# Patient Record
Sex: FEMALE | Race: WHITE | NOT HISPANIC OR LATINO | Employment: UNEMPLOYED | URBAN - METROPOLITAN AREA
[De-identification: names, ages, dates, MRNs, and addresses within clinical notes are randomized per-mention and may not be internally consistent; named-entity substitution may affect disease eponyms.]

---

## 2017-01-19 ENCOUNTER — ALLSCRIPTS OFFICE VISIT (OUTPATIENT)
Dept: OTHER | Facility: OTHER | Age: 67
End: 2017-01-19

## 2017-04-20 ENCOUNTER — ALLSCRIPTS OFFICE VISIT (OUTPATIENT)
Dept: OTHER | Facility: OTHER | Age: 67
End: 2017-04-20

## 2017-04-20 DIAGNOSIS — E55.9 VITAMIN D DEFICIENCY: ICD-10-CM

## 2017-04-20 DIAGNOSIS — I10 ESSENTIAL (PRIMARY) HYPERTENSION: ICD-10-CM

## 2017-04-20 DIAGNOSIS — Z78.0 ASYMPTOMATIC MENOPAUSAL STATE: ICD-10-CM

## 2017-04-20 DIAGNOSIS — R73.9 HYPERGLYCEMIA: ICD-10-CM

## 2017-04-20 DIAGNOSIS — E78.5 HYPERLIPIDEMIA: ICD-10-CM

## 2017-04-20 LAB
BILIRUB UR QL STRIP: NEGATIVE
CLARITY UR: NORMAL
COLOR UR: YELLOW
GLUCOSE (HISTORICAL): NEGATIVE
HGB UR QL STRIP.AUTO: NEGATIVE
KETONES UR STRIP-MCNC: NEGATIVE MG/DL
LEUKOCYTE ESTERASE UR QL STRIP: NEGATIVE
NITRITE UR QL STRIP: NEGATIVE
PH UR STRIP.AUTO: 6.5 [PH]
PROT UR STRIP-MCNC: NEGATIVE MG/DL
SP GR UR STRIP.AUTO: 1.01
UROBILINOGEN UR QL STRIP.AUTO: 0.2

## 2017-04-20 PROCEDURE — G0145 SCR C/V CYTO,THINLAYER,RESCR: HCPCS | Performed by: FAMILY MEDICINE

## 2017-04-20 PROCEDURE — 87624 HPV HI-RISK TYP POOLED RSLT: CPT | Performed by: FAMILY MEDICINE

## 2017-04-21 ENCOUNTER — LAB REQUISITION (OUTPATIENT)
Dept: LAB | Facility: HOSPITAL | Age: 67
End: 2017-04-21
Payer: COMMERCIAL

## 2017-04-21 DIAGNOSIS — Z01.419 ENCOUNTER FOR GYNECOLOGICAL EXAMINATION WITHOUT ABNORMAL FINDING: ICD-10-CM

## 2017-04-25 LAB — HPV RRNA GENITAL QL NAA+PROBE: NORMAL

## 2017-04-26 LAB
LAB AP GYN PRIMARY INTERPRETATION: NORMAL
Lab: NORMAL

## 2017-04-28 ENCOUNTER — ALLSCRIPTS OFFICE VISIT (OUTPATIENT)
Dept: OTHER | Facility: OTHER | Age: 67
End: 2017-04-28

## 2017-05-10 ENCOUNTER — ALLSCRIPTS OFFICE VISIT (OUTPATIENT)
Dept: OTHER | Facility: OTHER | Age: 67
End: 2017-05-10

## 2017-05-15 ENCOUNTER — GENERIC CONVERSION - ENCOUNTER (OUTPATIENT)
Dept: OTHER | Facility: OTHER | Age: 67
End: 2017-05-15

## 2017-05-25 ENCOUNTER — ALLSCRIPTS OFFICE VISIT (OUTPATIENT)
Dept: OTHER | Facility: OTHER | Age: 67
End: 2017-05-25

## 2017-06-09 ENCOUNTER — ALLSCRIPTS OFFICE VISIT (OUTPATIENT)
Dept: OTHER | Facility: OTHER | Age: 67
End: 2017-06-09

## 2017-10-19 ENCOUNTER — ALLSCRIPTS OFFICE VISIT (OUTPATIENT)
Dept: OTHER | Facility: OTHER | Age: 67
End: 2017-10-19

## 2017-10-19 DIAGNOSIS — Z12.39 ENCOUNTER FOR OTHER SCREENING FOR MALIGNANT NEOPLASM OF BREAST: ICD-10-CM

## 2017-10-19 DIAGNOSIS — R73.9 HYPERGLYCEMIA: ICD-10-CM

## 2017-10-19 DIAGNOSIS — I10 ESSENTIAL (PRIMARY) HYPERTENSION: ICD-10-CM

## 2017-10-30 ENCOUNTER — GENERIC CONVERSION - ENCOUNTER (OUTPATIENT)
Dept: OTHER | Facility: OTHER | Age: 67
End: 2017-10-30

## 2017-12-06 ENCOUNTER — ALLSCRIPTS OFFICE VISIT (OUTPATIENT)
Dept: OTHER | Facility: OTHER | Age: 67
End: 2017-12-06

## 2017-12-06 DIAGNOSIS — M79.661 PAIN OF RIGHT LOWER LEG: ICD-10-CM

## 2017-12-06 DIAGNOSIS — R60.0 LOCALIZED EDEMA: ICD-10-CM

## 2017-12-06 DIAGNOSIS — M54.50 LOW BACK PAIN: ICD-10-CM

## 2017-12-06 DIAGNOSIS — R74.8 ABNORMAL LEVELS OF OTHER SERUM ENZYMES: ICD-10-CM

## 2017-12-22 NOTE — PROGRESS NOTES
Assessment   1  Right calf pain (729 5) (M79 661)   2  Lower leg edema (782 3) (R60 0)   3  Elevated liver enzymes (790 5) (R74 8)   4  Benign essential hypertension (401 1) (I10)   5  Low back pain radiating to left leg (724 2) (M54 5)   6  Pain of left calf (729 5) (M79 662)    Plan    Elevated liver enzymes    · * US ABDOMEN COMPLETE; Status:Hold For - Scheduling; Requested for:96Gcn3829;    · US LIVER; Status:Canceled - Scheduling; Lower leg edema    · HydroCHLOROthiazide 12 5 MG Oral Tablet; Take 1 tablet daily  Lower leg edema, Pain of left calf    · VAS LOWER LIMB VENOUS DUPLEX STUDY, COMPLETE BILATERAL; Status:Hold For    - Scheduling; Requested for:05Qqq8552;   PMH: Screening for colon cancer    · COLONOSCOPY; Status:Temporary Deferral - Pt refuses;    12/6/2018      * MRI LUMBAR SPINE WO CONTRAST; Status:Need Information - Financial Authorization; Requested for:[id number];      Perform:[facility]; Order Comments:Lumbar degenerative disc disease radiating into left buttock ; Due:82Jua6208; Ordered; For:Low back pain radiating to left leg; Ordered By:Lesly Ramirez;        Discussion/Summary      Patient is a 78year old female with multiple issues today  Most urgent is her left calf pain and swelling  I am going to send her for a stat venous duplex to rule out DVT  Patient is also with elevated liver enzymes on previous blood work and I am ordering a liver ultrasound  also is with back pain radiating into her left buttock  Patient does have a history of degenerative disc disease but I do not see a recent MRI in her chart and I would like to refer her to pain management  I will try to get the MRI proved to insurance  Chief Complaint   PT complains of her legs and ankle's being swollen for the last two days      History of Present Illness   HPI: Patient has tightness in left calf and right ankle is with swelling  she has increased back pain into left buttock        Review of Systems Constitutional: No fever, no chills, feels well, no tiredness, no recent weight gain or loss  Cardiovascular: no complaints of slow or fast heart rate, no chest pain, no palpitations, no leg claudication or lower extremity edema  Respiratory: no complaints of shortness of breath, no wheezing, no dyspnea on exertion, no orthopnea or PND  Gastrointestinal: no complaints of abdominal pain, no constipation, no nausea or diarrhea, no vomiting, no bloody stools  Musculoskeletal: limb pain, but-- as noted in HPI  Integumentary: no complaints of skin rash or lesion, no itching or dry skin, no skin wounds  Neurological: no complaints of headache, no confusion, no numbness or tingling, no dizziness or fainting  Active Problems   1  Abnormal CAT scan (793 99) (R93 8)   2  Asymptomatic age-related postmenopausal state (V49 81) (Z78 0)   3  Atherosclerotic plaque (440 9) (I70 90)   4  Benign essential hypertension (401 1) (I10)   5  Benign paroxysmal positional vertigo, unspecified laterality (386 11) (H81 10)   6  Changing skin lesion (709 9) (L98 9)   7  Elevated blood sugar (790 29) (R73 9)   8  Hyperlipidemia (272 4) (E78 5)   9  Multiple gallstones (574 20) (K80 20)   10  Vitamin D deficiency (268 9) (E55 9)    Past Medical History   1  History of Acute bacterial conjunctivitis of left eye (372 03) (H10 32)   2  History of Bilateral acute serous otitis media, recurrence not specified (381 01) (H65 03)   3  History of Cellulitis (682 9) (L03 90)   4  History of Chest pressure (786 59) (R07 89)   5  History of Compression fracture of lumbar vertebra (805 4) (S32 000A)   6  History of Dysuria (788 1) (R30 0)   7  History of Dysuria (788 1) (R30 0)   8  History of Encounter for screening colonoscopy (V76 51) (Z12 11)   9  History of Encounter for screening mammogram for malignant neoplasm of breast     (V76 12) (Z12 31)   10   History of Encounter for screening mammogram for malignant neoplasm of breast      (V76 12) (Z12 31)   11  History of acute bacterial sinusitis (V12 69) (Z87 09)   12  History of acute bronchitis (V12 69) (Z87 09)   13  History of allergic rhinitis (V12 69) (Z87 09)   14  History of chest pain (V13 89) (Z87 898)   15  History of cholelithiasis (V12 79) (Z87 19)   16  History of dermatitis (V13 3) (Z87 2)   17  History of dizziness (V13 89) (Z87 898)   18  History of low back pain (V13 59) (Z87 39)   19  History of otitis media (V12 49) (Z86 69)   20  History of sinusitis (V12 69) (Z87 09)   21  History of Lump of skin (782 2) (R22 9)   22  History of Screening for colon cancer (V76 51) (Z12 11)   23  History of Screening for depression (V79 0) (Z13 89)   24  History of Visit for routine gyn exam (V72 31) (Z01 419)   25  History of Visit For:   32  History of Well woman exam (V72 31) (Z01 419)  Active Problems And Past Medical History Reviewed: The active problems and past medical history were reviewed and updated today  Family History   Mother    1  Family history of Hypertension  Father    2  Family history of Liver cancer   3  Family history of Stomach cancer    Social History    · Never A Smoker  The social history was reviewed and updated today  The social history was reviewed and is unchanged  Current Meds    1  B-6 100 MG Oral Tablet; TAKE 1 TABLET DAILY AS DIRECTED; Therapy: 66QVU1463 to Recorded   2  Budesonide 32 MCG/ACT Nasal Suspension; USE 2 SPRAYS IN EACH NOSTRIL ONCE     DAILY; Therapy: 76VKS8358 to (Last NH:71DCS9763)  Requested for: 39EMO2241 Ordered   3  Jublia 10 % External Solution; Apply to toenail; Therapy: 65KXN6517 to (Last Rx:68Qpi0948)  Requested for: 99Xxu3854 Ordered   4  Metoprolol Tartrate 100 MG Oral Tablet; Take 1 tablet by mouth  daily; Therapy: 80ETU5529 to (Danielle Ford)  Requested for: 19YYU0670; Last     Rx:75Djx9041 Ordered   5  Multivitamins Oral Capsule; TAKE 1 CAPSULE DAILY;      Therapy: 07LEE6069 to Recorded   6  PriLOSEC 20 MG CPDR; TAKE 1 CAPSULE Daily Recorded   7  Ursodiol 300 MG Oral Capsule; TAKE 1 CAPSULE BY MOUTH  TWICE DAILY; Therapy: 43FZN1920 to (Twilla Hard)  Requested for: 38ZYE4507; Last     Rx:00Pok2470 Ordered   8  Valsartan 160 MG Oral Tablet; take 1 tablet by mouth once daily; Therapy: 22Voq7000 to (Evaluate:20Nuu0943)  Requested for: 48YMZ0287; Last     Rx:01Gwg7274 Ordered   9  Vitamin D3 3000 UNIT Oral Tablet; Therapy: 00QOU9115 to Recorded   10  Vitamin E 200 UNIT Oral Tablet; Take 1 tablet daily; Therapy: 65WNZ4601 to Recorded   11  ZyrTEC Allergy 10 MG Oral Tablet; take 1 tablet daily as needed Recorded     The medication list was reviewed and updated today  Allergies   1  Bactrim DS TABS   2  Bentyl TABS   3  Erythromycin TABS   4  Zithromax TABS    Vitals    Recorded: [id number] 02:05PM   Temperature 97 F, Tympanic   Heart Rate 71   Pulse Quality Normal   Respiration Quality Normal   Respiration 17   Systolic 514, LUE, Sitting   Diastolic 80, LUE, Sitting   Height 5 ft 0 5 in   Weight 255 lb 2 oz   BMI Calculated 49 01   BSA Calculated 2 08   O2 Saturation 99   Pain Scale 0     Physical Exam        Pulmonary      Respiratory effort: No increased work of breathing or signs of respiratory distress  Auscultation of lungs: Clear to auscultation  Cardiovascular      Auscultation of heart: Normal rate and rhythm, normal S1 and S2, without murmurs  Examination of extremities for edema and/or varicosities: Normal        Lymphatic      Palpation of lymph nodes in neck: No lymphadenopathy  Musculoskeletal      Gait and station: Abnormal   Gait evaluation demonstrated limping on the right-- and-- limping on the left  -- Left calf with firmness  Right ankel with swelling           Signatures    Electronically signed by : Jadon German DO; Dec 21 2017  4:44PM EST                       (Author)

## 2018-01-12 VITALS
SYSTOLIC BLOOD PRESSURE: 146 MMHG | DIASTOLIC BLOOD PRESSURE: 90 MMHG | TEMPERATURE: 98.4 F | HEART RATE: 82 BPM | OXYGEN SATURATION: 99 % | HEIGHT: 62 IN | BODY MASS INDEX: 44.35 KG/M2 | WEIGHT: 241 LBS

## 2018-01-13 VITALS
TEMPERATURE: 97.3 F | HEIGHT: 62 IN | OXYGEN SATURATION: 96 % | BODY MASS INDEX: 46.05 KG/M2 | WEIGHT: 250.25 LBS | HEART RATE: 77 BPM | SYSTOLIC BLOOD PRESSURE: 158 MMHG | DIASTOLIC BLOOD PRESSURE: 92 MMHG

## 2018-01-14 VITALS
OXYGEN SATURATION: 97 % | TEMPERATURE: 97.1 F | WEIGHT: 243.44 LBS | HEIGHT: 62 IN | SYSTOLIC BLOOD PRESSURE: 152 MMHG | BODY MASS INDEX: 44.8 KG/M2 | DIASTOLIC BLOOD PRESSURE: 98 MMHG | HEART RATE: 78 BPM

## 2018-01-14 VITALS
SYSTOLIC BLOOD PRESSURE: 140 MMHG | OXYGEN SATURATION: 97 % | HEART RATE: 75 BPM | BODY MASS INDEX: 45.46 KG/M2 | RESPIRATION RATE: 16 BRPM | DIASTOLIC BLOOD PRESSURE: 82 MMHG | WEIGHT: 248.56 LBS | TEMPERATURE: 98.2 F

## 2018-01-14 VITALS
HEART RATE: 73 BPM | WEIGHT: 249.5 LBS | TEMPERATURE: 98.4 F | DIASTOLIC BLOOD PRESSURE: 92 MMHG | OXYGEN SATURATION: 98 % | HEIGHT: 62 IN | SYSTOLIC BLOOD PRESSURE: 142 MMHG | BODY MASS INDEX: 45.91 KG/M2

## 2018-01-14 VITALS
HEART RATE: 77 BPM | WEIGHT: 250 LBS | OXYGEN SATURATION: 97 % | BODY MASS INDEX: 46.01 KG/M2 | DIASTOLIC BLOOD PRESSURE: 90 MMHG | SYSTOLIC BLOOD PRESSURE: 150 MMHG | HEIGHT: 62 IN | TEMPERATURE: 99 F

## 2018-01-16 NOTE — PROGRESS NOTES
Assessment    1  Well adult exam (V70 0) (Z00 00)   2  Need for influenza vaccination (V04 81) (Z23)    Plan  Asymptomatic age-related postmenopausal state    · * DXA BONE DENSITY SPINE HIP AND PELVIS; Status:Active; Requested  CDR:36NUI7811;   Benign essential hypertension, Elevated blood sugar    · (1) CBC/PLT/DIFF; Status:Active; Requested SHW:99XBH0273;    · (1) COMPREHENSIVE METABOLIC PANEL; Status:Active; Requested [id number];    · (1) HEMOGLOBIN A1C; Status:Active; Requested for:2017;    · (1) LIPID PANEL FASTING W DIRECT LDL REFLEX; Status:Active; Requested  for:2017;    · (1) TSH WITH FT4 REFLEX; Status:Active; Requested for:2017;   Benign essential hypertension, Elevated blood sugar, Hyperlipidemia, Vitamin D  deficiency    · Valsartan 160 MG Oral Tablet; take 1 tablet by mouth once daily  Need for influenza vaccination    · Fluzone High-Dose 0 5 ML Intramuscular Suspension Prefilled Syringe    Discussion/Summary  health maintenance visit Currently, she eats an adequate diet and has an inadequate exercise regimen  cervical cancer screening is current Breast cancer screening: mammogram has been ordered  Colorectal cancer screening: colorectal cancer screening is current  Screening lab work includes hemoglobin, glucose, lipid profile and thyroid function testing  The immunizations are up to date  Hepatitis C Screening: the patient was counseled on Hepatitis C screening  Patient is a 71-year-old female seen for a well visit  She is due for mammogram and DEXA  I am giving her an RX for Fasting blood work  She will have high dose flu shot today  Possible side effects of new medications were reviewed with the patient/guardian today  The treatment plan was reviewed with the patient/guardian   The patient/guardian understands and agrees with the treatment plan      Chief Complaint  Patient presents for a physical exam  She stated that she usually does not get a flu vaccine, but she would like to discuss the high dose flu shot today  History of Present Illness  HM, Adult Female: The patient is being seen for a health maintenance evaluation  General Health: She has regular dental visits  She denies vision problems  Immunizations status: not up to date   Patient has refused up until this time  Screening:   HPI: Patient is here for wellness visit  /84 at home today  Review of Systems    Constitutional: No fever, no chills, feels well, no tiredness, no recent weight gain or weight loss  Cardiovascular: No complaints of slow heart rate, no fast heart rate, no chest pain, no palpitations, no leg claudication, no lower extremity edema  Respiratory: No complaints of shortness of breath, no wheezing, no cough, no SOB on exertion, no orthopnea, no PND  Gastrointestinal: No complaints of abdominal pain, no constipation, no nausea or vomiting, no diarrhea, no bloody stools  Genitourinary: No complaints of dysuria, no incontinence, no pelvic pain, no dysmenorrhea, no vaginal discharge or bleeding  Musculoskeletal: No complaints of arthralgias, no myalgias, no joint swelling or stiffness, no limb pain or swelling  Integumentary: stasis changes  Active Problems    1  Abnormal CAT scan (793 99) (R93 8)   2  Asymptomatic age-related postmenopausal state (V49 81) (Z78 0)   3  Atherosclerotic plaque (440 9) (I70 90)   4  Benign essential hypertension (401 1) (I10)   5  Benign paroxysmal positional vertigo, unspecified laterality (386 11) (H81 10)   6  Changing skin lesion (709 9) (L98 9)   7  Chronic sinusitis, unspecified location (473 9) (J32 9)   8  Cough (786 2) (R05)   9  Elevated blood sugar (790 29) (R73 9)   10  Encounter for other screening for malignant neoplasm of breast (V76 19) (Z12 39)   11  Hyperlipidemia (272 4) (E78 5)   12  Multiple gallstones (574 20) (K80 20)   13  Tinea of nail (110 1) (B35 1)   14   Vitamin D deficiency (268 9) (E55 9)    Past Medical History · History of Acute bacterial conjunctivitis of left eye (372 03) (H10 32)   · History of Bilateral acute serous otitis media, recurrence not specified (381 01) (H65 03)   · History of Cellulitis (682 9) (L03 90)   · History of Chest pressure (786 59) (R07 89)   · History of Compression fracture of lumbar vertebra (805 4) (S32 000A)   · History of Dysuria (788 1) (R30 0)   · History of Dysuria (788 1) (R30 0)   · History of Encounter for screening colonoscopy (V76 51) (Z12 11)   · History of Encounter for screening mammogram for malignant neoplasm of breast  (V76 12) (Z12 31)   · History of Encounter for screening mammogram for malignant neoplasm of breast  (V76 12) (Z12 31)   · History of acute bacterial sinusitis (V12 69) (Z87 09)   · History of acute bronchitis (V12 69) (Z87 09)   · History of allergic rhinitis (V12 69) (Z87 09)   · History of chest pain (V13 89) (V54 240)   · History of cholelithiasis (V12 79) (Z87 19)   · History of dermatitis (V13 3) (Z87 2)   · History of dizziness (V13 89) (Z87 898)   · History of low back pain (V13 59) (Z87 39)   · History of otitis media (V12 49) (Z86 69)   · History of sinusitis (V12 69) (Z87 09)   · History of Lump of skin (782 2) (R22 9)   · History of Screening for colon cancer (V76 51) (Z12 11)   · History of Screening for depression (V79 0) (Z13 89)   · History of Visit for routine gyn exam (V72 31) (Z01 419)   · History of Visit For:   · History of Well woman exam (V72 31) (Z01 419)    Family History  Mother    · Family history of Hypertension  Father    · Family history of Liver cancer   · Family history of Stomach cancer    Social History    · Never A Smoker    Current Meds   1  B-6 100 MG Oral Tablet; TAKE 1 TABLET DAILY AS DIRECTED; Therapy: 32MXI1394 to Recorded   2  Budesonide 32 MCG/ACT Nasal Suspension; USE 2 SPRAYS IN EACH NOSTRIL ONCE   DAILY; Therapy: 55LDR3616 to (Last OH:11XGZ2394)  Requested for: 75RII4750 Ordered   3   Jublia 10 % External Solution; Apply to toenail; Therapy: 89KKP8419 to (Last Rx:21Apr2016)  Requested for: 81Ywc6126 Ordered   4  Metoprolol Tartrate 100 MG Oral Tablet; Take 1 tablet by mouth  daily; Therapy: 92OVH5303 to (Bella Vazqueztorius)  Requested for: 43JGJ4435; Last   Rx:87Cjd9905 Ordered   5  Multivitamins Oral Capsule; TAKE 1 CAPSULE DAILY; Therapy: 97MSR3132 to Recorded   6  PriLOSEC 20 MG CPDR; TAKE 1 CAPSULE Daily Recorded   7  Ursodiol 300 MG Oral Capsule; TAKE 1 CAPSULE BY MOUTH  TWICE DAILY; Therapy: 10NUD7514 to (Bella Vazqueztorius)  Requested for: 91EZL8728; Last   Rx:83Kae8202 Ordered   8  Valsartan 160 MG Oral Tablet; take 1 tablet by mouth once daily; Therapy: 54Yap3193 to (Evaluate:06Nov2017)  Requested for: 61VDX8737; Last   Rx:02Mou6670 Ordered   9  Vitamin D3 3000 UNIT Oral Tablet; Therapy: 69IFZ0399 to Recorded   10  Vitamin E 200 UNIT Oral Tablet; Take 1 tablet daily; Therapy: 18MGH5055 to Recorded   11  ZyrTEC Allergy 10 MG Oral Tablet; take 1 tablet daily as needed Recorded    Allergies    1  Bactrim DS TABS   2  Bentyl TABS   3  Erythromycin TABS   4  Zithromax TABS    Vitals   Recorded: 61HKI3846 12:19PM   Temperature 97 5 F, Tympanic   Heart Rate 90   Pulse Quality Normal   Systolic 682, LUE, Sitting   Diastolic 90, LUE, Sitting   BP CUFF SIZE Large   Height 5 ft 2 in   Weight 252 lb 4 oz   BMI Calculated 46 14   BSA Calculated 2 11   O2 Saturation 96, RA     Physical Exam    Constitutional   General appearance: No acute distress, well appearing and well nourished  Head and Face   Head and face: Normal     Eyes   Conjunctiva and lids: No swelling, erythema or discharge  Pupils and irises: Equal, round, reactive to light  Ears, Nose, Mouth, and Throat   External inspection of ears and nose: Normal     Otoscopic examination: Tympanic membranes translucent with normal light reflex  Canals patent without erythema  Oropharynx: Normal with no erythema, edema, exudate or lesions  Neck   Neck: Supple, symmetric, trachea midline, no masses  Thyroid: Normal, no thyromegaly  Pulmonary   Respiratory effort: No increased work of breathing or signs of respiratory distress  Auscultation of lungs: Clear to auscultation  Cardiovascular   Auscultation of heart: Normal rate and rhythm, normal S1 and S2, no murmurs  Carotid pulses: 2+ bilaterally  Pedal pulses: 2+ bilaterally  Abdomen   Abdomen: Abnormal   The abdomen was obese  The abdomen was soft and nontender  Lymphatic   Palpation of lymph nodes in neck: No lymphadenopathy  Musculoskeletal   Gait and station: Normal     Neurologic   Reflexes: 2+ and symmetric  Sensation: No sensory loss  Psychiatric   Orientation to person, place, and time: Normal     Mood and affect: Normal        Health Management  History of Encounter for screening mammogram for malignant neoplasm of breast   Digital Bilateral Screening Mammogram With CAD; every 1 year; Last 62QVM4428; Next  Due: 60MDO6166; Overdue  * MAMMO SCREENING BILATERAL W CAD; every 1 year; Last 74QNM8756; Next Due:  12GVL2328;  Active    Signatures   Electronically signed by : Jaylene Traylor DO; Nov 7 2017  5:44AM EST                       (Author)

## 2018-01-22 VITALS
DIASTOLIC BLOOD PRESSURE: 90 MMHG | HEIGHT: 62 IN | OXYGEN SATURATION: 96 % | WEIGHT: 252.25 LBS | HEART RATE: 90 BPM | TEMPERATURE: 97.5 F | BODY MASS INDEX: 46.42 KG/M2 | SYSTOLIC BLOOD PRESSURE: 132 MMHG

## 2018-01-23 VITALS
DIASTOLIC BLOOD PRESSURE: 80 MMHG | SYSTOLIC BLOOD PRESSURE: 140 MMHG | HEART RATE: 71 BPM | BODY MASS INDEX: 48.17 KG/M2 | WEIGHT: 255.13 LBS | HEIGHT: 61 IN | OXYGEN SATURATION: 99 % | RESPIRATION RATE: 17 BRPM | TEMPERATURE: 97 F

## 2018-01-23 NOTE — MISCELLANEOUS
Message  I called patient with reading on MRI of her lumbar spine  Which shows multi level degenerative disc disease  And a remote compression fracture  The compression fracture was not present in 2009 but it is not new and we did see this on a CT patient had a couple years ago  I recommended either physical therapy or pain management, none of which patient is keen on doing  She asked if her degenerative disc disease is much worse than 2009 and I told her that I could not tell that from the report  I will try to find that paper report from 2009  She will let me know what she decides about physical therapy or pain management  I also reminded her to go for her ultrasound of the liver  Plan  Elevated liver enzymes    · * US ABDOMEN COMPLETE; Status:Hold For - Scheduling; Requested for:02Taj3694;    · US LIVER; Status:Canceled - Scheduling; Lower leg edema    · HydroCHLOROthiazide 12 5 MG Oral Tablet;  Take 1 tablet daily  Lower leg edema, Pain of left calf    · VAS LOWER LIMB VENOUS DUPLEX STUDY, COMPLETE BILATERAL; Status:Hold For  - Scheduling; Requested for:35Fez0338;   PMH: Screening for colon cancer    · COLONOSCOPY; Status:Temporary Deferral - Pt refuses;    12/6/2018    Signatures   Electronically signed by : Yon Iglesias DO; Dec 21 2017  5:00PM EST                       (Author)

## 2018-02-08 ENCOUNTER — OFFICE VISIT (OUTPATIENT)
Dept: FAMILY MEDICINE CLINIC | Facility: CLINIC | Age: 68
End: 2018-02-08
Payer: COMMERCIAL

## 2018-02-08 VITALS
WEIGHT: 254.7 LBS | RESPIRATION RATE: 18 BRPM | OXYGEN SATURATION: 99 % | SYSTOLIC BLOOD PRESSURE: 130 MMHG | DIASTOLIC BLOOD PRESSURE: 90 MMHG | TEMPERATURE: 97.8 F | HEART RATE: 75 BPM | BODY MASS INDEX: 48.92 KG/M2

## 2018-02-08 DIAGNOSIS — I10 BENIGN ESSENTIAL HYPERTENSION: ICD-10-CM

## 2018-02-08 DIAGNOSIS — K80.20 MULTIPLE GALLSTONES: ICD-10-CM

## 2018-02-08 DIAGNOSIS — J01.40 ACUTE NON-RECURRENT PANSINUSITIS: Primary | ICD-10-CM

## 2018-02-08 DIAGNOSIS — R73.9 ELEVATED BLOOD SUGAR: ICD-10-CM

## 2018-02-08 PROCEDURE — 99213 OFFICE O/P EST LOW 20 MIN: CPT | Performed by: FAMILY MEDICINE

## 2018-02-08 RX ORDER — CEFDINIR 300 MG/1
300 CAPSULE ORAL EVERY 12 HOURS SCHEDULED
Qty: 20 CAPSULE | Refills: 0 | Status: SHIPPED | OUTPATIENT
Start: 2018-02-08 | End: 2018-02-18

## 2018-02-08 RX ORDER — OMEPRAZOLE 20 MG/1
1 CAPSULE, DELAYED RELEASE ORAL DAILY
COMMUNITY
End: 2018-10-18 | Stop reason: SDUPTHER

## 2018-02-08 RX ORDER — HYDROCHLOROTHIAZIDE 12.5 MG/1
1 TABLET ORAL DAILY
COMMUNITY
Start: 2017-12-06 | End: 2018-10-18 | Stop reason: SDUPTHER

## 2018-02-08 RX ORDER — PYRIDOXINE HCL (VITAMIN B6) 100 MG
1 TABLET ORAL DAILY
COMMUNITY
Start: 2014-10-28

## 2018-02-08 RX ORDER — METOPROLOL TARTRATE 100 MG/1
1 TABLET ORAL DAILY
COMMUNITY
Start: 2012-12-05 | End: 2018-02-08 | Stop reason: SDUPTHER

## 2018-02-08 RX ORDER — METOPROLOL TARTRATE 100 MG/1
100 TABLET ORAL DAILY
Qty: 90 TABLET | Refills: 1 | Status: SHIPPED | OUTPATIENT
Start: 2018-02-08 | End: 2018-04-26 | Stop reason: SDUPTHER

## 2018-02-08 RX ORDER — URSODIOL 300 MG/1
1 CAPSULE ORAL 2 TIMES DAILY
COMMUNITY
Start: 2011-01-03 | End: 2018-02-08 | Stop reason: SDUPTHER

## 2018-02-08 RX ORDER — CETIRIZINE HYDROCHLORIDE 10 MG/1
1 TABLET ORAL DAILY PRN
COMMUNITY

## 2018-02-08 RX ORDER — URSODIOL 300 MG/1
300 CAPSULE ORAL 2 TIMES DAILY
Qty: 180 CAPSULE | Refills: 1 | Status: SHIPPED | OUTPATIENT
Start: 2018-02-08 | End: 2018-04-26 | Stop reason: SDUPTHER

## 2018-02-08 RX ORDER — ASCORBIC ACID
1 CRYSTALS ORAL DAILY
COMMUNITY
Start: 2014-10-28

## 2018-02-08 RX ORDER — VALSARTAN 160 MG/1
1 TABLET ORAL DAILY
COMMUNITY
Start: 2017-04-20 | End: 2018-03-16 | Stop reason: SDUPTHER

## 2018-02-08 NOTE — PROGRESS NOTES
Assessment/Plan:  Patient with acute sinusitis - facial pressure,pressure in ears and dizziness  After much discussion regarding antibiotics and review of the od medical record, we do not think she has an allergy to Cefuroxime, but I am prescribing Cefdinir  She did not want Meclizine for the dizziness secondary to side effects  No problem-specific Assessment & Plan notes found for this encounter  Diagnoses and all orders for this visit:    Acute non-recurrent pansinusitis  -     cefdinir (OMNICEF) 300 mg capsule; Take 1 capsule (300 mg total) by mouth every 12 (twelve) hours for 10 days    Benign essential hypertension  -     metoprolol tartrate (LOPRESSOR) 100 mg tablet; Take 1 tablet (100 mg total) by mouth daily for 90 days  -     Comprehensive metabolic panel; Future  -     Lipid Panel with Direct LDL reflex; Future  -     TSH, 3rd generation with T4 reflex; Future  -     CBC and differential; Future    Multiple gallstones  -     ursodiol (ACTIGALL) 300 mg capsule; Take 1 capsule (300 mg total) by mouth 2 (two) times a day    Elevated blood sugar  -     Lipid Panel with Direct LDL reflex; Future  -     TSH, 3rd generation with T4 reflex; Future  -     CBC and differential; Future  -     Hemoglobin A1c; Future    Other orders  -     Pyridoxine HCl (VITAMIN B-6) 100 MG TABS; Take 1 tablet by mouth daily  -     hydrochlorothiazide (HYDRODIURIL) 12 5 mg tablet; Take 1 tablet by mouth daily  -     Discontinue: metoprolol tartrate (LOPRESSOR) 100 mg tablet; Take 1 tablet by mouth daily  -     omeprazole (PRILOSEC) 20 mg delayed release capsule; Take 1 capsule by mouth daily  -     valsartan (DIOVAN) 160 mg tablet; Take 1 tablet by mouth daily  -     Discontinue: ursodiol (ACTIGALL) 300 mg capsule; Take 1 capsule by mouth 2 (two) times a day  -     Cholecalciferol (HM VITAMIN D3) 4000 units CAPS;  Take by mouth  -     Vitamin E 200 units TABS; Take 1 tablet by mouth daily  -     cetirizine (ZYRTEC ALLERGY) 10 mg tablet; Take 1 tablet by mouth daily as needed  -     Efinaconazole (JUBLIA) 10 % SOLN; Apply topically  -     budesonide (RINOCORT AQUA) 32 MCG/ACT nasal spray; 2 sprays into each nostril daily          Vitals:    02/08/18 1506   BP: 130/90   Pulse: 75   Resp: 18   Temp: 97 8 °F (36 6 °C)   SpO2: 99%       Subjective:      Patient ID: Sanford Whitley is a 78 y o  female  Patient is with sinus pressure for a few weeks - frontal area and back of neck  Worst part is the dizziness- pressure in her ears  She has to be careful when she stands up from sitting  The following portions of the patient's history were reviewed and updated as appropriate: allergies, current medications, past family history, past medical history, past social history, past surgical history and problem list     Review of Systems   Constitutional: Negative for chills and fever  HENT: Positive for congestion and sore throat  Respiratory: Positive for cough  Cardiovascular: Negative for chest pain and palpitations  Gastrointestinal: Negative  Genitourinary: Negative  Neurological: Positive for dizziness and headaches  Negative for syncope  Psychiatric/Behavioral: Negative  Objective:     Physical Exam   Constitutional: She appears well-developed and well-nourished  HENT:   Right Ear: Tympanic membrane is injected and scarred  Left Ear: Tympanic membrane is scarred  Nose: Mucosal edema and rhinorrhea present  Right sinus exhibits frontal sinus tenderness  Left sinus exhibits frontal sinus tenderness  Mouth/Throat: Oropharynx is clear and moist and mucous membranes are normal    Nursing note and vitals reviewed

## 2018-03-16 DIAGNOSIS — I10 ESSENTIAL HYPERTENSION: Primary | ICD-10-CM

## 2018-03-16 RX ORDER — VALSARTAN 160 MG/1
160 TABLET ORAL DAILY
Qty: 90 TABLET | Refills: 1 | Status: SHIPPED | OUTPATIENT
Start: 2018-03-16 | End: 2018-04-26 | Stop reason: SDUPTHER

## 2018-04-07 ENCOUNTER — OFFICE VISIT (OUTPATIENT)
Dept: FAMILY MEDICINE CLINIC | Facility: CLINIC | Age: 68
End: 2018-04-07
Payer: COMMERCIAL

## 2018-04-07 VITALS
WEIGHT: 258 LBS | SYSTOLIC BLOOD PRESSURE: 138 MMHG | HEART RATE: 74 BPM | TEMPERATURE: 99 F | HEIGHT: 61 IN | RESPIRATION RATE: 16 BRPM | BODY MASS INDEX: 48.71 KG/M2 | OXYGEN SATURATION: 99 % | DIASTOLIC BLOOD PRESSURE: 82 MMHG

## 2018-04-07 DIAGNOSIS — R42 DIZZINESS: Primary | ICD-10-CM

## 2018-04-07 PROCEDURE — 99213 OFFICE O/P EST LOW 20 MIN: CPT | Performed by: FAMILY MEDICINE

## 2018-04-07 NOTE — PROGRESS NOTES
Assessment/Plan:   1  Dizziness  Reviewed patient's symptoms with her  Her exam was benign today  It does not appear to be any sign of otitis media  She was advised to continue with supportive care  Maintain hydration  Take over-the-counter Mucinex for symptom relief  She may also start using her fluticasone nasal spray  If any symptoms should persist or worsen, consider short course of oral steroids  She was offered meclizine today however it does not appear that this will be beneficial for her  There are no diagnoses linked to this encounter  Subjective:    Chief Complaint   Patient presents with    Ear Fullness     Patient presents for dizziness and pressure in both ears x a couple days  Patient ID: Melody Arana is a 78 y o  female  Patient is a 52-year-old female presents today with CC of dizziness  She states that she periodically develops the symptoms  Her symptoms only last for short period of time  She mainly notices the symptoms upon position change when she stands up as well as when she turns her head  She denies any loss of consciousness  She states that she previously had similar symptoms and did have a urine infection  She currently has not been taking anything for her current symptoms  Review of Systems   Constitutional: Negative for activity change, chills, fatigue and fever  HENT: Negative for congestion, ear pain, sinus pressure and sore throat  Eyes: Negative for redness, itching and visual disturbance  Respiratory: Negative for cough and shortness of breath  Cardiovascular: Negative for chest pain and palpitations  Gastrointestinal: Negative for abdominal pain, diarrhea and nausea  Endocrine: Negative for cold intolerance and heat intolerance  Genitourinary: Negative for dysuria, flank pain and frequency  Musculoskeletal: Negative for arthralgias, back pain, gait problem and myalgias  Skin: Negative for color change  Allergic/Immunologic: Negative for environmental allergies  Neurological: Negative for dizziness, numbness and headaches  Psychiatric/Behavioral: Negative for behavioral problems and sleep disturbance  The following portions of the patient's history were reviewed and updated as appropriate : past family history, past medical history, past social history and past surgical history  Objective:    Vitals:    04/07/18 1233   BP: 138/82   BP Location: Left arm   Patient Position: Sitting   Cuff Size: Standard   Pulse: 74   Resp: 16   Temp: 99 °F (37 2 °C)   TempSrc: Tympanic   SpO2: 99%   Weight: 129 kg (285 lb 1 6 oz)   Height: 5' 1 02" (1 55 m)        Physical Exam   Constitutional: She is oriented to person, place, and time  She appears well-developed and well-nourished  HENT:   Head: Normocephalic and atraumatic  Nose: Nose normal    Mouth/Throat: No oropharyngeal exudate  Eyes: Pupils are equal, round, and reactive to light  Right eye exhibits no discharge  Left eye exhibits no discharge  Neck: Normal range of motion  Neck supple  No tracheal deviation present  Cardiovascular: Normal rate, regular rhythm and intact distal pulses  Exam reveals no gallop and no friction rub  No murmur heard  Pulses:       Carotid pulses are 2+ on the right side, and 2+ on the left side  Pulmonary/Chest: Effort normal and breath sounds normal  No respiratory distress  She has no wheezes  She has no rales  Abdominal: Soft  Bowel sounds are normal  She exhibits no distension  There is no tenderness  There is no rebound and no guarding  Musculoskeletal: Normal range of motion  She exhibits no edema  Lymphadenopathy:        Head (right side): No submental and no submandibular adenopathy present  Head (left side): No submental and no submandibular adenopathy present  She has no cervical adenopathy          Right cervical: No superficial cervical, no deep cervical and no posterior cervical adenopathy present  Left cervical: No superficial cervical, no deep cervical and no posterior cervical adenopathy present  Neurological: She is alert and oriented to person, place, and time  No cranial nerve deficit or sensory deficit  Skin: Skin is warm, dry and intact  Psychiatric: Her speech is normal and behavior is normal  Judgment normal  Her mood appears not anxious  Cognition and memory are normal  She does not exhibit a depressed mood  Vitals reviewed

## 2018-04-11 ENCOUNTER — OFFICE VISIT (OUTPATIENT)
Dept: FAMILY MEDICINE CLINIC | Facility: CLINIC | Age: 68
End: 2018-04-11
Payer: COMMERCIAL

## 2018-04-11 ENCOUNTER — TELEPHONE (OUTPATIENT)
Dept: FAMILY MEDICINE CLINIC | Facility: CLINIC | Age: 68
End: 2018-04-11

## 2018-04-11 VITALS
TEMPERATURE: 97.6 F | WEIGHT: 258.2 LBS | BODY MASS INDEX: 47.52 KG/M2 | HEART RATE: 73 BPM | HEIGHT: 62 IN | DIASTOLIC BLOOD PRESSURE: 80 MMHG | SYSTOLIC BLOOD PRESSURE: 132 MMHG | OXYGEN SATURATION: 99 % | RESPIRATION RATE: 17 BRPM

## 2018-04-11 DIAGNOSIS — Z12.11 SCREENING FOR COLON CANCER: Primary | ICD-10-CM

## 2018-04-11 DIAGNOSIS — R42 VERTIGO: ICD-10-CM

## 2018-04-11 DIAGNOSIS — I10 BENIGN ESSENTIAL HYPERTENSION: ICD-10-CM

## 2018-04-11 PROCEDURE — 99214 OFFICE O/P EST MOD 30 MIN: CPT | Performed by: FAMILY MEDICINE

## 2018-04-11 RX ORDER — GUAIFENESIN 600 MG
600 TABLET, EXTENDED RELEASE 12 HR ORAL EVERY 12 HOURS SCHEDULED
COMMUNITY

## 2018-04-11 NOTE — PROGRESS NOTES
Assessment/Plan:  Patient with dizziness, seen by ENT and no ear pathology noted  She was given an RX for an MRI  I agreed this was appropriate and also ordered an echo and Holter  She wants to have those at [facility]  She has an Rx for fasting blood work to have done before her next appointment  Patient is very anxious about her condition  Diagnoses and all orders for this visit:    Screening for colon cancer  -     Ambulatory referral to Gastroenterology; Future    Vertigo  -     Echo complete with contrast if indicated; Future  -     Holter monitor - 24 hour; Future    Benign essential hypertension    Other orders  -     guaiFENesin (MUCINEX) 600 mg 12 hr tablet; Take 600 mg by mouth every 12 (twelve) hours          Subjective:      Patient ID: Frandy Collins is a 78 y o  female  Patient is here for follow up of ear pressure  No crackling  Some sinus congestion  She was here on Saturday and saw Dr Gurpreet Blevins and was told her exam was normal  Then she went to see Dr Yocasta Christianson and saw the PA - they also saw no problems on her exam and was told that she had no pulse  The following portions of the patient's history were reviewed and updated as appropriate: allergies, current medications, past family history, past medical history, past social history, past surgical history and problem list     Review of Systems   Constitutional: Negative  HENT: Positive for sinus pressure  Respiratory: Negative  Cardiovascular: Positive for leg swelling  Gastrointestinal: Negative  Neurological: Positive for dizziness  Psychiatric/Behavioral: The patient is nervous/anxious  Objective:      /80 (BP Location: Right arm, Patient Position: Sitting, Cuff Size: Adult)   Pulse 73   Temp 97 6 °F (36 4 °C) (Tympanic)   Resp 17   Ht 5' 2" (1 575 m)   Wt 117 kg (258 lb 3 2 oz)   SpO2 99%   BMI 47 23 kg/m²          Physical Exam   Constitutional: She is oriented to person, place, and time  She appears well-developed and well-nourished  Obese  HENT:   Right Ear: Tympanic membrane normal    Left Ear: Tympanic membrane normal    Mouth/Throat: Posterior oropharyngeal erythema present  Cardiovascular: Normal rate, regular rhythm and normal heart sounds  Pulmonary/Chest: Effort normal and breath sounds normal    Musculoskeletal: She exhibits edema (Slight )  Neurological: She is alert and oriented to person, place, and time  Skin: Skin is warm and dry  Psychiatric: She has a normal mood and affect  Nursing note and vitals reviewed

## 2018-04-19 ENCOUNTER — TELEPHONE (OUTPATIENT)
Dept: FAMILY MEDICINE CLINIC | Facility: CLINIC | Age: 68
End: 2018-04-19

## 2018-04-19 NOTE — TELEPHONE ENCOUNTER
Pt came into the office and stated that she got the results of her MRI results back and she is very worried she does not know what it all means  She did have a copy sent to you, but I stated to the pt to please bring one in for your chart tomorrow  Pt the stated to me the results of her MRI was SM Vessel Disease, lack of oxygen  Pt asked if you would please call her when you have a moment  Thank you!

## 2018-04-20 NOTE — TELEPHONE ENCOUNTER
I spoke to patient last evening  Her MRI result was in the Care Everywhere section  We had not received a FAX - we didn  It showed  Subtle changes in the white matter, mild small vessel disease  I told her that this is most likely related to her hypertension and is important to get this under better control  She should take a baby aspirin daily  The MRI was just resulted yesterday afternoon at 2:56 p m  I had not called her about her blood work since she had an upcoming appointment and we would be discussing the results  She was very upset, as she had called the ear nose and throat doctor's office who had prescribed the MRI and she was told about the small vessel disease but did not understand the implications of this  I told her that I was not sure how to print the results of these tests out from the Care everywhere section but I would have to ask someone in the office to do that today  She should still have her echocardiogram and Holter monitor

## 2018-04-26 ENCOUNTER — ANNUAL EXAM (OUTPATIENT)
Dept: FAMILY MEDICINE CLINIC | Facility: CLINIC | Age: 68
End: 2018-04-26
Payer: COMMERCIAL

## 2018-04-26 VITALS
OXYGEN SATURATION: 99 % | TEMPERATURE: 97.6 F | HEIGHT: 62 IN | HEART RATE: 75 BPM | RESPIRATION RATE: 20 BRPM | SYSTOLIC BLOOD PRESSURE: 160 MMHG | DIASTOLIC BLOOD PRESSURE: 80 MMHG | BODY MASS INDEX: 47.22 KG/M2 | WEIGHT: 256.6 LBS

## 2018-04-26 DIAGNOSIS — K80.20 MULTIPLE GALLSTONES: ICD-10-CM

## 2018-04-26 DIAGNOSIS — I10 BENIGN ESSENTIAL HYPERTENSION: ICD-10-CM

## 2018-04-26 DIAGNOSIS — M51.36 DISC DEGENERATION, LUMBAR: ICD-10-CM

## 2018-04-26 DIAGNOSIS — Z01.419 WELL WOMAN EXAM: Primary | ICD-10-CM

## 2018-04-26 DIAGNOSIS — M47.816 LUMBAR SPONDYLOSIS: ICD-10-CM

## 2018-04-26 DIAGNOSIS — I10 ESSENTIAL HYPERTENSION: ICD-10-CM

## 2018-04-26 PROBLEM — R74.8 ELEVATED LIVER ENZYMES: Status: ACTIVE | Noted: 2017-12-06

## 2018-04-26 LAB
SL AMB  POCT GLUCOSE, UA: NEGATIVE
SL AMB LEUKOCYTE ESTERASE,UA: NEGATIVE
SL AMB POCT BILIRUBIN,UA: NEGATIVE
SL AMB POCT BLOOD,UA: NORMAL
SL AMB POCT CLARITY,UA: CLEAR
SL AMB POCT COLOR,UA: YELLOW
SL AMB POCT KETONES,UA: NEGATIVE
SL AMB POCT NITRITE,UA: NEGATIVE
SL AMB POCT PH,UA: 6
SL AMB POCT SPECIFIC GRAVITY,UA: 1.02
SL AMB POCT URINE PROTEIN: NEGATIVE
SL AMB POCT UROBILINOGEN: 0.2

## 2018-04-26 PROCEDURE — G0101 CA SCREEN;PELVIC/BREAST EXAM: HCPCS | Performed by: FAMILY MEDICINE

## 2018-04-26 PROCEDURE — 81003 URINALYSIS AUTO W/O SCOPE: CPT | Performed by: FAMILY MEDICINE

## 2018-04-26 PROCEDURE — G0145 SCR C/V CYTO,THINLAYER,RESCR: HCPCS | Performed by: FAMILY MEDICINE

## 2018-04-26 PROCEDURE — 99214 OFFICE O/P EST MOD 30 MIN: CPT | Performed by: FAMILY MEDICINE

## 2018-04-26 RX ORDER — METOPROLOL TARTRATE 100 MG/1
100 TABLET ORAL DAILY
Qty: 90 TABLET | Refills: 0 | Status: SHIPPED | OUTPATIENT
Start: 2018-04-26 | End: 2018-10-18 | Stop reason: SDUPTHER

## 2018-04-26 RX ORDER — VALSARTAN 160 MG/1
160 TABLET ORAL DAILY
Qty: 90 TABLET | Refills: 0 | Status: SHIPPED | OUTPATIENT
Start: 2018-04-26 | End: 2018-10-23 | Stop reason: SDUPTHER

## 2018-04-26 RX ORDER — URSODIOL 300 MG/1
300 CAPSULE ORAL 2 TIMES DAILY
Qty: 180 CAPSULE | Refills: 0 | Status: SHIPPED | OUTPATIENT
Start: 2018-04-26 | End: 2018-10-18 | Stop reason: SDUPTHER

## 2018-04-26 NOTE — PROGRESS NOTES
Assessment/Plan:  Patient is seen for a well womanexam  She did have a PAP smear  We also discussed her dizziness and the testing she is having done       Diagnoses and all orders for this visit:    Well woman exam  -     Liquid-based pap, screening  -     POCT urine dip auto non-scope    Benign essential hypertension  -     metoprolol tartrate (LOPRESSOR) 100 mg tablet; Take 1 tablet (100 mg total) by mouth daily for 90 days    Essential hypertension  -     valsartan (DIOVAN) 160 mg tablet; Take 1 tablet (160 mg total) by mouth daily    Disc degeneration, lumbar  -     Walker    Multiple gallstones  -     ursodiol (ACTIGALL) 300 mg capsule; Take 1 capsule (300 mg total) by mouth 2 (two) times a day    Lumbar spondylosis  -     Walker          Subjective:   Chief Complaint   Patient presents with    Gynecologic Exam        Patient ID: Polo Reyes is a 78 y o  female  Patient is here for well woman exam   She did have a PAP smear last year and it was normal, but she would like one today  I did discuss that it may not be covered  She has a [employer] insurance this year, she tells me  She did sign the ABN  The following portions of the patient's history were reviewed and updated as appropriate: allergies, current medications, past family history, past medical history, past social history, past surgical history and problem list     Review of Systems   Constitutional: Negative  Respiratory: Negative  Cardiovascular: Negative  Gastrointestinal:        Hemorrhoid  Genitourinary:        Vaginal dryness   Neurological: Positive for dizziness  Objective:      /80 (BP Location: Left arm, Patient Position: Sitting, Cuff Size: Large)   Pulse 75   Temp 97 6 °F (36 4 °C) (Tympanic)   Resp 20   Ht 5' 2" (1 575 m)   Wt 116 kg (256 lb 9 6 oz)   SpO2 99%   Breastfeeding? No   BMI 46 93 kg/m²          Physical Exam   Constitutional: She is oriented to person, place, and time   No distress  Morbidly obese  Neck: No thyromegaly present  Cardiovascular: Normal rate, regular rhythm and normal heart sounds  Pulmonary/Chest: Effort normal and breath sounds normal    Abdominal: Soft  Bowel sounds are normal    Genitourinary:   Genitourinary Comments: Vaginal exam was difficult secondary to patient size  Even with larger speculum, I am not sure that I truly visualized the cervix  I did not do a rectal exam because she say her hemorrhoid is bother ing her   A PAP smear was done  Lymphadenopathy:     She has no cervical adenopathy  Neurological: She is alert and oriented to person, place, and time  Skin: Skin is warm and dry  Psychiatric: She has a normal mood and affect  Nursing note and vitals reviewed

## 2018-04-30 ENCOUNTER — TELEPHONE (OUTPATIENT)
Dept: FAMILY MEDICINE CLINIC | Facility: CLINIC | Age: 68
End: 2018-04-30

## 2018-04-30 LAB
LAB AP GYN PRIMARY INTERPRETATION: NORMAL
Lab: NORMAL

## 2018-04-30 NOTE — TELEPHONE ENCOUNTER
Call patient - I looked in the Care Everywhere and saw her Holter and echo results because I know she was concerned  They were ok  I will call her at the end of my day to discuss further   (Please send message back to me as a reminder )

## 2018-05-09 ENCOUNTER — TELEPHONE (OUTPATIENT)
Dept: FAMILY MEDICINE CLINIC | Facility: CLINIC | Age: 68
End: 2018-05-09

## 2018-05-09 NOTE — TELEPHONE ENCOUNTER
[facility] pt id their for her echo she was requesting that it be faxed to her at [phone]   Pt's echo script was faxed on 5/9/18 at 2:33 pm to [phone]

## 2018-05-18 ENCOUNTER — OFFICE VISIT (OUTPATIENT)
Dept: FAMILY MEDICINE CLINIC | Facility: CLINIC | Age: 68
End: 2018-05-18
Payer: COMMERCIAL

## 2018-05-18 VITALS
HEART RATE: 92 BPM | DIASTOLIC BLOOD PRESSURE: 100 MMHG | RESPIRATION RATE: 18 BRPM | OXYGEN SATURATION: 94 % | TEMPERATURE: 97.6 F | HEIGHT: 61 IN | BODY MASS INDEX: 48.82 KG/M2 | WEIGHT: 258.6 LBS | SYSTOLIC BLOOD PRESSURE: 160 MMHG

## 2018-05-18 DIAGNOSIS — R42 DIZZINESS: ICD-10-CM

## 2018-05-18 DIAGNOSIS — I10 BENIGN ESSENTIAL HYPERTENSION: Primary | ICD-10-CM

## 2018-05-18 PROCEDURE — 99214 OFFICE O/P EST MOD 30 MIN: CPT | Performed by: FAMILY MEDICINE

## 2018-05-18 RX ORDER — HYDROCHLOROTHIAZIDE 12.5 MG/1
12.5 TABLET ORAL DAILY
Qty: 30 TABLET | Refills: 1 | Status: SHIPPED | OUTPATIENT
Start: 2018-05-18 | End: 2018-07-17 | Stop reason: SDUPTHER

## 2018-05-18 NOTE — PROGRESS NOTES
Assessment/Plan:    Patient is 69-year-old female seen for hypertension and to review her test results  Her echo did show a good ejection fraction over 60 percent and some wall thickening  No significant valve abnormality  Holter did not show any significant happy to be she has been seen by ENT for her dizziness and had an MRI of her brain  Her blood pressure is still not well controlled and I am adding a low dose of HCTZ and she will continue the metoprolol and valsartan  I will see her back in 6 weeks or so  Diagnoses and all orders for this visit:    Benign essential hypertension  -     hydrochlorothiazide (HYDRODIURIL) 12 5 mg tablet; Take 1 tablet (12 5 mg total) by mouth daily    Dizziness          Subjective:      Patient ID: Juany Mireles is a 78 y o  female  Patient is here to follow up on dizziness  She had echo and Holter  She had also seen ear nose and throat  Her Holter monitor did not show any significant  Ectopy  Her 1st echo  Was redone with Doppler to get better visualization  The following portions of the patient's history were reviewed and updated as appropriate: allergies, current medications, past family history, past medical history, past social history, past surgical history and problem list     Review of Systems   Constitutional: Negative  HENT: Negative  Respiratory: Negative  Cardiovascular: Positive for leg swelling  Gastrointestinal: Negative  Neurological: Positive for dizziness  Psychiatric/Behavioral: Negative  Objective:      /100 (BP Location: Left arm, Patient Position: Sitting, Cuff Size: Adult)   Pulse 92   Temp 97 6 °F (36 4 °C) (Tympanic)   Resp 18   Ht 5' 1 2" (1 554 m)   Wt 117 kg (258 lb 9 6 oz)   SpO2 94%   BMI 48 54 kg/m²          Physical Exam   Constitutional: She is oriented to person, place, and time  She appears well-developed  No distress  HENT:   Right TM dull compared to left     Eyes: EOM are normal  Pupils are equal, round, and reactive to light  Neck: No thyromegaly present  Cardiovascular: Normal rate, regular rhythm and normal heart sounds  /94   Pulmonary/Chest: Effort normal and breath sounds normal    Musculoskeletal: She exhibits edema  Lymphadenopathy:     She has no cervical adenopathy  Neurological: She is alert and oriented to person, place, and time  Skin: Skin is warm and dry  Psychiatric: She has a normal mood and affect  Nursing note and vitals reviewed

## 2018-07-17 ENCOUNTER — OFFICE VISIT (OUTPATIENT)
Dept: FAMILY MEDICINE CLINIC | Facility: CLINIC | Age: 68
End: 2018-07-17
Payer: COMMERCIAL

## 2018-07-17 VITALS
OXYGEN SATURATION: 98 % | WEIGHT: 253.7 LBS | RESPIRATION RATE: 18 BRPM | SYSTOLIC BLOOD PRESSURE: 130 MMHG | DIASTOLIC BLOOD PRESSURE: 80 MMHG | BODY MASS INDEX: 47.9 KG/M2 | HEART RATE: 80 BPM | TEMPERATURE: 98 F | HEIGHT: 61 IN

## 2018-07-17 DIAGNOSIS — E03.9 HYPOTHYROIDISM, UNSPECIFIED TYPE: ICD-10-CM

## 2018-07-17 DIAGNOSIS — I10 BENIGN ESSENTIAL HYPERTENSION: ICD-10-CM

## 2018-07-17 DIAGNOSIS — I10 ESSENTIAL HYPERTENSION: Primary | ICD-10-CM

## 2018-07-17 LAB
ALBUMIN SERPL BCP-MCNC: 3.7 G/DL (ref 3.5–5)
ALP SERPL-CCNC: 99 U/L (ref 46–116)
ALT SERPL W P-5'-P-CCNC: 101 U/L (ref 12–78)
ANION GAP SERPL CALCULATED.3IONS-SCNC: 6 MMOL/L (ref 4–13)
AST SERPL W P-5'-P-CCNC: 73 U/L (ref 5–45)
BILIRUB SERPL-MCNC: 0.41 MG/DL (ref 0.2–1)
BUN SERPL-MCNC: 13 MG/DL (ref 5–25)
CALCIUM SERPL-MCNC: 8.9 MG/DL (ref 8.3–10.1)
CHLORIDE SERPL-SCNC: 101 MMOL/L (ref 100–108)
CO2 SERPL-SCNC: 29 MMOL/L (ref 21–32)
CREAT SERPL-MCNC: 0.66 MG/DL (ref 0.6–1.3)
GFR SERPL CREATININE-BSD FRML MDRD: 92 ML/MIN/1.73SQ M
GLUCOSE SERPL-MCNC: 169 MG/DL (ref 65–140)
POTASSIUM SERPL-SCNC: 4 MMOL/L (ref 3.5–5.3)
PROT SERPL-MCNC: 7.9 G/DL (ref 6.4–8.2)
SODIUM SERPL-SCNC: 136 MMOL/L (ref 136–145)

## 2018-07-17 PROCEDURE — 36415 COLL VENOUS BLD VENIPUNCTURE: CPT | Performed by: FAMILY MEDICINE

## 2018-07-17 PROCEDURE — 99214 OFFICE O/P EST MOD 30 MIN: CPT | Performed by: FAMILY MEDICINE

## 2018-07-17 PROCEDURE — 80053 COMPREHEN METABOLIC PANEL: CPT | Performed by: FAMILY MEDICINE

## 2018-07-17 RX ORDER — HYDROCHLOROTHIAZIDE 12.5 MG/1
TABLET ORAL
Qty: 90 TABLET | Refills: 1 | Status: SHIPPED | OUTPATIENT
Start: 2018-07-17 | End: 2018-10-08 | Stop reason: SDUPTHER

## 2018-07-17 NOTE — PROGRESS NOTES
Assessment/Plan:  Patient's blood pressure is improved  I will check a BMP because of taking HCTZ  She will continue with present medication and I will see her back in the fall for a wellness visit  Regarding her ear discomfort, I asked her to continue Zyrtec and steroid nasal spray  Diagnoses and all orders for this visit:    Essential hypertension  -     Comprehensive metabolic panel    Benign essential hypertension    Hypothyroidism, unspecified type          Subjective:   Chief Complaint   Patient presents with    Follow-up     medication        Patient ID: Ny Turner is a 80 y o  female  Patient is here to follow up on dizziness and hypertension  We had adjusted her blood pressure medication at the last visit  She has sen ENT had an MRI and an echocardiogram in regards to dizziness  She is feeeling some ear discomfort  The following portions of the patient's history were reviewed and updated as appropriate: allergies, current medications, past family history, past medical history, past social history, past surgical history and problem list     Review of Systems   Constitutional: Negative for chills and fever  HENT: Positive for congestion and ear pain  Respiratory: Negative  Cardiovascular: Negative  Neurological: Positive for dizziness  Objective:      /80 (BP Location: Left arm, Patient Position: Sitting, Cuff Size: Large)   Pulse 80   Temp 98 °F (36 7 °C) (Tympanic)   Resp 18   Ht 5' 1 2" (1 554 m)   Wt 115 kg (253 lb 11 2 oz)   SpO2 98%   Breastfeeding? No   BMI 47 62 kg/m²          Physical Exam   Constitutional: She is oriented to person, place, and time  She appears well-developed  No distress  HENT:   TM's dull  Neck: Carotid bruit is not present  No thyromegaly present  Cardiovascular: Normal rate, regular rhythm and normal heart sounds  Pulmonary/Chest: Effort normal and breath sounds normal    Musculoskeletal: She exhibits no edema  Lymphadenopathy:     She has no cervical adenopathy  Neurological: She is alert and oriented to person, place, and time  Skin: Skin is warm and dry  Psychiatric: She has a normal mood and affect  Nursing note and vitals reviewed

## 2018-10-08 DIAGNOSIS — I10 BENIGN ESSENTIAL HYPERTENSION: ICD-10-CM

## 2018-10-08 RX ORDER — HYDROCHLOROTHIAZIDE 12.5 MG/1
12.5 TABLET ORAL DAILY
Qty: 90 TABLET | Refills: 1 | Status: SHIPPED | OUTPATIENT
Start: 2018-10-08 | End: 2018-10-18 | Stop reason: SDUPTHER

## 2018-10-18 ENCOUNTER — OFFICE VISIT (OUTPATIENT)
Dept: FAMILY MEDICINE CLINIC | Facility: CLINIC | Age: 68
End: 2018-10-18
Payer: COMMERCIAL

## 2018-10-18 VITALS
TEMPERATURE: 97.2 F | HEIGHT: 60 IN | SYSTOLIC BLOOD PRESSURE: 122 MMHG | WEIGHT: 250.5 LBS | RESPIRATION RATE: 16 BRPM | OXYGEN SATURATION: 96 % | BODY MASS INDEX: 49.18 KG/M2 | DIASTOLIC BLOOD PRESSURE: 84 MMHG | HEART RATE: 77 BPM

## 2018-10-18 DIAGNOSIS — R82.90 FOUL SMELLING URINE: ICD-10-CM

## 2018-10-18 DIAGNOSIS — I10 BENIGN ESSENTIAL HYPERTENSION: ICD-10-CM

## 2018-10-18 DIAGNOSIS — E03.9 HYPOTHYROIDISM, UNSPECIFIED TYPE: ICD-10-CM

## 2018-10-18 DIAGNOSIS — K21.00 REFLUX ESOPHAGITIS: ICD-10-CM

## 2018-10-18 DIAGNOSIS — R73.9 ELEVATED BLOOD SUGAR: ICD-10-CM

## 2018-10-18 DIAGNOSIS — K80.20 MULTIPLE GALLSTONES: ICD-10-CM

## 2018-10-18 DIAGNOSIS — Z13.220 ENCOUNTER FOR LIPID SCREENING FOR CARDIOVASCULAR DISEASE: ICD-10-CM

## 2018-10-18 DIAGNOSIS — Z11.59 ENCOUNTER FOR HEPATITIS C SCREENING TEST FOR LOW RISK PATIENT: ICD-10-CM

## 2018-10-18 DIAGNOSIS — Z12.31 ENCOUNTER FOR SCREENING MAMMOGRAM FOR BREAST CANCER: ICD-10-CM

## 2018-10-18 DIAGNOSIS — Z23 NEED FOR INFLUENZA VACCINATION: ICD-10-CM

## 2018-10-18 DIAGNOSIS — Z13.6 ENCOUNTER FOR LIPID SCREENING FOR CARDIOVASCULAR DISEASE: ICD-10-CM

## 2018-10-18 DIAGNOSIS — E55.9 VITAMIN D DEFICIENCY: ICD-10-CM

## 2018-10-18 DIAGNOSIS — Z00.00 MEDICARE ANNUAL WELLNESS VISIT, SUBSEQUENT: Primary | ICD-10-CM

## 2018-10-18 DIAGNOSIS — I10 ESSENTIAL HYPERTENSION: ICD-10-CM

## 2018-10-18 LAB
SL AMB  POCT GLUCOSE, UA: NEGATIVE
SL AMB LEUKOCYTE ESTERASE,UA: NEGATIVE
SL AMB POCT BILIRUBIN,UA: NEGATIVE
SL AMB POCT BLOOD,UA: ABNORMAL
SL AMB POCT CLARITY,UA: CLEAR
SL AMB POCT COLOR,UA: YELLOW
SL AMB POCT KETONES,UA: NEGATIVE
SL AMB POCT NITRITE,UA: NEGATIVE
SL AMB POCT PH,UA: 5
SL AMB POCT SPECIFIC GRAVITY,UA: 1.02
SL AMB POCT URINE PROTEIN: ABNORMAL
SL AMB POCT UROBILINOGEN: 0.2

## 2018-10-18 PROCEDURE — 81003 URINALYSIS AUTO W/O SCOPE: CPT | Performed by: FAMILY MEDICINE

## 2018-10-18 PROCEDURE — 1170F FXNL STATUS ASSESSED: CPT | Performed by: FAMILY MEDICINE

## 2018-10-18 PROCEDURE — 1036F TOBACCO NON-USER: CPT | Performed by: FAMILY MEDICINE

## 2018-10-18 PROCEDURE — 3008F BODY MASS INDEX DOCD: CPT | Performed by: FAMILY MEDICINE

## 2018-10-18 PROCEDURE — 1125F AMNT PAIN NOTED PAIN PRSNT: CPT | Performed by: FAMILY MEDICINE

## 2018-10-18 PROCEDURE — 1101F PT FALLS ASSESS-DOCD LE1/YR: CPT | Performed by: FAMILY MEDICINE

## 2018-10-18 PROCEDURE — 1160F RVW MEDS BY RX/DR IN RCRD: CPT | Performed by: FAMILY MEDICINE

## 2018-10-18 PROCEDURE — 90662 IIV NO PRSV INCREASED AG IM: CPT | Performed by: FAMILY MEDICINE

## 2018-10-18 PROCEDURE — 87086 URINE CULTURE/COLONY COUNT: CPT | Performed by: FAMILY MEDICINE

## 2018-10-18 PROCEDURE — 99214 OFFICE O/P EST MOD 30 MIN: CPT | Performed by: FAMILY MEDICINE

## 2018-10-18 PROCEDURE — G0008 ADMIN INFLUENZA VIRUS VAC: HCPCS | Performed by: FAMILY MEDICINE

## 2018-10-18 PROCEDURE — G0439 PPPS, SUBSEQ VISIT: HCPCS | Performed by: FAMILY MEDICINE

## 2018-10-18 RX ORDER — METOPROLOL TARTRATE 100 MG/1
100 TABLET ORAL DAILY
Qty: 90 TABLET | Refills: 0 | Status: SHIPPED | OUTPATIENT
Start: 2018-10-18 | End: 2018-10-18 | Stop reason: SDUPTHER

## 2018-10-18 RX ORDER — OMEPRAZOLE 20 MG/1
20 CAPSULE, DELAYED RELEASE ORAL DAILY
Qty: 90 CAPSULE | Refills: 1 | Status: SHIPPED | OUTPATIENT
Start: 2018-10-18 | End: 2018-10-18 | Stop reason: SDUPTHER

## 2018-10-18 RX ORDER — URSODIOL 300 MG/1
300 CAPSULE ORAL 2 TIMES DAILY
Qty: 180 CAPSULE | Refills: 1 | Status: SHIPPED | OUTPATIENT
Start: 2018-10-18 | End: 2018-10-18 | Stop reason: SDUPTHER

## 2018-10-18 RX ORDER — HYDROCHLOROTHIAZIDE 12.5 MG/1
12.5 TABLET ORAL DAILY
Qty: 90 TABLET | Refills: 1 | Status: SHIPPED | OUTPATIENT
Start: 2018-10-18 | End: 2018-10-18 | Stop reason: SDUPTHER

## 2018-10-18 NOTE — PROGRESS NOTES
Assessment/Plan:    Patient's blood pressure is controlled on medication  I am sending out a urine culture because of her urinary complaints  No problem-specific Assessment & Plan notes found for this encounter  Diagnoses and all orders for this visit:    Medicare annual wellness visit, subsequent    Benign essential hypertension  -     metoprolol tartrate (LOPRESSOR) 100 mg tablet; Take 1 tablet (100 mg total) by mouth daily for 90 days  -     hydrochlorothiazide (HYDRODIURIL) 12 5 mg tablet; Take 1 tablet (12 5 mg total) by mouth daily    Essential hypertension    Multiple gallstones  -     ursodiol (ACTIGALL) 300 mg capsule; Take 1 capsule (300 mg total) by mouth 2 (two) times a day    Encounter for screening mammogram for breast cancer  -     Mammo screening bilateral w cad; Future    Foul smelling urine    Other orders  -     omeprazole (PRILOSEC) 20 mg delayed release capsule; Take 1 capsule (20 mg total) by mouth daily          Subjective:   Chief Complaint   Patient presents with    Follow-up    Medicare Wellness Visit     Patient presents for AWV        Patient ID: Keyanna Guadalupe is a 80 y o  female  Patient is her for follow up of hypertension  She also notes that urine is strong  The following portions of the patient's history were reviewed and updated as appropriate: allergies, current medications, past family history, past medical history, past social history, past surgical history and problem list     Review of Systems   Constitutional: Negative for chills and fever  HENT: Negative for congestion and sore throat  Respiratory: Negative for chest tightness  Cardiovascular: Positive for leg swelling  Negative for chest pain and palpitations  Gastrointestinal: Negative for abdominal pain, constipation, diarrhea and nausea  Genitourinary: Positive for frequency  Negative for difficulty urinating  Foul smelling urine  Skin: Negative      Neurological: Negative for dizziness and headaches  Psychiatric/Behavioral: Negative  Objective:      /84 (BP Location: Left arm, Patient Position: Sitting, Cuff Size: Standard)   Pulse 77   Temp (!) 97 2 °F (36 2 °C) (Tympanic)   Resp 16   Ht 5' 0 43" (1 535 m)   Wt 114 kg (250 lb 8 oz)   SpO2 96%   BMI 48 22 kg/m²          Physical Exam   Constitutional: She is oriented to person, place, and time  She appears well-developed  No distress  HENT:   Right Ear: Tympanic membrane normal    Left Ear: Tympanic membrane normal    Mouth/Throat: No oropharyngeal exudate or posterior oropharyngeal erythema  Neck: Carotid bruit is not present  No thyromegaly present  Cardiovascular: Normal rate, regular rhythm and normal heart sounds  Pulmonary/Chest: Effort normal and breath sounds normal    Abdominal:   Obese   Musculoskeletal: She exhibits no edema  Lymphadenopathy:     She has no cervical adenopathy  Neurological: She is alert and oriented to person, place, and time  Skin: Skin is warm and dry  Psychiatric: She has a normal mood and affect  Nursing note and vitals reviewed

## 2018-10-18 NOTE — PROGRESS NOTES
Assessment and Plan:  Patient is here for Medicare subsequent visit  Will get flu shot today and get Prevnar at next visit    Problem List Items Addressed This Visit     Benign essential hypertension    Relevant Medications    metoprolol tartrate (LOPRESSOR) 100 mg tablet    hydrochlorothiazide (HYDRODIURIL) 12 5 mg tablet    Other Relevant Orders    CBC and differential    Comprehensive metabolic panel    TSH, 3rd generation with Free T4 reflex    Lipid Panel with Direct LDL reflex    Elevated blood sugar    Relevant Orders    HEMOGLOBIN A1C W/ EAG ESTIMATION    Multiple gallstones    Relevant Medications    ursodiol (ACTIGALL) 300 mg capsule    Vitamin D deficiency    Relevant Orders    Vitamin D 25 hydroxy    Hypothyroidism    Relevant Medications    metoprolol tartrate (LOPRESSOR) 100 mg tablet    Other Relevant Orders    TSH, 3rd generation with Free T4 reflex      Other Visit Diagnoses     Medicare annual wellness visit, subsequent    -  Primary    Essential hypertension        Relevant Medications    metoprolol tartrate (LOPRESSOR) 100 mg tablet    hydrochlorothiazide (HYDRODIURIL) 12 5 mg tablet    Encounter for screening mammogram for breast cancer        Relevant Orders    Mammo screening bilateral w cad    Foul smelling urine        Relevant Orders    POCT urine dip auto non-scope (Completed)    Encounter for hepatitis C screening test for low risk patient        Relevant Orders    Hepatitis C antibody    Need for influenza vaccination        Relevant Orders    influenza vaccine, 1421-7642, high-dose, PF 0 5 mL, for patients 65 yr+ (FLUZONE HIGH-DOSE) (Completed)    Reflux esophagitis        Relevant Medications    omeprazole (PRILOSEC) 20 mg delayed release capsule    Encounter for lipid screening for cardiovascular disease        Relevant Orders    High sensitivity CRP        Health Maintenance Due   Topic Date Due    Medicare Annual Wellness Visit (AWV)  1950    DTaP,Tdap,and Td Vaccines (1 - Tdap) 06/27/1972    Pneumococcal PPSV23/PCV13 65+ Years / Low and Medium Risk (1 of 2 - PCV13) 06/27/2016    MAMMOGRAM  10/30/2018         HPI:  Patient Active Problem List   Diagnosis    Benign essential hypertension    Acute non-recurrent pansinusitis    Abnormal CAT scan    Atherosclerotic plaque    Benign paroxysmal positional vertigo    Elevated blood sugar    Elevated liver enzymes    Hyperlipidemia    Multiple gallstones    Vitamin D deficiency    Hypothyroidism     Past Medical History:   Diagnosis Date    Chest pain     last assessed 9/16/13    Chest pressure     last assessed 12/15/16    Chronic sinusitis     last assessed 5/4/17    Compression fracture of lumbar vertebra ([address] )     last assessed 6/13/15    Lump of skin     last assessed 1/17/14     No past surgical history on file    Family History   Problem Relation Age of Onset    Hyperlipidemia Mother     Hypertension Mother     Cancer Father     Liver cancer Father     Stomach cancer Father     No Known Problems Son     No Known Problems Daughter      History   Smoking Status    Never Smoker   Smokeless Tobacco    Never Used     History   Alcohol Use No      History   Drug Use No         Current Outpatient Prescriptions   Medication Sig Dispense Refill    budesonide (RINOCORT AQUA) 32 MCG/ACT nasal spray 2 sprays into each nostril daily      cetirizine (ZYRTEC ALLERGY) 10 mg tablet Take 1 tablet by mouth daily as needed      Cholecalciferol (HM VITAMIN D3) 4000 units CAPS Take by mouth      Efinaconazole (JUBLIA) 10 % SOLN Apply topically      guaiFENesin (MUCINEX) 600 mg 12 hr tablet Take 600 mg by mouth every 12 (twelve) hours      hydrochlorothiazide (HYDRODIURIL) 12 5 mg tablet Take 1 tablet (12 5 mg total) by mouth daily 90 tablet 1    omeprazole (PRILOSEC) 20 mg delayed release capsule Take 1 capsule (20 mg total) by mouth daily 90 capsule 1    Pyridoxine HCl (VITAMIN B-6) 100 MG TABS Take 1 tablet by mouth daily  ursodiol (ACTIGALL) 300 mg capsule Take 1 capsule (300 mg total) by mouth 2 (two) times a day 180 capsule 1    valsartan (DIOVAN) 160 mg tablet Take 1 tablet (160 mg total) by mouth daily 90 tablet 0    Vitamin E 200 units TABS Take 1 tablet by mouth daily      metoprolol tartrate (LOPRESSOR) 100 mg tablet Take 1 tablet (100 mg total) by mouth daily for 90 days 90 tablet 0     No current facility-administered medications for this visit  Allergies   Allergen Reactions    Azithromycin GI Intolerance    Dicyclomine GI Intolerance and Hives    Erythromycin GI Intolerance    Sulfa Antibiotics Hives    Sulfamethoxazole-Trimethoprim Rash     Immunization History   Administered Date(s) Administered    Influenza Split High Dose Preservative Free IM 10/19/2017    Influenza, high dose seasonal 0 5 mL 10/18/2018       Patient Care Team:  Imani Bosch DO as PCP - General    Medicare Screening Tests and Risk Assessments:  Steve Bello is here for her Subsequent Wellness visit  Last Medicare Wellness visit information reviewed, patient interviewed and updates made to the record today  Health Risk Assessment:  Patient rates overall health as good  Patient feels that their physical health rating is Same  Eyesight was rated as Same  Hearing was rated as Same  Patient feels that their emotional and mental health rating is Same  Pain experienced by patient in the last 7 days has been Some  Patient's pain rating has been 6/10  Patient states that she has experienced no weight loss or gain in last 6 months  Emotional/Mental Health:  Patient has been feeling nervous/anxious  PHQ-9 Depression Screening:    Frequency of the following problems over the past two weeks:      1  Little interest or pleasure in doing things: 0 - not at all      2  Feeling down, depressed, or hopeless: 0 - not at all  PHQ-2 Score: 0          Broken Bones/Falls: Fall Risk Assessment:    In the past year, patient has experienced:  No history of falling in past year          Bladder/Bowel:  Patient has leaked urine accidently in the last six months  Patient reports no loss of bowel control  Immunizations:  Patient has had a flu vaccination within the last year  Patient has not received a pneumonia shot  Patient has not received a shingles shot  Patient has not received tetanus/diphtheria shot  Home Safety:  Patient does not have trouble with stairs inside or outside of their home  Patient currently reports that there are safety hazards present in home , working smoke alarms, working carbon monoxide detectors  Preventative Screenings:   Breast cancer screening performed, colon cancer screen completed, cholesterol screen completed, glaucoma eye exam completed,     Nutrition:  Current diet: Regular and Limited junk food with servings of the following:    Medications:  Patient is currently taking over-the-counter supplements  Patient is able to manage medications  Lifestyle Choices:  Patient reports no tobacco use  Patient has not smoked or used tobacco in the past   Patient reports no alcohol use  Patient does not drive a vehicle  Patient wears seat belt  Current level of exercise of physical activity described by patient as: LIMITED WALKING  Activities of Daily Living:  Can get out of bed by his or her self, able to dress self, able to make own meals, able to do own shopping, able to bathe self, can do own laundry/housekeeping, can manage own money, pay bills and track expenses    Previous Hospitalizations:  No hospitalization or ED visit in past 12 months        Advanced Directives:  Patient has decided on a power of   Patient has spoken to designated power of   Patient has not completed advanced directive          Preventative Screening/Counseling:      Cardiovascular:      General: Risks and Benefits Discussed     Due for Labs/Analytes/Optional EKG: Lipid Panel          Diabetes:      General: Risks and Benefits Discussed      Due for labs: Blood Glucose          Colorectal Cancer:      General: Risks and Benefits Discussed and Screening Not Indicated          Breast Cancer:      General: Risks and Benefits Discussed and Screening Current          Cervical Cancer:      General: Screening Not Indicated          AAA:      General: Screening Not Indicated          HIV:      General: Screening Not Indicated          Advanced Directives:   Patient has living will for healthcare, has durable POA for healthcare, patient has an advanced directive  5 wishes given  End of life assessment reviewed with patient  Immunizations:      Influenza: Influenza Due Today      Pneumococcal: Pneumococcal Due Today      Shingrix: Risks & Benefits Discussed      Hepatitis B (Low risk patients): Series Not Indicated      Zostavax: Vaccine Status Unknown      TD: Vaccine Status Unknown      TDAP: Vaccine Status Unknown  Additional Comments: Duane Givens refuses pneumonia vaccine at same time as flu vaccine  She will get at next visit  Physical Exam:  Review of Systems   Gastrointestinal: Negative for bowel incontinence  Psychiatric/Behavioral: The patient is not nervous/anxious  Vitals:    10/18/18 1347   BP: 122/84   BP Location: Left arm   Patient Position: Sitting   Cuff Size: Standard   Pulse: 77   Resp: 16   Temp: (!) 97 2 °F (36 2 °C)   TempSrc: Tympanic   SpO2: 96%   Weight: 114 kg (250 lb 8 oz)   Height: 5' 0 43" (1 535 m)   Body mass index is 48 22 kg/m²      Physical Exam

## 2018-10-20 LAB — BACTERIA UR CULT: NORMAL

## 2018-10-23 DIAGNOSIS — I10 ESSENTIAL HYPERTENSION: ICD-10-CM

## 2018-10-23 RX ORDER — HYDROCHLOROTHIAZIDE 12.5 MG/1
12.5 TABLET ORAL DAILY
Qty: 90 TABLET | Refills: 1 | Status: SHIPPED | OUTPATIENT
Start: 2018-10-23 | End: 2019-06-24 | Stop reason: SDUPTHER

## 2018-10-23 RX ORDER — URSODIOL 300 MG/1
300 CAPSULE ORAL 2 TIMES DAILY
Qty: 180 CAPSULE | Refills: 1 | Status: SHIPPED | OUTPATIENT
Start: 2018-10-23 | End: 2019-04-16 | Stop reason: SDUPTHER

## 2018-10-23 RX ORDER — VALSARTAN 160 MG/1
160 TABLET ORAL DAILY
Qty: 90 TABLET | Refills: 1 | Status: SHIPPED | OUTPATIENT
Start: 2018-10-23 | End: 2019-04-16 | Stop reason: SDUPTHER

## 2018-10-23 RX ORDER — METOPROLOL TARTRATE 100 MG/1
100 TABLET ORAL DAILY
Qty: 90 TABLET | Refills: 1 | Status: SHIPPED | OUTPATIENT
Start: 2018-10-23 | End: 2019-04-16 | Stop reason: SDUPTHER

## 2018-10-23 RX ORDER — OMEPRAZOLE 20 MG/1
20 CAPSULE, DELAYED RELEASE ORAL DAILY
Qty: 90 CAPSULE | Refills: 1 | Status: SHIPPED | OUTPATIENT
Start: 2018-10-23 | End: 2019-04-16 | Stop reason: SDUPTHER

## 2018-10-30 LAB
HBA1C MFR BLD HPLC: 6.1 %
HCV AB SER-ACNC: NEGATIVE

## 2018-12-03 DIAGNOSIS — I10 ESSENTIAL HYPERTENSION: ICD-10-CM

## 2018-12-03 NOTE — TELEPHONE ENCOUNTER
I had a request from [facility] for patient's Valsartan, but I had sent an RX to [facility] her mail in pharmacy in October   Does she rebecca the RX sent to [facility]

## 2018-12-04 RX ORDER — VALSARTAN 160 MG/1
160 TABLET ORAL DAILY
Qty: 90 TABLET | Refills: 1 | OUTPATIENT
Start: 2018-12-04

## 2019-04-16 DIAGNOSIS — I10 BENIGN ESSENTIAL HYPERTENSION: ICD-10-CM

## 2019-04-16 DIAGNOSIS — K80.20 MULTIPLE GALLSTONES: ICD-10-CM

## 2019-04-16 DIAGNOSIS — K21.00 REFLUX ESOPHAGITIS: ICD-10-CM

## 2019-04-16 DIAGNOSIS — I10 ESSENTIAL HYPERTENSION: ICD-10-CM

## 2019-04-17 RX ORDER — URSODIOL 300 MG/1
300 CAPSULE ORAL 2 TIMES DAILY
Qty: 180 CAPSULE | Refills: 1 | Status: SHIPPED | OUTPATIENT
Start: 2019-04-17 | End: 2019-10-23 | Stop reason: SDUPTHER

## 2019-04-17 RX ORDER — VALSARTAN 160 MG/1
160 TABLET ORAL DAILY
Qty: 90 TABLET | Refills: 1 | Status: SHIPPED | OUTPATIENT
Start: 2019-04-17 | End: 2019-10-23 | Stop reason: SDUPTHER

## 2019-04-17 RX ORDER — METOPROLOL TARTRATE 100 MG/1
100 TABLET ORAL DAILY
Qty: 90 TABLET | Refills: 1 | Status: SHIPPED | OUTPATIENT
Start: 2019-04-17 | End: 2019-10-23 | Stop reason: SDUPTHER

## 2019-04-17 RX ORDER — OMEPRAZOLE 20 MG/1
20 CAPSULE, DELAYED RELEASE ORAL DAILY
Qty: 90 CAPSULE | Refills: 1 | Status: SHIPPED | OUTPATIENT
Start: 2019-04-17 | End: 2019-10-23 | Stop reason: SDUPTHER

## 2019-05-07 ENCOUNTER — ANNUAL EXAM (OUTPATIENT)
Dept: FAMILY MEDICINE CLINIC | Facility: CLINIC | Age: 69
End: 2019-05-07
Payer: COMMERCIAL

## 2019-05-07 VITALS
BODY MASS INDEX: 47.37 KG/M2 | SYSTOLIC BLOOD PRESSURE: 128 MMHG | HEART RATE: 78 BPM | HEIGHT: 61 IN | DIASTOLIC BLOOD PRESSURE: 86 MMHG | TEMPERATURE: 97.5 F | OXYGEN SATURATION: 97 % | WEIGHT: 250.9 LBS | RESPIRATION RATE: 17 BRPM

## 2019-05-07 DIAGNOSIS — H35.62 RETINAL HEMORRHAGE, LEFT EYE: ICD-10-CM

## 2019-05-07 DIAGNOSIS — R31.21 ASYMPTOMATIC MICROSCOPIC HEMATURIA: Primary | ICD-10-CM

## 2019-05-07 DIAGNOSIS — E03.9 HYPOTHYROIDISM, UNSPECIFIED TYPE: ICD-10-CM

## 2019-05-07 DIAGNOSIS — R73.9 ELEVATED BLOOD SUGAR: ICD-10-CM

## 2019-05-07 DIAGNOSIS — Z23 NEED FOR VACCINATION AGAINST STREPTOCOCCUS PNEUMONIAE: ICD-10-CM

## 2019-05-07 DIAGNOSIS — R74.8 ELEVATED LIVER ENZYMES: ICD-10-CM

## 2019-05-07 DIAGNOSIS — I10 BENIGN ESSENTIAL HYPERTENSION: ICD-10-CM

## 2019-05-07 DIAGNOSIS — Z12.11 COLON CANCER SCREENING: ICD-10-CM

## 2019-05-07 DIAGNOSIS — Z01.419 WELL WOMAN EXAM WITH ROUTINE GYNECOLOGICAL EXAM: ICD-10-CM

## 2019-05-07 DIAGNOSIS — E78.2 MIXED HYPERLIPIDEMIA: ICD-10-CM

## 2019-05-07 LAB
SL AMB  POCT GLUCOSE, UA: ABNORMAL
SL AMB LEUKOCYTE ESTERASE,UA: ABNORMAL
SL AMB POCT BILIRUBIN,UA: ABNORMAL
SL AMB POCT BLOOD,UA: ABNORMAL
SL AMB POCT CLARITY,UA: CLEAR
SL AMB POCT COLOR,UA: YELLOW
SL AMB POCT KETONES,UA: ABNORMAL
SL AMB POCT NITRITE,UA: ABNORMAL
SL AMB POCT PH,UA: 5
SL AMB POCT SPECIFIC GRAVITY,UA: 1.01
SL AMB POCT URINE PROTEIN: ABNORMAL
SL AMB POCT UROBILINOGEN: 0.2

## 2019-05-07 PROCEDURE — G0145 SCR C/V CYTO,THINLAYER,RESCR: HCPCS | Performed by: FAMILY MEDICINE

## 2019-05-07 PROCEDURE — 88112 CYTOPATH CELL ENHANCE TECH: CPT | Performed by: PATHOLOGY

## 2019-05-07 PROCEDURE — G0101 CA SCREEN;PELVIC/BREAST EXAM: HCPCS | Performed by: FAMILY MEDICINE

## 2019-05-07 PROCEDURE — 90670 PCV13 VACCINE IM: CPT | Performed by: FAMILY MEDICINE

## 2019-05-07 PROCEDURE — G0009 ADMIN PNEUMOCOCCAL VACCINE: HCPCS | Performed by: FAMILY MEDICINE

## 2019-05-07 PROCEDURE — 87624 HPV HI-RISK TYP POOLED RSLT: CPT | Performed by: FAMILY MEDICINE

## 2019-05-07 PROCEDURE — 81003 URINALYSIS AUTO W/O SCOPE: CPT | Performed by: FAMILY MEDICINE

## 2019-05-07 PROCEDURE — 87086 URINE CULTURE/COLONY COUNT: CPT | Performed by: FAMILY MEDICINE

## 2019-05-08 LAB
BACTERIA UR CULT: ABNORMAL
HPV HR 12 DNA CVX QL NAA+PROBE: NEGATIVE
HPV16 DNA CVX QL NAA+PROBE: NEGATIVE
HPV18 DNA CVX QL NAA+PROBE: NEGATIVE

## 2019-05-09 LAB
LAB AP GYN PRIMARY INTERPRETATION: NORMAL
LAB AP LMP: NORMAL
Lab: NORMAL

## 2019-05-13 ENCOUNTER — TELEPHONE (OUTPATIENT)
Dept: FAMILY MEDICINE CLINIC | Facility: CLINIC | Age: 69
End: 2019-05-13

## 2019-05-18 DIAGNOSIS — B96.89 BACTERIAL VAGINOSIS: Primary | ICD-10-CM

## 2019-05-18 DIAGNOSIS — N76.0 BACTERIAL VAGINOSIS: Primary | ICD-10-CM

## 2019-05-18 RX ORDER — CLINDAMYCIN PHOSPHATE 20 MG/G
1 CREAM VAGINAL
Qty: 40 G | Refills: 0 | Status: SHIPPED | OUTPATIENT
Start: 2019-05-18 | End: 2019-05-25

## 2019-05-20 DIAGNOSIS — Z12.31 ENCOUNTER FOR SCREENING MAMMOGRAM FOR BREAST CANCER: ICD-10-CM

## 2019-05-22 ENCOUNTER — TELEPHONE (OUTPATIENT)
Dept: FAMILY MEDICINE CLINIC | Facility: CLINIC | Age: 69
End: 2019-05-22

## 2019-05-30 LAB — HBA1C MFR BLD HPLC: 6.2 %

## 2019-06-01 ENCOUNTER — APPOINTMENT (OUTPATIENT)
Dept: RADIOLOGY | Facility: CLINIC | Age: 69
End: 2019-06-01
Payer: COMMERCIAL

## 2019-06-01 ENCOUNTER — OFFICE VISIT (OUTPATIENT)
Dept: URGENT CARE | Facility: CLINIC | Age: 69
End: 2019-06-01
Payer: COMMERCIAL

## 2019-06-01 VITALS
HEIGHT: 63 IN | RESPIRATION RATE: 18 BRPM | BODY MASS INDEX: 44.3 KG/M2 | TEMPERATURE: 97.3 F | OXYGEN SATURATION: 97 % | WEIGHT: 250 LBS | DIASTOLIC BLOOD PRESSURE: 87 MMHG | SYSTOLIC BLOOD PRESSURE: 186 MMHG | HEART RATE: 79 BPM

## 2019-06-01 DIAGNOSIS — R10.9 FLANK PAIN: ICD-10-CM

## 2019-06-01 DIAGNOSIS — R10.9 FLANK PAIN: Primary | ICD-10-CM

## 2019-06-01 LAB
SL AMB  POCT GLUCOSE, UA: ABNORMAL
SL AMB LEUKOCYTE ESTERASE,UA: ABNORMAL
SL AMB POCT BILIRUBIN,UA: ABNORMAL
SL AMB POCT BLOOD,UA: ABNORMAL
SL AMB POCT CLARITY,UA: ABNORMAL
SL AMB POCT COLOR,UA: YELLOW
SL AMB POCT KETONES,UA: ABNORMAL
SL AMB POCT NITRITE,UA: ABNORMAL
SL AMB POCT PH,UA: 7.5
SL AMB POCT SPECIFIC GRAVITY,UA: 1.01
SL AMB POCT URINE PROTEIN: 300
SL AMB POCT UROBILINOGEN: 0.2

## 2019-06-01 PROCEDURE — S9088 SERVICES PROVIDED IN URGENT: HCPCS | Performed by: NURSE PRACTITIONER

## 2019-06-01 PROCEDURE — 74018 RADEX ABDOMEN 1 VIEW: CPT

## 2019-06-01 PROCEDURE — 99214 OFFICE O/P EST MOD 30 MIN: CPT | Performed by: NURSE PRACTITIONER

## 2019-06-01 PROCEDURE — 81002 URINALYSIS NONAUTO W/O SCOPE: CPT | Performed by: NURSE PRACTITIONER

## 2019-06-03 ENCOUNTER — HOSPITAL ENCOUNTER (OUTPATIENT)
Dept: RADIOLOGY | Facility: HOSPITAL | Age: 69
Discharge: HOME | End: 2019-06-03
Payer: COMMERCIAL

## 2019-06-03 ENCOUNTER — OFFICE VISIT (OUTPATIENT)
Dept: FAMILY MEDICINE CLINIC | Facility: CLINIC | Age: 69
End: 2019-06-03
Payer: COMMERCIAL

## 2019-06-03 ENCOUNTER — HOSPITAL ENCOUNTER (OUTPATIENT)
Dept: CT IMAGING | Facility: HOSPITAL | Age: 69
Discharge: HOME | End: 2019-06-03
Payer: COMMERCIAL

## 2019-06-03 VITALS
WEIGHT: 248.2 LBS | OXYGEN SATURATION: 97 % | DIASTOLIC BLOOD PRESSURE: 86 MMHG | TEMPERATURE: 97.1 F | BODY MASS INDEX: 43.98 KG/M2 | RESPIRATION RATE: 17 BRPM | SYSTOLIC BLOOD PRESSURE: 132 MMHG | HEART RATE: 72 BPM | HEIGHT: 63 IN

## 2019-06-03 DIAGNOSIS — R93.5 ABNORMAL CT OF THE ABDOMEN: Primary | ICD-10-CM

## 2019-06-03 DIAGNOSIS — I10 BENIGN ESSENTIAL HYPERTENSION: ICD-10-CM

## 2019-06-03 DIAGNOSIS — R93.5 ABNORMAL CT OF THE ABDOMEN: ICD-10-CM

## 2019-06-03 DIAGNOSIS — R31.0 GROSS HEMATURIA: ICD-10-CM

## 2019-06-03 DIAGNOSIS — R10.31 RIGHT LOWER QUADRANT ABDOMINAL PAIN: Primary | ICD-10-CM

## 2019-06-03 DIAGNOSIS — R10.31 RIGHT LOWER QUADRANT ABDOMINAL PAIN: ICD-10-CM

## 2019-06-03 PROCEDURE — 3075F SYST BP GE 130 - 139MM HG: CPT | Performed by: FAMILY MEDICINE

## 2019-06-03 PROCEDURE — 4040F PNEUMOC VAC/ADMIN/RCVD: CPT | Performed by: FAMILY MEDICINE

## 2019-06-03 PROCEDURE — 3079F DIAST BP 80-89 MM HG: CPT | Performed by: FAMILY MEDICINE

## 2019-06-03 PROCEDURE — 1036F TOBACCO NON-USER: CPT | Performed by: FAMILY MEDICINE

## 2019-06-03 PROCEDURE — 74177 CT ABD & PELVIS W/CONTRAST: CPT

## 2019-06-03 PROCEDURE — 3008F BODY MASS INDEX DOCD: CPT | Performed by: FAMILY MEDICINE

## 2019-06-03 PROCEDURE — 71046 X-RAY EXAM CHEST 2 VIEWS: CPT

## 2019-06-03 PROCEDURE — 1160F RVW MEDS BY RX/DR IN RCRD: CPT | Performed by: FAMILY MEDICINE

## 2019-06-03 PROCEDURE — 99214 OFFICE O/P EST MOD 30 MIN: CPT | Performed by: FAMILY MEDICINE

## 2019-06-03 RX ADMIN — IOHEXOL 50 ML: 240 INJECTION, SOLUTION INTRATHECAL; INTRAVASCULAR; INTRAVENOUS; ORAL at 10:00

## 2019-06-03 RX ADMIN — IOHEXOL 100 ML: 350 INJECTION, SOLUTION INTRAVENOUS at 12:54

## 2019-06-04 ENCOUNTER — TELEPHONE (OUTPATIENT)
Dept: FAMILY MEDICINE CLINIC | Facility: CLINIC | Age: 69
End: 2019-06-04

## 2019-06-04 DIAGNOSIS — R59.9 LYMPH NODES ENLARGED: ICD-10-CM

## 2019-06-04 DIAGNOSIS — R16.1 SPLEEN ENLARGED: Primary | ICD-10-CM

## 2019-06-13 ENCOUNTER — TELEPHONE (OUTPATIENT)
Dept: FAMILY MEDICINE CLINIC | Facility: CLINIC | Age: 69
End: 2019-06-13

## 2019-06-17 DIAGNOSIS — J98.8 RESPIRATORY INFECTION: Primary | ICD-10-CM

## 2019-06-17 RX ORDER — AMOXICILLIN 500 MG/1
500 CAPSULE ORAL EVERY 8 HOURS SCHEDULED
Qty: 30 CAPSULE | Refills: 0 | Status: SHIPPED | OUTPATIENT
Start: 2019-06-17 | End: 2019-06-27

## 2019-06-24 DIAGNOSIS — I10 BENIGN ESSENTIAL HYPERTENSION: ICD-10-CM

## 2019-06-24 RX ORDER — HYDROCHLOROTHIAZIDE 12.5 MG/1
12.5 TABLET ORAL DAILY
Qty: 90 TABLET | Refills: 1 | Status: SHIPPED | OUTPATIENT
Start: 2019-06-24 | End: 2019-10-23 | Stop reason: SDUPTHER

## 2019-10-23 ENCOUNTER — OFFICE VISIT (OUTPATIENT)
Dept: FAMILY MEDICINE CLINIC | Facility: CLINIC | Age: 69
End: 2019-10-23
Payer: COMMERCIAL

## 2019-10-23 VITALS
DIASTOLIC BLOOD PRESSURE: 84 MMHG | RESPIRATION RATE: 18 BRPM | TEMPERATURE: 98.6 F | SYSTOLIC BLOOD PRESSURE: 126 MMHG | HEART RATE: 78 BPM | OXYGEN SATURATION: 98 %

## 2019-10-23 DIAGNOSIS — E55.9 VITAMIN D DEFICIENCY: ICD-10-CM

## 2019-10-23 DIAGNOSIS — I10 ESSENTIAL HYPERTENSION: ICD-10-CM

## 2019-10-23 DIAGNOSIS — K80.20 MULTIPLE GALLSTONES: ICD-10-CM

## 2019-10-23 DIAGNOSIS — E03.9 HYPOTHYROIDISM, UNSPECIFIED TYPE: ICD-10-CM

## 2019-10-23 DIAGNOSIS — Z11.59 NEED FOR HEPATITIS C SCREENING TEST: ICD-10-CM

## 2019-10-23 DIAGNOSIS — K21.00 REFLUX ESOPHAGITIS: ICD-10-CM

## 2019-10-23 DIAGNOSIS — I10 BENIGN ESSENTIAL HYPERTENSION: Primary | ICD-10-CM

## 2019-10-23 DIAGNOSIS — J98.11 MILD BIBASILAR ATELECTASIS: ICD-10-CM

## 2019-10-23 DIAGNOSIS — Z00.00 MEDICARE ANNUAL WELLNESS VISIT, SUBSEQUENT: ICD-10-CM

## 2019-10-23 PROCEDURE — 99214 OFFICE O/P EST MOD 30 MIN: CPT | Performed by: FAMILY MEDICINE

## 2019-10-23 PROCEDURE — 1101F PT FALLS ASSESS-DOCD LE1/YR: CPT | Performed by: FAMILY MEDICINE

## 2019-10-23 PROCEDURE — G0439 PPPS, SUBSEQ VISIT: HCPCS | Performed by: FAMILY MEDICINE

## 2019-10-23 PROCEDURE — 3074F SYST BP LT 130 MM HG: CPT | Performed by: FAMILY MEDICINE

## 2019-10-23 PROCEDURE — 1125F AMNT PAIN NOTED PAIN PRSNT: CPT | Performed by: FAMILY MEDICINE

## 2019-10-23 PROCEDURE — 1170F FXNL STATUS ASSESSED: CPT | Performed by: FAMILY MEDICINE

## 2019-10-23 PROCEDURE — 3079F DIAST BP 80-89 MM HG: CPT | Performed by: FAMILY MEDICINE

## 2019-10-23 RX ORDER — VALSARTAN 160 MG/1
160 TABLET ORAL DAILY
Qty: 90 TABLET | Refills: 1 | Status: SHIPPED | OUTPATIENT
Start: 2019-10-23 | End: 2020-04-07 | Stop reason: SDUPTHER

## 2019-10-23 RX ORDER — OMEPRAZOLE 20 MG/1
20 CAPSULE, DELAYED RELEASE ORAL DAILY
Qty: 90 CAPSULE | Refills: 1 | Status: SHIPPED | OUTPATIENT
Start: 2019-10-23 | End: 2020-04-07 | Stop reason: SDUPTHER

## 2019-10-23 RX ORDER — HYDROCHLOROTHIAZIDE 12.5 MG/1
12.5 TABLET ORAL DAILY
Qty: 90 TABLET | Refills: 1 | Status: SHIPPED | OUTPATIENT
Start: 2019-10-23 | End: 2020-04-07 | Stop reason: SDUPTHER

## 2019-10-23 RX ORDER — METOPROLOL TARTRATE 100 MG/1
100 TABLET ORAL DAILY
Qty: 90 TABLET | Refills: 1 | Status: SHIPPED | OUTPATIENT
Start: 2019-10-23 | End: 2020-04-07 | Stop reason: SDUPTHER

## 2019-10-23 RX ORDER — URSODIOL 300 MG/1
300 CAPSULE ORAL 2 TIMES DAILY
Qty: 180 CAPSULE | Refills: 1 | Status: SHIPPED | OUTPATIENT
Start: 2019-10-23 | End: 2020-04-07 | Stop reason: SDUPTHER

## 2019-10-23 NOTE — PROGRESS NOTES
Assessment/Plan:    Patient is a 81-year-old female seen for follow-up of hypertension and hyperlipidemia  Her blood pressure is stable on metoprolol, HCTZ and valsartan  Patient has recently been seen by Urology because of blood found on urine in the office  Her workup has been negative  They believe there is no cause for concern regarding the microscopic hematuria  She has also seen a gastroenterologist at [facility] regarding her elevated liver enzymes  That workup is still incomplete  I did review the blood work that she has had done the past few months and she has not had a lipid panel  And so I did order that  She has seen Hematology regarding enlarged spleen but they feel that it is normal for her size  Patient is morbidly obese  Patient asked for an EKG  She states that her insurance allows her to have an EKG yearly  I explained to her that I thought this was only for the Welcome to Medicare visit  But I not refuse doing it  [facility]        Diagnoses and all orders for this visit:    Need for hepatitis C screening test    Benign essential hypertension  -     metoprolol tartrate (LOPRESSOR) 100 mg tablet; Take 1 tablet (100 mg total) by mouth daily  -     hydrochlorothiazide (HYDRODIURIL) 12 5 mg tablet; Take 1 tablet (12 5 mg total) by mouth daily  -     Comprehensive metabolic panel; Future  -     TSH, 3rd generation with Free T4 reflex; Future    Multiple gallstones  -     ursodiol (ACTIGALL) 300 mg capsule; Take 1 capsule (300 mg total) by mouth 2 (two) times a day    Reflux esophagitis  -     omeprazole (PRILOSEC) 20 mg delayed release capsule; Take 1 capsule (20 mg total) by mouth daily    Essential hypertension  -     valsartan (DIOVAN) 160 mg tablet; Take 1 tablet (160 mg total) by mouth daily  -     POCT ECG    Mild bibasilar atelectasis  -     XR chest pa & lateral; Future    Vitamin D deficiency  -     Vitamin D 25 hydroxy;  Future    Hypothyroidism, unspecified type  -     Lipid Panel with Direct LDL reflex; Future  -     TSH, 3rd generation with Free T4 reflex; Future    Other orders  -     Cancel: Hepatitis C antibody; Future          Subjective:   Chief Complaint   Patient presents with    Medicare Wellness Visit     subsequent         Patient ID: Lexi Isaac is a 77 y o  female  Patient is here for follow  Up of chronic medical conditins  She is compliant with her medications  She is feeling well  Since I last saw her, she has seen multiple specialists - Gastroenterology, Urology and Hematology  This was all related to microscopic amounts of blood found on a dip urinalysis in the office  So far her workup has been negative for any hematologic or urologic conditions  Her GI workup is not complete as of yet  The following portions of the patient's history were reviewed and updated as appropriate: allergies, current medications, past family history, past medical history, past social history, past surgical history and problem list     Review of Systems   Constitutional: Negative for chills and fever  HENT: Negative for congestion and sore throat  Respiratory: Negative for chest tightness  Cardiovascular: Negative for chest pain and palpitations  Gastrointestinal: Negative for abdominal pain, constipation, diarrhea and nausea  Genitourinary: Negative for difficulty urinating  Skin: Negative  Neurological: Negative for dizziness and headaches  Psychiatric/Behavioral: Negative  Objective:      /84 (BP Location: Right arm, Patient Position: Sitting, Cuff Size: Large)   Pulse 78   Temp 98 6 °F (37 °C) (Tympanic)   Resp 18   SpO2 98%          Physical Exam   Constitutional: She is oriented to person, place, and time  No distress  Morbidly obese   Neck: Carotid bruit is not present  No thyromegaly present  Cardiovascular: Normal rate, regular rhythm and normal heart sounds     Pulmonary/Chest: Effort normal and breath sounds normal    Musculoskeletal: She exhibits no edema  Lymphadenopathy:     She has no cervical adenopathy  Neurological: She is alert and oriented to person, place, and time  Skin: Skin is warm and dry  Psychiatric: She has a normal mood and affect  Nursing note and vitals reviewed

## 2019-10-23 NOTE — PROGRESS NOTES
Assessment and Plan:    patient is seen for Medicare subsequent visit  Problem List Items Addressed This Visit        Digestive    Multiple gallstones    Relevant Medications    ursodiol (ACTIGALL) 300 mg capsule       Endocrine    Hypothyroidism    Relevant Medications    metoprolol tartrate (LOPRESSOR) 100 mg tablet    Other Relevant Orders    Lipid Panel with Direct LDL reflex    TSH, 3rd generation with Free T4 reflex       Cardiovascular and Mediastinum    Benign essential hypertension    Relevant Medications    metoprolol tartrate (LOPRESSOR) 100 mg tablet    hydrochlorothiazide (HYDRODIURIL) 12 5 mg tablet    valsartan (DIOVAN) 160 mg tablet    Other Relevant Orders    Comprehensive metabolic panel    TSH, 3rd generation with Free T4 reflex       Other    Vitamin D deficiency    Relevant Orders    Vitamin D 25 hydroxy      Other Visit Diagnoses     Need for hepatitis C screening test    -  Primary    Reflux esophagitis        Relevant Medications    omeprazole (PRILOSEC) 20 mg delayed release capsule    Essential hypertension        Relevant Medications    metoprolol tartrate (LOPRESSOR) 100 mg tablet    hydrochlorothiazide (HYDRODIURIL) 12 5 mg tablet    valsartan (DIOVAN) 160 mg tablet    Other Relevant Orders    POCT ECG    Mild bibasilar atelectasis        Relevant Orders    XR chest pa & lateral    Medicare annual wellness visit, subsequent               Preventive health issues were discussed with patient, and age appropriate screening tests were ordered as noted in patient's After Visit Summary  Personalized health advice and appropriate referrals for health education or preventive services given if needed, as noted in patient's After Visit Summary       History of Present Illness:     Patient presents for Medicare Annual Wellness visit    Patient Care Team:  Wellington Paz DO as PCP - General     Problem List:     Patient Active Problem List   Diagnosis    Benign essential hypertension    Acute non-recurrent pansinusitis    Abnormal CAT scan    Atherosclerotic plaque    Benign paroxysmal positional vertigo    Elevated blood sugar    Elevated liver enzymes    Hyperlipidemia    Multiple gallstones    Vitamin D deficiency    Hypothyroidism      Past Medical and Surgical History:     Past Medical History:   Diagnosis Date    Chest pain     last assessed 9/16/13    Chest pressure     last assessed 12/15/16    Chronic sinusitis     last assessed 5/4/17    Compression fracture of lumbar vertebra ([address] )     last assessed 6/13/15    Lump of skin     last assessed 1/17/14     History reviewed  No pertinent surgical history     Family History:     Family History   Problem Relation Age of Onset    Hyperlipidemia Mother     Hypertension Mother    Chu Patricio Cancer Father     Liver cancer Father     Stomach cancer Father     No Known Problems Son     No Known Problems Daughter       Social History:     Social History     Socioeconomic History    Marital status: /Civil Union     Spouse name: None    Number of children: None    Years of education: None    Highest education level: None   Occupational History    None   Social Needs    Financial resource strain: None    Food insecurity:     Worry: None     Inability: None    Transportation needs:     Medical: None     Non-medical: None   Tobacco Use    Smoking status: Never Smoker    Smokeless tobacco: Never Used   Substance and Sexual Activity    Alcohol use: No    Drug use: No    Sexual activity: Yes     Partners: Male   Lifestyle    Physical activity:     Days per week: None     Minutes per session: None    Stress: None   Relationships    Social connections:     Talks on phone: None     Gets together: None     Attends Sikh service: None     Active member of club or organization: None     Attends meetings of clubs or organizations: None     Relationship status: None    Intimate partner violence:     Fear of current or ex partner: None Emotionally abused: None     Physically abused: None     Forced sexual activity: None   Other Topics Concern    None   Social History Narrative    Always uses seatbelts    Feels safe at home    No caffeine use       Medications and Allergies:     Current Outpatient Medications   Medication Sig Dispense Refill    budesonide (RINOCORT AQUA) 32 MCG/ACT nasal spray 2 sprays into each nostril daily      cetirizine (ZYRTEC ALLERGY) 10 mg tablet Take 1 tablet by mouth daily as needed      Cholecalciferol (HM VITAMIN D3) 4000 units CAPS Take by mouth      Efinaconazole (JUBLIA) 10 % SOLN Apply topically      guaiFENesin (MUCINEX) 600 mg 12 hr tablet Take 600 mg by mouth every 12 (twelve) hours      hydrochlorothiazide (HYDRODIURIL) 12 5 mg tablet Take 1 tablet (12 5 mg total) by mouth daily 90 tablet 1    omeprazole (PRILOSEC) 20 mg delayed release capsule Take 1 capsule (20 mg total) by mouth daily 90 capsule 1    Pyridoxine HCl (VITAMIN B-6) 100 MG TABS Take 1 tablet by mouth daily      ursodiol (ACTIGALL) 300 mg capsule Take 1 capsule (300 mg total) by mouth 2 (two) times a day 180 capsule 1    valsartan (DIOVAN) 160 mg tablet Take 1 tablet (160 mg total) by mouth daily 90 tablet 1    Vitamin E 200 units TABS Take 1 tablet by mouth daily      metoprolol tartrate (LOPRESSOR) 100 mg tablet Take 1 tablet (100 mg total) by mouth daily 90 tablet 1     No current facility-administered medications for this visit        Allergies   Allergen Reactions    Azithromycin GI Intolerance    Dicyclomine GI Intolerance and Hives    Erythromycin GI Intolerance    Sulfa Antibiotics Hives    Sulfamethoxazole-Trimethoprim Rash      Immunizations:     Immunization History   Administered Date(s) Administered    Influenza Split High Dose Preservative Free IM 10/19/2017, 09/23/2019    Influenza, high dose seasonal 0 5 mL 10/18/2018    Pneumococcal Conjugate 13-Valent 05/07/2019      Health Maintenance:         Topic Date Due    Hepatitis C Screening  1950    MAMMOGRAM  05/17/2020    CRC Screening: Colonoscopy  05/18/2028         Topic Date Due    HEPATITIS B VACCINES (1 of 3 - Risk 3-dose series) 06/27/1970    DTaP,Tdap,and Td Vaccines (1 - Tdap) 06/27/1972      Medicare Health Risk Assessment:     /84 (BP Location: Right arm, Patient Position: Sitting, Cuff Size: Large)   Pulse 78   Temp 98 6 °F (37 °C) (Tympanic)   Resp 18   SpO2 98%      Nettie Stan is here for her Subsequent Wellness visit  Health Risk Assessment:   Patient rates overall health as good  Patient feels that their physical health rating is same  Eyesight was rated as same  Hearing was rated as same  Patient feels that their emotional and mental health rating is same  Pain experienced in the last 7 days has been none  Patient states that she has experienced no weight loss or gain in last 6 months  Depression Screening:   PHQ-2 Score: 0      Fall Risk Screening: In the past year, patient has experienced: no history of falling in past year      Urinary Incontinence Screening:   Patient has not leaked urine accidently in the last six months  Home Safety:  Patient does not have trouble with stairs inside or outside of their home  Patient has working smoke alarms and has working carbon monoxide detector  Home safety hazards include: none  Nutrition:   Current diet is Regular  Medications:   Patient is currently taking over-the-counter supplements  OTC medications include: see medication list  Patient is able to manage medications  Activities of Daily Living (ADLs)/Instrumental Activities of Daily Living (IADLs):   Walk and transfer into and out of bed and chair?: Yes  Dress and groom yourself?: Yes    Bathe or shower yourself?: Yes    Feed yourself?  Yes  Do your laundry/housekeeping?: Yes  Manage your money, pay your bills and track your expenses?: Yes  Make your own meals?: Yes    Do your own shopping?: Yes    Previous Hospitalizations:   Any hospitalizations or ED visits within the last 12 months?: No      Advance Care Planning:   Living will: No    Durable POA for healthcare:  Yes    Advanced directive: Yes    Advanced directive counseling given: Yes    End of Life Decisions reviewed with patient: Yes      Cognitive Screening:   Provider or family/friend/caregiver concerned regarding cognition?: No    PREVENTIVE SCREENINGS      Cardiovascular Screening:    General: Screening Not Indicated and History Lipid Disorder      Diabetes Screening:     General: Screening Current      Colorectal Cancer Screening:     General: Screening Current      Breast Cancer Screening:     General: Screening Current      Cervical Cancer Screening:    General: Screening Not Indicated      Abdominal Aortic Aneurysm (AAA) Screening:        General: Screening Not Indicated      Lung Cancer Screening:     General: Screening Not Indicated      Hepatitis C Screening:    General: Screening Current      Shaheen Zamora DO

## 2019-10-25 PROCEDURE — 93000 ELECTROCARDIOGRAM COMPLETE: CPT | Performed by: FAMILY MEDICINE

## 2019-11-06 ENCOUNTER — RX ONLY (RX ONLY)
Age: 69
End: 2019-11-06

## 2019-11-06 ENCOUNTER — DOCTOR'S OFFICE (OUTPATIENT)
Dept: URBAN - METROPOLITAN AREA CLINIC 136 | Facility: CLINIC | Age: 69
Setting detail: OPHTHALMOLOGY
End: 2019-11-06
Payer: COMMERCIAL

## 2019-11-06 DIAGNOSIS — H11.31: ICD-10-CM

## 2019-11-06 PROCEDURE — 99202 OFFICE O/P NEW SF 15 MIN: CPT | Performed by: OPTOMETRIST

## 2019-11-06 ASSESSMENT — REFRACTION_MANIFEST
OU_VA: 20/
OS_VA2: 20/
OD_VA3: 20/
OS_VA3: 20/
OU_VA: 20/
OD_VA2: 20/
OD_VA2: 20/
OS_VA3: 20/
OS_VA2: 20/
OS_VA1: 20/
OD_VA1: 20/
OD_VA3: 20/
OS_VA1: 20/
OD_VA1: 20/

## 2019-11-06 ASSESSMENT — REFRACTION_CURRENTRX
OS_OVR_VA: 20/
OD_OVR_VA: 20/
OD_OVR_VA: 20/
OS_OVR_VA: 20/
OD_OVR_VA: 20/
OS_OVR_VA: 20/

## 2019-11-06 ASSESSMENT — VISUAL ACUITY
OS_BCVA: 20/30-2
OD_BCVA: 20/30-1

## 2019-11-22 ENCOUNTER — TELEPHONE (OUTPATIENT)
Dept: FAMILY MEDICINE CLINIC | Facility: CLINIC | Age: 69
End: 2019-11-22

## 2019-11-22 NOTE — TELEPHONE ENCOUNTER
I sent an email to billing requesting a one time courtesy adjustment for $16 65 for [facility] 10/25/19 EKG balance due  There was a miscommunication about this service between the patient and provider

## 2020-04-07 DIAGNOSIS — K21.00 REFLUX ESOPHAGITIS: ICD-10-CM

## 2020-04-07 DIAGNOSIS — K80.20 MULTIPLE GALLSTONES: ICD-10-CM

## 2020-04-07 DIAGNOSIS — I10 ESSENTIAL HYPERTENSION: ICD-10-CM

## 2020-04-07 DIAGNOSIS — I10 BENIGN ESSENTIAL HYPERTENSION: ICD-10-CM

## 2020-04-07 RX ORDER — OMEPRAZOLE 20 MG/1
20 CAPSULE, DELAYED RELEASE ORAL DAILY
Qty: 90 CAPSULE | Refills: 0 | Status: SHIPPED | OUTPATIENT
Start: 2020-04-07 | End: 2020-06-29 | Stop reason: SDUPTHER

## 2020-04-07 RX ORDER — METOPROLOL TARTRATE 100 MG/1
100 TABLET ORAL DAILY
Qty: 90 TABLET | Refills: 0 | Status: SHIPPED | OUTPATIENT
Start: 2020-04-07 | End: 2020-06-29 | Stop reason: SDUPTHER

## 2020-04-07 RX ORDER — VALSARTAN 160 MG/1
160 TABLET ORAL DAILY
Qty: 90 TABLET | Refills: 0 | Status: SHIPPED | OUTPATIENT
Start: 2020-04-07 | End: 2020-06-29 | Stop reason: SDUPTHER

## 2020-04-07 RX ORDER — URSODIOL 300 MG/1
300 CAPSULE ORAL 2 TIMES DAILY
Qty: 180 CAPSULE | Refills: 0 | Status: SHIPPED | OUTPATIENT
Start: 2020-04-07 | End: 2020-06-29 | Stop reason: SDUPTHER

## 2020-04-07 RX ORDER — HYDROCHLOROTHIAZIDE 12.5 MG/1
12.5 TABLET ORAL DAILY
Qty: 90 TABLET | Refills: 0 | Status: SHIPPED | OUTPATIENT
Start: 2020-04-07 | End: 2020-06-29 | Stop reason: SDUPTHER

## 2020-06-29 DIAGNOSIS — I10 ESSENTIAL HYPERTENSION: ICD-10-CM

## 2020-06-29 DIAGNOSIS — K80.20 MULTIPLE GALLSTONES: ICD-10-CM

## 2020-06-29 DIAGNOSIS — I10 BENIGN ESSENTIAL HYPERTENSION: ICD-10-CM

## 2020-06-29 DIAGNOSIS — K21.00 REFLUX ESOPHAGITIS: ICD-10-CM

## 2020-06-30 RX ORDER — URSODIOL 300 MG/1
300 CAPSULE ORAL 2 TIMES DAILY
Qty: 180 CAPSULE | Refills: 0 | Status: SHIPPED | OUTPATIENT
Start: 2020-06-30 | End: 2020-09-14 | Stop reason: SDUPTHER

## 2020-06-30 RX ORDER — METOPROLOL TARTRATE 100 MG/1
100 TABLET ORAL DAILY
Qty: 90 TABLET | Refills: 0 | Status: SHIPPED | OUTPATIENT
Start: 2020-06-30 | End: 2020-09-14 | Stop reason: SDUPTHER

## 2020-06-30 RX ORDER — METOPROLOL TARTRATE 100 MG/1
100 TABLET ORAL DAILY
Qty: 90 TABLET | Refills: 0 | OUTPATIENT
Start: 2020-06-30 | End: 2020-09-28

## 2020-06-30 RX ORDER — OMEPRAZOLE 20 MG/1
20 CAPSULE, DELAYED RELEASE ORAL DAILY
Qty: 90 CAPSULE | Refills: 0 | Status: SHIPPED | OUTPATIENT
Start: 2020-06-30 | End: 2020-09-14 | Stop reason: SDUPTHER

## 2020-06-30 RX ORDER — HYDROCHLOROTHIAZIDE 12.5 MG/1
12.5 TABLET ORAL DAILY
Qty: 90 TABLET | Refills: 0 | Status: SHIPPED | OUTPATIENT
Start: 2020-06-30 | End: 2020-09-14 | Stop reason: SDUPTHER

## 2020-06-30 RX ORDER — VALSARTAN 160 MG/1
160 TABLET ORAL DAILY
Qty: 90 TABLET | Refills: 0 | Status: SHIPPED | OUTPATIENT
Start: 2020-06-30 | End: 2020-09-14 | Stop reason: SDUPTHER

## 2020-09-14 DIAGNOSIS — K21.00 REFLUX ESOPHAGITIS: ICD-10-CM

## 2020-09-14 DIAGNOSIS — K80.20 MULTIPLE GALLSTONES: ICD-10-CM

## 2020-09-14 DIAGNOSIS — I10 ESSENTIAL HYPERTENSION: ICD-10-CM

## 2020-09-14 DIAGNOSIS — I10 BENIGN ESSENTIAL HYPERTENSION: ICD-10-CM

## 2020-09-15 RX ORDER — HYDROCHLOROTHIAZIDE 12.5 MG/1
12.5 TABLET ORAL DAILY
Qty: 90 TABLET | Refills: 0 | Status: SHIPPED | OUTPATIENT
Start: 2020-09-15 | End: 2020-12-15 | Stop reason: SDUPTHER

## 2020-09-15 RX ORDER — URSODIOL 300 MG/1
300 CAPSULE ORAL 2 TIMES DAILY
Qty: 180 CAPSULE | Refills: 0 | Status: SHIPPED | OUTPATIENT
Start: 2020-09-15 | End: 2020-12-15 | Stop reason: SDUPTHER

## 2020-09-15 RX ORDER — METOPROLOL TARTRATE 100 MG/1
100 TABLET ORAL DAILY
Qty: 90 TABLET | Refills: 0 | Status: SHIPPED | OUTPATIENT
Start: 2020-09-15 | End: 2020-12-15 | Stop reason: SDUPTHER

## 2020-09-15 RX ORDER — VALSARTAN 160 MG/1
160 TABLET ORAL DAILY
Qty: 90 TABLET | Refills: 0 | Status: SHIPPED | OUTPATIENT
Start: 2020-09-15 | End: 2020-12-15 | Stop reason: SDUPTHER

## 2020-09-15 RX ORDER — OMEPRAZOLE 20 MG/1
20 CAPSULE, DELAYED RELEASE ORAL DAILY
Qty: 90 CAPSULE | Refills: 0 | Status: SHIPPED | OUTPATIENT
Start: 2020-09-15 | End: 2020-12-15 | Stop reason: SDUPTHER

## 2020-09-15 NOTE — TELEPHONE ENCOUNTER
Call patient - I received a request to refill her medication  I ddi authorize the refill, but she will need blood work and and appointment before anymore refills can be authorized  She would be due for her Medicare Wellness in October - please schedule appropriately

## 2020-09-28 NOTE — TELEPHONE ENCOUNTER
Tried calling home phone number, no answer, no machine  LMOM on 's cell phone asking him to have her call back to schedule AWV for October and let us know which lab she uses for orders  Ok per CC

## 2020-12-15 DIAGNOSIS — K21.00 REFLUX ESOPHAGITIS: ICD-10-CM

## 2020-12-15 DIAGNOSIS — I10 BENIGN ESSENTIAL HYPERTENSION: ICD-10-CM

## 2020-12-15 DIAGNOSIS — K80.20 MULTIPLE GALLSTONES: ICD-10-CM

## 2020-12-15 DIAGNOSIS — I10 ESSENTIAL HYPERTENSION: ICD-10-CM

## 2020-12-16 RX ORDER — URSODIOL 300 MG/1
300 CAPSULE ORAL 2 TIMES DAILY
Qty: 180 CAPSULE | Refills: 0 | Status: SHIPPED | OUTPATIENT
Start: 2020-12-16 | End: 2021-03-16 | Stop reason: SDUPTHER

## 2020-12-16 RX ORDER — METOPROLOL TARTRATE 100 MG/1
100 TABLET ORAL DAILY
Qty: 90 TABLET | Refills: 0 | OUTPATIENT
Start: 2020-12-16 | End: 2021-03-16

## 2020-12-16 RX ORDER — HYDROCHLOROTHIAZIDE 12.5 MG/1
12.5 TABLET ORAL DAILY
Qty: 90 TABLET | Refills: 0 | Status: SHIPPED | OUTPATIENT
Start: 2020-12-16 | End: 2021-03-16 | Stop reason: SDUPTHER

## 2020-12-16 RX ORDER — OMEPRAZOLE 20 MG/1
20 CAPSULE, DELAYED RELEASE ORAL DAILY
Qty: 90 CAPSULE | Refills: 0 | Status: SHIPPED | OUTPATIENT
Start: 2020-12-16 | End: 2021-03-16 | Stop reason: SDUPTHER

## 2020-12-16 RX ORDER — METOPROLOL TARTRATE 100 MG/1
100 TABLET ORAL DAILY
Qty: 90 TABLET | Refills: 0 | Status: SHIPPED | OUTPATIENT
Start: 2020-12-16 | End: 2021-03-16 | Stop reason: SDUPTHER

## 2020-12-16 RX ORDER — VALSARTAN 160 MG/1
160 TABLET ORAL DAILY
Qty: 90 TABLET | Refills: 0 | Status: SHIPPED | OUTPATIENT
Start: 2020-12-16 | End: 2021-03-16 | Stop reason: SDUPTHER

## 2021-03-10 DIAGNOSIS — Z23 ENCOUNTER FOR IMMUNIZATION: ICD-10-CM

## 2021-03-16 DIAGNOSIS — I10 ESSENTIAL HYPERTENSION: ICD-10-CM

## 2021-03-16 DIAGNOSIS — K21.00 REFLUX ESOPHAGITIS: ICD-10-CM

## 2021-03-16 DIAGNOSIS — I10 BENIGN ESSENTIAL HYPERTENSION: ICD-10-CM

## 2021-03-16 DIAGNOSIS — K80.20 MULTIPLE GALLSTONES: ICD-10-CM

## 2021-03-16 RX ORDER — METOPROLOL TARTRATE 100 MG/1
100 TABLET ORAL DAILY
Qty: 90 TABLET | Refills: 0 | Status: SHIPPED | OUTPATIENT
Start: 2021-03-16 | End: 2021-06-11 | Stop reason: SDUPTHER

## 2021-03-16 RX ORDER — URSODIOL 300 MG/1
300 CAPSULE ORAL 2 TIMES DAILY
Qty: 180 CAPSULE | Refills: 0 | Status: SHIPPED | OUTPATIENT
Start: 2021-03-16 | End: 2021-06-11 | Stop reason: SDUPTHER

## 2021-03-16 RX ORDER — VALSARTAN 160 MG/1
160 TABLET ORAL DAILY
Qty: 90 TABLET | Refills: 0 | Status: SHIPPED | OUTPATIENT
Start: 2021-03-16 | End: 2021-06-11 | Stop reason: SDUPTHER

## 2021-03-16 RX ORDER — HYDROCHLOROTHIAZIDE 12.5 MG/1
12.5 TABLET ORAL DAILY
Qty: 90 TABLET | Refills: 0 | Status: CANCELLED | OUTPATIENT
Start: 2021-03-16

## 2021-03-16 RX ORDER — OMEPRAZOLE 20 MG/1
20 CAPSULE, DELAYED RELEASE ORAL DAILY
Qty: 90 CAPSULE | Refills: 0 | Status: SHIPPED | OUTPATIENT
Start: 2021-03-16 | End: 2021-06-11 | Stop reason: SDUPTHER

## 2021-03-16 RX ORDER — HYDROCHLOROTHIAZIDE 12.5 MG/1
12.5 TABLET ORAL DAILY
Qty: 90 TABLET | Refills: 0 | Status: SHIPPED | OUTPATIENT
Start: 2021-03-16 | End: 2021-06-11 | Stop reason: SDUPTHER

## 2021-04-28 ENCOUNTER — TELEPHONE (OUTPATIENT)
Dept: FAMILY MEDICINE CLINIC | Facility: CLINIC | Age: 71
End: 2021-04-28

## 2021-04-28 NOTE — TELEPHONE ENCOUNTER
Patient stated she called yesterday and left message regarding her appointment with Fallon Corral on 4/29/2021   Patient is hesitant to come into the office and would like to know if a virtual visit can be done instead   Patient is schedule for a wellness check  Please advise

## 2021-04-28 NOTE — TELEPHONE ENCOUNTER
Call patient  Certainly if she is not comfortable coming in the office, we can do the visit virtually, but I do not feel that is optimal, since I will not be able to check her Blood pressure, listen to her heart etc Everyone coming into the building is masked and we clean the exam tables, etc  The virtual visit should be video, not a phone call  Also, I see that she has not gotten a COVID vaccine through [facility]  Did she get her vaccine through [facility] or one of the pharmacies  If she has not gotten one, we need to help her schedule one

## 2021-04-28 NOTE — TELEPHONE ENCOUNTER
Spoke to [place] who stated she is unable to do a Video Visit  She can only do a phone visit  Per [place] " she has a BP machine/ [employer]  Etc  Spoke to [facility] and appointment switch to a phone visit  Patient is aware that it will not be Wellness Check  Patient did get her covid vaccine will bring records the next time she comes into the office     First -2/26/2021  Booster- 3/19/2021

## 2021-04-29 ENCOUNTER — TELEMEDICINE (OUTPATIENT)
Dept: FAMILY MEDICINE CLINIC | Facility: CLINIC | Age: 71
End: 2021-04-29
Payer: COMMERCIAL

## 2021-04-29 VITALS
BODY MASS INDEX: 45.35 KG/M2 | OXYGEN SATURATION: 97 % | WEIGHT: 256 LBS | DIASTOLIC BLOOD PRESSURE: 84 MMHG | SYSTOLIC BLOOD PRESSURE: 126 MMHG

## 2021-04-29 DIAGNOSIS — E55.9 VITAMIN D DEFICIENCY: ICD-10-CM

## 2021-04-29 DIAGNOSIS — R73.9 ELEVATED BLOOD SUGAR: ICD-10-CM

## 2021-04-29 DIAGNOSIS — I10 BENIGN ESSENTIAL HYPERTENSION: Primary | ICD-10-CM

## 2021-04-29 DIAGNOSIS — E78.2 MIXED HYPERLIPIDEMIA: ICD-10-CM

## 2021-04-29 DIAGNOSIS — R74.8 ELEVATED LIVER ENZYMES: ICD-10-CM

## 2021-04-29 DIAGNOSIS — E03.9 HYPOTHYROIDISM, UNSPECIFIED TYPE: ICD-10-CM

## 2021-04-29 PROCEDURE — 1160F RVW MEDS BY RX/DR IN RCRD: CPT | Performed by: FAMILY MEDICINE

## 2021-04-29 PROCEDURE — 99214 OFFICE O/P EST MOD 30 MIN: CPT | Performed by: FAMILY MEDICINE

## 2021-04-29 PROCEDURE — 1036F TOBACCO NON-USER: CPT | Performed by: FAMILY MEDICINE

## 2021-04-29 NOTE — PROGRESS NOTES
Virtual Brief Visit    Assessment/Plan:    Patient is 46-year-old female seen for follow-up chronic medical conditions  Problem List Items Addressed This Visit        Endocrine    Hypothyroidism       Cardiovascular and Mediastinum    Benign essential hypertension - Primary       Other    Elevated blood sugar    Elevated liver enzymes        At this time she will continue on the valsartan and metoprolol for her blood pressure  She also takes HCTZ  I will put orders in for fasting blood work  Hopefully in 6 months she feels comfortable coming into the office  Reason for visit is   Chief Complaint   Patient presents with    Virtual Brief Visit        Encounter provider Nikita Gee DO    Provider located at [address] [facility] [id number] [phone]    Recent Visits  Date Type Provider Dept   04/29/21 DULCE MARIA Head DO [facility]   04/28/21 Telephone March Alpers, MA [facility]   Showing recent visits within past 7 days and meeting all other requirements     Future Appointments  No visits were found meeting these conditions  Showing future appointments within next 150 days and meeting all other requirements        After connecting through [employer] and patient was informed that this is a secure, HIPAA-compliant platform  She agrees to proceed  , the patient was identified by name and date of birth  Jerri Ruggiero was informed that this is a telemedicine visit and that the visit is being conducted through telephone  My office door was closed  No one else was in the room  She acknowledged consent and understanding of privacy and security of the platform  The patient has agreed to participate and understands she can discontinue the visit at any time  Patient is aware this is a billable service  Subjective    Jerri Ruggiero is a 72 y o  female   Seen for follow-up of chronic medical conditions    She is still fearful of COVID and did not want to come into the office     Got her second COVID vaccine  Past Medical History:   Diagnosis Date    Chest pain     last assessed 9/16/13    Chest pressure     last assessed 12/15/16    Chronic sinusitis     last assessed 5/4/17    Compression fracture of lumbar vertebra [facility])     last assessed 6/13/15    Lump of skin     last assessed 1/17/14       No past surgical history on file  Current Outpatient Medications   Medication Sig Dispense Refill    hydrochlorothiazide (HYDRODIURIL) 12 5 mg tablet Take 1 tablet (12 5 mg total) by mouth daily 90 tablet 0    metoprolol tartrate (LOPRESSOR) 100 mg tablet Take 1 tablet (100 mg total) by mouth daily 90 tablet 0    valsartan (DIOVAN) 160 mg tablet Take 1 tablet (160 mg total) by mouth daily 90 tablet 0    budesonide (RINOCORT AQUA) 32 MCG/ACT nasal spray 2 sprays into each nostril daily      cetirizine (ZYRTEC ALLERGY) 10 mg tablet Take 1 tablet by mouth daily as needed      Cholecalciferol (HM VITAMIN D3) 4000 units CAPS Take by mouth      Efinaconazole (JUBLIA) 10 % SOLN Apply topically      guaiFENesin (MUCINEX) 600 mg 12 hr tablet Take 600 mg by mouth every 12 (twelve) hours      omeprazole (PriLOSEC) 20 mg delayed release capsule Take 1 capsule (20 mg total) by mouth daily 90 capsule 0    Pyridoxine HCl (VITAMIN B-6) 100 MG TABS Take 1 tablet by mouth daily      ursodiol (ACTIGALL) 300 mg capsule Take 1 capsule (300 mg total) by mouth 2 (two) times a day 180 capsule 0    Vitamin E 200 units TABS Take 1 tablet by mouth daily       No current facility-administered medications for this visit  Allergies   Allergen Reactions    Azithromycin GI Intolerance    Dicyclomine GI Intolerance and Hives    Erythromycin GI Intolerance    Sulfa Antibiotics Hives    Sulfamethoxazole-Trimethoprim Rash       Review of Systems   Constitutional: Negative for chills and fever     HENT: Negative for congestion and sore throat  Respiratory: Negative for chest tightness  Cardiovascular: Negative for chest pain and palpitations  Gastrointestinal: Negative for abdominal pain, constipation, diarrhea and nausea  Genitourinary: Negative for difficulty urinating  Skin: Negative  Neurological: Negative for dizziness and headaches  Psychiatric/Behavioral: Negative  Vitals:    04/29/21 1246   BP: 126/84   SpO2: 97%   Weight: 116 kg (256 lb)         I spent 20 minutes directly with the patient during this visit    [facility] [employer] [address] acknowledges that she has consented to an online visit or consultation  She understands that the online visit is based solely on information provided by her, and that, in the absence of a face-to-face physical evaluation by the physician, the diagnosis she receives is both limited and provisional in terms of accuracy and completeness  This is not intended to replace a full medical face-to-face evaluation by the physician  Zeinab Robbins understands and accepts these terms

## 2021-05-01 ENCOUNTER — TELEPHONE (OUTPATIENT)
Dept: FAMILY MEDICINE CLINIC | Facility: CLINIC | Age: 71
End: 2021-05-01

## 2021-05-01 NOTE — TELEPHONE ENCOUNTER
----- Message from Naima Sanchez DO sent at 4/30/2021 12:24 AM EDT -----  Regarding: lab work    I put lab work orders in patient's chart  Please mail to her  [facility]

## 2021-06-11 DIAGNOSIS — K21.00 REFLUX ESOPHAGITIS: ICD-10-CM

## 2021-06-11 DIAGNOSIS — K80.20 MULTIPLE GALLSTONES: ICD-10-CM

## 2021-06-11 DIAGNOSIS — I10 BENIGN ESSENTIAL HYPERTENSION: ICD-10-CM

## 2021-06-11 DIAGNOSIS — I10 ESSENTIAL HYPERTENSION: ICD-10-CM

## 2021-06-11 RX ORDER — URSODIOL 300 MG/1
300 CAPSULE ORAL 2 TIMES DAILY
Qty: 180 CAPSULE | Refills: 0 | Status: SHIPPED | OUTPATIENT
Start: 2021-06-11 | End: 2021-09-14 | Stop reason: SDUPTHER

## 2021-06-11 RX ORDER — METOPROLOL TARTRATE 100 MG/1
100 TABLET ORAL DAILY
Qty: 90 TABLET | Refills: 0 | Status: SHIPPED | OUTPATIENT
Start: 2021-06-11 | End: 2021-09-14 | Stop reason: SDUPTHER

## 2021-06-11 RX ORDER — HYDROCHLOROTHIAZIDE 12.5 MG/1
12.5 TABLET ORAL DAILY
Qty: 90 TABLET | Refills: 0 | Status: SHIPPED | OUTPATIENT
Start: 2021-06-11 | End: 2021-09-14 | Stop reason: SDUPTHER

## 2021-06-11 RX ORDER — OMEPRAZOLE 20 MG/1
20 CAPSULE, DELAYED RELEASE ORAL DAILY
Qty: 90 CAPSULE | Refills: 0 | Status: SHIPPED | OUTPATIENT
Start: 2021-06-11 | End: 2021-09-14 | Stop reason: SDUPTHER

## 2021-06-11 RX ORDER — VALSARTAN 160 MG/1
160 TABLET ORAL DAILY
Qty: 90 TABLET | Refills: 0 | Status: SHIPPED | OUTPATIENT
Start: 2021-06-11 | End: 2021-09-14 | Stop reason: SDUPTHER
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
## 2021-07-28 ENCOUNTER — APPOINTMENT (OUTPATIENT)
Dept: RADIOLOGY | Facility: CLINIC | Age: 71
End: 2021-07-28
Payer: COMMERCIAL

## 2021-07-28 ENCOUNTER — OFFICE VISIT (OUTPATIENT)
Dept: URGENT CARE | Facility: CLINIC | Age: 71
End: 2021-07-28
Payer: COMMERCIAL

## 2021-07-28 VITALS
DIASTOLIC BLOOD PRESSURE: 82 MMHG | RESPIRATION RATE: 22 BRPM | OXYGEN SATURATION: 98 % | HEART RATE: 100 BPM | TEMPERATURE: 97.2 F | SYSTOLIC BLOOD PRESSURE: 138 MMHG

## 2021-07-28 DIAGNOSIS — M25.461 EFFUSION, RIGHT KNEE: ICD-10-CM
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
## 2021-07-28 DIAGNOSIS — S89.91XA RIGHT KNEE INJURY, INITIAL ENCOUNTER: ICD-10-CM

## 2021-07-28 DIAGNOSIS — T07.XXXA MULTIPLE CONTUSIONS: ICD-10-CM
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
## 2021-07-28 PROCEDURE — 73564 X-RAY EXAM KNEE 4 OR MORE: CPT

## 2021-07-28 PROCEDURE — 99214 OFFICE O/P EST MOD 30 MIN: CPT | Performed by: PHYSICIAN ASSISTANT

## 2021-07-28 PROCEDURE — S9088 SERVICES PROVIDED IN URGENT: HCPCS | Performed by: PHYSICIAN ASSISTANT
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
## 2021-07-30 ENCOUNTER — VBI (OUTPATIENT)
Dept: ADMINISTRATIVE | Facility: OTHER | Age: 71
End: 2021-07-30

## 2021-07-30 ENCOUNTER — OFFICE VISIT (OUTPATIENT)
Dept: OBGYN CLINIC | Facility: MEDICAL CENTER | Age: 71
End: 2021-07-30
Payer: COMMERCIAL

## 2021-07-30 ENCOUNTER — APPOINTMENT (OUTPATIENT)
Dept: RADIOLOGY | Facility: MEDICAL CENTER | Age: 71
End: 2021-07-30
Payer: COMMERCIAL

## 2021-07-30 VITALS
DIASTOLIC BLOOD PRESSURE: 79 MMHG | HEART RATE: 73 BPM | SYSTOLIC BLOOD PRESSURE: 129 MMHG | BODY MASS INDEX: 45.36 KG/M2 | WEIGHT: 256 LBS | HEIGHT: 63 IN

## 2021-07-30 DIAGNOSIS — M25.461 EFFUSION, RIGHT KNEE: ICD-10-CM

## 2021-07-30 DIAGNOSIS — Z00.6 ENCOUNTER FOR EXAMINATION FOR NORMAL COMPARISON AND CONTROL IN CLINICAL RESEARCH PROGRAM: ICD-10-CM
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
## 2021-07-30 DIAGNOSIS — S89.91XA RIGHT KNEE INJURY, INITIAL ENCOUNTER: ICD-10-CM

## 2021-07-30 DIAGNOSIS — M17.11 PRIMARY OSTEOARTHRITIS OF RIGHT KNEE: ICD-10-CM

## 2021-07-30 DIAGNOSIS — S89.91XA RIGHT KNEE INJURY, INITIAL ENCOUNTER: Primary | ICD-10-CM

## 2021-07-30 PROCEDURE — 1160F RVW MEDS BY RX/DR IN RCRD: CPT | Performed by: ORTHOPAEDIC SURGERY

## 2021-07-30 PROCEDURE — 73564 X-RAY EXAM KNEE 4 OR MORE: CPT

## 2021-07-30 PROCEDURE — 99203 OFFICE O/P NEW LOW 30 MIN: CPT | Performed by: ORTHOPAEDIC SURGERY

## 2021-07-30 PROCEDURE — 73560 X-RAY EXAM OF KNEE 1 OR 2: CPT

## 2021-07-30 PROCEDURE — 3008F BODY MASS INDEX DOCD: CPT | Performed by: ORTHOPAEDIC SURGERY
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
## 2021-08-03 ENCOUNTER — TELEPHONE (OUTPATIENT)
Dept: OBGYN CLINIC | Facility: HOSPITAL | Age: 71
End: 2021-08-03

## 2021-08-03 NOTE — TELEPHONE ENCOUNTER
Agree with recommendations  No sign of infection on picture  Lots of swelling can cause blisters  She should keep these areas clean  She may use a little neosporin or triple antibiotic ointment and a bandage  The most important thing is that the patient continues to ice and elevate (toes about the nose) to help get her swelling down  May take NSAID and tylenol  We will follow up after MRI or sooner if she has concerns   Thanks

## 2021-08-03 NOTE — TELEPHONE ENCOUNTER
Patient states knee is filling up with fluid again and is dripping  She would like a call back to discuss

## 2021-08-03 NOTE — TELEPHONE ENCOUNTER
Patient is calling wanting to let Dr Olga Harrison that her blisters popped twice on her knee, it is oozing serosanguinous fluid  She is keeping it applying neosporin and keeping it covered with a telfa bandage  She is worried about infection  Advised this can happen when there is a lot of swelling as the edema comes thru the skin  She will send picture of the blister for Dr 's review  In the meantime RICE method, tylenol and ibuprofen  Call with any worsening of symptoms  MRI scheduled for 8/13  She also states she looked at AVS and she did not deny instability etc   She said she definitely had instability as she was offered a wheelchair  Surgery scheduler - please authorize MRI for 8/13 at [facility]    Brennen Burt  Please see picture sent by patient -

## 2021-08-04 NOTE — TELEPHONE ENCOUNTER
Patient is calling back asking to speak to Gracie Bowles  I let the patient know that she is in the OR & that I would ask her to call back as soon as she could       [phone]

## 2021-08-04 NOTE — TELEPHONE ENCOUNTER
I spoke with patient and information from Odalys Martínez provided and she verbalized understanding  She will call back with any further concerns

## 2021-08-05 ENCOUNTER — TELEPHONE (OUTPATIENT)
Dept: OBGYN CLINIC | Facility: HOSPITAL | Age: 71
End: 2021-08-05

## 2021-08-05 NOTE — TELEPHONE ENCOUNTER
Needing prior auth for rt knee Sameul Ganser, [address]  services mri  [address] [facility] [address]  [phone]

## 2021-08-06 NOTE — TELEPHONE ENCOUNTER
Anoop thru [facility]:     Approval # [id number]  Tracking # [id number]  Valid: 8/6 - 2/2/2022    Faxed to Luther at [phone]

## 2021-08-06 NOTE — TELEPHONE ENCOUNTER
Per Jamilah Del Cid:     ALEX: [id number]  TIN: [id number]  [facility]   Scheduled for 8/13

## 2021-08-19 ENCOUNTER — OFFICE VISIT (OUTPATIENT)
Dept: OBGYN CLINIC | Facility: MEDICAL CENTER | Age: 71
End: 2021-08-19
Payer: COMMERCIAL

## 2021-08-19 VITALS
BODY MASS INDEX: 45.04 KG/M2 | HEIGHT: 63 IN | HEART RATE: 80 BPM | DIASTOLIC BLOOD PRESSURE: 74 MMHG | SYSTOLIC BLOOD PRESSURE: 137 MMHG | WEIGHT: 254.2 LBS

## 2021-08-19 DIAGNOSIS — M79.89 LEG SWELLING: ICD-10-CM

## 2021-08-19 DIAGNOSIS — T14.8XXA HEMATOMA: Primary | ICD-10-CM

## 2021-08-19 DIAGNOSIS — M22.41 CHONDROMALACIA OF RIGHT PATELLA: ICD-10-CM

## 2021-08-19 PROCEDURE — 1160F RVW MEDS BY RX/DR IN RCRD: CPT | Performed by: ORTHOPAEDIC SURGERY

## 2021-08-19 PROCEDURE — 1036F TOBACCO NON-USER: CPT | Performed by: ORTHOPAEDIC SURGERY

## 2021-08-19 PROCEDURE — 3008F BODY MASS INDEX DOCD: CPT | Performed by: ORTHOPAEDIC SURGERY

## 2021-08-19 PROCEDURE — 99213 OFFICE O/P EST LOW 20 MIN: CPT | Performed by: ORTHOPAEDIC SURGERY

## 2021-08-19 RX ORDER — NAPROXEN 500 MG/1
500 TABLET ORAL 2 TIMES DAILY PRN
Qty: 60 TABLET | Refills: 1 | Status: SHIPPED | OUTPATIENT
Start: 2021-08-19 | End: 2021-10-07 | Stop reason: SDUPTHER
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
## 2021-09-02 ENCOUNTER — TELEPHONE (OUTPATIENT)
Dept: FAMILY MEDICINE CLINIC | Facility: CLINIC | Age: 71
End: 2021-09-02

## 2021-09-02 NOTE — TELEPHONE ENCOUNTER
Lmom pt over due for AWV  Last 10/23/19  If pt calls back please assist in scheduling  If pt declines visit, please document

## 2021-09-14 DIAGNOSIS — K21.00 REFLUX ESOPHAGITIS: ICD-10-CM

## 2021-09-14 DIAGNOSIS — I10 ESSENTIAL HYPERTENSION: ICD-10-CM

## 2021-09-14 DIAGNOSIS — K80.20 MULTIPLE GALLSTONES: ICD-10-CM

## 2021-09-14 DIAGNOSIS — I10 BENIGN ESSENTIAL HYPERTENSION: ICD-10-CM

## 2021-09-14 RX ORDER — VALSARTAN 160 MG/1
160 TABLET ORAL DAILY
Qty: 90 TABLET | Refills: 0 | Status: SHIPPED | OUTPATIENT
Start: 2021-09-14 | End: 2021-12-13 | Stop reason: SDUPTHER

## 2021-09-14 RX ORDER — METOPROLOL TARTRATE 100 MG/1
100 TABLET ORAL DAILY
Qty: 90 TABLET | Refills: 0 | Status: SHIPPED | OUTPATIENT
Start: 2021-09-14 | End: 2021-12-13 | Stop reason: SDUPTHER

## 2021-09-14 RX ORDER — OMEPRAZOLE 20 MG/1
20 CAPSULE, DELAYED RELEASE ORAL DAILY
Qty: 90 CAPSULE | Refills: 0 | Status: SHIPPED | OUTPATIENT
Start: 2021-09-14 | End: 2021-12-13 | Stop reason: SDUPTHER

## 2021-09-14 RX ORDER — URSODIOL 300 MG/1
300 CAPSULE ORAL 2 TIMES DAILY
Qty: 180 CAPSULE | Refills: 0 | Status: SHIPPED | OUTPATIENT
Start: 2021-09-14 | End: 2021-12-13 | Stop reason: SDUPTHER

## 2021-09-14 RX ORDER — HYDROCHLOROTHIAZIDE 12.5 MG/1
12.5 TABLET ORAL DAILY
Qty: 90 TABLET | Refills: 0 | Status: SHIPPED | OUTPATIENT
Start: 2021-09-14 | End: 2022-05-04

## 2021-10-07 ENCOUNTER — OFFICE VISIT (OUTPATIENT)
Dept: OBGYN CLINIC | Facility: MEDICAL CENTER | Age: 71
End: 2021-10-07
Payer: COMMERCIAL

## 2021-10-07 VITALS
BODY MASS INDEX: 46.78 KG/M2 | HEIGHT: 63 IN | HEART RATE: 79 BPM | WEIGHT: 264 LBS | DIASTOLIC BLOOD PRESSURE: 82 MMHG | SYSTOLIC BLOOD PRESSURE: 130 MMHG

## 2021-10-07 DIAGNOSIS — T14.8XXA HEMATOMA: Primary | ICD-10-CM

## 2021-10-07 DIAGNOSIS — M79.89 LEG SWELLING: ICD-10-CM

## 2021-10-07 DIAGNOSIS — M22.41 CHONDROMALACIA OF RIGHT PATELLA: ICD-10-CM

## 2021-10-07 PROCEDURE — 99213 OFFICE O/P EST LOW 20 MIN: CPT | Performed by: ORTHOPAEDIC SURGERY

## 2021-10-07 RX ORDER — NAPROXEN 500 MG/1
500 TABLET ORAL 2 TIMES DAILY PRN
Qty: 60 TABLET | Refills: 1 | Status: SHIPPED | OUTPATIENT
Start: 2021-10-07 | End: 2022-05-04

## 2021-10-15 ENCOUNTER — RA CDI HCC (OUTPATIENT)
Dept: OTHER | Facility: HOSPITAL | Age: 71
End: 2021-10-15

## 2021-10-20 ENCOUNTER — OFFICE VISIT (OUTPATIENT)
Dept: FAMILY MEDICINE CLINIC | Facility: CLINIC | Age: 71
End: 2021-10-20
Payer: COMMERCIAL

## 2021-10-20 ENCOUNTER — TELEPHONE (OUTPATIENT)
Dept: FAMILY MEDICINE CLINIC | Facility: CLINIC | Age: 71
End: 2021-10-20

## 2021-10-20 VITALS
SYSTOLIC BLOOD PRESSURE: 124 MMHG | HEART RATE: 89 BPM | TEMPERATURE: 98.7 F | OXYGEN SATURATION: 99 % | HEIGHT: 63 IN | BODY MASS INDEX: 47.13 KG/M2 | WEIGHT: 266 LBS | DIASTOLIC BLOOD PRESSURE: 82 MMHG

## 2021-10-20 DIAGNOSIS — S80.01XA HEMATOMA OF RIGHT KNEE REGION: ICD-10-CM

## 2021-10-20 DIAGNOSIS — R10.84 GENERALIZED ABDOMINAL PAIN: Primary | ICD-10-CM

## 2021-10-20 DIAGNOSIS — R14.0 ABDOMINAL DISTENTION: ICD-10-CM

## 2021-10-20 DIAGNOSIS — R60.0 LOWER EXTREMITY EDEMA: ICD-10-CM

## 2021-10-20 DIAGNOSIS — L03.311 CELLULITIS OF ABDOMINAL WALL: ICD-10-CM

## 2021-10-20 DIAGNOSIS — D64.9 ANEMIA, UNSPECIFIED TYPE: ICD-10-CM

## 2021-10-20 DIAGNOSIS — I10 BENIGN ESSENTIAL HYPERTENSION: ICD-10-CM

## 2021-10-20 DIAGNOSIS — Z00.00 MEDICARE ANNUAL WELLNESS VISIT, SUBSEQUENT: ICD-10-CM

## 2021-10-20 DIAGNOSIS — R10.84 GENERALIZED ABDOMINAL PAIN: ICD-10-CM

## 2021-10-20 DIAGNOSIS — R73.9 ELEVATED BLOOD SUGAR: ICD-10-CM

## 2021-10-20 DIAGNOSIS — R18.8 OTHER ASCITES: Primary | ICD-10-CM

## 2021-10-20 PROCEDURE — 1160F RVW MEDS BY RX/DR IN RCRD: CPT | Performed by: FAMILY MEDICINE

## 2021-10-20 PROCEDURE — 3288F FALL RISK ASSESSMENT DOCD: CPT | Performed by: FAMILY MEDICINE

## 2021-10-20 PROCEDURE — 1170F FXNL STATUS ASSESSED: CPT | Performed by: FAMILY MEDICINE

## 2021-10-20 PROCEDURE — 3074F SYST BP LT 130 MM HG: CPT | Performed by: FAMILY MEDICINE

## 2021-10-20 PROCEDURE — 3008F BODY MASS INDEX DOCD: CPT | Performed by: FAMILY MEDICINE

## 2021-10-20 PROCEDURE — 1036F TOBACCO NON-USER: CPT | Performed by: FAMILY MEDICINE

## 2021-10-20 PROCEDURE — 3079F DIAST BP 80-89 MM HG: CPT | Performed by: FAMILY MEDICINE

## 2021-10-20 PROCEDURE — 3725F SCREEN DEPRESSION PERFORMED: CPT | Performed by: FAMILY MEDICINE

## 2021-10-20 PROCEDURE — 1125F AMNT PAIN NOTED PAIN PRSNT: CPT | Performed by: FAMILY MEDICINE

## 2021-10-20 PROCEDURE — 99214 OFFICE O/P EST MOD 30 MIN: CPT | Performed by: FAMILY MEDICINE

## 2021-10-20 PROCEDURE — G0439 PPPS, SUBSEQ VISIT: HCPCS | Performed by: FAMILY MEDICINE

## 2021-10-20 RX ORDER — CEPHALEXIN 500 MG/1
500 CAPSULE ORAL EVERY 12 HOURS SCHEDULED
Qty: 20 CAPSULE | Refills: 0 | Status: SHIPPED | OUTPATIENT
Start: 2021-10-20 | End: 2021-10-30

## 2021-10-20 RX ORDER — HYOSCYAMINE SULFATE 0.125 MG
0.12 TABLET ORAL EVERY 4 HOURS PRN
Qty: 30 TABLET | Refills: 0 | Status: SHIPPED | OUTPATIENT
Start: 2021-10-20 | End: 2021-10-28 | Stop reason: SDUPTHER

## 2021-10-21 ENCOUNTER — TELEPHONE (OUTPATIENT)
Dept: FAMILY MEDICINE CLINIC | Facility: CLINIC | Age: 71
End: 2021-10-21

## 2021-10-28 ENCOUNTER — TELEPHONE (OUTPATIENT)
Dept: FAMILY MEDICINE CLINIC | Facility: CLINIC | Age: 71
End: 2021-10-28

## 2021-10-28 DIAGNOSIS — R10.84 GENERALIZED ABDOMINAL PAIN: ICD-10-CM

## 2021-10-28 RX ORDER — HYOSCYAMINE SULFATE 0.125 MG
0.12 TABLET ORAL EVERY 4 HOURS PRN
Qty: 30 TABLET | Refills: 0 | Status: SHIPPED | OUTPATIENT
Start: 2021-10-28

## 2021-10-29 ENCOUNTER — TELEPHONE (OUTPATIENT)
Dept: FAMILY MEDICINE CLINIC | Facility: CLINIC | Age: 71
End: 2021-10-29

## 2021-11-16 ENCOUNTER — TELEPHONE (OUTPATIENT)
Dept: FAMILY MEDICINE CLINIC | Facility: CLINIC | Age: 71
End: 2021-11-16

## 2021-12-13 DIAGNOSIS — I10 ESSENTIAL HYPERTENSION: ICD-10-CM

## 2021-12-13 DIAGNOSIS — I10 BENIGN ESSENTIAL HYPERTENSION: ICD-10-CM

## 2021-12-13 DIAGNOSIS — K21.00 REFLUX ESOPHAGITIS: ICD-10-CM

## 2021-12-13 DIAGNOSIS — K80.20 MULTIPLE GALLSTONES: ICD-10-CM

## 2021-12-14 RX ORDER — OMEPRAZOLE 20 MG/1
20 CAPSULE, DELAYED RELEASE ORAL DAILY
Qty: 90 CAPSULE | Refills: 0 | Status: SHIPPED | OUTPATIENT
Start: 2021-12-14 | End: 2022-03-18 | Stop reason: SDUPTHER

## 2021-12-14 RX ORDER — VALSARTAN 160 MG/1
160 TABLET ORAL DAILY
Qty: 90 TABLET | Refills: 0 | Status: SHIPPED | OUTPATIENT
Start: 2021-12-14 | End: 2022-03-18 | Stop reason: SDUPTHER

## 2021-12-14 RX ORDER — URSODIOL 300 MG/1
300 CAPSULE ORAL 2 TIMES DAILY
Qty: 180 CAPSULE | Refills: 0 | Status: SHIPPED | OUTPATIENT
Start: 2021-12-14 | End: 2022-03-18 | Stop reason: SDUPTHER

## 2021-12-14 RX ORDER — METOPROLOL TARTRATE 100 MG/1
100 TABLET ORAL DAILY
Qty: 90 TABLET | Refills: 0 | Status: SHIPPED | OUTPATIENT
Start: 2021-12-14 | End: 2022-03-18 | Stop reason: SDUPTHER

## 2022-02-02 ENCOUNTER — VBI (OUTPATIENT)
Dept: ADMINISTRATIVE | Facility: OTHER | Age: 72
End: 2022-02-02

## 2022-04-27 ENCOUNTER — RA CDI HCC (OUTPATIENT)
Dept: OTHER | Facility: HOSPITAL | Age: 72
End: 2022-04-27

## 2022-04-27 NOTE — PROGRESS NOTES
[facility]  coding opportunities     E66 01, N18 31     Chart Reviewed number of suggestions sent to Provider: 2     Patients Insurance     Medicare Insurance: [employer]

## 2022-05-02 RX ORDER — FUROSEMIDE 20 MG/1
40 TABLET ORAL DAILY
COMMUNITY
Start: 2022-02-28

## 2022-05-02 RX ORDER — SPIRONOLACTONE 50 MG/1
100 TABLET, FILM COATED ORAL DAILY
COMMUNITY
Start: 2022-02-28

## 2022-05-04 ENCOUNTER — TELEPHONE (OUTPATIENT)
Dept: ADMINISTRATIVE | Facility: OTHER | Age: 72
End: 2022-05-04

## 2022-05-04 ENCOUNTER — OFFICE VISIT (OUTPATIENT)
Dept: FAMILY MEDICINE CLINIC | Facility: CLINIC | Age: 72
End: 2022-05-04
Payer: COMMERCIAL

## 2022-05-04 VITALS
SYSTOLIC BLOOD PRESSURE: 124 MMHG | TEMPERATURE: 97.9 F | DIASTOLIC BLOOD PRESSURE: 64 MMHG | BODY MASS INDEX: 36.93 KG/M2 | OXYGEN SATURATION: 99 % | HEART RATE: 54 BPM | WEIGHT: 208.4 LBS | RESPIRATION RATE: 18 BRPM | HEIGHT: 63 IN

## 2022-05-04 DIAGNOSIS — Z23 NEED FOR HEPATITIS A AND B VACCINATION: ICD-10-CM

## 2022-05-04 DIAGNOSIS — K75.81 LIVER CIRRHOSIS SECONDARY TO NASH (HCC): ICD-10-CM

## 2022-05-04 DIAGNOSIS — I10 BENIGN ESSENTIAL HYPERTENSION: Primary | ICD-10-CM

## 2022-05-04 DIAGNOSIS — E55.9 VITAMIN D DEFICIENCY: ICD-10-CM

## 2022-05-04 DIAGNOSIS — R73.9 ELEVATED BLOOD SUGAR: ICD-10-CM

## 2022-05-04 DIAGNOSIS — R30.0 DYSURIA: ICD-10-CM

## 2022-05-04 DIAGNOSIS — K74.60 LIVER CIRRHOSIS SECONDARY TO NASH (HCC): ICD-10-CM

## 2022-05-04 PROCEDURE — 90471 IMMUNIZATION ADMIN: CPT | Performed by: FAMILY MEDICINE

## 2022-05-04 PROCEDURE — 90746 HEPB VACCINE 3 DOSE ADULT IM: CPT | Performed by: FAMILY MEDICINE

## 2022-05-04 PROCEDURE — 3074F SYST BP LT 130 MM HG: CPT | Performed by: FAMILY MEDICINE

## 2022-05-04 PROCEDURE — 3008F BODY MASS INDEX DOCD: CPT | Performed by: FAMILY MEDICINE

## 2022-05-04 PROCEDURE — 3078F DIAST BP <80 MM HG: CPT | Performed by: FAMILY MEDICINE

## 2022-05-04 PROCEDURE — G0010 ADMIN HEPATITIS B VACCINE: HCPCS | Performed by: FAMILY MEDICINE

## 2022-05-04 PROCEDURE — 1160F RVW MEDS BY RX/DR IN RCRD: CPT | Performed by: FAMILY MEDICINE

## 2022-05-04 PROCEDURE — 90632 HEPA VACCINE ADULT IM: CPT | Performed by: FAMILY MEDICINE

## 2022-05-04 PROCEDURE — 99214 OFFICE O/P EST MOD 30 MIN: CPT | Performed by: FAMILY MEDICINE

## 2022-05-04 PROCEDURE — 3725F SCREEN DEPRESSION PERFORMED: CPT | Performed by: FAMILY MEDICINE

## 2022-05-04 PROCEDURE — 1036F TOBACCO NON-USER: CPT | Performed by: FAMILY MEDICINE

## 2022-05-04 NOTE — PATIENT INSTRUCTIONS
You will get your 2nd hepatitis-B vaccine in one month  And then when I see you in 6 months, we will give you your 2nd hepatitis a and 3rd hepatitis-B vaccine

## 2022-05-04 NOTE — TELEPHONE ENCOUNTER
Upon review of the In Basket request we were able to locate, review, and update the patient chart as requested for Hepatitis C   Any additional questions or concerns should be emailed to the Practice Liaisons via [email or web address] email, please do not reply via In Basket      Thank you  Pantera Costa

## 2022-05-04 NOTE — PROGRESS NOTES
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 ([facility] [address] )  -     Lipid Panel with Direct LDL reflex; Future  -     HEPATITIS A VACCINE ADULT IM  -     HEPATITIS B VACCINE ADULT IM    Elevated blood sugar  -     HEMOGLOBIN A1C W/ EAG ESTIMATION; Future    Vitamin D deficiency  -     Vitamin D 25 hydroxy; Future    Dysuria  -     UA (URINE) with reflex to Scope; Future    BMI 36 0-36 9,adult    Need for hepatitis A and B vaccination  -     HEPATITIS A VACCINE ADULT IM  -     HEPATITIS B VACCINE ADULT IM    Other orders  -     furosemide (LASIX) 20 mg tablet; Take 40 mg by mouth daily    -     spironolactone (ALDACTONE) 50 mg tablet; Take 100 mg by mouth daily            Subjective:   Chief Complaint   Patient presents with    Follow-up     asking to defer mammo, dexa, and CRC        Patient ID: Soham Kirby is a 80 y o  female  Patient is here for follow up of hypertension  I have not seen her since she was diagnosed with non alcoholic cirrhosis  The following portions of the patient's history were reviewed and updated as appropriate: allergies, current medications, past family history, past medical history, past social history, past surgical history and problem list     Review of Systems   Constitutional: Negative for chills and fever  HENT: Negative for congestion and sore throat  Respiratory: Negative for chest tightness  Cardiovascular: Negative for chest pain and palpitations  Gastrointestinal: Positive for abdominal distention  Negative for abdominal pain, constipation, diarrhea and nausea  Genitourinary: Negative for difficulty urinating  Skin: Positive for color change  Neurological: Negative for dizziness and headaches  Psychiatric/Behavioral: Positive for agitation  Objective:      /64 (BP Location: Left arm, Patient Position: Sitting)   Pulse (!) 54   Temp 97 9 °F (36 6 °C) (Tympanic)   Resp 18   Ht 5' 3" (1 6 m)   Wt 94 5 kg (208 lb 6 4 oz)   SpO2 99%   BMI 36 92 kg/m²          Physical Exam  Vitals and nursing note reviewed  Constitutional:       General: She is not in acute distress  Appearance: She is obese  HENT:      Head: Normocephalic  Neck:      Thyroid: No thyromegaly  Cardiovascular:      Rate and Rhythm: Regular rhythm  Bradycardia present  Heart sounds: Normal heart sounds  Comments: /70  Pulmonary:      Effort: Pulmonary effort is normal       Breath sounds: Normal breath sounds  Musculoskeletal:      Right lower leg: No edema  Left lower leg: No edema  Lymphadenopathy:      Cervical: No cervical adenopathy  Skin:     General: Skin is warm and dry  Neurological:      Mental Status: She is alert and oriented to person, place, and time     Psychiatric:         Mood and Affect: Mood normal

## 2022-05-04 NOTE — TELEPHONE ENCOUNTER
----- Message from Ken De La Fuente sent at 5/4/2022  8:36 AM EDT -----  Regarding: Care Gap Request  05/04/22 8:36 AM    Hello, our patient Carmelo Madera has had Hepatitis C completed/performed  Please assist in updating the patient chart by pulling the Care Everywhere (CE) document  The date of service is 10/2018       Thank you,  Javier Zavala MA  [facility]

## 2022-05-13 LAB — HBA1C MFR BLD HPLC: 5 %

## 2022-05-30 ENCOUNTER — TELEPHONE (OUTPATIENT)
Dept: FAMILY MEDICINE CLINIC | Facility: CLINIC | Age: 72
End: 2022-05-30

## 2022-05-30 NOTE — TELEPHONE ENCOUNTER
Call patient  She probably saw her blood results  Her A1c was 5 0 which is good even though her blood sugar was 115  Her cholesterol was 160  Her vitamin-D level was good at 58  Jodi Figueroa

## 2022-06-16 ENCOUNTER — CLINICAL SUPPORT (OUTPATIENT)
Dept: FAMILY MEDICINE CLINIC | Facility: CLINIC | Age: 72
End: 2022-06-16
Payer: COMMERCIAL

## 2022-06-16 DIAGNOSIS — Z23 NEED FOR HEPATITIS B VACCINATION: Primary | ICD-10-CM

## 2022-06-16 PROCEDURE — 90746 HEPB VACCINE 3 DOSE ADULT IM: CPT | Performed by: FAMILY MEDICINE

## 2022-06-16 PROCEDURE — G0010 ADMIN HEPATITIS B VACCINE: HCPCS | Performed by: FAMILY MEDICINE

## 2022-06-20 DIAGNOSIS — I10 ESSENTIAL HYPERTENSION: ICD-10-CM

## 2022-06-20 DIAGNOSIS — I10 BENIGN ESSENTIAL HYPERTENSION: ICD-10-CM

## 2022-06-20 DIAGNOSIS — K80.20 MULTIPLE GALLSTONES: ICD-10-CM

## 2022-06-20 DIAGNOSIS — K21.00 REFLUX ESOPHAGITIS: ICD-10-CM

## 2022-06-20 RX ORDER — OMEPRAZOLE 20 MG/1
20 CAPSULE, DELAYED RELEASE ORAL DAILY
Qty: 90 CAPSULE | Refills: 0 | Status: SHIPPED | OUTPATIENT
Start: 2022-06-20

## 2022-06-20 RX ORDER — VALSARTAN 160 MG/1
160 TABLET ORAL DAILY
Qty: 90 TABLET | Refills: 0 | Status: SHIPPED | OUTPATIENT
Start: 2022-06-20

## 2022-06-20 RX ORDER — METOPROLOL TARTRATE 100 MG/1
100 TABLET ORAL DAILY
Qty: 90 TABLET | Refills: 0 | Status: SHIPPED | OUTPATIENT
Start: 2022-06-20 | End: 2022-09-18

## 2022-06-20 RX ORDER — URSODIOL 300 MG/1
300 CAPSULE ORAL 2 TIMES DAILY
Qty: 180 CAPSULE | Refills: 0 | Status: SHIPPED | OUTPATIENT
Start: 2022-06-20

## 2022-09-22 DIAGNOSIS — I10 ESSENTIAL HYPERTENSION: ICD-10-CM

## 2022-09-22 DIAGNOSIS — K80.20 MULTIPLE GALLSTONES: ICD-10-CM

## 2022-09-22 DIAGNOSIS — K21.00 REFLUX ESOPHAGITIS: ICD-10-CM

## 2022-09-22 DIAGNOSIS — I10 BENIGN ESSENTIAL HYPERTENSION: ICD-10-CM

## 2022-09-24 RX ORDER — OMEPRAZOLE 20 MG/1
20 CAPSULE, DELAYED RELEASE ORAL DAILY
Qty: 90 CAPSULE | Refills: 0 | Status: SHIPPED | OUTPATIENT
Start: 2022-09-24

## 2022-09-24 RX ORDER — URSODIOL 300 MG/1
300 CAPSULE ORAL 2 TIMES DAILY
Qty: 180 CAPSULE | Refills: 0 | Status: SHIPPED | OUTPATIENT
Start: 2022-09-24

## 2022-09-24 RX ORDER — VALSARTAN 160 MG/1
160 TABLET ORAL DAILY
Qty: 90 TABLET | Refills: 0 | Status: SHIPPED | OUTPATIENT
Start: 2022-09-24

## 2022-09-24 RX ORDER — METOPROLOL TARTRATE 100 MG/1
100 TABLET ORAL DAILY
Qty: 90 TABLET | Refills: 0 | Status: SHIPPED | OUTPATIENT
Start: 2022-09-24 | End: 2022-12-23

## 2022-11-04 ENCOUNTER — RA CDI HCC (OUTPATIENT)
Dept: OTHER | Facility: HOSPITAL | Age: 72
End: 2022-11-04

## 2022-11-04 NOTE — PROGRESS NOTES
[facility]  coding opportunities       Chart reviewed, no opportunity found:   [address]        Patients Insurance     Medicare Insurance: [employer]

## 2022-11-07 ENCOUNTER — OFFICE VISIT (OUTPATIENT)
Dept: FAMILY MEDICINE CLINIC | Facility: CLINIC | Age: 72
End: 2022-11-07

## 2022-11-07 VITALS
WEIGHT: 218.6 LBS | TEMPERATURE: 98.2 F | RESPIRATION RATE: 18 BRPM | HEART RATE: 66 BPM | DIASTOLIC BLOOD PRESSURE: 68 MMHG | SYSTOLIC BLOOD PRESSURE: 120 MMHG | OXYGEN SATURATION: 99 % | HEIGHT: 61 IN | BODY MASS INDEX: 41.27 KG/M2

## 2022-11-07 DIAGNOSIS — R73.9 ELEVATED BLOOD SUGAR: ICD-10-CM

## 2022-11-07 DIAGNOSIS — Z23 NEED FOR HEPATITIS A IMMUNIZATION: ICD-10-CM

## 2022-11-07 DIAGNOSIS — Z00.00 ENCOUNTER FOR SUBSEQUENT ANNUAL WELLNESS VISIT (AWV) IN MEDICARE PATIENT: ICD-10-CM

## 2022-11-07 DIAGNOSIS — K74.60 LIVER CIRRHOSIS SECONDARY TO NASH (HCC): ICD-10-CM

## 2022-11-07 DIAGNOSIS — Z23 NEED FOR HEPATITIS B VACCINATION: ICD-10-CM

## 2022-11-07 DIAGNOSIS — E03.9 HYPOTHYROIDISM, UNSPECIFIED TYPE: Primary | ICD-10-CM

## 2022-11-07 DIAGNOSIS — K75.81 LIVER CIRRHOSIS SECONDARY TO NASH (HCC): ICD-10-CM

## 2022-11-07 DIAGNOSIS — I10 BENIGN ESSENTIAL HYPERTENSION: ICD-10-CM

## 2022-11-07 DIAGNOSIS — R31.9 HEMATURIA, UNSPECIFIED TYPE: ICD-10-CM

## 2022-11-07 PROBLEM — T39.395A NSAIDS ADVERSE REACTION: Status: ACTIVE | Noted: 2022-11-07
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
## 2022-11-07 NOTE — PROGRESS NOTES
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 ([address] )    4  Encounter for subsequent annual wellness visit (AWV) in Medicare patient    5  Need for hepatitis A immunization  -     HEPATITIS A VACCINE ADULT IM    6  Need for hepatitis B vaccination  -     HEPATITIS B VACCINE ADULT IM    7  Hematuria, unspecified type  -     UA (URINE) with reflex to Scope; Future; Expected date: 2022  -     Urine culture; Future; Expected date: 2022    8  Elevated blood sugar  -     HEMOGLOBIN A1C W/ EAG ESTIMATION; Future; Expected date: 2022         Subjective      Chief Complaint   Patient presents with   • Medicare Wellness Visit     Subsequent   • Follow-up     6 months- review labs done 22       Patient is here for follow up of chronic medical conditions  She saw a drop of blood with her urine    Review of Systems   Constitutional: Negative for chills and fever  HENT: Negative for congestion and sore throat  Respiratory: Negative for chest tightness  Cardiovascular: Negative for chest pain and palpitations  Gastrointestinal: Negative for abdominal pain, constipation, diarrhea and nausea  Genitourinary: Negative for difficulty urinating  Skin: Negative  Lumps on arms   Neurological: Negative for dizziness and headaches  Psychiatric/Behavioral: Negative          Current Outpatient Medications on File Prior to Visit   Medication Sig   • aspirin (ECOTRIN LOW STRENGTH) 81 mg EC tablet Take 81 mg by mouth daily   • budesonide (RINOCORT AQUA) 32 MCG/ACT nasal spray 2 sprays into each nostril daily   • cetirizine (ZyrTEC) 10 mg tablet Take 1 tablet by mouth daily as needed   • Cholecalciferol 100 MCG (4000 UT) CAPS Take by mouth   • cyanocobalamin (VITAMIN B-12) 100 MCG tablet Take 100 mcg by mouth daily   • Efinaconazole 10 % SOLN Apply topically   • furosemide (LASIX) 20 mg tablet Take 40 mg by mouth daily     • guaiFENesin (MUCINEX) 600 mg 12 hr tablet Take 600 mg by mouth every 12 (twelve) hours   • hyoscyamine (Levsin) 0 125 MG tablet Take 1 tablet (0 125 mg total) by mouth every 4 (four) hours as needed for cramping   • metoprolol tartrate (LOPRESSOR) 100 mg tablet Take 1 tablet (100 mg total) by mouth daily   • mometasone (NASONEX) 50 mcg/act nasal spray 2 sprays each nostril once daily   • omeprazole (PriLOSEC) 20 mg delayed release capsule Take 1 capsule (20 mg total) by mouth daily   • Pyridoxine HCl (VITAMIN B-6) 100 MG TABS Take 1 tablet by mouth daily   • spironolactone (ALDACTONE) 50 mg tablet Take 100 mg by mouth daily     • ursodiol (ACTIGALL) 300 mg capsule Take 1 capsule (300 mg total) by mouth 2 (two) times a day   • valsartan (DIOVAN) 160 mg tablet Take 1 tablet (160 mg total) by mouth daily   • Vitamin E 200 units TABS Take 1 tablet by mouth daily       Objective     /68 (BP Location: Left arm, Patient Position: Sitting, Cuff Size: Large)   Pulse 66   Temp 98 2 °F (36 8 °C) (Tympanic)   Resp 18   Ht 5' 1" (1 549 m)   Wt 99 2 kg (218 lb 9 6 oz)   SpO2 99%   BMI 41 30 kg/m²     Physical Exam  Vitals and nursing note reviewed  Constitutional:       General: She is not in acute distress  Appearance: She is obese  Neck:      Thyroid: No thyromegaly  Cardiovascular:      Rate and Rhythm: Normal rate and regular rhythm  Heart sounds: Normal heart sounds  Pulmonary:      Effort: Pulmonary effort is normal       Breath sounds: Normal breath sounds  Lymphadenopathy:      Cervical: No cervical adenopathy  Skin:     General: Skin is warm and dry  Findings: Lesion (nodules along left forearm) present     Neurological:      Mental Status: She is alert and oriented to person, place, and time         Imani Bosch, DO

## 2022-11-08 LAB — HBA1C MFR BLD HPLC: 5.5 %

## 2022-11-11 ENCOUNTER — TELEPHONE (OUTPATIENT)
Dept: FAMILY MEDICINE CLINIC | Facility: CLINIC | Age: 72
End: 2022-11-11

## 2022-11-12 ENCOUNTER — TELEPHONE (OUTPATIENT)
Dept: FAMILY MEDICINE CLINIC | Facility: CLINIC | Age: 72
End: 2022-11-12

## 2022-11-12 DIAGNOSIS — N30.00 ACUTE CYSTITIS WITHOUT HEMATURIA: Primary | ICD-10-CM

## 2022-11-12 DIAGNOSIS — N30.01 ACUTE CYSTITIS WITH HEMATURIA: ICD-10-CM

## 2022-11-12 RX ORDER — CIPROFLOXACIN 250 MG/1
250 TABLET, FILM COATED ORAL EVERY 12 HOURS SCHEDULED
Qty: 10 TABLET | Refills: 0 | Status: SHIPPED | OUTPATIENT
Start: 2022-11-12 | End: 2022-11-17

## 2022-12-05 ENCOUNTER — VBI (OUTPATIENT)
Dept: ADMINISTRATIVE | Facility: OTHER | Age: 72
End: 2022-12-05

## 2022-12-12 ENCOUNTER — TELEPHONE (OUTPATIENT)
Dept: FAMILY MEDICINE CLINIC | Facility: CLINIC | Age: 72
End: 2022-12-12

## 2022-12-12 DIAGNOSIS — R30.0 DYSURIA: Primary | ICD-10-CM

## 2022-12-12 NOTE — TELEPHONE ENCOUNTER
Pt ORIANAM she would like a script regarding a urine infection that she had in November  She wondering if she can have a script sent to [facility] in [place] to check her urine

## 2023-05-24 ENCOUNTER — RA CDI HCC (OUTPATIENT)
Dept: OTHER | Facility: HOSPITAL | Age: 73
End: 2023-05-24

## 2023-05-24 NOTE — PROGRESS NOTES
[facility]  coding opportunities       Chart reviewed, no opportunity found: CHART REVIEWED, Quinten Lowry [id number]     Patients Insurance     Medicare Insurance: [employer]

## 2023-05-25 ENCOUNTER — OFFICE VISIT (OUTPATIENT)
Dept: FAMILY MEDICINE CLINIC | Facility: CLINIC | Age: 73
End: 2023-05-25

## 2023-05-25 VITALS
SYSTOLIC BLOOD PRESSURE: 120 MMHG | BODY MASS INDEX: 42.86 KG/M2 | DIASTOLIC BLOOD PRESSURE: 72 MMHG | WEIGHT: 227 LBS | TEMPERATURE: 97.6 F | OXYGEN SATURATION: 99 % | HEIGHT: 61 IN | HEART RATE: 64 BPM

## 2023-05-25 DIAGNOSIS — K21.00 REFLUX ESOPHAGITIS: ICD-10-CM

## 2023-05-25 DIAGNOSIS — R60.9 EDEMA, UNSPECIFIED TYPE: ICD-10-CM

## 2023-05-25 DIAGNOSIS — E66.01 OBESITY, MORBID (HCC): ICD-10-CM

## 2023-05-25 DIAGNOSIS — K75.81 LIVER CIRRHOSIS SECONDARY TO NASH (HCC): ICD-10-CM

## 2023-05-25 DIAGNOSIS — R32 URINARY INCONTINENCE, UNSPECIFIED TYPE: ICD-10-CM

## 2023-05-25 DIAGNOSIS — I10 ESSENTIAL HYPERTENSION: ICD-10-CM

## 2023-05-25 DIAGNOSIS — R30.0 DYSURIA: ICD-10-CM

## 2023-05-25 DIAGNOSIS — R73.9 ELEVATED BLOOD SUGAR: ICD-10-CM

## 2023-05-25 DIAGNOSIS — E03.9 HYPOTHYROIDISM, UNSPECIFIED TYPE: Primary | ICD-10-CM

## 2023-05-25 DIAGNOSIS — E78.5 DYSLIPIDEMIA: ICD-10-CM

## 2023-05-25 DIAGNOSIS — Z23 NEED FOR VACCINATION AGAINST STREPTOCOCCUS PNEUMONIAE: ICD-10-CM

## 2023-05-25 DIAGNOSIS — K80.20 MULTIPLE GALLSTONES: ICD-10-CM

## 2023-05-25 DIAGNOSIS — I10 BENIGN ESSENTIAL HYPERTENSION: ICD-10-CM

## 2023-05-25 DIAGNOSIS — K74.60 LIVER CIRRHOSIS SECONDARY TO NASH (HCC): ICD-10-CM

## 2023-05-25 RX ORDER — URSODIOL 300 MG/1
300 CAPSULE ORAL 2 TIMES DAILY
Qty: 180 CAPSULE | Refills: 1 | Status: SHIPPED | OUTPATIENT
Start: 2023-05-25

## 2023-05-25 RX ORDER — VALSARTAN 160 MG/1
160 TABLET ORAL DAILY
Qty: 90 TABLET | Refills: 1 | Status: SHIPPED | OUTPATIENT
Start: 2023-05-25

## 2023-05-25 RX ORDER — OMEPRAZOLE 20 MG/1
20 CAPSULE, DELAYED RELEASE ORAL DAILY
Qty: 90 CAPSULE | Refills: 1 | Status: SHIPPED | OUTPATIENT
Start: 2023-05-25

## 2023-05-25 RX ORDER — METOPROLOL TARTRATE 100 MG/1
100 TABLET ORAL DAILY
Qty: 90 TABLET | Refills: 1 | Status: SHIPPED | OUTPATIENT
Start: 2023-05-25 | End: 2023-08-23

## 2023-05-25 NOTE — PROGRESS NOTES
Name: Kavita Hernandez      : 1950      MRN: [id number]  Encounter Provider: Zonia Rayo DO  Encounter Date: 2023   Encounter department: [address] [place] [address]   Patient is a 71year old female seen for follow up of chronic medical conditions  She is seen by [facility] for non alcoholic cirrhosis and goe to [facility] for paracentesis by IR  She has not had enough fluid the past few times to have the procedure done  1  Hypothyroidism, unspecified type  -     TSH, 3rd generation with Free T4 reflex; Future; Expected date: 2023    2  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 ([facility] )    3  Benign essential hypertension  -     metoprolol tartrate (LOPRESSOR) 100 mg tablet; Take 1 tablet (100 mg total) by mouth daily  -     POCT ECG    4  Essential hypertension  -     valsartan (DIOVAN) 160 mg tablet; Take 1 tablet (160 mg total) by mouth daily    5  Multiple gallstones  -     ursodiol (ACTIGALL) 300 mg capsule; Take 1 capsule (300 mg total) by mouth 2 (two) times a day    6  Reflux esophagitis  -     omeprazole (PriLOSEC) 20 mg delayed release capsule; Take 1 capsule (20 mg total) by mouth daily    7  Edema, unspecified type  -     POCT ECG    8  Elevated blood sugar  -     HEMOGLOBIN A1C W/ EAG ESTIMATION; Future; Expected date: 2023    9  Dyslipidemia  -     Lipid Panel with Direct LDL reflex; Future; Expected date: 2023    10  Dysuria  -     UA (URINE) with reflex to Scope; Future; Expected date: 2023  -     Urine culture; Future; Expected date: 2023    11  Need for vaccination against Streptococcus pneumoniae  -     Pneumococcal Conjugate Vaccine 20-valent (Pcv20)    12  Obesity, morbid ([facility] )    13  Urinary incontinence, unspecified type    Her blood pressure is well controlled and an EKG done at the visit was normal     I gave her orders for blood work which she can have done with her blood work for GI    AWV in six months         Subjective      Chief Complaint "  Patient presents with   • Follow-up     6 month follow up  Patient is seen for follow up of chronic medical conditions  She complains of frequent urination and needing to wear adut diapers and still havig incontinence  She watches hr weight and worries if it fluctuaes    Review of Systems   Constitutional: Negative for chills and fever  HENT: Negative for congestion and sore throat  Respiratory: Negative for chest tightness  Cardiovascular: Positive for leg swelling  Negative for chest pain and palpitations  Gastrointestinal: Positive for abdominal distention and abdominal pain  Negative for constipation, diarrhea and nausea  Genitourinary: Positive for frequency  Negative for difficulty urinating  As in HPI   Skin: Negative  Neurological: Negative for dizziness and headaches  Psychiatric/Behavioral: Negative          Current Outpatient Medications on File Prior to Visit   Medication Sig   • aspirin (ECOTRIN LOW STRENGTH) 81 mg EC tablet Take 81 mg by mouth daily   • cyanocobalamin (VITAMIN B-12) 100 MCG tablet Take 100 mcg by mouth daily   • furosemide (LASIX) 20 mg tablet Take 40 mg by mouth daily     • guaiFENesin (MUCINEX) 600 mg 12 hr tablet Take 600 mg by mouth every 12 (twelve) hours   • hyoscyamine (Levsin) 0 125 MG tablet Take 1 tablet (0 125 mg total) by mouth every 4 (four) hours as needed for cramping   • Pyridoxine HCl (VITAMIN B-6) 100 MG TABS Take 1 tablet by mouth daily   • spironolactone (ALDACTONE) 50 mg tablet Take 100 mg by mouth daily     • Vitamin E 200 units TABS Take 1 tablet by mouth daily   • cetirizine (ZyrTEC) 10 mg tablet Take 1 tablet by mouth daily as needed (Patient not taking: Reported on 5/25/2023)       Objective     /72 (BP Location: Left arm, Patient Position: Sitting, Cuff Size: Large)   Pulse 64   Temp 97 6 °F (36 4 °C)   Ht 5' 1\" (1 549 m)   Wt 103 kg (227 lb)   SpO2 99%   BMI 42 89 kg/m²     Physical Exam  Vitals and nursing note " reviewed  Constitutional:       General: She is not in acute distress  Appearance: She is obese  HENT:      Head: Normocephalic  Neck:      Thyroid: No thyromegaly  Cardiovascular:      Rate and Rhythm: Normal rate and regular rhythm  Heart sounds: Normal heart sounds  Pulmonary:      Effort: Pulmonary effort is normal       Breath sounds: Normal breath sounds  Musculoskeletal:      Right lower leg: Edema present  Left lower leg: Edema present  Lymphadenopathy:      Cervical: No cervical adenopathy  Skin:     General: Skin is warm and dry  Neurological:      Mental Status: She is alert and oriented to person, place, and time     Psychiatric:         Mood and Affect: Mood normal        Wilian Mendez DO

## 2023-05-25 NOTE — PATIENT INSTRUCTIONS
Urinary Incontinence   AMBULATORY CARE:   Urinary incontinence (UI)  is when you lose control of your bladder  UI develops because your bladder cannot store or empty urine properly  The 3 most common types of UI are stress incontinence, urge incontinence, or both  Common symptoms include the following:   • You feel like your bladder does not empty completely when you urinate  • You urinate often and need to urinate immediately  • You leak urine when you sleep, or you wake up with the urge to urinate  • You leak urine when you cough, sneeze, exercise, or laugh  Call your doctor if:   • You have severe pain  • You are confused or cannot think clearly  • You have a fever  • You see blood in your urine  • You have pain when you urinate  • You have new or worse pain, even after treatment  • Your mouth feels dry or you have vision changes  • Your urine is cloudy or smells bad  • You have questions or concerns about your condition or care  Medicines:   • Medicines  may be given to help strengthen your bladder control  • Take your medicine as directed  Contact your healthcare provider if you think your medicine is not helping or if you have side effects  Tell your provider if you are allergic to any medicine  Keep a list of the medicines, vitamins, and herbs you take  Include the amounts, and when and why you take them  Bring the list or the pill bottles to follow-up visits  Carry your medicine list with you in case of an emergency  Do pelvic muscle exercises often:  Your pelvic muscles help you stop urinating  Squeeze these muscles tight for 5 seconds, then relax for 5 seconds  Gradually work up to squeezing for 10 seconds  Do 3 sets of 15 repetitions a day, or as directed  This will help strengthen your pelvic muscles and improve bladder control  Train your bladder:  Go to the bathroom at set times, such as every 2 hours, even if you do not feel the urge to go   You can also try to hold your urine when you feel the urge to go  For example, hold your urine for 5 minutes when you feel the urge to go  As that becomes easier, hold your urine for 10 minutes  Self-care:   • Keep a UI record  Write down how often you leak urine and how much you leak  Make a note of what you were doing when you leaked urine  • Drink liquids as directed  Ask your healthcare provider how much liquid to drink each day and which liquids are best for you  You may need to limit the amount of liquid you drink to help control your urine leakage  Do not drink any liquid right before you go to bed  Limit or do not have drinks that contain caffeine or alcohol  • Prevent constipation  Eat a variety of high-fiber foods  Good examples are high-fiber cereals, beans, vegetables, and whole-grain breads  Prune juice may help make your bowel movement softer  Walking is the best way to trigger your intestines to have a bowel movement  • Exercise regularly and maintain a healthy weight  Ask your healthcare provider how much you should weigh and about the best exercise plan for you  Weight loss and exercise will decrease pressure on your bladder and help you control your leakage  Ask him or her to help you create a weight loss plan if you are overweight  • Use a catheter as directed  to help empty your bladder  A catheter is a tiny, plastic tube that is put into your bladder to drain your urine  Your healthcare provider may tell you to use a catheter to prevent your bladder from getting too full and leaking urine  • Go to behavior therapy as directed  Behavior therapy may be used to help you learn to control your urge to urinate  Follow up with your doctor as directed:  Write down your questions so you remember to ask them during your visits  © Copyright [employer] 2022 Information is for End User's use only and may not be sold, redistributed or otherwise used for commercial purposes    The above information is an  only  It is not intended as medical advice for individual conditions or treatments  Talk to your doctor, nurse or pharmacist before following any medical regimen to see if it is safe and effective for you

## 2023-06-16 LAB — HBA1C MFR BLD HPLC: 6.1 %

## 2023-06-20 DIAGNOSIS — N30.01 ACUTE CYSTITIS WITH HEMATURIA: ICD-10-CM

## 2023-06-20 RX ORDER — CIPROFLOXACIN 250 MG/1
250 TABLET, FILM COATED ORAL EVERY 12 HOURS SCHEDULED
Qty: 10 TABLET | Refills: 0 | Status: SHIPPED | OUTPATIENT
Start: 2023-06-20 | End: 2023-06-25

## 2023-06-30 ENCOUNTER — TELEPHONE (OUTPATIENT)
Dept: FAMILY MEDICINE CLINIC | Facility: CLINIC | Age: 73
End: 2023-06-30

## 2023-06-30 DIAGNOSIS — R30.0 DYSURIA: Primary | ICD-10-CM

## 2023-06-30 NOTE — TELEPHONE ENCOUNTER
Pt had UTI but had finished meds on Sunday but still feels like she has the UTI. Any recommendations.

## 2023-07-03 NOTE — TELEPHONE ENCOUNTER
I think there was miscommunication here. Pt not going to [facility] lab    Orders faxed to lab in [place] at this time.

## 2023-08-24 ENCOUNTER — VBI (OUTPATIENT)
Dept: ADMINISTRATIVE | Facility: OTHER | Age: 73
End: 2023-08-24

## 2023-09-18 DIAGNOSIS — K80.20 MULTIPLE GALLSTONES: ICD-10-CM

## 2023-09-18 RX ORDER — URSODIOL 300 MG/1
300 CAPSULE ORAL 2 TIMES DAILY
Qty: 180 CAPSULE | Refills: 0 | Status: SHIPPED | OUTPATIENT
Start: 2023-09-18

## 2023-09-18 NOTE — TELEPHONE ENCOUNTER
Patient called. She stated [facility] will not refill her Ursodiol, they are saying there are no refills. Cassie Lizama said the bottle says 1 refill.

## 2023-10-19 ENCOUNTER — VBI (OUTPATIENT)
Dept: ADMINISTRATIVE | Facility: OTHER | Age: 73
End: 2023-10-19

## 2023-11-07 ENCOUNTER — VBI (OUTPATIENT)
Dept: ADMINISTRATIVE | Facility: OTHER | Age: 73
End: 2023-11-07

## 2023-12-04 ENCOUNTER — RA CDI HCC (OUTPATIENT)
Dept: OTHER | Facility: HOSPITAL | Age: 73
End: 2023-12-04

## 2023-12-04 NOTE — PROGRESS NOTES
[address] coding opportunities       Chart reviewed, no opportunity found: [address] Review     Patients Insurance     Medicare Insurance: [employer]

## 2023-12-12 ENCOUNTER — OFFICE VISIT (OUTPATIENT)
Dept: FAMILY MEDICINE CLINIC | Facility: CLINIC | Age: 73
End: 2023-12-12
Payer: COMMERCIAL

## 2023-12-12 ENCOUNTER — RA CDI HCC (OUTPATIENT)
Dept: OTHER | Facility: HOSPITAL | Age: 73
End: 2023-12-12

## 2023-12-12 VITALS
HEIGHT: 61 IN | BODY MASS INDEX: 43.31 KG/M2 | WEIGHT: 229.4 LBS | HEART RATE: 69 BPM | SYSTOLIC BLOOD PRESSURE: 112 MMHG | TEMPERATURE: 97.7 F | OXYGEN SATURATION: 99 % | DIASTOLIC BLOOD PRESSURE: 68 MMHG

## 2023-12-12 DIAGNOSIS — Z00.00 MEDICARE ANNUAL WELLNESS VISIT, SUBSEQUENT: ICD-10-CM

## 2023-12-12 DIAGNOSIS — K75.81 LIVER CIRRHOSIS SECONDARY TO NASH (HCC): ICD-10-CM

## 2023-12-12 DIAGNOSIS — R30.0 DYSURIA: ICD-10-CM

## 2023-12-12 DIAGNOSIS — K74.60 LIVER CIRRHOSIS SECONDARY TO NASH (HCC): ICD-10-CM

## 2023-12-12 DIAGNOSIS — R73.9 ELEVATED BLOOD SUGAR: ICD-10-CM

## 2023-12-12 DIAGNOSIS — E61.1 IRON DEFICIENCY: ICD-10-CM

## 2023-12-12 DIAGNOSIS — K80.20 MULTIPLE GALLSTONES: ICD-10-CM

## 2023-12-12 DIAGNOSIS — K64.9 HEMORRHOIDS, UNSPECIFIED HEMORRHOID TYPE: ICD-10-CM

## 2023-12-12 DIAGNOSIS — I10 BENIGN ESSENTIAL HYPERTENSION: Primary | ICD-10-CM

## 2023-12-12 DIAGNOSIS — K21.00 REFLUX ESOPHAGITIS: ICD-10-CM

## 2023-12-12 DIAGNOSIS — I10 ESSENTIAL HYPERTENSION: ICD-10-CM

## 2023-12-12 PROCEDURE — G0439 PPPS, SUBSEQ VISIT: HCPCS | Performed by: FAMILY MEDICINE

## 2023-12-12 PROCEDURE — 99214 OFFICE O/P EST MOD 30 MIN: CPT | Performed by: FAMILY MEDICINE

## 2023-12-12 RX ORDER — VALSARTAN 160 MG/1
160 TABLET ORAL DAILY
Qty: 90 TABLET | Refills: 1 | Status: SHIPPED | OUTPATIENT
Start: 2023-12-12

## 2023-12-12 RX ORDER — HYDROCORTISONE 25 MG/G
CREAM TOPICAL 2 TIMES DAILY
Qty: 28 G | Refills: 1 | Status: SHIPPED | OUTPATIENT
Start: 2023-12-12 | End: 2023-12-13 | Stop reason: SDUPTHER

## 2023-12-12 RX ORDER — METOPROLOL TARTRATE 100 MG/1
100 TABLET ORAL DAILY
Qty: 90 TABLET | Refills: 1 | Status: SHIPPED | OUTPATIENT
Start: 2023-12-12 | End: 2024-06-09

## 2023-12-12 RX ORDER — URSODIOL 300 MG/1
300 CAPSULE ORAL 2 TIMES DAILY
Qty: 180 CAPSULE | Refills: 1 | Status: SHIPPED | OUTPATIENT
Start: 2023-12-12

## 2023-12-12 RX ORDER — OMEPRAZOLE 20 MG/1
20 CAPSULE, DELAYED RELEASE ORAL DAILY
Qty: 90 CAPSULE | Refills: 1 | Status: SHIPPED | OUTPATIENT
Start: 2023-12-12

## 2023-12-12 NOTE — PROGRESS NOTES
Assessment and Plan:     Problem List Items Addressed This Visit     Benign essential hypertension - Primary    Relevant Medications    metoprolol tartrate (LOPRESSOR) 100 mg tablet    valsartan (DIOVAN) 160 mg tablet    Elevated blood sugar    Relevant Orders    HEMOGLOBIN A1C W/ EAG ESTIMATION    Multiple gallstones    Relevant Medications    ursodiol (ACTIGALL) 300 mg capsule    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)    Relevant Orders    Comprehensive metabolic panel    CBC and differential   Other Visit Diagnoses     Reflux esophagitis        Relevant Medications    omeprazole (PriLOSEC) 20 mg delayed release capsule    Essential hypertension        Relevant Medications    metoprolol tartrate (LOPRESSOR) 100 mg tablet    valsartan (DIOVAN) 160 mg tablet    Medicare annual wellness visit, subsequent        Hemorrhoids, unspecified hemorrhoid type        Dysuria        Relevant Orders    UA (URINE) with reflex to Scope    Urine culture    Iron deficiency        Relevant Orders    Iron      Patient follows with [facility] for HOUSE.  Blood pressure stable.  Patient was given Anusol suppositories for hemorrhoids.      BMI Counseling: Body mass index is 43.34 kg/m². The BMI is above normal. Nutrition recommendations include encouraging healthy choices of fruits and vegetables, moderation in carbohydrate intake, increasing intake of lean protein and reducing intake of saturated and trans fat. Exercise recommendations include moderate physical activity 150 minutes/week. No pharmacotherapy was ordered. Rationale for BMI follow-up plan is due to patient being overweight or obese.     Depression Screening and Follow-up Plan: Patient was screened for depression during today's encounter. They screened negative with a PHQ-2 score of 0.    Urinary Incontinence Plan of Care: counseling topics discussed: use restroom every 2 hours and limiting fluid intake 3-4 hours before bed.       Preventive health issues were discussed with  patient, and age appropriate screening tests were ordered as noted in patient's After Visit Summary.  Personalized health advice and appropriate referrals for health education or preventive services given if needed, as noted in patient's After Visit Summary.     History of Present Illness:     Patient presents for a Medicare Wellness Visit    Patient is seen for follow up of chronic medical conditions as well as her Medicare visit.  She has frequent BM's - has problems with hemorrhoids.       Patient Care Team:  Janny Ramirez DO as PCP - General     Review of Systems:     Review of Systems   Constitutional:  Negative for chills and fever.   HENT:  Negative for congestion and sore throat.    Respiratory:  Negative for chest tightness.    Cardiovascular:  Negative for chest pain and palpitations.   Gastrointestinal:  Negative for abdominal pain, constipation, diarrhea and nausea.        As in HPI   Genitourinary:  Negative for difficulty urinating.   Skin: Negative.    Neurological:  Negative for dizziness and headaches.   Psychiatric/Behavioral: Negative.          Problem List:     Patient Active Problem List   Diagnosis   • Benign essential hypertension   • Acute non-recurrent pansinusitis   • Abnormal CAT scan   • Atherosclerotic plaque   • Benign paroxysmal positional vertigo   • Elevated blood sugar   • Elevated liver enzymes   • Hyperlipidemia   • Multiple gallstones   • Vitamin D deficiency   • Hypothyroidism   • 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)   • BMI 40.0-44.9, adult (HCC)   • NSAIDs adverse reaction      Past Medical and Surgical History:     Past Medical History:   Diagnosis Date   • Chest pain     last assessed 9/16/13   • Chest pressure     last assessed 12/15/16   • Chronic sinusitis     last assessed 5/4/17   • Compression fracture of lumbar vertebra (HCC)     last assessed 6/13/15   • Lump of skin     last assessed 1/17/14     History reviewed. No pertinent surgical history.   Family History:      Family History   Problem Relation Age of Onset   • Hyperlipidemia Mother    • Hypertension Mother    • Cancer Father    • Liver cancer Father    • Stomach cancer Father    • No Known Problems Son    • No Known Problems Daughter       Social History:     Social History     Socioeconomic History   • Marital status: /Civil Union     Spouse name: None   • Number of children: None   • Years of education: None   • Highest education level: None   Occupational History   • None   Tobacco Use   • Smoking status: Never   • Smokeless tobacco: Never   Vaping Use   • Vaping status: Never Used   Substance and Sexual Activity   • Alcohol use: No   • Drug use: No   • Sexual activity: Yes     Partners: Male   Other Topics Concern   • None   Social History Narrative    Always uses seatbelts    Feels safe at home    No caffeine use     Social Determinants of Health     Financial Resource Strain: Low Risk  (12/12/2023)    Overall Financial Resource Strain (CARDIA)    • Difficulty of Paying Living Expenses: Not hard at all   Food Insecurity: Not on file   Transportation Needs: No Transportation Needs (12/12/2023)    PRAPARE - Transportation    • Lack of Transportation (Medical): No    • Lack of Transportation (Non-Medical): No   Physical Activity: Not on file   Stress: Not on file   Social Connections: Not on file   Intimate Partner Violence: Not on file   Housing Stability: Not on file      Medications and Allergies:     Current Outpatient Medications   Medication Sig Dispense Refill   • aspirin (ECOTRIN LOW STRENGTH) 81 mg EC tablet Take 81 mg by mouth daily     • cyanocobalamin (VITAMIN B-12) 100 MCG tablet Take 100 mcg by mouth daily     • furosemide (LASIX) 20 mg tablet Take 40 mg by mouth daily       • guaiFENesin (MUCINEX) 600 mg 12 hr tablet Take 600 mg by mouth every 12 (twelve) hours     • metoprolol tartrate (LOPRESSOR) 100 mg tablet Take 1 tablet (100 mg total) by mouth daily 90 tablet 1   • omeprazole (PriLOSEC)  20 mg delayed release capsule Take 1 capsule (20 mg total) by mouth daily 90 capsule 1   • Pyridoxine HCl (VITAMIN B-6) 100 MG TABS Take 1 tablet by mouth daily     • spironolactone (ALDACTONE) 50 mg tablet Take 100 mg by mouth daily       • ursodiol (ACTIGALL) 300 mg capsule Take 1 capsule (300 mg total) by mouth 2 (two) times a day 180 capsule 1   • valsartan (DIOVAN) 160 mg tablet Take 1 tablet (160 mg total) by mouth daily 90 tablet 1   • Vitamin E 200 units TABS Take 1 tablet by mouth daily     • cetirizine (ZyrTEC) 10 mg tablet Take 1 tablet by mouth daily as needed (Patient not taking: Reported on 5/25/2023)     • hydrocortisone (ANUSOL-HC) 2.5 % rectal cream Apply topically 2 (two) times a day 28 g 1   • hyoscyamine (Levsin) 0.125 MG tablet Take 1 tablet (0.125 mg total) by mouth every 4 (four) hours as needed for cramping (Patient not taking: Reported on 12/12/2023) 30 tablet 0     No current facility-administered medications for this visit.     Allergies   Allergen Reactions   • Azithromycin GI Intolerance   • Dicyclomine GI Intolerance and Hives   • Erythromycin GI Intolerance   • Sulfa Antibiotics Hives   • Sulfamethoxazole-Trimethoprim Rash      Immunizations:     Immunization History   Administered Date(s) Administered   • COVID-19 PFIZER VACCINE 0.3 ML IM 02/26/2021, 03/19/2021   • Hep A, adult 05/04/2022, 11/07/2022   • Hep B, adult 05/04/2022, 06/16/2022, 11/07/2022   • INFLUENZA 11/16/2021, 10/01/2023   • Influenza Split High Dose Preservative Free IM 10/19/2017, 09/23/2019   • Influenza, high dose seasonal 0.7 mL 10/18/2018   • Pneumococcal Conjugate 13-Valent 05/07/2019   • Pneumococcal Conjugate Vaccine 20-valent (Pcv20), Polysace 05/25/2023      Health Maintenance:         Topic Date Due   • Colorectal Cancer Screening  Never done   • Breast Cancer Screening: Mammogram  05/17/2020   • Cervical Cancer Screening  05/07/2021   • Hepatitis C Screening  Completed         Topic Date Due   • COVID-19  Vaccine (3 - 2023-24 season) 09/01/2023      Medicare Screening Tests and Risk Assessments:     Martha is here for her Subsequent Wellness visit. Last Medicare Wellness visit information reviewed, patient interviewed and updates made to the record today.      Health Risk Assessment:   Patient rates overall health as very good. Patient feels that their physical health rating is same. Patient is satisfied with their life. Eyesight was rated as same. Hearing was rated as same. Patient feels that their emotional and mental health rating is same. Patients states they are sometimes angry. Patient states they are sometimes unusually tired/fatigued. Pain experienced in the last 7 days has been none. Patient states that she has experienced no weight loss or gain in last 6 months.     Depression Screening:   PHQ-2 Score: 0      Fall Risk Screening:   In the past year, patient has experienced: no history of falling in past year      Urinary Incontinence Screening:   Patient has not leaked urine accidently in the last six months.     Home Safety:  Patient does not have trouble with stairs inside or outside of their home. Patient has working smoke alarms and has working carbon monoxide detector. Home safety hazards include: none.     Nutrition:   Current diet is Regular.     Medications:   Patient is currently taking over-the-counter supplements. OTC medications include: see medication list. Patient is able to manage medications.     Activities of Daily Living (ADLs)/Instrumental Activities of Daily Living (IADLs):   Walk and transfer into and out of bed and chair?: Yes  Dress and groom yourself?: Yes    Bathe or shower yourself?: Yes    Feed yourself? Yes  Do your laundry/housekeeping?: Yes  Manage your money, pay your bills and track your expenses?: Yes  Make your own meals?: Yes    Do your own shopping?: Yes    Previous Hospitalizations:   Any hospitalizations or ED visits within the last 12 months?: No      Advance Care  "Planning:   Living will: No    Durable POA for healthcare: No    ACP document given: Yes    End of Life Decisions reviewed with patient: Yes      Cognitive Screening:   Provider or family/friend/caregiver concerned regarding cognition?: No    PREVENTIVE SCREENINGS      Cardiovascular Screening:    General: Screening Not Indicated and History Lipid Disorder      Diabetes Screening:     General: Screening Current      Cervical Cancer Screening:    General: Screening Not Indicated      Lung Cancer Screening:     General: Screening Not Indicated      Hepatitis C Screening:    General: Screening Current    Screening, Brief Intervention, and Referral to Treatment (SBIRT)    Screening  Typical number of drinks in a day: 0  Typical number of drinks in a week: 0  Interpretation: Low risk drinking behavior.    Single Item Drug Screening:  How often have you used an illegal drug (including marijuana) or a prescription medication for non-medical reasons in the past year? never    Single Item Drug Screen Score: 0  Interpretation: Negative screen for possible drug use disorder    Brief Intervention  Alcohol & drug use screenings were reviewed. No concerns regarding substance use disorder identified.     No results found.     Physical Exam:     /68 (BP Location: Left arm, Patient Position: Sitting, Cuff Size: Large)   Pulse 69   Temp 97.7 °F (36.5 °C)   Ht 5' 1\" (1.549 m)   Wt 104 kg (229 lb 6.4 oz)   SpO2 99%   BMI 43.34 kg/m²     Physical Exam  Vitals and nursing note reviewed.   Constitutional:       General: She is not in acute distress.     Appearance: She is well-developed.   HENT:      Head: Normocephalic.      Right Ear: Tympanic membrane normal.      Left Ear: Tympanic membrane normal.      Mouth/Throat:      Pharynx: No posterior oropharyngeal erythema.   Eyes:      General: No scleral icterus.  Neck:      Thyroid: No thyromegaly.      Vascular: No carotid bruit.   Cardiovascular:      Rate and Rhythm: Normal " rate and regular rhythm.      Heart sounds: Normal heart sounds. No murmur heard.  Pulmonary:      Effort: Pulmonary effort is normal.      Breath sounds: Normal breath sounds.   Abdominal:      General: Bowel sounds are normal.      Palpations: Abdomen is soft.   Musculoskeletal:      Right lower leg: No edema.      Left lower leg: No edema.   Lymphadenopathy:      Cervical: No cervical adenopathy.   Skin:     General: Skin is warm and dry.   Neurological:      Mental Status: She is alert and oriented to person, place, and time.   Psychiatric:         Mood and Affect: Mood normal.          Janny Ramirez DO

## 2023-12-12 NOTE — PATIENT INSTRUCTIONS
Medicare Preventive Visit Patient Instructions  Thank you for completing your Welcome to Medicare Visit or Medicare Annual Wellness Visit today. Your next wellness visit will be due in one year (12/12/2024).  The screening/preventive services that you may require over the next 5-10 years are detailed below. Some tests may not apply to you based off risk factors and/or age. Screening tests ordered at today's visit but not completed yet may show as past due. Also, please note that scanned in results may not display below.  Preventive Screenings:  Service Recommendations Previous Testing/Comments   Colorectal Cancer Screening  * Colonoscopy    * Fecal Occult Blood Test (FOBT)/Fecal Immunochemical Test (FIT)  * Fecal DNA/Cologuard Test  * Flexible Sigmoidoscopy Age: 45-75 years old   Colonoscopy: every 10 years (may be performed more frequently if at higher risk)  OR  FOBT/FIT: every 1 year  OR  Cologuard: every 3 years  OR  Sigmoidoscopy: every 5 years  Screening may be recommended earlier than age 45 if at higher risk for colorectal cancer. Also, an individualized decision between you and your healthcare provider will decide whether screening between the ages of 76-85 would be appropriate. Colonoscopy: Not on file  FOBT/FIT: Not on file  Cologuard: Not on file  Sigmoidoscopy: Not on file          Breast Cancer Screening Age: 40+ years old  Frequency: every 1-2 years  Not required if history of left and right mastectomy Mammogram: 05/17/2019        Cervical Cancer Screening Between the ages of 21-29, pap smear recommended once every 3 years.   Between the ages of 30-65, can perform pap smear with HPV co-testing every 5 years.   Recommendations may differ for women with a history of total hysterectomy, cervical cancer, or abnormal pap smears in past. Pap Smear: 05/07/2019    Screening Not Indicated   Hepatitis C Screening Once for adults born between 1945 and 1965  More frequently in patients at high risk for Hepatitis  C Hep C Antibody: 10/30/2018    Screening Current   Diabetes Screening 1-2 times per year if you're at risk for diabetes or have pre-diabetes Fasting glucose: No results in last 5 years (No results in last 5 years)  A1C: 6.1 % (6/16/2023)  Screening Current   Cholesterol Screening Once every 5 years if you don't have a lipid disorder. May order more often based on risk factors. Lipid panel: Not on file    Screening Not Indicated  History Lipid Disorder     Other Preventive Screenings Covered by Medicare:  Abdominal Aortic Aneurysm (AAA) Screening: covered once if your at risk. You're considered to be at risk if you have a family history of AAA.  Lung Cancer Screening: covers low dose CT scan once per year if you meet all of the following conditions: (1) Age 55-77; (2) No signs or symptoms of lung cancer; (3) Current smoker or have quit smoking within the last 15 years; (4) You have a tobacco smoking history of at least 20 pack years (packs per day multiplied by number of years you smoked); (5) You get a written order from a healthcare provider.  Glaucoma Screening: covered annually if you're considered high risk: (1) You have diabetes OR (2) Family history of glaucoma OR (3)  aged 50 and older OR (4)  American aged 65 and older  Osteoporosis Screening: covered every 2 years if you meet one of the following conditions: (1) You're estrogen deficient and at risk for osteoporosis based off medical history and other findings; (2) Have a vertebral abnormality; (3) On glucocorticoid therapy for more than 3 months; (4) Have primary hyperparathyroidism; (5) On osteoporosis medications and need to assess response to drug therapy.   Last bone density test (DXA Scan): Not on file.  HIV Screening: covered annually if you're between the age of 15-65. Also covered annually if you are younger than 15 and older than 65 with risk factors for HIV infection. For pregnant patients, it is covered up to 3 times per  pregnancy.    Immunizations:  Immunization Recommendations   Influenza Vaccine Annual influenza vaccination during flu season is recommended for all persons aged >= 6 months who do not have contraindications   Pneumococcal Vaccine   * Pneumococcal conjugate vaccine = PCV13 (Prevnar 13), PCV15 (Vaxneuvance), PCV20 (Prevnar 20)  * Pneumococcal polysaccharide vaccine = PPSV23 (Pneumovax) Adults 19-65 yo with certain risk factors or if 65+ yo  If never received any pneumonia vaccine: recommend Prevnar 20 (PCV20)  Give PCV20 if previously received 1 dose of PCV13 or PPSV23   Hepatitis B Vaccine 3 dose series if at intermediate or high risk (ex: diabetes, end stage renal disease, liver disease)   Respiratory syncytial virus (RSV) Vaccine - COVERED BY MEDICARE PART D  * RSVPreF3 (Arexvy) CDC recommends that adults 60 years of age and older may receive a single dose of RSV vaccine using shared clinical decision-making (SCDM)   Tetanus (Td) Vaccine - COST NOT COVERED BY MEDICARE PART B Following completion of primary series, a booster dose should be given every 10 years to maintain immunity against tetanus. Td may also be given as tetanus wound prophylaxis.   Tdap Vaccine - COST NOT COVERED BY MEDICARE PART B Recommended at least once for all adults. For pregnant patients, recommended with each pregnancy.   Shingles Vaccine (Shingrix) - COST NOT COVERED BY MEDICARE PART B  2 shot series recommended in those 19 years and older who have or will have weakened immune systems or those 50 years and older     Health Maintenance Due:      Topic Date Due   • Colorectal Cancer Screening  Never done   • Breast Cancer Screening: Mammogram  05/17/2020   • Cervical Cancer Screening  05/07/2021   • Hepatitis C Screening  Completed     Immunizations Due:      Topic Date Due   • COVID-19 Vaccine (3 - Pfizer series) 05/14/2021     Advance Directives   What are advance directives?  Advance directives are legal documents that state your wishes  and plans for medical care. These plans are made ahead of time in case you lose your ability to make decisions for yourself. Advance directives can apply to any medical decision, such as the treatments you want, and if you want to donate organs.   What are the types of advance directives?  There are many types of advance directives, and each state has rules about how to use them. You may choose a combination of any of the following:  Living will:  This is a written record of the treatment you want. You can also choose which treatments you do not want, which to limit, and which to stop at a certain time. This includes surgery, medicine, IV fluid, and tube feedings.   Durable power of  for healthcare (DPAHC):  This is a written record that states who you want to make healthcare choices for you when you are unable to make them for yourself. This person, called a proxy, is usually a family member or a friend. You may choose more than 1 proxy.  Do not resuscitate (DNR) order:  A DNR order is used in case your heart stops beating or you stop breathing. It is a request not to have certain forms of treatment, such as CPR. A DNR order may be included in other types of advance directives.  Medical directive:  This covers the care that you want if you are in a coma, near death, or unable to make decisions for yourself. You can list the treatments you want for each condition. Treatment may include pain medicine, surgery, blood transfusions, dialysis, IV or tube feedings, and a ventilator (breathing machine).  Values history:  This document has questions about your views, beliefs, and how you feel and think about life. This information can help others choose the care that you would choose.  Why are advance directives important?  An advance directive helps you control your care. Although spoken wishes may be used, it is better to have your wishes written down. Spoken wishes can be misunderstood, or not followed.  Treatments may be given even if you do not want them. An advance directive may make it easier for your family to make difficult choices about your care.   Urinary Incontinence   Urinary incontinence (UI)  is when you lose control of your bladder. UI develops because your bladder cannot store or empty urine properly. The 3 most common types of UI are stress incontinence, urge incontinence, or both.  Medicines:   May be given to help strengthen your bladder control. Report any side effects of medication to your healthcare provider.  Do pelvic muscle exercises often:  Your pelvic muscles help you stop urinating. Squeeze these muscles tight for 5 seconds, then relax for 5 seconds. Gradually work up to squeezing for 10 seconds. Do 3 sets of 15 repetitions a day, or as directed. This will help strengthen your pelvic muscles and improve bladder control.  Train your bladder:  Go to the bathroom at set times, such as every 2 hours, even if you do not feel the urge to go. You can also try to hold your urine when you feel the urge to go. For example, hold your urine for 5 minutes when you feel the urge to go. As that becomes easier, hold your urine for 10 minutes.   Self-care:   Keep a UI record.  Write down how often you leak urine and how much you leak. Make a note of what you were doing when you leaked urine.  Drink liquids as directed. You may need to limit the amount of liquid you drink to help control your urine leakage. Do not drink any liquid right before you go to bed. Limit or do not have drinks that contain caffeine or alcohol.   Prevent constipation.  Eat a variety of high-fiber foods. Good examples are high-fiber cereals, beans, vegetables, and whole-grain breads. Walking is the best way to trigger your intestines to have a bowel movement.  Exercise regularly and maintain a healthy weight.  Weight loss and exercise will decrease pressure on your bladder and help you control your leakage.   Use a catheter as  directed  to help empty your bladder. A catheter is a tiny, plastic tube that is put into your bladder to drain your urine.   Go to behavior therapy as directed.  Behavior therapy may be used to help you learn to control your urge to urinate.    Weight Management   Why it is important to manage your weight:  Being overweight increases your risk of health conditions such as heart disease, high blood pressure, type 2 diabetes, and certain types of cancer. It can also increase your risk for osteoarthritis, sleep apnea, and other respiratory problems. Aim for a slow, steady weight loss. Even a small amount of weight loss can lower your risk of health problems.  How to lose weight safely:  A safe and healthy way to lose weight is to eat fewer calories and get regular exercise. You can lose up about 1 pound a week by decreasing the number of calories you eat by 500 calories each day.   Healthy meal plan for weight management:  A healthy meal plan includes a variety of foods, contains fewer calories, and helps you stay healthy. A healthy meal plan includes the following:  Eat whole-grain foods more often.  A healthy meal plan should contain fiber. Fiber is the part of grains, fruits, and vegetables that is not broken down by your body. Whole-grain foods are healthy and provide extra fiber in your diet. Some examples of whole-grain foods are whole-wheat breads and pastas, oatmeal, brown rice, and bulgur.  Eat a variety of vegetables every day.  Include dark, leafy greens such as spinach, kale, sb greens, and mustard greens. Eat yellow and orange vegetables such as carrots, sweet potatoes, and winter squash.   Eat a variety of fruits every day.  Choose fresh or canned fruit (canned in its own juice or light syrup) instead of juice. Fruit juice has very little or no fiber.  Eat low-fat dairy foods.  Drink fat-free (skim) milk or 1% milk. Eat fat-free yogurt and low-fat cottage cheese. Try low-fat cheeses such as  mozzarella and other reduced-fat cheeses.  Choose meat and other protein foods that are low in fat.  Choose beans or other legumes such as split peas or lentils. Choose fish, skinless poultry (chicken or turkey), or lean cuts of red meat (beef or pork). Before you cook meat or poultry, cut off any visible fat.   Use less fat and oil.  Try baking foods instead of frying them. Add less fat, such as margarine, sour cream, regular salad dressing and mayonnaise to foods. Eat fewer high-fat foods. Some examples of high-fat foods include french fries, doughnuts, ice cream, and cakes.  Eat fewer sweets.  Limit foods and drinks that are high in sugar. This includes candy, cookies, regular soda, and sweetened drinks.  Exercise:  Exercise at least 30 minutes per day on most days of the week. Some examples of exercise include walking, biking, dancing, and swimming. You can also fit in more physical activity by taking the stairs instead of the elevator or parking farther away from stores. Ask your healthcare provider about the best exercise plan for you.      © Copyright [employer] 2018 Information is for End User's use only and may not be sold, redistributed or otherwise used for commercial purposes. All illustrations and images included in CareNotes® are the copyrighted property of A.D.A.M., Inc. or [employer]

## 2023-12-12 NOTE — PROGRESS NOTES
[address] [facility] coding opportunities       Chart reviewed, no opportunity found: CHART REVIEWED, [address]     Patients Insurance     Medicare Insurance: [employer]

## 2023-12-13 ENCOUNTER — TELEPHONE (OUTPATIENT)
Dept: FAMILY MEDICINE CLINIC | Facility: CLINIC | Age: 73
End: 2023-12-13

## 2023-12-13 DIAGNOSIS — K64.9 HEMORRHOIDS, UNSPECIFIED HEMORRHOID TYPE: ICD-10-CM

## 2023-12-13 RX ORDER — HYDROCORTISONE 25 MG/G
CREAM TOPICAL 2 TIMES DAILY
Qty: 28 G | Refills: 1 | Status: SHIPPED | OUTPATIENT
Start: 2023-12-13

## 2023-12-13 NOTE — TELEPHONE ENCOUNTER
Ashley Capone [facility] June 27, 1950 My number is [phone]. I was there yesterday to see Doctor Ruthie Tian. I'm calling about a mix up in my medications to be filled. Thank you. Addressed with pt.  Resent cream to [facility], was sent to [facility]

## 2023-12-21 ENCOUNTER — VBI (OUTPATIENT)
Dept: ADMINISTRATIVE | Facility: OTHER | Age: 73
End: 2023-12-21

## 2024-01-05 ENCOUNTER — VBI (OUTPATIENT)
Dept: ADMINISTRATIVE | Facility: OTHER | Age: 74
End: 2024-01-05

## 2024-02-21 PROBLEM — J01.40 ACUTE NON-RECURRENT PANSINUSITIS: Status: RESOLVED | Noted: 2018-02-08 | Resolved: 2024-02-21

## 2024-03-06 ENCOUNTER — OFFICE VISIT (OUTPATIENT)
Dept: URGENT CARE | Facility: MEDICAL CENTER | Age: 74
End: 2024-03-06
Payer: COMMERCIAL

## 2024-03-06 ENCOUNTER — APPOINTMENT (OUTPATIENT)
Dept: RADIOLOGY | Facility: MEDICAL CENTER | Age: 74
End: 2024-03-06
Payer: COMMERCIAL

## 2024-03-06 VITALS
SYSTOLIC BLOOD PRESSURE: 118 MMHG | TEMPERATURE: 96 F | RESPIRATION RATE: 18 BRPM | DIASTOLIC BLOOD PRESSURE: 84 MMHG | HEART RATE: 80 BPM | OXYGEN SATURATION: 99 %

## 2024-03-06 DIAGNOSIS — M25.561 ACUTE PAIN OF RIGHT KNEE: Primary | ICD-10-CM

## 2024-03-06 DIAGNOSIS — M25.561 ACUTE PAIN OF RIGHT KNEE: ICD-10-CM

## 2024-03-06 PROCEDURE — 73562 X-RAY EXAM OF KNEE 3: CPT

## 2024-03-06 PROCEDURE — S9088 SERVICES PROVIDED IN URGENT: HCPCS

## 2024-03-06 PROCEDURE — 99213 OFFICE O/P EST LOW 20 MIN: CPT

## 2024-03-06 NOTE — PATIENT INSTRUCTIONS
Preliminary reading of right knee X-ray: no fracture.  Radiologist will have final reading- if that is different I will call you.    Ice to affected area first 48 hours, heat afterwards. 15 minutes every 3 hours as needed throughout the day  Topical pain medication such as icy/hot, Biofreeze, Salon pas, etc.  Acetaminophen - 325-1000 mg orally every 4-6 hours when required, maximum 4000 mg/day    Follow up with orthopedics: Call [phone] to make an appointment    If tests have been performed at [facility], our office will contact you with results if changes need to be made to the care plan discussed with you at the visit.  You can review your full results on [facility] MyChart.
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
## 2024-04-08 LAB
ALBUMIN SERPL-MCNC: 4.1 G/DL (ref 3.5–5.7)
ALP SERPL-CCNC: 58 U/L (ref 35–120)
ALT SERPL-CCNC: 42 U/L
ANION GAP SERPL CALCULATED.3IONS-SCNC: 9 MMOL/L (ref 3–11)
AST SERPL-CCNC: 40 U/L
BASOPHILS # BLD AUTO: 0 THOU/CMM (ref 0–0.1)
BASOPHILS NFR BLD AUTO: 1 %
BILIRUB SERPL-MCNC: 0.6 MG/DL (ref 0.2–1)
BUN SERPL-MCNC: 28 MG/DL (ref 7–25)
CALCIUM SERPL-MCNC: 9.2 MG/DL (ref 8.5–10.1)
CHLORIDE SERPL-SCNC: 99 MMOL/L (ref 100–109)
CO2 SERPL-SCNC: 30 MMOL/L (ref 21–31)
CREAT SERPL-MCNC: 1.03 MG/DL (ref 0.4–1.1)
CYTOLOGY CMNT CVX/VAG CYTO-IMP: ABNORMAL
DIFFERENTIAL METHOD BLD: ABNORMAL
EOSINOPHIL # BLD AUTO: 0.1 THOU/CMM (ref 0–0.5)
EOSINOPHIL NFR BLD AUTO: 2 %
ERYTHROCYTE [DISTWIDTH] IN BLOOD BY AUTOMATED COUNT: 15.1 % (ref 12–16)
EST. AVERAGE GLUCOSE BLD GHB EST-MCNC: 120 MG/DL
GFR/BSA.PRED SERPLBLD CYS-BASED-ARV: 57 ML/MIN/{1.73_M2}
GLUCOSE SERPL-MCNC: 131 MG/DL (ref 65–99)
GLUCOSE UR QL STRIP: NEGATIVE MG/DL
HBA1C MFR BLD: 5.8 %
HCT VFR BLD AUTO: 36.3 % (ref 35–43)
HGB BLD-MCNC: 12.1 G/DL (ref 11.5–14.5)
HGB UR QL STRIP: NEGATIVE MG/DL
IRON SERPL-MCNC: 60 UG/DL (ref 50–212)
KETONES UR QL STRIP: NEGATIVE MG/DL
LEUKOCYTE ESTERASE UR QL STRIP: NEGATIVE /UL
LYMPHOCYTES # BLD AUTO: 0.7 THOU/CMM (ref 1–3)
LYMPHOCYTES NFR BLD AUTO: 16 %
MCH RBC QN AUTO: 27.5 PG (ref 26–34)
MCHC RBC AUTO-ENTMCNC: 33.3 G/DL (ref 32–37)
MCV RBC AUTO: 83 FL (ref 80–100)
MONOCYTES # BLD AUTO: 0.4 THOU/CMM (ref 0.3–1)
MONOCYTES NFR BLD AUTO: 8 %
NEUTROPHILS # BLD AUTO: 3.4 THOU/CMM (ref 1.8–7.8)
NEUTROPHILS NFR BLD AUTO: 73 %
NITRITE UR QL STRIP: NEGATIVE
PH UR: 5 [PH] (ref 4.5–8)
PLATELET # BLD AUTO: 160 THOU/CMM (ref 140–350)
PMV BLD REES-ECKER: 8.1 FL (ref 7.5–11.3)
POTASSIUM SERPL-SCNC: 4.1 MMOL/L (ref 3.5–5.2)
PROT 24H UR-MRATE: NEGATIVE MG/DL
PROT SERPL-MCNC: 7.5 G/DL (ref 6.3–8.3)
RBC # BLD AUTO: 4.4 MILL/CMM (ref 3.7–4.7)
SL AMB POCT URINE COMMENT: NORMAL
SODIUM SERPL-SCNC: 138 MMOL/L (ref 135–145)
SP GR UR: 1.02 (ref 1–1.03)
WBC # BLD AUTO: 4.6 THOU/CMM (ref 4–10)

## 2024-04-09 LAB — LEGIONELLA SPEC CULT: NORMAL

## 2024-04-26 ENCOUNTER — VBI (OUTPATIENT)
Dept: ADMINISTRATIVE | Facility: OTHER | Age: 74
End: 2024-04-26

## 2024-05-30 ENCOUNTER — RA CDI HCC (OUTPATIENT)
Dept: OTHER | Facility: HOSPITAL | Age: 74
End: 2024-05-30

## 2024-06-05 DIAGNOSIS — K21.00 REFLUX ESOPHAGITIS: ICD-10-CM

## 2024-06-05 DIAGNOSIS — I10 BENIGN ESSENTIAL HYPERTENSION: ICD-10-CM

## 2024-06-05 DIAGNOSIS — K80.20 MULTIPLE GALLSTONES: ICD-10-CM

## 2024-06-05 DIAGNOSIS — I10 ESSENTIAL HYPERTENSION: ICD-10-CM

## 2024-06-06 RX ORDER — URSODIOL 300 MG/1
300 CAPSULE ORAL 2 TIMES DAILY
Qty: 180 CAPSULE | Refills: 1 | Status: SHIPPED | OUTPATIENT
Start: 2024-06-06

## 2024-06-06 RX ORDER — VALSARTAN 160 MG/1
160 TABLET ORAL DAILY
Qty: 90 TABLET | Refills: 1 | Status: SHIPPED | OUTPATIENT
Start: 2024-06-06

## 2024-06-06 RX ORDER — OMEPRAZOLE 20 MG/1
20 CAPSULE, DELAYED RELEASE ORAL DAILY
Qty: 90 CAPSULE | Refills: 1 | Status: SHIPPED | OUTPATIENT
Start: 2024-06-06

## 2024-06-06 RX ORDER — METOPROLOL TARTRATE 100 MG/1
100 TABLET ORAL DAILY
Qty: 90 TABLET | Refills: 1 | Status: SHIPPED | OUTPATIENT
Start: 2024-06-06 | End: 2024-12-03

## 2024-06-07 ENCOUNTER — TELEPHONE (OUTPATIENT)
Dept: ADMINISTRATIVE | Facility: OTHER | Age: 74
End: 2024-06-07

## 2024-06-07 NOTE — TELEPHONE ENCOUNTER
----- Message from Daniella GALEAS sent at 6/6/2024  2:01 PM EDT -----  Regarding: Care Gap Request  06/06/24 2:01 PM    Hello, our patient Martha Sandoval has had CRC: Colonoscopy completed/performed. Please assist in updating the patient chart by pulling the Care Everywhere (CE) document. The date of service is 6/6/24.     Thank you,  Daniella Machado  [facility]

## 2024-06-07 NOTE — TELEPHONE ENCOUNTER
Upon review of the In Basket request we were able to locate, review, and update the patient chart as requested for CRC: Colonoscopy.    Any additional questions or concerns should be emailed to the Practice Liaisons via the appropriate education email address, please do not reply via In Basket.    Thank you  Rabia Perez MA   [facility]

## 2024-06-11 ENCOUNTER — OFFICE VISIT (OUTPATIENT)
Dept: FAMILY MEDICINE CLINIC | Facility: CLINIC | Age: 74
End: 2024-06-11
Payer: COMMERCIAL

## 2024-06-11 VITALS
DIASTOLIC BLOOD PRESSURE: 78 MMHG | HEIGHT: 61 IN | WEIGHT: 223.4 LBS | HEART RATE: 65 BPM | SYSTOLIC BLOOD PRESSURE: 112 MMHG | OXYGEN SATURATION: 99 % | BODY MASS INDEX: 42.18 KG/M2 | TEMPERATURE: 97.5 F

## 2024-06-11 DIAGNOSIS — R73.9 ELEVATED BLOOD SUGAR: Primary | ICD-10-CM

## 2024-06-11 DIAGNOSIS — K75.81 LIVER CIRRHOSIS SECONDARY TO NASH (HCC): ICD-10-CM

## 2024-06-11 DIAGNOSIS — I10 BENIGN ESSENTIAL HYPERTENSION: ICD-10-CM

## 2024-06-11 DIAGNOSIS — K74.60 LIVER CIRRHOSIS SECONDARY TO NASH (HCC): ICD-10-CM

## 2024-06-11 DIAGNOSIS — E66.01 OBESITY, MORBID (HCC): ICD-10-CM

## 2024-06-11 PROCEDURE — G2211 COMPLEX E/M VISIT ADD ON: HCPCS | Performed by: FAMILY MEDICINE

## 2024-06-11 PROCEDURE — 99214 OFFICE O/P EST MOD 30 MIN: CPT | Performed by: FAMILY MEDICINE

## 2024-06-11 NOTE — PROGRESS NOTES
Ambulatory Visit  Name: Martha Sandoval      : 1950      MRN: [id number]  Encounter Provider: Janny Ramirez DO  Encounter Date: 2024   Encounter department: [facility]    Assessment & Plan   1. Elevated blood sugar  Assessment & Plan:  A1C 5.8  Orders:  -     Hemoglobin A1C; Future; Expected date: 2024  -     Lipid Panel with Direct LDL reflex; Future; Expected date: 2024  -     Hemoglobin A1C  -     Lipid Panel with Direct LDL reflex  2. 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)  Assessment & Plan:  I will order alpha-fetoprotein and PT/INR as patient is not seeing a gastroenterologist at this moment.  I have also ordered her CT scan for July.  Orders:  -     CT abdomen w wo contrast; Future; Expected date: 2024  -     AFP tumor marker; Future; Expected date: 2024  -     Protime-INR; Future; Expected date: 2024  -     Lipid Panel with Direct LDL reflex; Future; Expected date: 2024  -     AFP tumor marker  -     Protime-INR  -     Lipid Panel with Direct LDL reflex  3. Obesity, morbid (HCC)  -     Lipid Panel with Direct LDL reflex; Future; Expected date: 2024  -     Lipid Panel with Direct LDL reflex  4. Benign essential hypertension  Assessment & Plan:  Blood pressure is stable on metoprolol and valsartan.  She is also taking spironolactone and furosemide since she had developed ascites.  Orders:  -     Lipid Panel with Direct LDL reflex; Future; Expected date: 2024  -     Comprehensive metabolic panel; Future; Expected date: 2024  -     CBC and differential; Future; Expected date: 2024  -     TSH, 3rd generation with Free T4 reflex; Future; Expected date: 2024  -     Lipid Panel with Direct LDL reflex  -     Comprehensive metabolic panel  -     CBC and differential  -     TSH, 3rd generation with Free T4 reflex         She was also given orders for fasting blood work to have done prior to her visit in 6 months.  This   "will also be her Medicare wellness.      History of Present Illness     Patient is here for follow up of chronic medical conditions, but she is upset about issues with her gastroenterologist and recent colonoscopy..  She is no longer seeing Dr. Shen CARDENAS.  She was being seen for HOUSE.  Patient has been having problems with bleeding, painful hemorrhoids but they were unable to scope her to that office until July.  She then went to see Dr. Wilkinson, who had done her 's colonoscopy.  She did have colonoscopy, but she was under the impression that he will also treat her hemorrhoids at that time.  He did biopsy a lesion in her rectum and they are waiting on the biopsy results.  He did tell patient that he believes this was a carcinoma.  Patient did have blood work back in April.  She is taking her blood pressure medication.          Review of Systems   Constitutional:  Negative for chills and fever.   HENT:  Negative for congestion and sore throat.    Respiratory:  Negative for chest tightness.    Cardiovascular:  Negative for chest pain and palpitations.   Gastrointestinal:  Positive for anal bleeding, blood in stool and rectal pain. Negative for abdominal pain, constipation, diarrhea and nausea.   Genitourinary:  Negative for difficulty urinating.   Skin: Negative.    Neurological:  Negative for dizziness and headaches.   Psychiatric/Behavioral: Negative.         Objective     /78 (BP Location: Left arm, Patient Position: Sitting, Cuff Size: Adult)   Pulse 65   Temp 97.5 °F (36.4 °C)   Ht 5' 1\" (1.549 m)   Wt 101 kg (223 lb 6.4 oz)   SpO2 99%   BMI 42.21 kg/m²     Physical Exam  Constitutional:       General: She is not in acute distress.  HENT:      Head: Normocephalic.   Neck:      Vascular: No carotid bruit.   Cardiovascular:      Rate and Rhythm: Normal rate and regular rhythm.   Musculoskeletal:      Right lower leg: No edema.      Left lower leg: No edema.   Lymphadenopathy:      Cervical: No " cervical adenopathy.   Skin:     General: Skin is warm and dry.   Neurological:      Mental Status: She is alert and oriented to person, place, and time.   Psychiatric:      Comments: Patient is agitated when speaking of the specialist she is seen recently.     Return to the visit was spent discussing gastroenterology and colonoscopy visits.  Administrative Statements

## 2024-06-13 ENCOUNTER — TELEPHONE (OUTPATIENT)
Dept: FAMILY MEDICINE CLINIC | Facility: CLINIC | Age: 74
End: 2024-06-13

## 2024-06-13 NOTE — ASSESSMENT & PLAN NOTE
Blood pressure is stable on metoprolol and valsartan.  She is also taking spironolactone and furosemide since she had developed ascites.

## 2024-06-13 NOTE — TELEPHONE ENCOUNTER
----- Message from Janny Ramirez DO sent at 6/13/2024  2:44 PM EDT -----  Regarding: RE: CT scan  I think so. And if you could call Martha that we checked and the number is the same.  ----- Message -----  From: Yolanda Bailey  Sent: 6/13/2024  11:52 AM EDT  To: Janny Ramirez DO  Subject: RE: CT scan                                      Hi Dr. Ramirez,     I called [facility] and the procedure code will be [id number] so everything will be the same. Did you need me to fax the referral over to them?     Yolanda  ----- Message -----  From: Janny Ramirez DO  Sent: 6/13/2024   8:29 AM EDT  To: Yolanda Bailey  Subject: CT scan                                          I ordered a CT scan for patient when I saw her on Tuesday.  She is going to have this at [facility] as she has been doing this for the past couple years and  will need to compare.  She wants us to make sure that it is the same procedure code.  Can you check with radiology at [facility] and when we fax this over she will have the same scan that she had previously-the procedure code number she gave me was [id number].  Thanks, DEISY

## 2024-06-13 NOTE — ASSESSMENT & PLAN NOTE
I will order alpha-fetoprotein and PT/INR as patient is not seeing a gastroenterologist at this moment.  I have also ordered her CT scan for July.

## 2024-06-14 ENCOUNTER — VBI (OUTPATIENT)
Dept: ADMINISTRATIVE | Facility: OTHER | Age: 74
End: 2024-06-14

## 2024-06-18 ENCOUNTER — TELEPHONE (OUTPATIENT)
Dept: GASTROENTEROLOGY | Facility: CLINIC | Age: 74
End: 2024-06-18

## 2024-06-18 ENCOUNTER — NURSE TRIAGE (OUTPATIENT)
Age: 74
End: 2024-06-18

## 2024-06-18 NOTE — TELEPHONE ENCOUNTER
Pt called newly diagnosed with colon cancer. Pt would like apt with Dr. Wellington. Only urgent slots for Dr. Wellington. I transferred call to [facility] triage to assist.    Patient with COVID 19.  Patient has completed Plaquenil.  Continue supportive care.  Continue isolation precautions.  NLR today is 17.31 (slightly decreased from yesterday)

## 2024-06-18 NOTE — TELEPHONE ENCOUNTER
"Pt transferred to nurses [facility] for assistance.     Pt sees Dr. Wilkinson for C&R at [facility] and had colonoscopy was diagnosed with colon cancer. They advised surgery for pt to remove part of her colon. Pt asking for a second opinion with  C&R before she proceeds with surgery. She wanted to be seen in July once all tests and results are in. Soonest was with Dr. Madrid for end of July- pt asked to be put on a wait list.     Reason for Disposition  • Information only question and nurse able to answer    Answer Assessment - Initial Assessment Questions  1. REASON FOR CALL or QUESTION: \"What is your reason for calling today?\" or \"How can I best help you?\" or \"What question do you have that I can help answer?\"      See notes    Protocols used: Information Only Call - No Triage-ADULT-[place]    "

## 2024-07-08 ENCOUNTER — TELEPHONE (OUTPATIENT)
Age: 74
End: 2024-07-08

## 2024-07-08 NOTE — TELEPHONE ENCOUNTER
Pt is scheduled for a robotic vs laparoscopic low anterior bowel resection on 9/18 with Dr. Wilkinson and would like to schedule pre op appt within last 2 weeks of August. She stated she would like to be seen by Dr. Ramirez and did not wish to schedule with a different provider. No availability on Dr. Ramirez's calendar for the dates she is looking for. Please call back at [phone]. Thank you.

## 2024-07-10 ENCOUNTER — TELEPHONE (OUTPATIENT)
Age: 74
End: 2024-07-10

## 2024-07-10 NOTE — TELEPHONE ENCOUNTER
Pt was calling to see if she could changer her appt to Dr Boone. I transferred and Gali took the call

## 2024-07-12 NOTE — PROGRESS NOTES
Colon and Rectal Surgery   Martha Sandoval 74 y.o. female MRN [id number]  Encounter: [id number]  07/15/24 4:52 PM            Assessment: Martha Sandoval is a 74 y.o. female who has rectal cancer.      Plan:   Rectal cancer (HCC)  The patient presents for evaluation of rectal cancer.  This was found on colonoscopy to be above the third house duration in the rectum.  This would suggest it is a proximal rectal tumor.  However, I do not have, nation of that based on the MRI.  I viewed the report for the MRI and CT scan.  Discs have been downloaded in the [employer] and I will review those images when available.  Her CEA level was 1.2.  Creatinine is 1.19.  She has known liver disease with cirrhosis and history of ascites accumulation.  She is on diuretics to manage that.  Her pathology report shows adenocarcinoma.    She wishes to transfer her care.  In order to accomplish that, I need to visualize the lesion, determine its size and exact position from the anal verge.  For this, I recommend repeat colonoscopy with rigid proctoscopy.  I will review the MRI and CT scans with radiology when they have been downloaded.  The pathology slides will be transferred to our pathologists for confirmation of the diagnosis.    I explained to the patient and her  that the standard of care for her would be a low anterior resection.  This is because the tumor is deemed clinically T1-2, node-negative tumor.  There are no metastatic findings.  However, a very low-lying lesion could be removed using transanal techniques if it is small enough and low enough.  This could be considered based on the findings at colonoscopy and rigid proctoscopy.  Another issue for her is her liver disease.  This will be assessed by our hepatologist.  If the liver disease is prohibitive, nonsurgical management may be recommended.  That might lead to consideration of radiation and chemotherapy rather than surgery, off standard of care protocol.  I  think the latter is less likely but it certainly is possible if the liver disease is felt to be prohibitively risky for a low anterior resection.  I explained why we would not be able to treat a high rectal lesion or a large rectal lesion using transanal techniques.    A perfunctory review of risks to her including liver disease and liver failure, renal disease, intra-abdominal complications, anastomotic leak with colostomy, DVT/PE, MI, CVA, or death.    I understand that she wants to transfer her care to the [facility].  For that reason, I will get a hepatology, nephrology, cardiology, and surgical optimization consult for her.  Hopefully we can expedite these.    Presentation at multidisciplinary conference will be encouraged if we can manage this before her operation occurs.  We will need her results interpreted before that time in order to accomplish this.    Innumerable questions were answered.  We will need to review these questions when she returns after findings have been reviewed by our own system.  Full consent will be obtained at that time.    60 minutes with her and .      Nini     HPI    Martha Sandoval is a 74 y.o. female who presents for a second opinion for T1/2 N0 colon cancer.     The patient had a colonoscopy procedure on 6/6/24 with Dr. Davon Wilkinson that revealed a 10 mm descending colon polyp which was removed by cold snare and retrieved for pathology. The procedure also noted a 30 mm rectal poly which was just above the third rectal valve. It appeared ulcerated and concerning for malignancy and was biopsied.   DIAGNOSIS:   A. DESCENDING COLON POLYP (POLYPECTOMY):    Tubular adenoma.   B. RECTOSIGMOID COLON MASS (BIOPSY):    Invasive moderately differentiated adenocarcinoma.     MRI rectum cancer staging protocol with and without contrast 7/03/24  IMPRESSION:   1. Polypoid mass lower to mid rectum.  2.  MR-T category: T1/2 (tumor confined to rectal wall).   3.  MR-N category: N0  (no concerning, visible lymph nodes/deposits).   4.  MRF: Clear   5.  Anal sphincter involvement: No.   6.  Suspicious extra mesorectal lymph nodes: No.   7.  EMVI: no.      CT chest abdomen pelvis with contrast 6/26/24  IMPRESSION:   Soft tissue observation within the rectosigmoid junction measuring up to 2.7 x 1.9 cm which may reflect the patient's biopsy-proven invasive moderately differentiated adenocarcinoma. No definite findings for metastatic disease within the chest, abdomen, or pelvis.   Cirrhotic morphology of the liver with sequela of portal hypertension including   recanalization of the umbilical vein. No suspicious early arterial enhancing   observation identified.   Grossly stable gastrohepatic and periportal lymphadenopathy, likely reactive in   the setting of underlying hepatocellular disease.   Grossly stable to intervally decreased pancreatic cystic observation with the largest within the pancreatic body, measuring up to 0.8 cm, favoring indolent pancreatic cystic lesions of epithelial origin, such as side branch IPMNs.       The patient reports that she has 6-12 bowel movements a day with soft and formed stool. She reports rectal bleeding. She notes that most days when she wakes up she feels the urge to produce a bowel movement and just blood will come out. She will have a bowel movement afterward. She also sometimes has rectal bleeding later in the day.     She notes abdominal cramping and pressure. She also reports a constant urge to produce a bowel movement.     6/21/24 CEA = 1.2 which was within normal limits.   Last CBC 4/8/24 was within normal limits except for absolute lymphocytes = 0.7 (L).  Last CMP 6/21/24 was within normal limits except for glucose = 100(H), BUN = 32(H), creatinine = 1.19(H), eGFRcr = 48(L).      Historical Information   Past Medical History:   Diagnosis Date    Chest pain     last assessed 9/16/13    Chest pressure     last assessed 12/15/16    Chronic sinusitis      last assessed 5/4/17    Compression fracture of lumbar vertebra (HCC)     last assessed 6/13/15    Lump of skin     last assessed 1/17/14     No past surgical history on file.    Meds/Allergies       Current Outpatient Medications:     aspirin (ECOTRIN LOW STRENGTH) 81 mg EC tablet, Take 81 mg by mouth daily, Disp: , Rfl:     cetirizine (ZyrTEC) 10 mg tablet, Take 1 tablet by mouth daily as needed, Disp: , Rfl:     cyanocobalamin (VITAMIN B-12) 100 MCG tablet, Take 100 mcg by mouth daily, Disp: , Rfl:     furosemide (LASIX) 20 mg tablet, Take 40 mg by mouth daily  , Disp: , Rfl:     guaiFENesin (MUCINEX) 600 mg 12 hr tablet, Take 600 mg by mouth every 12 (twelve) hours, Disp: , Rfl:     hydrocortisone (ANUSOL-HC) 2.5 % rectal cream, Apply topically 2 (two) times a day, Disp: 28 g, Rfl: 1    metoprolol tartrate (LOPRESSOR) 100 mg tablet, Take 1 tablet (100 mg total) by mouth daily, Disp: 90 tablet, Rfl: 1    omeprazole (PriLOSEC) 20 mg delayed release capsule, Take 1 capsule (20 mg total) by mouth daily, Disp: 90 capsule, Rfl: 1    Pyridoxine HCl (VITAMIN B-6) 100 MG TABS, Take 1 tablet by mouth daily, Disp: , Rfl:     spironolactone (ALDACTONE) 50 mg tablet, Take 100 mg by mouth daily  , Disp: , Rfl:     ursodiol (ACTIGALL) 300 mg capsule, Take 1 capsule (300 mg total) by mouth 2 (two) times a day, Disp: 180 capsule, Rfl: 1    valsartan (DIOVAN) 160 mg tablet, Take 1 tablet (160 mg total) by mouth daily, Disp: 90 tablet, Rfl: 1    Vitamin E 200 units TABS, Take 1 tablet by mouth daily, Disp: , Rfl:     hyoscyamine (Levsin) 0.125 MG tablet, Take 1 tablet (0.125 mg total) by mouth every 4 (four) hours as needed for cramping (Patient not taking: Reported on 12/12/2023), Disp: 30 tablet, Rfl: 0  Allergies   Allergen Reactions    Azithromycin GI Intolerance    Dicyclomine GI Intolerance and Hives    Erythromycin GI Intolerance    Naproxen Edema    Sulfa Antibiotics Hives    Sulfamethoxazole-Trimethoprim Rash       Social  "History   Social History     Substance and Sexual Activity   Drug Use No     Social History     Tobacco Use   Smoking Status Never   Smokeless Tobacco Never         Family History   Problem Relation Age of Onset    Hyperlipidemia Mother     Hypertension Mother     Cancer Father     Liver cancer Father     Stomach cancer Father     No Known Problems Son     No Known Problems Daughter          Review of Systems   Constitutional: Negative.    Respiratory: Negative.     Cardiovascular: Negative.    Gastrointestinal: Negative.        Objective   Current Vitals:  Vitals:    07/15/24 1525   BP: 122/70   Weight: 102 kg (224 lb)   Height: 5' 1\" (1.549 m)         Physical Exam  Constitutional:       Appearance: Normal appearance. She is obese.   Cardiovascular:      Rate and Rhythm: Normal rate and regular rhythm.   Pulmonary:      Effort: Pulmonary effort is normal.      Breath sounds: Normal breath sounds.   Abdominal:      General: Abdomen is flat.      Palpations: Abdomen is soft. There is no mass.      Tenderness: There is no abdominal tenderness. There is no guarding.      Hernia: No hernia is present.      Comments: Rotund abdomen.   Neurological:      General: No focal deficit present.      Mental Status: She is alert and oriented to person, place, and time.   Psychiatric:         Mood and Affect: Mood normal.         Behavior: Behavior normal.               "

## 2024-07-12 NOTE — H&P (VIEW-ONLY)
Colon and Rectal Surgery   Martha Sandoval 74 y.o. female MRN [id number]  Encounter: [id number]  07/15/24 4:52 PM            Assessment: Martha Sandoval is a 74 y.o. female who has rectal cancer.      Plan:   Rectal cancer (HCC)  The patient presents for evaluation of rectal cancer.  This was found on colonoscopy to be above the third house duration in the rectum.  This would suggest it is a proximal rectal tumor.  However, I do not have, nation of that based on the MRI.  I viewed the report for the MRI and CT scan.  Discs have been downloaded in the [employer] and I will review those images when available.  Her CEA level was 1.2.  Creatinine is 1.19.  She has known liver disease with cirrhosis and history of ascites accumulation.  She is on diuretics to manage that.  Her pathology report shows adenocarcinoma.    She wishes to transfer her care.  In order to accomplish that, I need to visualize the lesion, determine its size and exact position from the anal verge.  For this, I recommend repeat colonoscopy with rigid proctoscopy.  I will review the MRI and CT scans with radiology when they have been downloaded.  The pathology slides will be transferred to our pathologists for confirmation of the diagnosis.    I explained to the patient and her  that the standard of care for her would be a low anterior resection.  This is because the tumor is deemed clinically T1-2, node-negative tumor.  There are no metastatic findings.  However, a very low-lying lesion could be removed using transanal techniques if it is small enough and low enough.  This could be considered based on the findings at colonoscopy and rigid proctoscopy.  Another issue for her is her liver disease.  This will be assessed by our hepatologist.  If the liver disease is prohibitive, nonsurgical management may be recommended.  That might lead to consideration of radiation and chemotherapy rather than surgery, off standard of care protocol.  I  think the latter is less likely but it certainly is possible if the liver disease is felt to be prohibitively risky for a low anterior resection.  I explained why we would not be able to treat a high rectal lesion or a large rectal lesion using transanal techniques.    A perfunctory review of risks to her including liver disease and liver failure, renal disease, intra-abdominal complications, anastomotic leak with colostomy, DVT/PE, MI, CVA, or death.    I understand that she wants to transfer her care to the [facility].  For that reason, I will get a hepatology, nephrology, cardiology, and surgical optimization consult for her.  Hopefully we can expedite these.    Presentation at multidisciplinary conference will be encouraged if we can manage this before her operation occurs.  We will need her results interpreted before that time in order to accomplish this.    Innumerable questions were answered.  We will need to review these questions when she returns after findings have been reviewed by our own system.  Full consent will be obtained at that time.    60 minutes with her and .      Nini     HPI    Martha Sandoval is a 74 y.o. female who presents for a second opinion for T1/2 N0 colon cancer.     The patient had a colonoscopy procedure on 6/6/24 with Dr. Davon Wilkinson that revealed a 10 mm descending colon polyp which was removed by cold snare and retrieved for pathology. The procedure also noted a 30 mm rectal poly which was just above the third rectal valve. It appeared ulcerated and concerning for malignancy and was biopsied.   DIAGNOSIS:   A. DESCENDING COLON POLYP (POLYPECTOMY):    Tubular adenoma.   B. RECTOSIGMOID COLON MASS (BIOPSY):    Invasive moderately differentiated adenocarcinoma.     MRI rectum cancer staging protocol with and without contrast 7/03/24  IMPRESSION:   1. Polypoid mass lower to mid rectum.  2.  MR-T category: T1/2 (tumor confined to rectal wall).   3.  MR-N category: N0  (no concerning, visible lymph nodes/deposits).   4.  MRF: Clear   5.  Anal sphincter involvement: No.   6.  Suspicious extra mesorectal lymph nodes: No.   7.  EMVI: no.      CT chest abdomen pelvis with contrast 6/26/24  IMPRESSION:   Soft tissue observation within the rectosigmoid junction measuring up to 2.7 x 1.9 cm which may reflect the patient's biopsy-proven invasive moderately differentiated adenocarcinoma. No definite findings for metastatic disease within the chest, abdomen, or pelvis.   Cirrhotic morphology of the liver with sequela of portal hypertension including   recanalization of the umbilical vein. No suspicious early arterial enhancing   observation identified.   Grossly stable gastrohepatic and periportal lymphadenopathy, likely reactive in   the setting of underlying hepatocellular disease.   Grossly stable to intervally decreased pancreatic cystic observation with the largest within the pancreatic body, measuring up to 0.8 cm, favoring indolent pancreatic cystic lesions of epithelial origin, such as side branch IPMNs.       The patient reports that she has 6-12 bowel movements a day with soft and formed stool. She reports rectal bleeding. She notes that most days when she wakes up she feels the urge to produce a bowel movement and just blood will come out. She will have a bowel movement afterward. She also sometimes has rectal bleeding later in the day.     She notes abdominal cramping and pressure. She also reports a constant urge to produce a bowel movement.     6/21/24 CEA = 1.2 which was within normal limits.   Last CBC 4/8/24 was within normal limits except for absolute lymphocytes = 0.7 (L).  Last CMP 6/21/24 was within normal limits except for glucose = 100(H), BUN = 32(H), creatinine = 1.19(H), eGFRcr = 48(L).      Historical Information   Past Medical History:   Diagnosis Date    Chest pain     last assessed 9/16/13    Chest pressure     last assessed 12/15/16    Chronic sinusitis      last assessed 5/4/17    Compression fracture of lumbar vertebra (HCC)     last assessed 6/13/15    Lump of skin     last assessed 1/17/14     No past surgical history on file.    Meds/Allergies       Current Outpatient Medications:     aspirin (ECOTRIN LOW STRENGTH) 81 mg EC tablet, Take 81 mg by mouth daily, Disp: , Rfl:     cetirizine (ZyrTEC) 10 mg tablet, Take 1 tablet by mouth daily as needed, Disp: , Rfl:     cyanocobalamin (VITAMIN B-12) 100 MCG tablet, Take 100 mcg by mouth daily, Disp: , Rfl:     furosemide (LASIX) 20 mg tablet, Take 40 mg by mouth daily  , Disp: , Rfl:     guaiFENesin (MUCINEX) 600 mg 12 hr tablet, Take 600 mg by mouth every 12 (twelve) hours, Disp: , Rfl:     hydrocortisone (ANUSOL-HC) 2.5 % rectal cream, Apply topically 2 (two) times a day, Disp: 28 g, Rfl: 1    metoprolol tartrate (LOPRESSOR) 100 mg tablet, Take 1 tablet (100 mg total) by mouth daily, Disp: 90 tablet, Rfl: 1    omeprazole (PriLOSEC) 20 mg delayed release capsule, Take 1 capsule (20 mg total) by mouth daily, Disp: 90 capsule, Rfl: 1    Pyridoxine HCl (VITAMIN B-6) 100 MG TABS, Take 1 tablet by mouth daily, Disp: , Rfl:     spironolactone (ALDACTONE) 50 mg tablet, Take 100 mg by mouth daily  , Disp: , Rfl:     ursodiol (ACTIGALL) 300 mg capsule, Take 1 capsule (300 mg total) by mouth 2 (two) times a day, Disp: 180 capsule, Rfl: 1    valsartan (DIOVAN) 160 mg tablet, Take 1 tablet (160 mg total) by mouth daily, Disp: 90 tablet, Rfl: 1    Vitamin E 200 units TABS, Take 1 tablet by mouth daily, Disp: , Rfl:     hyoscyamine (Levsin) 0.125 MG tablet, Take 1 tablet (0.125 mg total) by mouth every 4 (four) hours as needed for cramping (Patient not taking: Reported on 12/12/2023), Disp: 30 tablet, Rfl: 0  Allergies   Allergen Reactions    Azithromycin GI Intolerance    Dicyclomine GI Intolerance and Hives    Erythromycin GI Intolerance    Naproxen Edema    Sulfa Antibiotics Hives    Sulfamethoxazole-Trimethoprim Rash       Social  "History   Social History     Substance and Sexual Activity   Drug Use No     Social History     Tobacco Use   Smoking Status Never   Smokeless Tobacco Never         Family History   Problem Relation Age of Onset    Hyperlipidemia Mother     Hypertension Mother     Cancer Father     Liver cancer Father     Stomach cancer Father     No Known Problems Son     No Known Problems Daughter          Review of Systems   Constitutional: Negative.    Respiratory: Negative.     Cardiovascular: Negative.    Gastrointestinal: Negative.        Objective   Current Vitals:  Vitals:    07/15/24 1525   BP: 122/70   Weight: 102 kg (224 lb)   Height: 5' 1\" (1.549 m)         Physical Exam  Constitutional:       Appearance: Normal appearance. She is obese.   Cardiovascular:      Rate and Rhythm: Normal rate and regular rhythm.   Pulmonary:      Effort: Pulmonary effort is normal.      Breath sounds: Normal breath sounds.   Abdominal:      General: Abdomen is flat.      Palpations: Abdomen is soft. There is no mass.      Tenderness: There is no abdominal tenderness. There is no guarding.      Hernia: No hernia is present.      Comments: Rotund abdomen.   Neurological:      General: No focal deficit present.      Mental Status: She is alert and oriented to person, place, and time.   Psychiatric:         Mood and Affect: Mood normal.         Behavior: Behavior normal.               "

## 2024-07-15 ENCOUNTER — OFFICE VISIT (OUTPATIENT)
Age: 74
End: 2024-07-15
Payer: COMMERCIAL

## 2024-07-15 VITALS
WEIGHT: 224 LBS | BODY MASS INDEX: 42.29 KG/M2 | HEIGHT: 61 IN | DIASTOLIC BLOOD PRESSURE: 70 MMHG | SYSTOLIC BLOOD PRESSURE: 122 MMHG

## 2024-07-15 DIAGNOSIS — C20 RECTAL CANCER (HCC): Primary | ICD-10-CM

## 2024-07-15 PROCEDURE — 99205 OFFICE O/P NEW HI 60 MIN: CPT | Performed by: COLON & RECTAL SURGERY

## 2024-07-15 RX ORDER — METRONIDAZOLE 250 MG/1
1000 TABLET ORAL 3 TIMES DAILY
OUTPATIENT
Start: 2024-07-15 | End: 2024-07-16

## 2024-07-15 RX ORDER — NEOMYCIN SULFATE 500 MG/1
1000 TABLET ORAL 3 TIMES DAILY
OUTPATIENT
Start: 2024-07-15 | End: 2024-07-16

## 2024-07-15 RX ORDER — HEPARIN SODIUM 5000 [USP'U]/ML
5000 INJECTION, SOLUTION INTRAVENOUS; SUBCUTANEOUS ONCE
OUTPATIENT
Start: 2024-07-15 | End: 2024-07-15

## 2024-07-15 NOTE — LETTER
July 15, 2024     Janny Ramirez DO  [address]  [address]  [facility] [address]    Patient: Martha Sandoval   YOB: 1950   Date of Visit: 7/15/2024       Dear Dr. Ramirez:    Thank you for referring Martha Sandoval to me for evaluation. Below are my notes for this consultation.    If you have questions, please do not hesitate to call me. I look forward to following your patient along with you.         Sincerely,        JUANY Boone MD        CC: ANA[facility] Long  GI ONCOLOGY TUMOR BOARD    JUANY Boone MD  7/15/2024  4:53 PM  Sign when Signing Visit  Colon and Rectal Surgery   Martha Sandoval 74 y.o. female MRN [id number]  Encounter: [id number]  07/15/24 4:52 PM            Assessment: Martha Sandoval is a 74 y.o. female who has rectal cancer.      Plan:   Rectal cancer (HCC)  The patient presents for evaluation of rectal cancer.  This was found on colonoscopy to be above the third house duration in the rectum.  This would suggest it is a proximal rectal tumor.  However, I do not have, nation of that based on the MRI.  I viewed the report for the MRI and CT scan.  Discs have been downloaded in the [employer] [facility] and I will review those images when available.  Her CEA level was 1.2.  Creatinine is 1.19.  She has known liver disease with cirrhosis and history of ascites accumulation.  She is on diuretics to manage that.  Her pathology report shows adenocarcinoma.    She wishes to transfer her care.  In order to accomplish that, I need to visualize the lesion, determine its size and exact position from the anal verge.  For this, I recommend repeat colonoscopy with rigid proctoscopy.  I will review the MRI and CT scans with radiology when they have been downloaded.  The pathology slides will be transferred to our pathologists for confirmation of the diagnosis.    I explained to the patient and her  that the standard of care for her would be a low anterior resection.  This is  because the tumor is deemed clinically T1-2, node-negative tumor.  There are no metastatic findings.  However, a very low-lying lesion could be removed using transanal techniques if it is small enough and low enough.  This could be considered based on the findings at colonoscopy and rigid proctoscopy.  Another issue for her is her liver disease.  This will be assessed by our hepatologist.  If the liver disease is prohibitive, nonsurgical management may be recommended.  That might lead to consideration of radiation and chemotherapy rather than surgery, off standard of care protocol.  I think the latter is less likely but it certainly is possible if the liver disease is felt to be prohibitively risky for a low anterior resection.  I explained why we would not be able to treat a high rectal lesion or a large rectal lesion using transanal techniques.    A perfunctory review of risks to her including liver disease and liver failure, renal disease, intra-abdominal complications, anastomotic leak with colostomy, DVT/PE, MI, CVA, or death.    I understand that she wants to transfer her care to the [facility].  For that reason, I will get a hepatology, nephrology, cardiology, and surgical optimization consult for her.  Hopefully we can expedite these.    Presentation at multidisciplinary conference will be encouraged if we can manage this before her operation occurs.  We will need her results interpreted before that time in order to accomplish this.    Innumerable questions were answered.  We will need to review these questions when she returns after findings have been reviewed by our own system.  Full consent will be obtained at that time.    60 minutes with her and .      Subjective    HPI    Martha Sandoval is a 74 y.o. female who presents for a second opinion for T1/2 N0 colon cancer.     The patient had a colonoscopy procedure on 6/6/24 with Dr. Davon Wilkinson that revealed a 10 mm descending colon polyp which  was removed by cold snare and retrieved for pathology. The procedure also noted a 30 mm rectal poly which was just above the third rectal valve. It appeared ulcerated and concerning for malignancy and was biopsied.   DIAGNOSIS:   A. DESCENDING COLON POLYP (POLYPECTOMY):    Tubular adenoma.   B. RECTOSIGMOID COLON MASS (BIOPSY):    Invasive moderately differentiated adenocarcinoma.     MRI rectum cancer staging protocol with and without contrast 7/03/24  IMPRESSION:   1. Polypoid mass lower to mid rectum.  2.  MR-T category: T1/2 (tumor confined to rectal wall).   3.  MR-N category: N0 (no concerning, visible lymph nodes/deposits).   4.  MRF: Clear   5.  Anal sphincter involvement: No.   6.  Suspicious extra mesorectal lymph nodes: No.   7.  EMVI: no.      CT chest abdomen pelvis with contrast 6/26/24  IMPRESSION:   Soft tissue observation within the rectosigmoid junction measuring up to 2.7 x 1.9 cm which may reflect the patient's biopsy-proven invasive moderately differentiated adenocarcinoma. No definite findings for metastatic disease within the chest, abdomen, or pelvis.   Cirrhotic morphology of the liver with sequela of portal hypertension including   recanalization of the umbilical vein. No suspicious early arterial enhancing   observation identified.   Grossly stable gastrohepatic and periportal lymphadenopathy, likely reactive in   the setting of underlying hepatocellular disease.   Grossly stable to intervally decreased pancreatic cystic observation with the largest within the pancreatic body, measuring up to 0.8 cm, favoring indolent pancreatic cystic lesions of epithelial origin, such as side branch IPMNs.       The patient reports that she has 6-12 bowel movements a day with soft and formed stool. She reports rectal bleeding. She notes that most days when she wakes up she feels the urge to produce a bowel movement and just blood will come out. She will have a bowel movement afterward. She also sometimes  has rectal bleeding later in the day.     She notes abdominal cramping and pressure. She also reports a constant urge to produce a bowel movement.     6/21/24 CEA = 1.2 which was within normal limits.   Last CBC 4/8/24 was within normal limits except for absolute lymphocytes = 0.7 (L).  Last CMP 6/21/24 was within normal limits except for glucose = 100(H), BUN = 32(H), creatinine = 1.19(H), eGFRcr = 48(L).      Historical Information  Past Medical History:   Diagnosis Date   • Chest pain     last assessed 9/16/13   • Chest pressure     last assessed 12/15/16   • Chronic sinusitis     last assessed 5/4/17   • Compression fracture of lumbar vertebra (HCC)     last assessed 6/13/15   • Lump of skin     last assessed 1/17/14     No past surgical history on file.    Meds/Allergies      Current Outpatient Medications:   •  aspirin (ECOTRIN LOW STRENGTH) 81 mg EC tablet, Take 81 mg by mouth daily, Disp: , Rfl:   •  cetirizine (ZyrTEC) 10 mg tablet, Take 1 tablet by mouth daily as needed, Disp: , Rfl:   •  cyanocobalamin (VITAMIN B-12) 100 MCG tablet, Take 100 mcg by mouth daily, Disp: , Rfl:   •  furosemide (LASIX) 20 mg tablet, Take 40 mg by mouth daily  , Disp: , Rfl:   •  guaiFENesin (MUCINEX) 600 mg 12 hr tablet, Take 600 mg by mouth every 12 (twelve) hours, Disp: , Rfl:   •  hydrocortisone (ANUSOL-HC) 2.5 % rectal cream, Apply topically 2 (two) times a day, Disp: 28 g, Rfl: 1  •  metoprolol tartrate (LOPRESSOR) 100 mg tablet, Take 1 tablet (100 mg total) by mouth daily, Disp: 90 tablet, Rfl: 1  •  omeprazole (PriLOSEC) 20 mg delayed release capsule, Take 1 capsule (20 mg total) by mouth daily, Disp: 90 capsule, Rfl: 1  •  Pyridoxine HCl (VITAMIN B-6) 100 MG TABS, Take 1 tablet by mouth daily, Disp: , Rfl:   •  spironolactone (ALDACTONE) 50 mg tablet, Take 100 mg by mouth daily  , Disp: , Rfl:   •  ursodiol (ACTIGALL) 300 mg capsule, Take 1 capsule (300 mg total) by mouth 2 (two) times a day, Disp: 180 capsule, Rfl:  "1  •  valsartan (DIOVAN) 160 mg tablet, Take 1 tablet (160 mg total) by mouth daily, Disp: 90 tablet, Rfl: 1  •  Vitamin E 200 units TABS, Take 1 tablet by mouth daily, Disp: , Rfl:   •  hyoscyamine (Levsin) 0.125 MG tablet, Take 1 tablet (0.125 mg total) by mouth every 4 (four) hours as needed for cramping (Patient not taking: Reported on 12/12/2023), Disp: 30 tablet, Rfl: 0  Allergies   Allergen Reactions   • Azithromycin GI Intolerance   • Dicyclomine GI Intolerance and Hives   • Erythromycin GI Intolerance   • Naproxen Edema   • Sulfa Antibiotics Hives   • Sulfamethoxazole-Trimethoprim Rash       Social History  Social History     Substance and Sexual Activity   Drug Use No     Social History     Tobacco Use   Smoking Status Never   Smokeless Tobacco Never         Family History   Problem Relation Age of Onset   • Hyperlipidemia Mother    • Hypertension Mother    • Cancer Father    • Liver cancer Father    • Stomach cancer Father    • No Known Problems Son    • No Known Problems Daughter          Review of Systems   Constitutional: Negative.    Respiratory: Negative.     Cardiovascular: Negative.    Gastrointestinal: Negative.        Objective  Current Vitals:  Vitals:    07/15/24 1525   BP: 122/70   Weight: 102 kg (224 lb)   Height: 5' 1\" (1.549 m)         Physical Exam  Constitutional:       Appearance: Normal appearance. She is obese.   Cardiovascular:      Rate and Rhythm: Normal rate and regular rhythm.   Pulmonary:      Effort: Pulmonary effort is normal.      Breath sounds: Normal breath sounds.   Abdominal:      General: Abdomen is flat.      Palpations: Abdomen is soft. There is no mass.      Tenderness: There is no abdominal tenderness. There is no guarding.      Hernia: No hernia is present.      Comments: Rotund abdomen.   Neurological:      General: No focal deficit present.      Mental Status: She is alert and oriented to person, place, and time.   Psychiatric:         Mood and Affect: Mood normal. "         Behavior: Behavior normal.

## 2024-07-15 NOTE — LETTER
July 15, 2024     Janny Ramirez DO  [address]  [address]  [facility] [address]    Patient: Martha Sandoval   YOB: 1950   Date of Visit: 7/15/2024       Dear Dr. Ramirez:    Thank you for referring Martha Sandoval to me for evaluation. Below are my notes for this consultation.    If you have questions, please do not hesitate to call me. I look forward to following your patient along with you.         Sincerely,        JUANY Boone MD        CC: ANAMIKA Long  GI ONCOLOGY TUMOR BOARD    JUANY Boone MD  7/15/2024  4:52 PM  Incomplete  Colon and Rectal Surgery   Martha Sandoval 74 y.o. female MRN [id number]  Encounter: [id number]  07/15/24 4:52 PM            Assessment: Martha Sandoval is a 74 y.o. female who has rectal cancer.      Plan:   Rectal cancer (HCC)  The patient presents for evaluation of rectal cancer.  This was found on colonoscopy to be above the third house duration in the rectum.  This would suggest it is a proximal rectal tumor.  However, I do not have, nation of that based on the MRI.  I viewed the report for the MRI and CT scan.  Discs have been downloaded in the [employer] and I will review those images when available.  Her CEA level was 1.2.  Creatinine is 1.19.  She has known liver disease with cirrhosis and history of ascites accumulation.  She is on diuretics to manage that.  Her pathology report shows adenocarcinoma.    She wishes to transfer her care.  In order to accomplish that, I need to visualize the lesion, determine its size and exact position from the anal verge.  For this, I recommend repeat colonoscopy with rigid proctoscopy.  I will review the MRI and CT scans with radiology when they have been downloaded.  The pathology slides will be transferred to our pathologists for confirmation of the diagnosis.    I explained to the patient and her  that the standard of care for her would be a low anterior resection.  This is because the tumor  is deemed clinically T1-2, node-negative tumor.  There are no metastatic findings.  However, a very low-lying lesion could be removed using transanal techniques if it is small enough and low enough.  This could be considered based on the findings at colonoscopy and rigid proctoscopy.  Another issue for her is her liver disease.  This will be assessed by our hepatologist.  If the liver disease is prohibitive, nonsurgical management may be recommended.  That might lead to consideration of radiation and chemotherapy rather than surgery, off standard of care protocol.  I think the latter is less likely but it certainly is possible if the liver disease is felt to be prohibitively risky for a low anterior resection.  I explained why we would not be able to treat a high rectal lesion or a large rectal lesion using transanal techniques.    A perfunctory review of risks to her including liver disease and liver failure, renal disease, intra-abdominal complications, anastomotic leak with colostomy, DVT/PE, MI, CVA, or death.    I understand that she wants to transfer her care to the [facility].  For that reason, I will get a hepatology, nephrology, cardiology, and surgical optimization consult for her.  Hopefully we can expedite these.    Presentation at multidisciplinary conference will be encouraged if we can manage this before her operation occurs.  We will need her results interpreted before that time in order to accomplish this.    Innumerable questions were answered.  We will need to review these questions when she returns after findings have been reviewed by our own system.  Full consent will be obtained at that time.    60 minutes with her and .      Subjective    HPI    Martha Sandoval is a 74 y.o. female who presents for a second opinion for T1/2 N0 colon cancer.     The patient had a colonoscopy procedure on 6/6/24 with Dr. Davon Wilkinson that revealed a 10 mm descending colon polyp which was removed by cold  snare and retrieved for pathology. The procedure also noted a 30 mm rectal poly which was just above the third rectal valve. It appeared ulcerated and concerning for malignancy and was biopsied.   DIAGNOSIS:   A. DESCENDING COLON POLYP (POLYPECTOMY):    Tubular adenoma.   B. RECTOSIGMOID COLON MASS (BIOPSY):    Invasive moderately differentiated adenocarcinoma.     MRI rectum cancer staging protocol with and without contrast 7/03/24  IMPRESSION:   1. Polypoid mass lower to mid rectum.  2.  MR-T category: T1/2 (tumor confined to rectal wall).   3.  MR-N category: N0 (no concerning, visible lymph nodes/deposits).   4.  MRF: Clear   5.  Anal sphincter involvement: No.   6.  Suspicious extra mesorectal lymph nodes: No.   7.  EMVI: no.      CT chest abdomen pelvis with contrast 6/26/24  IMPRESSION:   Soft tissue observation within the rectosigmoid junction measuring up to 2.7 x 1.9 cm which may reflect the patient's biopsy-proven invasive moderately differentiated adenocarcinoma. No definite findings for metastatic disease within the chest, abdomen, or pelvis.   Cirrhotic morphology of the liver with sequela of portal hypertension including   recanalization of the umbilical vein. No suspicious early arterial enhancing   observation identified.   Grossly stable gastrohepatic and periportal lymphadenopathy, likely reactive in   the setting of underlying hepatocellular disease.   Grossly stable to intervally decreased pancreatic cystic observation with the largest within the pancreatic body, measuring up to 0.8 cm, favoring indolent pancreatic cystic lesions of epithelial origin, such as side branch IPMNs.       The patient reports that she has 6-12 bowel movements a day with soft and formed stool. She reports rectal bleeding. She notes that most days when she wakes up she feels the urge to produce a bowel movement and just blood will come out. She will have a bowel movement afterward. She also sometimes has rectal bleeding  later in the day.     She notes abdominal cramping and pressure. She also reports a constant urge to produce a bowel movement.     6/21/24 CEA = 1.2 which was within normal limits.   Last CBC 4/8/24 was within normal limits except for absolute lymphocytes = 0.7 (L).  Last CMP 6/21/24 was within normal limits except for glucose = 100(H), BUN = 32(H), creatinine = 1.19(H), eGFRcr = 48(L).      Historical Information  Past Medical History:   Diagnosis Date   • Chest pain     last assessed 9/16/13   • Chest pressure     last assessed 12/15/16   • Chronic sinusitis     last assessed 5/4/17   • Compression fracture of lumbar vertebra (HCC)     last assessed 6/13/15   • Lump of skin     last assessed 1/17/14     No past surgical history on file.    Meds/Allergies      Current Outpatient Medications:   •  aspirin (ECOTRIN LOW STRENGTH) 81 mg EC tablet, Take 81 mg by mouth daily, Disp: , Rfl:   •  cetirizine (ZyrTEC) 10 mg tablet, Take 1 tablet by mouth daily as needed, Disp: , Rfl:   •  cyanocobalamin (VITAMIN B-12) 100 MCG tablet, Take 100 mcg by mouth daily, Disp: , Rfl:   •  furosemide (LASIX) 20 mg tablet, Take 40 mg by mouth daily  , Disp: , Rfl:   •  guaiFENesin (MUCINEX) 600 mg 12 hr tablet, Take 600 mg by mouth every 12 (twelve) hours, Disp: , Rfl:   •  hydrocortisone (ANUSOL-HC) 2.5 % rectal cream, Apply topically 2 (two) times a day, Disp: 28 g, Rfl: 1  •  metoprolol tartrate (LOPRESSOR) 100 mg tablet, Take 1 tablet (100 mg total) by mouth daily, Disp: 90 tablet, Rfl: 1  •  omeprazole (PriLOSEC) 20 mg delayed release capsule, Take 1 capsule (20 mg total) by mouth daily, Disp: 90 capsule, Rfl: 1  •  Pyridoxine HCl (VITAMIN B-6) 100 MG TABS, Take 1 tablet by mouth daily, Disp: , Rfl:   •  spironolactone (ALDACTONE) 50 mg tablet, Take 100 mg by mouth daily  , Disp: , Rfl:   •  ursodiol (ACTIGALL) 300 mg capsule, Take 1 capsule (300 mg total) by mouth 2 (two) times a day, Disp: 180 capsule, Rfl: 1  •  valsartan  "(DIOVAN) 160 mg tablet, Take 1 tablet (160 mg total) by mouth daily, Disp: 90 tablet, Rfl: 1  •  Vitamin E 200 units TABS, Take 1 tablet by mouth daily, Disp: , Rfl:   •  hyoscyamine (Levsin) 0.125 MG tablet, Take 1 tablet (0.125 mg total) by mouth every 4 (four) hours as needed for cramping (Patient not taking: Reported on 12/12/2023), Disp: 30 tablet, Rfl: 0  Allergies   Allergen Reactions   • Azithromycin GI Intolerance   • Dicyclomine GI Intolerance and Hives   • Erythromycin GI Intolerance   • Naproxen Edema   • Sulfa Antibiotics Hives   • Sulfamethoxazole-Trimethoprim Rash       Social History  Social History     Substance and Sexual Activity   Drug Use No     Social History     Tobacco Use   Smoking Status Never   Smokeless Tobacco Never         Family History   Problem Relation Age of Onset   • Hyperlipidemia Mother    • Hypertension Mother    • Cancer Father    • Liver cancer Father    • Stomach cancer Father    • No Known Problems Son    • No Known Problems Daughter          Review of Systems   Constitutional: Negative.    Respiratory: Negative.     Cardiovascular: Negative.    Gastrointestinal: Negative.        Objective  Current Vitals:  Vitals:    07/15/24 1525   BP: 122/70   Weight: 102 kg (224 lb)   Height: 5' 1\" (1.549 m)         Physical Exam  Constitutional:       Appearance: Normal appearance. She is obese.   Cardiovascular:      Rate and Rhythm: Normal rate and regular rhythm.   Pulmonary:      Effort: Pulmonary effort is normal.      Breath sounds: Normal breath sounds.   Abdominal:      General: Abdomen is flat.      Palpations: Abdomen is soft. There is no mass.      Tenderness: There is no abdominal tenderness. There is no guarding.      Hernia: No hernia is present.      Comments: Rotund abdomen.   Neurological:      General: No focal deficit present.      Mental Status: She is alert and oriented to person, place, and time.   Psychiatric:         Mood and Affect: Mood normal.         " Behavior: Behavior normal.                 JUANY Boone MD  7/15/2024  4:22 PM  Sign when Signing Visit  Colon and Rectal Surgery   Martha Sandoval 74 y.o. female MRN [id number]  Encounter: [id number]  07/12/24 11:40 AM            Assessment: Martha Sandoval is a 74 y.o. female who ***      Plan:   No problem-specific Assessment & Plan notes found for this encounter.      Subjective     HPI    Martha Sandoval is a 74 y.o. female who presents for a second opinion for T1/2 N0 colon cancer.     The patient had a colonoscopy procedure on 6/6/24 with Dr. Davon Wilkinson that revealed a 10 mm descending colon polyp which was removed by cold snare and retrieved for pathology. The procedure also noted a 30 mm rectal poly which was just above the third rectal valve. It appeared ulcerated and concerning for malignancy and was biopsied.   DIAGNOSIS:   A. DESCENDING COLON POLYP (POLYPECTOMY):    Tubular adenoma.   B. RECTOSIGMOID COLON MASS (BIOPSY):    Invasive moderately differentiated adenocarcinoma.     MRI rectum cancer staging protocol with and without contrast 7/03/24  IMPRESSION:   1. Polypoid mass lower to mid rectum.  2.  MR-T category: T1/2 (tumor confined to rectal wall).   3.  MR-N category: N0 (no concerning, visible lymph nodes/deposits).   4.  MRF: Clear   5.  Anal sphincter involvement: No.   6.  Suspicious extra mesorectal lymph nodes: No.   7.  EMVI: no.      CT chest abdomen pelvis with contrast 6/26/24  IMPRESSION:   Soft tissue observation within the rectosigmoid junction measuring up to 2.7 x 1.9 cm which may reflect the patient's biopsy-proven invasive moderately differentiated adenocarcinoma. No definite findings for metastatic disease within the chest, abdomen, or pelvis.   Cirrhotic morphology of the liver with sequela of portal hypertension including   recanalization of the umbilical vein. No suspicious early arterial enhancing   observation identified.   Grossly stable gastrohepatic and periportal  lymphadenopathy, likely reactive in   the setting of underlying hepatocellular disease.   Grossly stable to intervally decreased pancreatic cystic observation with the largest within the pancreatic body, measuring up to 0.8 cm, favoring indolent pancreatic cystic lesions of epithelial origin, such as side branch IPMNs.       The patient reports that she has 6-12 bowel movements a day with soft and formed stool. She reports rectal bleeding. She notes that most days when she wakes up she feels the urge to produce a bowel movement and just blood will come out. She will have a bowel movement afterward. She also sometimes has rectal bleeding later in the day.     She notes abdominal cramping and pressure. She also reports a constant urge to produce a bowel movement.     6/21/24 CEA = 1.2 which was within normal limits.   Last CBC 4/8/24 was within normal limits except for absolute lymphocytes = 0.7 (L).  Last CMP 6/21/24 was within normal limits except for glucose = 100(H), BUN = 32(H), creatinine = 1.19(H), eGFRcr = 48(L).      Historical Information   Past Medical History:   Diagnosis Date   • Chest pain     last assessed 9/16/13   • Chest pressure     last assessed 12/15/16   • Chronic sinusitis     last assessed 5/4/17   • Compression fracture of lumbar vertebra (HCC)     last assessed 6/13/15   • Lump of skin     last assessed 1/17/14     No past surgical history on file.    Meds/Allergies       Current Outpatient Medications:   •  aspirin (ECOTRIN LOW STRENGTH) 81 mg EC tablet, Take 81 mg by mouth daily, Disp: , Rfl:   •  cetirizine (ZyrTEC) 10 mg tablet, Take 1 tablet by mouth daily as needed, Disp: , Rfl:   •  cyanocobalamin (VITAMIN B-12) 100 MCG tablet, Take 100 mcg by mouth daily, Disp: , Rfl:   •  furosemide (LASIX) 20 mg tablet, Take 40 mg by mouth daily  , Disp: , Rfl:   •  guaiFENesin (MUCINEX) 600 mg 12 hr tablet, Take 600 mg by mouth every 12 (twelve) hours, Disp: , Rfl:   •  hydrocortisone (ANUSOL-HC)  2.5 % rectal cream, Apply topically 2 (two) times a day, Disp: 28 g, Rfl: 1  •  hyoscyamine (Levsin) 0.125 MG tablet, Take 1 tablet (0.125 mg total) by mouth every 4 (four) hours as needed for cramping (Patient not taking: Reported on 12/12/2023), Disp: 30 tablet, Rfl: 0  •  metoprolol tartrate (LOPRESSOR) 100 mg tablet, Take 1 tablet (100 mg total) by mouth daily, Disp: 90 tablet, Rfl: 1  •  omeprazole (PriLOSEC) 20 mg delayed release capsule, Take 1 capsule (20 mg total) by mouth daily, Disp: 90 capsule, Rfl: 1  •  Pyridoxine HCl (VITAMIN B-6) 100 MG TABS, Take 1 tablet by mouth daily, Disp: , Rfl:   •  spironolactone (ALDACTONE) 50 mg tablet, Take 100 mg by mouth daily  , Disp: , Rfl:   •  ursodiol (ACTIGALL) 300 mg capsule, Take 1 capsule (300 mg total) by mouth 2 (two) times a day, Disp: 180 capsule, Rfl: 1  •  valsartan (DIOVAN) 160 mg tablet, Take 1 tablet (160 mg total) by mouth daily, Disp: 90 tablet, Rfl: 1  •  Vitamin E 200 units TABS, Take 1 tablet by mouth daily, Disp: , Rfl:   Allergies   Allergen Reactions   • Azithromycin GI Intolerance   • Dicyclomine GI Intolerance and Hives   • Erythromycin GI Intolerance   • Sulfa Antibiotics Hives   • Sulfamethoxazole-Trimethoprim Rash       Social History   Social History     Substance and Sexual Activity   Drug Use No     Social History     Tobacco Use   Smoking Status Never   Smokeless Tobacco Never         Family History   Problem Relation Age of Onset   • Hyperlipidemia Mother    • Hypertension Mother    • Cancer Father    • Liver cancer Father    • Stomach cancer Father    • No Known Problems Son    • No Known Problems Daughter          Review of Systems    Objective   Current Vitals:  There were no vitals filed for this visit.      Physical Exam

## 2024-07-15 NOTE — LETTER
July 15, 2024     Janny Ramirez DO  [address]  [address]  [facility] [address]    Patient: Martha Sandoval   YOB: 1950   Date of Visit: 7/15/2024       Dear Dr. Ramirez:    Thank you for referring Martha Sandoval to me for evaluation. Below are my notes for this consultation.    If you have questions, please do not hesitate to call me. I look forward to following your patient along with you.         Sincerely,        JUANY Boone MD        CC: ELVER Lorenzana RN  GI ONCOLOGY TUMOR BOARD    JUANY Boone MD  7/15/2024  4:52 PM  Incomplete  Colon and Rectal Surgery   Martha Sandoval 74 y.o. female MRN [id number]  Encounter: [id number]  07/15/24 4:52 PM            Assessment: Martha Sandoval is a 74 y.o. female who has rectal cancer.      Plan:   Rectal cancer (HCC)  The patient presents for evaluation of rectal cancer.  This was found on colonoscopy to be above the third house duration in the rectum.  This would suggest it is a proximal rectal tumor.  However, I do not have, nation of that based on the MRI.  I viewed the report for the MRI and CT scan.  Discs have been downloaded in the [employer] and I will review those images when available.  Her CEA level was 1.2.  Creatinine is 1.19.  She has known liver disease with cirrhosis and history of ascites accumulation.  She is on diuretics to manage that.  Her pathology report shows adenocarcinoma.    She wishes to transfer her care.  In order to accomplish that, I need to visualize the lesion, determine its size and exact position from the anal verge.  For this, I recommend repeat colonoscopy with rigid proctoscopy.  I will review the MRI and CT scans with radiology when they have been downloaded.  The pathology slides will be transferred to our pathologists for confirmation of the diagnosis.    I explained to the patient and her  that the standard of care for her would be a low anterior resection.  This is because the tumor  is deemed clinically T1-2, node-negative tumor.  There are no metastatic findings.  However, a very low-lying lesion could be removed using transanal techniques if it is small enough and low enough.  This could be considered based on the findings at colonoscopy and rigid proctoscopy.  Another issue for her is her liver disease.  This will be assessed by our hepatologist.  If the liver disease is prohibitive, nonsurgical management may be recommended.  That might lead to consideration of radiation and chemotherapy rather than surgery, off standard of care protocol.  I think the latter is less likely but it certainly is possible if the liver disease is felt to be prohibitively risky for a low anterior resection.  I explained why we would not be able to treat a high rectal lesion or a large rectal lesion using transanal techniques.    A perfunctory review of risks to her including liver disease and liver failure, renal disease, intra-abdominal complications, anastomotic leak with colostomy, DVT/PE, MI, CVA, or death.    I understand that she wants to transfer her care to the [facility].  For that reason, I will get a hepatology, nephrology, cardiology, and surgical optimization consult for her.  Hopefully we can expedite these.    Presentation at multidisciplinary conference will be encouraged if we can manage this before her operation occurs.  We will need her results interpreted before that time in order to accomplish this.    Innumerable questions were answered.  We will need to review these questions when she returns after findings have been reviewed by our own system.  Full consent will be obtained at that time.    60 minutes with her and .      Subjective    HPI    Martha Sandoval is a 74 y.o. female who presents for a second opinion for T1/2 N0 colon cancer.     The patient had a colonoscopy procedure on 6/6/24 with Dr. Davon Wilkinson that revealed a 10 mm descending colon polyp which was removed by cold  snare and retrieved for pathology. The procedure also noted a 30 mm rectal poly which was just above the third rectal valve. It appeared ulcerated and concerning for malignancy and was biopsied.   DIAGNOSIS:   A. DESCENDING COLON POLYP (POLYPECTOMY):    Tubular adenoma.   B. RECTOSIGMOID COLON MASS (BIOPSY):    Invasive moderately differentiated adenocarcinoma.     MRI rectum cancer staging protocol with and without contrast 7/03/24  IMPRESSION:   1. Polypoid mass lower to mid rectum.  2.  MR-T category: T1/2 (tumor confined to rectal wall).   3.  MR-N category: N0 (no concerning, visible lymph nodes/deposits).   4.  MRF: Clear   5.  Anal sphincter involvement: No.   6.  Suspicious extra mesorectal lymph nodes: No.   7.  EMVI: no.      CT chest abdomen pelvis with contrast 6/26/24  IMPRESSION:   Soft tissue observation within the rectosigmoid junction measuring up to 2.7 x 1.9 cm which may reflect the patient's biopsy-proven invasive moderately differentiated adenocarcinoma. No definite findings for metastatic disease within the chest, abdomen, or pelvis.   Cirrhotic morphology of the liver with sequela of portal hypertension including   recanalization of the umbilical vein. No suspicious early arterial enhancing   observation identified.   Grossly stable gastrohepatic and periportal lymphadenopathy, likely reactive in   the setting of underlying hepatocellular disease.   Grossly stable to intervally decreased pancreatic cystic observation with the largest within the pancreatic body, measuring up to 0.8 cm, favoring indolent pancreatic cystic lesions of epithelial origin, such as side branch IPMNs.       The patient reports that she has 6-12 bowel movements a day with soft and formed stool. She reports rectal bleeding. She notes that most days when she wakes up she feels the urge to produce a bowel movement and just blood will come out. She will have a bowel movement afterward. She also sometimes has rectal bleeding  later in the day.     She notes abdominal cramping and pressure. She also reports a constant urge to produce a bowel movement.     6/21/24 CEA = 1.2 which was within normal limits.   Last CBC 4/8/24 was within normal limits except for absolute lymphocytes = 0.7 (L).  Last CMP 6/21/24 was within normal limits except for glucose = 100(H), BUN = 32(H), creatinine = 1.19(H), eGFRcr = 48(L).      Historical Information  Past Medical History:   Diagnosis Date    Chest pain     last assessed 9/16/13    Chest pressure     last assessed 12/15/16    Chronic sinusitis     last assessed 5/4/17    Compression fracture of lumbar vertebra (HCC)     last assessed 6/13/15    Lump of skin     last assessed 1/17/14     No past surgical history on file.    Meds/Allergies      Current Outpatient Medications:     aspirin (ECOTRIN LOW STRENGTH) 81 mg EC tablet, Take 81 mg by mouth daily, Disp: , Rfl:     cetirizine (ZyrTEC) 10 mg tablet, Take 1 tablet by mouth daily as needed, Disp: , Rfl:     cyanocobalamin (VITAMIN B-12) 100 MCG tablet, Take 100 mcg by mouth daily, Disp: , Rfl:     furosemide (LASIX) 20 mg tablet, Take 40 mg by mouth daily  , Disp: , Rfl:     guaiFENesin (MUCINEX) 600 mg 12 hr tablet, Take 600 mg by mouth every 12 (twelve) hours, Disp: , Rfl:     hydrocortisone (ANUSOL-HC) 2.5 % rectal cream, Apply topically 2 (two) times a day, Disp: 28 g, Rfl: 1    metoprolol tartrate (LOPRESSOR) 100 mg tablet, Take 1 tablet (100 mg total) by mouth daily, Disp: 90 tablet, Rfl: 1    omeprazole (PriLOSEC) 20 mg delayed release capsule, Take 1 capsule (20 mg total) by mouth daily, Disp: 90 capsule, Rfl: 1    Pyridoxine HCl (VITAMIN B-6) 100 MG TABS, Take 1 tablet by mouth daily, Disp: , Rfl:     spironolactone (ALDACTONE) 50 mg tablet, Take 100 mg by mouth daily  , Disp: , Rfl:     ursodiol (ACTIGALL) 300 mg capsule, Take 1 capsule (300 mg total) by mouth 2 (two) times a day, Disp: 180 capsule, Rfl: 1    valsartan (DIOVAN) 160 mg tablet,  "Take 1 tablet (160 mg total) by mouth daily, Disp: 90 tablet, Rfl: 1    Vitamin E 200 units TABS, Take 1 tablet by mouth daily, Disp: , Rfl:     hyoscyamine (Levsin) 0.125 MG tablet, Take 1 tablet (0.125 mg total) by mouth every 4 (four) hours as needed for cramping (Patient not taking: Reported on 12/12/2023), Disp: 30 tablet, Rfl: 0  Allergies   Allergen Reactions    Azithromycin GI Intolerance    Dicyclomine GI Intolerance and Hives    Erythromycin GI Intolerance    Naproxen Edema    Sulfa Antibiotics Hives    Sulfamethoxazole-Trimethoprim Rash       Social History  Social History     Substance and Sexual Activity   Drug Use No     Social History     Tobacco Use   Smoking Status Never   Smokeless Tobacco Never         Family History   Problem Relation Age of Onset    Hyperlipidemia Mother     Hypertension Mother     Cancer Father     Liver cancer Father     Stomach cancer Father     No Known Problems Son     No Known Problems Daughter          Review of Systems   Constitutional: Negative.    Respiratory: Negative.     Cardiovascular: Negative.    Gastrointestinal: Negative.        Objective  Current Vitals:  Vitals:    07/15/24 1525   BP: 122/70   Weight: 102 kg (224 lb)   Height: 5' 1\" (1.549 m)         Physical Exam  Constitutional:       Appearance: Normal appearance. She is obese.   Cardiovascular:      Rate and Rhythm: Normal rate and regular rhythm.   Pulmonary:      Effort: Pulmonary effort is normal.      Breath sounds: Normal breath sounds.   Abdominal:      General: Abdomen is flat.      Palpations: Abdomen is soft. There is no mass.      Tenderness: There is no abdominal tenderness. There is no guarding.      Hernia: No hernia is present.      Comments: Rotund abdomen.   Neurological:      General: No focal deficit present.      Mental Status: She is alert and oriented to person, place, and time.   Psychiatric:         Mood and Affect: Mood normal.         Behavior: Behavior normal.                 W. " Talat Boone MD  7/15/2024  4:22 PM  Sign when Signing Visit  Colon and Rectal Surgery   Martha Sandoval 74 y.o. female MRN [id number]  Encounter: [id number]  07/12/24 11:40 AM            Assessment: Martha Sandoval is a 74 y.o. female who ***      Plan:   No problem-specific Assessment & Plan notes found for this encounter.      Subjective     HPI    Martha Sandoval is a 74 y.o. female who presents for a second opinion for T1/2 N0 colon cancer.     The patient had a colonoscopy procedure on 6/6/24 with Dr. Davon Wilkinson that revealed a 10 mm descending colon polyp which was removed by cold snare and retrieved for pathology. The procedure also noted a 30 mm rectal poly which was just above the third rectal valve. It appeared ulcerated and concerning for malignancy and was biopsied.   DIAGNOSIS:   A. DESCENDING COLON POLYP (POLYPECTOMY):    Tubular adenoma.   B. RECTOSIGMOID COLON MASS (BIOPSY):    Invasive moderately differentiated adenocarcinoma.     MRI rectum cancer staging protocol with and without contrast 7/03/24  IMPRESSION:   1. Polypoid mass lower to mid rectum.  2.  MR-T category: T1/2 (tumor confined to rectal wall).   3.  MR-N category: N0 (no concerning, visible lymph nodes/deposits).   4.  MRF: Clear   5.  Anal sphincter involvement: No.   6.  Suspicious extra mesorectal lymph nodes: No.   7.  EMVI: no.      CT chest abdomen pelvis with contrast 6/26/24  IMPRESSION:   Soft tissue observation within the rectosigmoid junction measuring up to 2.7 x 1.9 cm which may reflect the patient's biopsy-proven invasive moderately differentiated adenocarcinoma. No definite findings for metastatic disease within the chest, abdomen, or pelvis.   Cirrhotic morphology of the liver with sequela of portal hypertension including   recanalization of the umbilical vein. No suspicious early arterial enhancing   observation identified.   Grossly stable gastrohepatic and periportal lymphadenopathy, likely reactive in   the setting  of underlying hepatocellular disease.   Grossly stable to intervally decreased pancreatic cystic observation with the largest within the pancreatic body, measuring up to 0.8 cm, favoring indolent pancreatic cystic lesions of epithelial origin, such as side branch IPMNs.       The patient reports that she has 6-12 bowel movements a day with soft and formed stool. She reports rectal bleeding. She notes that most days when she wakes up she feels the urge to produce a bowel movement and just blood will come out. She will have a bowel movement afterward. She also sometimes has rectal bleeding later in the day.     She notes abdominal cramping and pressure. She also reports a constant urge to produce a bowel movement.     6/21/24 CEA = 1.2 which was within normal limits.   Last CBC 4/8/24 was within normal limits except for absolute lymphocytes = 0.7 (L).  Last CMP 6/21/24 was within normal limits except for glucose = 100(H), BUN = 32(H), creatinine = 1.19(H), eGFRcr = 48(L).      Historical Information   Past Medical History:   Diagnosis Date    Chest pain     last assessed 9/16/13    Chest pressure     last assessed 12/15/16    Chronic sinusitis     last assessed 5/4/17    Compression fracture of lumbar vertebra (HCC)     last assessed 6/13/15    Lump of skin     last assessed 1/17/14     No past surgical history on file.    Meds/Allergies       Current Outpatient Medications:     aspirin (ECOTRIN LOW STRENGTH) 81 mg EC tablet, Take 81 mg by mouth daily, Disp: , Rfl:     cetirizine (ZyrTEC) 10 mg tablet, Take 1 tablet by mouth daily as needed, Disp: , Rfl:     cyanocobalamin (VITAMIN B-12) 100 MCG tablet, Take 100 mcg by mouth daily, Disp: , Rfl:     furosemide (LASIX) 20 mg tablet, Take 40 mg by mouth daily  , Disp: , Rfl:     guaiFENesin (MUCINEX) 600 mg 12 hr tablet, Take 600 mg by mouth every 12 (twelve) hours, Disp: , Rfl:     hydrocortisone (ANUSOL-HC) 2.5 % rectal cream, Apply topically 2 (two) times a day, Disp:  28 g, Rfl: 1    hyoscyamine (Levsin) 0.125 MG tablet, Take 1 tablet (0.125 mg total) by mouth every 4 (four) hours as needed for cramping (Patient not taking: Reported on 12/12/2023), Disp: 30 tablet, Rfl: 0    metoprolol tartrate (LOPRESSOR) 100 mg tablet, Take 1 tablet (100 mg total) by mouth daily, Disp: 90 tablet, Rfl: 1    omeprazole (PriLOSEC) 20 mg delayed release capsule, Take 1 capsule (20 mg total) by mouth daily, Disp: 90 capsule, Rfl: 1    Pyridoxine HCl (VITAMIN B-6) 100 MG TABS, Take 1 tablet by mouth daily, Disp: , Rfl:     spironolactone (ALDACTONE) 50 mg tablet, Take 100 mg by mouth daily  , Disp: , Rfl:     ursodiol (ACTIGALL) 300 mg capsule, Take 1 capsule (300 mg total) by mouth 2 (two) times a day, Disp: 180 capsule, Rfl: 1    valsartan (DIOVAN) 160 mg tablet, Take 1 tablet (160 mg total) by mouth daily, Disp: 90 tablet, Rfl: 1    Vitamin E 200 units TABS, Take 1 tablet by mouth daily, Disp: , Rfl:   Allergies   Allergen Reactions    Azithromycin GI Intolerance    Dicyclomine GI Intolerance and Hives    Erythromycin GI Intolerance    Sulfa Antibiotics Hives    Sulfamethoxazole-Trimethoprim Rash       Social History   Social History     Substance and Sexual Activity   Drug Use No     Social History     Tobacco Use   Smoking Status Never   Smokeless Tobacco Never         Family History   Problem Relation Age of Onset    Hyperlipidemia Mother     Hypertension Mother     Cancer Father     Liver cancer Father     Stomach cancer Father     No Known Problems Son     No Known Problems Daughter          Review of Systems    Objective   Current Vitals:  There were no vitals filed for this visit.      Physical Exam

## 2024-07-15 NOTE — ASSESSMENT & PLAN NOTE
The patient presents for evaluation of rectal cancer.  This was found on colonoscopy to be above the third house duration in the rectum.  This would suggest it is a proximal rectal tumor.  However, I do not have, nation of that based on the MRI.  I viewed the report for the MRI and CT scan.  Discs have been downloaded in the [employer] system and I will review those images when available.  Her CEA level was 1.2.  Creatinine is 1.19.  She has known liver disease with cirrhosis and history of ascites accumulation.  She is on diuretics to manage that.  Her pathology report shows adenocarcinoma.    She wishes to transfer her care.  In order to accomplish that, I need to visualize the lesion, determine its size and exact position from the anal verge.  For this, I recommend repeat colonoscopy with rigid proctoscopy.  I will review the MRI and CT scans with radiology when they have been downloaded.  The pathology slides will be transferred to our pathologists for confirmation of the diagnosis.    I explained to the patient and her  that the standard of care for her would be a low anterior resection.  This is because the tumor is deemed clinically T1-2, node-negative tumor.  There are no metastatic findings.  However, a very low-lying lesion could be removed using transanal techniques if it is small enough and low enough.  This could be considered based on the findings at colonoscopy and rigid proctoscopy.  Another issue for her is her liver disease.  This will be assessed by our hepatologist.  If the liver disease is prohibitive, nonsurgical management may be recommended.  That might lead to consideration of radiation and chemotherapy rather than surgery, off standard of care protocol.  I think the latter is less likely but it certainly is possible if the liver disease is felt to be prohibitively risky for a low anterior resection.  I explained why we would not be able to treat a high rectal lesion or a large rectal  lesion using transanal techniques.    A perfunctory review of risks to her including liver disease and liver failure, renal disease, intra-abdominal complications, anastomotic leak with colostomy, DVT/PE, MI, CVA, or death.    I understand that she wants to transfer her care to the [facility].  For that reason, I will get a hepatology, nephrology, cardiology, and surgical optimization consult for her.  Hopefully we can expedite these.    Presentation at multidisciplinary conference will be encouraged if we can manage this before her operation occurs.  We will need her results interpreted before that time in order to accomplish this.    Innumerable questions were answered.  We will need to review these questions when she returns after findings have been reviewed by our own system.  Full consent will be obtained at that time.    60 minutes with her and .

## 2024-07-15 NOTE — Clinical Note
colonoscopy with rigid procto, ASAP Book a date for about 6 weeks Hepatology, Nephrology, Cardiology, optimization eval before surgery.

## 2024-07-15 NOTE — TELEPHONE ENCOUNTER
Pt stated that everything is currently up in the air and she doesn't know if that's when she's having the surgery. Pt states she has a second opinion scheduled for today and would like to touch base with Dr. Ramirez after today to get her opinions. I tried scheduling pt to touch base with Dr. Ramirez, but she has no availability. Pt asked if Dr. Ramirez could reach out to her sometime this week to talk about her possible surgery.

## 2024-07-16 DIAGNOSIS — C20 RECTAL CANCER (HCC): Primary | ICD-10-CM

## 2024-07-16 NOTE — LETTER
Martha Sandoval  [address] [place] [id number]        FLEXIBLE COLONOSCOPY INSTRUCTIONS  PLEASE NOTE...  AS OF JUNE 1, 2014, OUR OFFICE REQUIRES 72 HOURS NOTICE OF CANCELLATION/RESCHEDULE OF A PROCEDURE TO AVOID INCURRING A MISSED APPOINTMENT FEE.     Your Colonoscopy Procedure has been scheduled at:[address], [facility] [address]     The Date of your Procedure is: July 26, 2024        The hospital facility will contact you the evening prior to your scheduled procedure with your arrival time.      Use the bowel preparation as directed.     Check with your family doctor if you are taking a blood thinner (Coumadin, Plavix, Xarelto, Pradaxa, Gingko biloba, Ginseng, Feverfew, Herbert's Wort).  We suggest stopping these for 3 days. Special instructions may be needed if you are taking aspirin or any aspirin-containing medication.  Check with your family physician.       If you are on DIABETIC MEDICATION (tablets or insulin) your doctor may make changes in your preparation.     Take all medications usual unless otherwise instructed.     As always, if you have any questions or concerns, feel free to call the office.  Our  staff will be happy to connect you with one of the nurses to answer any questions or address any concerns you have regarding your procedure.       Do not wear any jewelry the day of your procedure including wedding rings.     Arrangements must be made for a responsible party to drive you home from the procedure.  If you do not have a responsible family member or friend to drive you home, the procedure will not be done.  Vast or a taxi is not acceptable.     It is important you notify our office of any insurance changes prior to your procedure and, if necessary, supply us with referrals from your primary care physician.

## 2024-07-16 NOTE — LETTER
[facility] COLON AND RECTAL SURGERY [place]  [address]  [address]  [place] [address]  Phone#  [phone]      24    To Whom It May Concern;    Please forward the surgical pathology slides from the below named patients most recent colonoscopy.    Requestor: JUANY Boone MD      Phone:  [phone]  Address:  [address] [place] [address], [place] [address]    PATIENT INFO:    Patient: Martha Sandoval : 1950        DELIVERY INFORMATION:    Name of Facility:  [facility]  Attention: Mable or Alysha  Department:  Pathology  Address:  [address]  [place] [address]   Phone:  [phone]    Please [place] one of the following: URGENT planning for surgery        Physician Printed Name:________________________________________    Physician Signature:___________________________________________

## 2024-07-16 NOTE — TELEPHONE ENCOUNTER
Requested path slides from recent colonoscopy.     Attempted to contact patient to schedule all procedures indicated,no answer. Left v/m requesting call back. Waiting on response from patient.     ----- Message from BRENDA Boone MD sent at 7/15/2024  4:55 PM EDT -----  colonoscopy with rigid procto, ASAP  Book a date for about 6 weeks  Hepatology, Nephrology, Cardiology, optimization eval before surgery.

## 2024-07-18 ENCOUNTER — DOCUMENTATION (OUTPATIENT)
Dept: HEMATOLOGY ONCOLOGY | Facility: CLINIC | Age: 74
End: 2024-07-18

## 2024-07-18 ENCOUNTER — PREP FOR PROCEDURE (OUTPATIENT)
Age: 74
End: 2024-07-18

## 2024-07-18 DIAGNOSIS — C20 RECTAL CANCER (HCC): Primary | ICD-10-CM

## 2024-07-18 RX ORDER — SODIUM, POTASSIUM,MAG SULFATES 17.5-3.13G
SOLUTION, RECONSTITUTED, ORAL ORAL
Qty: 354 ML | Refills: 0 | Status: SHIPPED | OUTPATIENT
Start: 2024-07-18

## 2024-07-18 NOTE — PROGRESS NOTES
In-basket message received from Suri Lorenzana RN to add patient to the [facility] on 8/8/2024. Chart reviewed and prep completed.

## 2024-07-18 NOTE — TELEPHONE ENCOUNTER
Spoke to patient to coordinate care.     Scheduled TR 7/26 EA   Patient requesting rx bowel prep     Patient states she will schedule all consults needed prior to surgery. Discussed we are holding 9/10/24. She will call me if she needs assistance we scheduling appointments.

## 2024-07-18 NOTE — PROGRESS NOTES
Rqst sent to the GI Onc TB pool to add this pt as a NEW rectal pt on 8/8/24 to be presented by Dr. Boone.

## 2024-07-19 ENCOUNTER — TELEPHONE (OUTPATIENT)
Age: 74
End: 2024-07-19

## 2024-07-19 NOTE — TELEPHONE ENCOUNTER
Patients GI provider:  Dr. Boone     Number to return call: ( [phone]     Reason for call: Pt calling regarding location of COLONOSCOPY  she is unsure of [place] location wants this moved to [facility] I made the patient aware this needs to be done sooner then later only catrachito at [facility] was not until Oct eunice understood and was given GI ASST number to return there call     Scheduled procedure/appointment date if applicable: Apt/procedure

## 2024-07-19 NOTE — TELEPHONE ENCOUNTER
Patient called back to schedule pre-op appointment.  8/27 was already scheduled, but same appointment was available for 8/28.  Scheduled Patient for 8/28/24 @ 2:45 pm.

## 2024-07-22 ENCOUNTER — LAB REQUISITION (OUTPATIENT)
Dept: LAB | Facility: HOSPITAL | Age: 74
End: 2024-07-22
Payer: COMMERCIAL

## 2024-07-22 DIAGNOSIS — C20 MALIGNANT NEOPLASM OF RECTUM (HCC): ICD-10-CM

## 2024-07-22 PROCEDURE — 88321 CONSLTJ&REPRT SLD PREP ELSWR: CPT | Performed by: PATHOLOGY

## 2024-07-23 ENCOUNTER — TELEPHONE (OUTPATIENT)
Dept: ANESTHESIOLOGY | Facility: CLINIC | Age: 74
End: 2024-07-23

## 2024-07-25 RX ORDER — SODIUM CHLORIDE, SODIUM LACTATE, POTASSIUM CHLORIDE, CALCIUM CHLORIDE 600; 310; 30; 20 MG/100ML; MG/100ML; MG/100ML; MG/100ML
50 INJECTION, SOLUTION INTRAVENOUS CONTINUOUS
Status: CANCELLED | OUTPATIENT
Start: 2024-07-25

## 2024-07-26 ENCOUNTER — ANESTHESIA EVENT (OUTPATIENT)
Dept: GASTROENTEROLOGY | Facility: HOSPITAL | Age: 74
End: 2024-07-26

## 2024-07-26 ENCOUNTER — ANESTHESIA (OUTPATIENT)
Dept: GASTROENTEROLOGY | Facility: HOSPITAL | Age: 74
End: 2024-07-26

## 2024-07-26 ENCOUNTER — HOSPITAL ENCOUNTER (OUTPATIENT)
Dept: GASTROENTEROLOGY | Facility: HOSPITAL | Age: 74
Setting detail: OUTPATIENT SURGERY
End: 2024-07-26
Attending: COLON & RECTAL SURGERY
Payer: COMMERCIAL

## 2024-07-26 VITALS
TEMPERATURE: 98.1 F | HEART RATE: 67 BPM | OXYGEN SATURATION: 98 % | RESPIRATION RATE: 11 BRPM | HEIGHT: 62 IN | DIASTOLIC BLOOD PRESSURE: 57 MMHG | BODY MASS INDEX: 40.3 KG/M2 | WEIGHT: 219 LBS | SYSTOLIC BLOOD PRESSURE: 122 MMHG

## 2024-07-26 DIAGNOSIS — C20 RECTAL CANCER (HCC): ICD-10-CM

## 2024-07-26 PROCEDURE — 45378 DIAGNOSTIC COLONOSCOPY: CPT | Performed by: COLON & RECTAL SURGERY

## 2024-07-26 RX ORDER — PROPOFOL 10 MG/ML
INJECTION, EMULSION INTRAVENOUS AS NEEDED
Status: DISCONTINUED | OUTPATIENT
Start: 2024-07-26 | End: 2024-07-26

## 2024-07-26 RX ORDER — SODIUM CHLORIDE, SODIUM LACTATE, POTASSIUM CHLORIDE, CALCIUM CHLORIDE 600; 310; 30; 20 MG/100ML; MG/100ML; MG/100ML; MG/100ML
50 INJECTION, SOLUTION INTRAVENOUS CONTINUOUS
Status: DISCONTINUED | OUTPATIENT
Start: 2024-07-26 | End: 2024-07-30 | Stop reason: HOSPADM

## 2024-07-26 RX ORDER — PHENYLEPHRINE HCL IN 0.9% NACL 1 MG/10 ML
SYRINGE (ML) INTRAVENOUS AS NEEDED
Status: DISCONTINUED | OUTPATIENT
Start: 2024-07-26 | End: 2024-07-26

## 2024-07-26 RX ORDER — PROPOFOL 10 MG/ML
INJECTION, EMULSION INTRAVENOUS CONTINUOUS PRN
Status: DISCONTINUED | OUTPATIENT
Start: 2024-07-26 | End: 2024-07-26

## 2024-07-26 RX ORDER — LIDOCAINE HYDROCHLORIDE 10 MG/ML
INJECTION, SOLUTION EPIDURAL; INFILTRATION; INTRACAUDAL; PERINEURAL AS NEEDED
Status: DISCONTINUED | OUTPATIENT
Start: 2024-07-26 | End: 2024-07-26

## 2024-07-26 RX ORDER — SODIUM CHLORIDE, SODIUM LACTATE, POTASSIUM CHLORIDE, CALCIUM CHLORIDE 600; 310; 30; 20 MG/100ML; MG/100ML; MG/100ML; MG/100ML
INJECTION, SOLUTION INTRAVENOUS CONTINUOUS PRN
Status: DISCONTINUED | OUTPATIENT
Start: 2024-07-26 | End: 2024-07-26

## 2024-07-26 RX ADMIN — LIDOCAINE HYDROCHLORIDE 50 MG: 10 INJECTION, SOLUTION EPIDURAL; INFILTRATION; INTRACAUDAL at 13:55

## 2024-07-26 RX ADMIN — PROPOFOL 40 MG: 10 INJECTION, EMULSION INTRAVENOUS at 13:59

## 2024-07-26 RX ADMIN — SODIUM CHLORIDE, SODIUM LACTATE, POTASSIUM CHLORIDE, AND CALCIUM CHLORIDE 50 ML/HR: .6; .31; .03; .02 INJECTION, SOLUTION INTRAVENOUS at 13:35

## 2024-07-26 RX ADMIN — PROPOFOL 130 MCG/KG/MIN: 10 INJECTION, EMULSION INTRAVENOUS at 13:55

## 2024-07-26 RX ADMIN — Medication 200 MCG: at 14:10

## 2024-07-26 RX ADMIN — SODIUM CHLORIDE, SODIUM LACTATE, POTASSIUM CHLORIDE, AND CALCIUM CHLORIDE: .6; .31; .03; .02 INJECTION, SOLUTION INTRAVENOUS at 13:18

## 2024-07-26 RX ADMIN — Medication 200 MCG: at 14:03

## 2024-07-26 NOTE — ANESTHESIA PREPROCEDURE EVALUATION
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      Anesthesia Plan  ASA Score- 3     Anesthesia Type- IV sedation with anesthesia with ASA Monitors.         Additional Monitors:     Airway Plan:            Plan Factors-Exercise tolerance (METS): >4 METS.    Chart reviewed. EKG reviewed.  Existing labs reviewed. Patient summary reviewed.    Patient is not a current smoker. Patient not instructed to abstain from smoking on day of procedure. Patient did not smoke on day of surgery.            Induction-     Postoperative Plan-     Perioperative Resuscitation Plan - Level 1 - Full Code.       Informed Consent- Anesthetic plan and risks discussed with patient.  I personally reviewed this patient with the CRNA. Discussed and agreed on the Anesthesia Plan with the CRNA..        Lab Results   Component Value Date    HGBA1C 5.8 (H) 04/08/2024       Lab Results   Component Value Date    K 4.0 06/21/2024     06/21/2024    CO2 28 06/21/2024    BUN 32 (H) 06/21/2024    CREATININE 1.19 (H) 06/21/2024    CALCIUM 8.8 06/21/2024    AST 34 06/21/2024    ALT 32 06/21/2024    ALKPHOS 57 06/21/2024    EGFR 48 (L) 06/21/2024       Lab Results   Component Value Date    WBC 4.6 04/08/2024    HGB 12.1 04/08/2024    HCT 36.3 04/08/2024    MCV 83 04/08/2024     04/08/2024   Echo 2021   Left ventricle is normal in size. Wall thickness is  normal. Systolic   function is normal with an ejection fraction of 60-65%. Paradoxical septal   motion. There is grade I (mild) diastolic dysfunction and normal left   atrial pressure.     Right ventricle cavity is normal. Systolic function is normal.     Left atrium cavity size is normal.     The aortic valve was not well visualized. There is no regurgitation or   stenosis.    Mitral valve structure is normal. There is trace regurgitation. There   is no evidence of mitral valve stenosis.     Tricuspid valve structure is normal. There is trace regurgitation.   There is no evidence of tricuspid valve stenosis.       EKG 2023   NSR

## 2024-07-26 NOTE — INTERVAL H&P NOTE
H&P reviewed. After examining the patient I find no changes in the patients condition since the H&P had been written.    Vitals:    07/26/24 1329   BP: 144/66   Pulse: 71   Resp: 18   Temp: (!) 97 °F (36.1 °C)   SpO2: 99%

## 2024-07-26 NOTE — ANESTHESIA POSTPROCEDURE EVALUATION
Post-Op Assessment Note    CV Status:  Stable  Pain Score: 0    Pain management: adequate       Mental Status:  Sleepy   Hydration Status:  Euvolemic   PONV Controlled:  Controlled   Airway Patency:  Patent     Post Op Vitals Reviewed: Yes    No anethesia notable event occurred.    Staff: Anesthesiologist, CRNA               /58 (07/26/24 1417)    Temp      Pulse 72 (07/26/24 1417)   Resp 12 (07/26/24 1417)    SpO2 100 % (07/26/24 1417)

## 2024-07-29 ENCOUNTER — TELEPHONE (OUTPATIENT)
Age: 74
End: 2024-07-29

## 2024-07-29 NOTE — TELEPHONE ENCOUNTER
Left patient VM to schedule office visit to discuss surgery with Dr. Boone. Patient scheduled for 8/19/24, asked pt to called back to confirm.

## 2024-07-29 NOTE — TELEPHONE ENCOUNTER
PT called in to schedule a hep NP for a pre op clearance. Pt requested  at [place]. Pt notified that he isnt available. Pt was notified that HEP Spec is mainly out of [facility]. Pt upset she didn't know road names. Pt was going to give phone to  but call went silent. Line came back on with Pt screaming at . I said hello 2 times and got no reply.  disconnected call

## 2024-08-01 ENCOUNTER — TELEPHONE (OUTPATIENT)
Dept: CARDIOLOGY CLINIC | Facility: CLINIC | Age: 74
End: 2024-08-01

## 2024-08-01 ENCOUNTER — OFFICE VISIT (OUTPATIENT)
Dept: GASTROENTEROLOGY | Facility: CLINIC | Age: 74
End: 2024-08-01
Payer: COMMERCIAL

## 2024-08-01 VITALS
DIASTOLIC BLOOD PRESSURE: 70 MMHG | WEIGHT: 223 LBS | OXYGEN SATURATION: 99 % | SYSTOLIC BLOOD PRESSURE: 102 MMHG | BODY MASS INDEX: 41.04 KG/M2 | HEART RATE: 82 BPM | TEMPERATURE: 97 F | HEIGHT: 62 IN

## 2024-08-01 DIAGNOSIS — R73.9 ELEVATED BLOOD SUGAR: ICD-10-CM

## 2024-08-01 DIAGNOSIS — K74.60 LIVER CIRRHOSIS SECONDARY TO NASH (HCC): Primary | ICD-10-CM

## 2024-08-01 DIAGNOSIS — R93.89 ABNORMAL CAT SCAN: ICD-10-CM

## 2024-08-01 DIAGNOSIS — C20 RECTAL CANCER (HCC): ICD-10-CM

## 2024-08-01 DIAGNOSIS — R18.8 OTHER ASCITES: ICD-10-CM

## 2024-08-01 DIAGNOSIS — K75.81 LIVER CIRRHOSIS SECONDARY TO NASH (HCC): Primary | ICD-10-CM

## 2024-08-01 PROCEDURE — 99204 OFFICE O/P NEW MOD 45 MIN: CPT | Performed by: INTERNAL MEDICINE

## 2024-08-01 NOTE — TELEPHONE ENCOUNTER
LMOM to assist in patient establishing care with Dr. Dominguez (cardio-oncology). Anyone may help in scheduling, please attach the referral.

## 2024-08-01 NOTE — PROGRESS NOTES
Consultation -  [facility]  Martha Sandoval 74 y.o. female MRN: [id number]  @ Encounter: [id number]    ASSESSMENT AND PLAN:      Martha Sandoval is a 74 y.o. old female with PMH of hypertension, cholelithiasis, hypothyroidism, obesity, recent diagnosis of rectal cancer who is seen in the office for [facility] cirrhosis management and preoperative evaluation.    [facility] Cirrhosis  History of ascites  Rectal Cancer  GERD  Pancreatic cyst  She is planned for lower anterior resection of colon cancer.  Fortunately she appears compensated currently.  Etiology of cirrhosis may be secondary to MASH versus cardiac cause.  She did have paracentesis in 2021 that showed high SAAG high-protein. Only had mild elevation of AST for the past 2 years.  But did have AST and ALT elevations in 2018.  She has secondary workup including KOFFI, ASMA, alpha-1 antitrypsin, iron panel are negative.  She also has a 0.8 cm pancreatic body cyst that is stable.  Monitor LFTs every 3 to 6 months.  Please refer below for risk assessment assuming patient has cirrhosis.  She wishes to forego endoscopic variceal surveillance.  Despite that she remains at acceptable risk to proceed with surgery.  Repeat MRI 2025 for pancreatic cyst.  Continue Prilosec 20 mg daily  Varices: No recent endoscopy on file.  She is overdue for variceal screening.  Would recommend preoperative endoscopy for variceal screening and further risk stratification for bleeding however patient does not wish to pursue this at this time despite counseling. She did not offer a particular reason for this.   Monitor for signs of bleeding.  Monitor hgb, transfuse for < 7.  Ascites: Reportedly has history of ascites.  No ascites on exam.  Last paracentesis in 2021.  Maintained on diuretics.  Renal function stable.  Continue Lasix 40 mg daily.  Continue Aldactone 100 mg daily.  2 g Sodium diet  Monitor volume status, creatinine, urine output.  Encephalopathy: No history of  encephalopathy.  Monitor mental status  HCC Screening: Recent CT with contrast without any suspicious lesions.  aFP negative in April 2024.  Continues screening with US/AFP every 6 months.  Transplant Candidacy: No urgent indication given low MELD.  Follow up with hepatology as an outpatient.     MELD 3.0: 9 at 4/8/2024  9:10 AM  MELD-Na: 7 at 4/8/2024  9:10 AM  Calculated from:  Serum Creatinine: 1.03 mg/dL at 4/8/2024  9:05 AM  Serum Sodium: 138 mmol/L (Using max of 137 mmol/L) at 4/8/2024  9:05 AM  Total Bilirubin: 0.6 mg/dL (Using min of 1 mg/dL) at 4/8/2024  9:05 AM  Serum Albumin: 4.1 g/dL (Using max of 3.5 g/dL) at 4/8/2024  9:05 AM  INR(ratio): 1.1 at 4/8/2024  9:10 AM  Age at listing (hypothetical): 73 years  Sex: Female at 4/8/2024  9:10 AM    Predicted Postoperative Outcomes by the VOCAL-Dundas Score:      30-day mortality: 0.9%      90-day mortality: 4.3%      180-day mortality: 6.1%      90-day decompensation: 7.8%    Follow up in 3 months.  Thank you for this consultation.  ______________________________________________________________________    HPI:  Martha Sandoval is a 74 y.o. old female with PMH of hypertension, cholelithiasis, hypothyroidism, obesity, recent diagnosis of rectal cancer who is seen in the office for [facility] cirrhosis management and preoperative evaluation.  She was originally diagnosed with cirrhosis in 2021 when she required paracentesis.  She denies any further decompensations.  Does have frequent stools and bleeding presumably from rectal cancer.  Denies any hematemesis, coffee-ground emesis.  Does admit to bloating but denies any specific abdominal distention.  Denies any confusion at home.  Denies alcohol use.    HEALTHCARE MAINTENANCE:   Hepatitis C screening negative in 2018.  Last EGD: None  Last Colonoscopy: 7/26/2024 showed malignant appearing rectal mass.  Confirmed to be adenocarcinoma.    REVIEW OF SYSTEMS:    Review of Systems   Constitutional:  Negative for chills and  fever.   HENT:  Negative for congestion and sinus pressure.    Respiratory:  Negative for cough and shortness of breath.    Cardiovascular:  Negative for chest pain, palpitations and leg swelling.   Gastrointestinal:  Negative for abdominal pain, diarrhea, nausea and vomiting.   Genitourinary:  Negative for dysuria and hematuria.   Musculoskeletal:  Negative for arthralgias and back pain.   Skin:  Negative for color change and rash.   Neurological:  Negative for dizziness and headaches.   Psychiatric/Behavioral:  Negative for agitation and confusion.    All other systems reviewed and are negative.       Historical Information   Past Medical History:   Diagnosis Date    Chest pain     last assessed 9/16/13    Chest pressure     last assessed 12/15/16    Chronic sinusitis     last assessed 5/4/17    Compression fracture of lumbar vertebra (HCC)     last assessed 6/13/15    Lump of skin     last assessed 1/17/14     No past surgical history on file.  Social History   Social History     Substance and Sexual Activity   Alcohol Use No     Social History     Substance and Sexual Activity   Drug Use No     Social History     Tobacco Use   Smoking Status Never   Smokeless Tobacco Never     Family History   Problem Relation Age of Onset    Hyperlipidemia Mother     Hypertension Mother     Cancer Father     Liver cancer Father     Stomach cancer Father     No Known Problems Son     No Known Problems Daughter        Meds/Allergies     Not in a hospital admission.  No current facility-administered medications for this visit.       Allergies   Allergen Reactions    Azithromycin GI Intolerance    Dicyclomine GI Intolerance and Hives    Erythromycin GI Intolerance    Naproxen Edema    Sulfa Antibiotics Hives    Sulfamethoxazole-Trimethoprim Rash       Objective     not currently breastfeeding. There is no height or weight on file to calculate BMI.    [unfilled]      PHYSICAL EXAM:      Physical Exam  Vitals and nursing note  reviewed.   Constitutional:       General: She is not in acute distress.     Appearance: Normal appearance. She is obese. She is not ill-appearing.   HENT:      Head: Normocephalic and atraumatic.      Mouth/Throat:      Mouth: Mucous membranes are moist.   Eyes:      Extraocular Movements: Extraocular movements intact.      Conjunctiva/sclera: Conjunctivae normal.   Cardiovascular:      Pulses: Normal pulses.   Pulmonary:      Effort: Pulmonary effort is normal.   Abdominal:      General: Abdomen is flat. Bowel sounds are normal. There is no distension.      Palpations: Abdomen is soft.      Tenderness: There is no abdominal tenderness. There is no guarding.   Skin:     General: Skin is warm and dry.   Neurological:      General: No focal deficit present.      Mental Status: She is alert and oriented to person, place, and time.   Psychiatric:         Mood and Affect: Mood normal.         Behavior: Behavior normal.         Lab Results:   No visits with results within 1 Day(s) from this visit.   Latest known visit with results is:   Lab Requisition on 07/22/2024   Component Date Value    Case Report 07/22/2024                      Value:Surgical Pathology Report                         Case: [id number]                                  Authorizing Provider:  BRENDA Boone MD       Collected:           07/22/2024 0520              Ordering Location:     [facility]      Received:            07/22/2024 0521                                     [facility]ospital [facility]                                                                                  Laboratory                                                                   Pathologist:           Daniella Perez MD                                                         Specimen:    Colon, Descending Colon Polyp Polyoectomy, Rectosigmoid Colon Mass Biopsy (11 slides                [id number] [facility], collected 6/6/2024)                                  Final Diagnosis 07/22/2024                      Value:A. Descending colon polyp and rectosigmoid mass (11 slides [id number] [facility], collected 6/6/2024, biopsy):      A. Descending colon polyp (Received 3 slides labeled [id number], biopsy):    - Tubular adenoma, excised    - No high grade dysplasia      B. Rectosigmoid mass (Received 8 slides labeled [id number], biopsy):    - Moderately differentiated adenocarcinoma      Comment:   - The provided MMR IHC (MLH1, MSH2/6, PMS2) show no loss of nuclear expression: Low probability of MSI-H.    - The provided Her2 IHC shows 1+ staining/negative.     Interpretation performed at [facility], [address] [place] [address], [place] [id number]        Additional Information 07/22/2024                      Value:All reported additional testing was performed with appropriately reactive controls.  These tests were developed and their performance characteristics determined by [facility] or appropriate performing facility, though some tests may be performed on tissues which have not been validated for performance characteristics (such as staining performed on alcohol exposed cell blocks and decalcified tissues).  Results should be interpreted with caution and in the context of the patients’ clinical condition. These tests may not be cleared or approved by the U.S. Food and Drug Administration, though the FDA has determined that such clearance or approval is not necessary. These tests are used for clinical purposes and they should not be regarded as investigational or for research. This laboratory has been approved by CLIA 88, designated as a high-complexity laboratory and is qualified to perform these tests.  .      Gross Description 07/22/2024                      Value:Received for consult from [facility], [address], [place] [address] ([phone]) 11 slides labeled [id number] and the corresponding pathology report on  patient Martha Sandoval.         Clinical Information 07/22/2024                      Value:Special screening for malignant neoplasm, colon.       Imaging Studies: I have personally reviewed pertinent imaging studies.    Pérez Martinez D.O.  Gastroenterology Fellow  PGY-5  Available via [facility]  8/1/2024 11:13 AM

## 2024-08-05 NOTE — TELEPHONE ENCOUNTER
LMOM to offer assistance to patient in establishing care. Anyone may help with scheduling a consult in cardio-onc. Please attach the referral.     Letter sent

## 2024-08-08 ENCOUNTER — DOCUMENTATION (OUTPATIENT)
Dept: HEMATOLOGY ONCOLOGY | Facility: CLINIC | Age: 74
End: 2024-08-08

## 2024-08-08 NOTE — PROGRESS NOTES
RECTAL/GI MULTIDISCIPLINARY CASE REVIEW  NEW RECTAL CANCER DIAGNOSIS    DATE: 8/8/2024      PRESENTING DOCTOR: Dr. Boone      DIAGNOSIS: Rectal cancer  T1/T2, N0      Martha Sandoval was presented at the Rectal/GI Multidisciplinary Conference today.    BIOPSY DATE/RESULT:  7/22/2024-  A. Descending colon polyp and rectosigmoid mass (11 slides [id number] [facility], collected 6/6/2024, biopsy):                  A. Descending colon polyp (Received 3 slides labeled [id number], biopsy):                            - Tubular adenoma, excised                            - No high grade dysplasia                   B. Rectosigmoid mass (Received 8 slides labeled [id number], biopsy):                            - Moderately differentiated adenocarcinoma                   Comment:               - The provided MMR IHC (MLH1, MSH2/6, PMS2) show no loss of nuclear expression: Low probability of MSI-H.                - The provided Her2 IHC shows 1+ staining/negative.     IMAGING DONE:  6/26/2024 CT CAP w contrast-  Soft tissue observation within the rectosigmoid junction measuring up to 2.7 x   1.9 cm which may reflect the patient's biopsy-proven invasive moderately   differentiated adenocarcinoma. No definite findings for metastatic disease   within the chest, abdomen, or pelvis.     Cirrhotic morphology of the liver with sequela of portal hypertension including   recanalization of the umbilical vein. No suspicious early arterial enhancing   observation identified.     Grossly stable gastrohepatic and periportal lymphadenopathy, likely reactive in   the setting of underlying hepatocellular disease.     Grossly stable to intervally decreased pancreatic cystic observation with the   largest within the pancreatic body, measuring up to 0.8 cm, favoring indolent   pancreatic cystic lesions of epithelial origin, such as side branch IPMNs.     Recommendation: For an incidental pancreatic cyst measuring less than 1.5 cm  in   a patient between the ages of 65 and 79 years at presentation, reevaluation   every 2 years for total of 10 years is recommended.     7/3/2024 MRI pelvis rectal cancer staging-  1.  Polypoid mass lower to mid rectum.     2.  MR-T category: T1/2 (tumor confined to rectal wall).     3.  MR-N category: N0 (no concerning, visible lymph nodes/deposits).     4.  MRF: Clear     5.  Anal sphincter involvement: No.     6.  Suspicious extra mesorectal lymph nodes: No.     7.  EMVI: no.       WAS IMAGING REVIEWED TODAY:  YES    CEA RESULT/DATE/REVIEWED:  6/21/2024 1.2    EXPECTED DATE OF FIRST TREATMENT:  N/A    COLONOSCOPY REVIEWED:  YES    IF METASTATIC, WERE BIOPSIES OF METASTATIC SITES AVAILABLE FOR REVIEW:  N/A    PRE TREATMENT CLINICAL STAGE:  T1, T2, N0      PHYSICIAN RECOMMENDED PLAN:    -Patient is scheduled for LAR on 9/10/2024 with primary anastomosis. Patient will need pre-op clearance.    Team agreed to plan.    The final treatment plan will be left to the discretion of the patient and the treating physician.     DISCLAIMERS:  TO THE TREATING PHYSICIAN:  This conference is a meeting of clinicians from various specialty areas who evaluate and discuss patients for whom a multidisciplinary treatment approach is being considered. Please note that the above opinion was a consensus of the conference attendees and is intended only to assist in quality care of the discussed patient.  The responsibility for follow up on the input given during the conference, along with any final decisions regarding plan of care, is that of the patient and the patient's provider. Accordingly, appointments have only been recommended based on this information and have NOT been scheduled unless otherwise noted.      TO THE PATIENT:  This summary is a brief record of major aspects of your cancer treatment. You may choose to share a copy with any of your doctors or nurses. However, this is not a detailed or comprehensive record of your  care.      NCCN guidelines were readily available for review at this discussion

## 2024-08-08 NOTE — PROGRESS NOTES
CANCER TREATMENT EVALUATION AND RECOMMENDATION SUMMARY    RECTAL CANCER MULTIDISCIPLINARY CASE REVIEW    NAME OF SURGEON: Dr. Boone    PATIENT NAME/: Martha Sandoval  1950    DATE: 2024      TUMOR LOCATION IN THE RECTUM (LOWER, MIDDLE, OR UPPER THIRD) -   Mid to low rectum      INDICATION OF SPHINCTER INVOLVEMENT -   5.  Anal sphincter involvement: No.     6.  Suspicious extra mesorectal lymph nodes: No.     7.  EMVI: no.       CLINICAL (PRETREATMENT) [employer] CANCER (AJCC) STAGE -   T1/2, N0      CT SCAN STAGING -   2024 CT CAP w contrast-  Soft tissue observation within the rectosigmoid junction measuring up to 2.7 x   1.9 cm which may reflect the patient's biopsy-proven invasive moderately   differentiated adenocarcinoma. No definite findings for metastatic disease   within the chest, abdomen, or pelvis.     Cirrhotic morphology of the liver with sequela of portal hypertension including   recanalization of the umbilical vein. No suspicious early arterial enhancing   observation identified.     Grossly stable gastrohepatic and periportal lymphadenopathy, likely reactive in   the setting of underlying hepatocellular disease.     Grossly stable to intervally decreased pancreatic cystic observation with the   largest within the pancreatic body, measuring up to 0.8 cm, favoring indolent   pancreatic cystic lesions of epithelial origin, such as side branch IPMNs.     Recommendation: For an incidental pancreatic cyst measuring less than 1.5 cm in   a patient between the ages of 65 and 79 years at presentation, reevaluation   every 2 years for total of 10 years is recommended.       PRETREATMENT CIRCUMFERENTIAL RESECTION MARGIN STATUS (INVOLVED, THREATENED, OR NOT THREATENED) -   Clear- Not threatened      CARCINOEMBRYONIC ANTIGEN LEVEL (CEA) -   2024 1.2       NEOADJUVANT THERAPY RECOMMENDATION -   NO      TYPE AND DURATION OF NEOADJUVANT THERAPY RECOMMENDED -    N/A      ANTICIPATED DATE AND TYPE OF SURGICAL PROCEDURE -   9/10/2024  Low Anterior Resection (LAR)      CLINICAL RESEARCH STUDY ELIGIBILITY AND/OR ENROLLMENT -   N/A

## 2024-08-12 ENCOUNTER — CONSULT (OUTPATIENT)
Dept: NEPHROLOGY | Facility: CLINIC | Age: 74
End: 2024-08-12
Payer: COMMERCIAL

## 2024-08-12 VITALS
SYSTOLIC BLOOD PRESSURE: 130 MMHG | WEIGHT: 225 LBS | BODY MASS INDEX: 41.41 KG/M2 | HEIGHT: 62 IN | DIASTOLIC BLOOD PRESSURE: 76 MMHG | HEART RATE: 63 BPM

## 2024-08-12 DIAGNOSIS — I12.9 BENIGN HYPERTENSION WITH CKD (CHRONIC KIDNEY DISEASE) STAGE III (HCC): Primary | ICD-10-CM

## 2024-08-12 DIAGNOSIS — C20 RECTAL CANCER (HCC): ICD-10-CM

## 2024-08-12 DIAGNOSIS — N18.31 STAGE 3A CHRONIC KIDNEY DISEASE (HCC): ICD-10-CM

## 2024-08-12 DIAGNOSIS — N18.30 BENIGN HYPERTENSION WITH CKD (CHRONIC KIDNEY DISEASE) STAGE III (HCC): Primary | ICD-10-CM

## 2024-08-12 PROCEDURE — 99204 OFFICE O/P NEW MOD 45 MIN: CPT | Performed by: INTERNAL MEDICINE

## 2024-08-12 NOTE — PATIENT INSTRUCTIONS
-Hold furosemide, spironolectone, valsartan on the day of surgery.   -check blood test this month before surgery

## 2024-08-12 NOTE — PROGRESS NOTES
NEPHROLOGY OUTPATIENT CONSULTATION   Martha Sandoval 74 y.o. female MRN: [id number]  Date: 8/12/2024  Reason for consultation:   Chief Complaint   Patient presents with    Consult    Chronic Kidney Disease     ASSESSMENT AND PLAN:  CKD stage III, baseline creatinine 0.9-1.1 going back to 2022  -Creatinine 1.1 in June 2024.  No recent values available since then.  -CKD suspect secondary to long-term hypertension causing hypertensive arteriosclerosis in the setting of underlying cirrhosis  -UA bland in April 2024  -Recommend to have repeat BMP this month prior to surgery.  -Advised to hold furosemide, spironolactone, valsartan on the day of surgery.    I have personally discussed the risks and benefits of the surgery from a renal standpoint with the patient in depth, and advised the patient about the risk of VICTORIANO and the course of VICTORIANO if it occurs. Patient voiced understanding.    From a renal standpoint the patient is renally optimized for surgery as long as repeat BMP shows stable creatinine at baseline with the following recommendations:  Fluids to administer: Normal Saline 250 cc total over 2 hours   Medication Recommendations:  Minimize any IV contrast use (If IV contrast is used, please check BMP POD # 1)  Hold NSAIDs for at least 10 days prior to surgery  Hold valsartan starting on the day of the surgery  Hold furosemide and spironolactone starting on the day of surgery/procedure  Hemodynamic Recommendations:  Ideally, target MAPs greater than 65 mmHg in the perioperative period, and minimize operative time with MAPs < 65 mmHg.   Avoid intraop hemodynamic instability to decrease risk for VICTORIANO occurrence.  Other Recommendations:  Please check a BMP POD day # 1 and call if any questions or if Creatinine is rising or electrolyte abnormalities present     Hypertension  -Blood pressure acceptable in the office today.  -She has upper arm BP machine although does not check BP on regular basis lately.  She was found to  "have hypotensive episode during cardiology office visit recently.  -Currently on valsartan 160 mg daily, metoprolol 100 mg daily, Lasix 40 mg daily, spironolactone 100 mg daily.  -Given acceptable BP readings in the office, continue current regimen.  -Recommend to hold Lasix, valsartan, spironolactone on the day of surgery.    Recently diagnosed rectal carcinoma, patient is scheduled for upcoming surgery by colorectal in September 2024.    HOUSE cirrhosis, follows with GI.  History of ascites requiring paracentesis couple years ago.    HISTORY OF PRESENT ILLNESS:  Martha Sandoval is a 74 y.o. year old female with medical issues of hypertension for 30 years, hyperlipidemia, House cirrhosis, hypothyroidism, obesity, CKD stage III with baseline creatinine 0.9-1.1 who presents for initial consultation for CKD, hypertension.  Old medical records including prior lab values were reviewed from [facility] records.    Patient was recently diagnosed with rectal carcinoma in June 2024 and being scheduled for colorectal surgery next month.  She denies any urinary complaint except increased frequency in the setting of taking diuretics.  Denies any worsening leg edema.  Denies any worsening shortness of breath, nausea or vomiting.  No recent NSAID use.    She had occasional lightheadedness episodes in the recent past.  Does not check BP on regular basis at home.    REVIEW OF SYSTEMS:    More than 10 point review of systems were obtained and discussed in length with the patient. Complete review of systems were negative / unremarkable except mentioned in the HPI section.      PHYSICAL EXAM:  Vitals:    08/12/24 1450 08/12/24 1536   BP: 110/72 130/76   BP Location: Left arm    Patient Position: Sitting    Cuff Size: Large    Pulse: 63    Weight: 102 kg (225 lb)    Height: 5' 2\" (1.575 m)      Body mass index is 41.15 kg/m².    Physical Exam  Vitals reviewed.   Constitutional:       Appearance: She is well-developed. She is obese. "   HENT:      Head: Normocephalic and atraumatic.      Right Ear: External ear normal.      Left Ear: External ear normal.   Eyes:      Conjunctiva/sclera: Conjunctivae normal.   Cardiovascular:      Comments: No significant edema in legs  Pulmonary:      Effort: Pulmonary effort is normal.      Breath sounds: Normal breath sounds. No wheezing or rales.   Abdominal:      General: Bowel sounds are normal. There is no distension.      Palpations: Abdomen is soft.      Tenderness: There is no abdominal tenderness.   Musculoskeletal:         General: No deformity.   Lymphadenopathy:      Cervical: No cervical adenopathy.   Skin:     Findings: No rash.   Neurological:      Mental Status: She is alert and oriented to person, place, and time.   Psychiatric:         Behavior: Behavior normal.         PAST MEDICAL HISTORY:  Past Medical History:   Diagnosis Date    Cancer (HCC)     Chest pain     last assessed 9/16/13    Chest pressure     last assessed 12/15/16    Chronic sinusitis     last assessed 5/4/17    Compression fracture of lumbar vertebra (HCC)     last assessed 6/13/15    GERD (gastroesophageal reflux disease)     Hypertension     Liver disease     Lump of skin     last assessed 1/17/14       PAST SURGICAL HISTORY:  History reviewed. No pertinent surgical history.    ALLERGIES:  Allergies   Allergen Reactions    Azithromycin GI Intolerance    Dicyclomine GI Intolerance and Hives    Erythromycin GI Intolerance    Naproxen Edema    Sulfa Antibiotics Hives    Sulfamethoxazole-Trimethoprim Rash       SOCIAL HISTORY:  Social History     Substance and Sexual Activity   Alcohol Use No     Social History     Substance and Sexual Activity   Drug Use No     Social History     Tobacco Use   Smoking Status Never   Smokeless Tobacco Never       FAMILY HISTORY:  Family History   Problem Relation Age of Onset    Hyperlipidemia Mother     Hypertension Mother     Cancer Father     Liver cancer Father     Stomach cancer Father      "No Known Problems Son     No Known Problems Daughter        MEDICATIONS:    Current Outpatient Medications:     aspirin (ECOTRIN LOW STRENGTH) 81 mg EC tablet, Take 81 mg by mouth daily, Disp: , Rfl:     cetirizine (ZyrTEC) 10 mg tablet, Take 1 tablet by mouth daily as needed, Disp: , Rfl:     cyanocobalamin (VITAMIN B-12) 100 MCG tablet, Take 100 mcg by mouth daily, Disp: , Rfl:     furosemide (LASIX) 20 mg tablet, Take 40 mg by mouth daily  , Disp: , Rfl:     guaiFENesin (MUCINEX) 600 mg 12 hr tablet, Take 600 mg by mouth every 12 (twelve) hours, Disp: , Rfl:     hydrocortisone (ANUSOL-HC) 2.5 % rectal cream, Apply topically 2 (two) times a day, Disp: 28 g, Rfl: 1    metoprolol tartrate (LOPRESSOR) 100 mg tablet, Take 1 tablet (100 mg total) by mouth daily, Disp: 90 tablet, Rfl: 1    Na Sulfate-K Sulfate-Mg Sulf (Suprep Bowel Prep Kit) 17.5-3.13-1.6 GM/177ML SOLN, Take bowel prep as indicated for colonoscopy, Disp: 354 mL, Rfl: 0    omeprazole (PriLOSEC) 20 mg delayed release capsule, Take 1 capsule (20 mg total) by mouth daily, Disp: 90 capsule, Rfl: 1    Pyridoxine HCl (VITAMIN B-6) 100 MG TABS, Take 1 tablet by mouth daily, Disp: , Rfl:     spironolactone (ALDACTONE) 50 mg tablet, Take 100 mg by mouth daily  , Disp: , Rfl:     ursodiol (ACTIGALL) 300 mg capsule, Take 1 capsule (300 mg total) by mouth 2 (two) times a day, Disp: 180 capsule, Rfl: 1    valsartan (DIOVAN) 160 mg tablet, Take 1 tablet (160 mg total) by mouth daily, Disp: 90 tablet, Rfl: 1    Vitamin E 200 units TABS, Take 1 tablet by mouth daily, Disp: , Rfl:     hyoscyamine (Levsin) 0.125 MG tablet, Take 1 tablet (0.125 mg total) by mouth every 4 (four) hours as needed for cramping (Patient not taking: Reported on 12/12/2023), Disp: 30 tablet, Rfl: 0    Lab Results:   Creatinine 1.1    Portions of the record may have been created with voice recognition software. Occasional wrong word or \"sound a like\" substitutions may have occurred due to the " inherent limitations of voice recognition software. Read the chart carefully and recognize, using context, where substitutions have occurred.

## 2024-08-19 ENCOUNTER — ANESTHESIA EVENT (OUTPATIENT)
Dept: PERIOP | Facility: HOSPITAL | Age: 74
DRG: 329 | End: 2024-08-19
Payer: COMMERCIAL

## 2024-08-19 ENCOUNTER — TELEPHONE (OUTPATIENT)
Age: 74
End: 2024-08-19

## 2024-08-19 DIAGNOSIS — M79.676 PAIN OF TOE, UNSPECIFIED LATERALITY: Primary | ICD-10-CM

## 2024-08-19 DIAGNOSIS — N18.31 STAGE 3A CHRONIC KIDNEY DISEASE (HCC): Primary | ICD-10-CM

## 2024-08-19 NOTE — TELEPHONE ENCOUNTER
Patient seen at [facility] ED for Gout on 8/16/24  Patient seen by Nephrology 8/14/24 and given lab orders to have done 1/2025.  Patient concern that no one had informed her she has Stage III Kidney Disease .    Patient Declined to schedule office visit with any other provider for later this week. Patient would like to speak or see Dr. Ramirez today.

## 2024-08-19 NOTE — TELEPHONE ENCOUNTER
Pt rescheduled her appt that she had today as she is not feeling well. Put her into Dr. Boone's next available at [place] on 9/4/24. Pt was concerned that may be too short notice as this appt is to discuss upcoming surgery on 9/10/24. Pt would like to know if she needs to be seen sooner than this or 9/4/24 is ok to keep.

## 2024-08-19 NOTE — TELEPHONE ENCOUNTER
Spoke to pt she is aware she can wait unit that carlee, she is also aware she needs to have her pre admission blood work done soon

## 2024-08-19 NOTE — TELEPHONE ENCOUNTER
3rd call from patient reports that she is waiting for a return call from Dr. Ramirez b/c she saw a nephrologist last week and saw documentation that she has stage 3 CKD. Patient states she never knew this and the nephrologist did not discuss it with her. States he ordered a urine test and she doesn't want to do it unless Dr. Ramirez confirms that she should. Please follow up with patient asap. Offered OV if able but she declined.

## 2024-08-20 LAB — URATE SERPL-MCNC: 9.3 MG/DL (ref 2.4–6)

## 2024-08-22 ENCOUNTER — TELEPHONE (OUTPATIENT)
Age: 74
End: 2024-08-22

## 2024-08-22 NOTE — TELEPHONE ENCOUNTER
Patients GI provider:  Dr. Boone    Number to return call: [phone]    Reason for call: Pt called asking to be scheduled in the [place] office. Per pt [place] office is too far.    Scheduled procedure/appointment date if applicable: 9/4/2024

## 2024-08-26 ENCOUNTER — TELEPHONE (OUTPATIENT)
Dept: ANESTHESIOLOGY | Facility: CLINIC | Age: 74
End: 2024-08-26

## 2024-08-27 ENCOUNTER — TELEPHONE (OUTPATIENT)
Dept: ANESTHESIOLOGY | Facility: CLINIC | Age: 74
End: 2024-08-27

## 2024-08-28 ENCOUNTER — CONSULT (OUTPATIENT)
Dept: FAMILY MEDICINE CLINIC | Facility: CLINIC | Age: 74
End: 2024-08-28
Payer: COMMERCIAL

## 2024-08-28 VITALS
BODY MASS INDEX: 42.06 KG/M2 | WEIGHT: 222.8 LBS | SYSTOLIC BLOOD PRESSURE: 116 MMHG | HEIGHT: 61 IN | OXYGEN SATURATION: 99 % | HEART RATE: 69 BPM | DIASTOLIC BLOOD PRESSURE: 70 MMHG | TEMPERATURE: 97.5 F

## 2024-08-28 DIAGNOSIS — N18.30 BENIGN HYPERTENSION WITH CKD (CHRONIC KIDNEY DISEASE) STAGE III (HCC): ICD-10-CM

## 2024-08-28 DIAGNOSIS — M10.9 ACUTE GOUT OF RIGHT FOOT, UNSPECIFIED CAUSE: ICD-10-CM

## 2024-08-28 DIAGNOSIS — K74.60 LIVER CIRRHOSIS SECONDARY TO NASH (HCC): ICD-10-CM

## 2024-08-28 DIAGNOSIS — R30.0 DYSURIA: ICD-10-CM

## 2024-08-28 DIAGNOSIS — K75.81 LIVER CIRRHOSIS SECONDARY TO NASH (HCC): ICD-10-CM

## 2024-08-28 DIAGNOSIS — I10 BENIGN ESSENTIAL HYPERTENSION: ICD-10-CM

## 2024-08-28 DIAGNOSIS — I10 ESSENTIAL HYPERTENSION: ICD-10-CM

## 2024-08-28 DIAGNOSIS — I12.9 BENIGN HYPERTENSION WITH CKD (CHRONIC KIDNEY DISEASE) STAGE III (HCC): ICD-10-CM

## 2024-08-28 DIAGNOSIS — Z01.818 PRE-OP EXAMINATION: Primary | ICD-10-CM

## 2024-08-28 DIAGNOSIS — C20 RECTAL CANCER (HCC): ICD-10-CM

## 2024-08-28 PROCEDURE — 99214 OFFICE O/P EST MOD 30 MIN: CPT | Performed by: FAMILY MEDICINE

## 2024-08-28 RX ORDER — VALSARTAN 160 MG/1
160 TABLET ORAL DAILY
Qty: 90 TABLET | Refills: 1 | Status: SHIPPED | OUTPATIENT
Start: 2024-08-28

## 2024-08-28 RX ORDER — FUROSEMIDE 20 MG
40 TABLET ORAL DAILY
Qty: 90 TABLET | Refills: 1 | Status: SHIPPED | OUTPATIENT
Start: 2024-08-28 | End: 2024-08-28

## 2024-08-28 RX ORDER — SPIRONOLACTONE 50 MG/1
100 TABLET, FILM COATED ORAL DAILY
Qty: 90 TABLET | Refills: 1 | Status: SHIPPED | OUTPATIENT
Start: 2024-08-28 | End: 2024-08-28

## 2024-08-28 RX ORDER — SPIRONOLACTONE 50 MG/1
100 TABLET, FILM COATED ORAL DAILY
Qty: 90 TABLET | Refills: 1 | Status: SHIPPED | OUTPATIENT
Start: 2024-08-28 | End: 2024-08-30 | Stop reason: SDUPTHER

## 2024-08-28 RX ORDER — METOPROLOL TARTRATE 100 MG
100 TABLET ORAL DAILY
Qty: 90 TABLET | Refills: 1 | Status: SHIPPED | OUTPATIENT
Start: 2024-08-28 | End: 2024-08-28

## 2024-08-28 RX ORDER — VALSARTAN 160 MG/1
160 TABLET ORAL DAILY
Qty: 90 TABLET | Refills: 1 | Status: SHIPPED | OUTPATIENT
Start: 2024-08-28 | End: 2024-08-28

## 2024-08-28 RX ORDER — FUROSEMIDE 20 MG
40 TABLET ORAL DAILY
Qty: 90 TABLET | Refills: 1 | Status: SHIPPED | OUTPATIENT
Start: 2024-08-28 | End: 2024-08-30 | Stop reason: SDUPTHER

## 2024-08-28 RX ORDER — COLCHICINE 0.6 MG/1
TABLET ORAL
COMMUNITY
Start: 2024-08-16

## 2024-08-28 RX ORDER — METOPROLOL TARTRATE 100 MG
100 TABLET ORAL DAILY
Qty: 90 TABLET | Refills: 1 | Status: SHIPPED | OUTPATIENT
Start: 2024-08-28 | End: 2025-02-24

## 2024-08-28 NOTE — PROGRESS NOTES
Ambulatory Visit  Name: Martha Sandoval      : 1950      MRN: [id number]  Encounter Provider: Janny Ramirez DO  Encounter Date: 2024   Encounter department: [facility]    Assessment & Plan   1. Pre-op examination  2. Rectal cancer (HCC)  3. Benign hypertension with CKD (chronic kidney disease) stage III (HCC)  4. 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 Essential hypertension  -     valsartan (DIOVAN) 160 mg tablet; Take 1 tablet (160 mg total) by mouth daily  6. Benign essential hypertension  -     metoprolol tartrate (LOPRESSOR) 100 mg tablet; Take 1 tablet (100 mg total) by mouth daily  7. Dysuria  -     UA (URINE) with reflex to Scope; Future; Expected date: 2024  -     UA (URINE) with reflex to Scope  8. Acute gout of right foot, unspecified cause    Patient is seen for preop exam.  She did see hepatology regarding her liver cirrhosis secondary to HOUSE.    She has seen nephrology regarding her Chronic kidney disease stage 3A.  I did review her stress test and echocardiogram through care everywhere.  She has not seen the cardiologist in follow-up to give her clearance, but the tests do appear to be okay.  Patient is very concerned about diagnosis of rectal cancer and had an appointment with medical oncology and is scheduled to see radiation oncology just in case she would need one of the specialist.  Patient needs to have her preop blood work.  She has blood work orders from our previous visit and some of these do overlap with the preop blood work.  I told her she was to be certain to have a blood work needed for surgery.  Patient was seen in [facility] care for foot pain and was diagnosed with gout.  She was given a prescription for colchicine and developed significant diarrhea.  She stopped the  "medication.  I did order a uric acid level and this was elevated.  Patient does not want to take medicine for this prior to her surgery.  We discussed that she was told not to take her valsartan or diuretics the day of surgery.  She usually takes her diuretics in the evening.  I recommended that she hold the doses the night before her surgery.  I will watch for her blood work results.     History of Present Illness     Patient is here for pre-op exam.  She is scheduled for surgery with Dr. Boone - laparoscopic surgery - low anterior resection on 9/10 for rectal carcinoma.  Patient is understandably anxious about her diagnosis and surgery.  She has seen nephrology and hepatology with [facility].  She has seen cardiology - Dr. Allen at [facility]. She had a stress test and echo.  She has not had pre-op blood work yet.        Review of Systems   Constitutional:  Negative for chills and fever.   HENT:  Negative for congestion and sore throat.    Respiratory:  Negative for chest tightness.    Cardiovascular:  Negative for chest pain and palpitations.   Gastrointestinal:  Negative for nausea.        Patient with frequent BM's daily.   Genitourinary:  Negative for difficulty urinating.   Musculoskeletal:  Positive for arthralgias (foot pain - right first toe).   Skin: Negative.    Neurological:  Negative for dizziness and headaches.   Psychiatric/Behavioral: Negative.         Objective     /70 (BP Location: Left arm, Patient Position: Sitting, Cuff Size: Adult)   Pulse 69   Temp 97.5 °F (36.4 °C)   Ht 5' 1\" (1.549 m)   Wt 101 kg (222 lb 12.8 oz)   SpO2 99%   BMI 42.10 kg/m²     Physical Exam  Vitals and nursing note reviewed.   Constitutional:       General: She is not in acute distress.     Appearance: She is obese.   HENT:      Head: Normocephalic.      Right Ear: Tympanic membrane normal.      Left Ear: Tympanic membrane normal.   Eyes:      General: No scleral icterus.  Neck:      Thyroid: No thyromegaly.      " Vascular: No carotid bruit.   Cardiovascular:      Rate and Rhythm: Normal rate and regular rhythm.      Heart sounds: Normal heart sounds. No murmur heard.  Pulmonary:      Effort: Pulmonary effort is normal.      Breath sounds: Normal breath sounds.   Musculoskeletal:         General: Tenderness (tender right 1st MTP joint) present.      Right lower leg: No edema.      Left lower leg: No edema.   Lymphadenopathy:      Cervical: No cervical adenopathy.   Skin:     General: Skin is warm and dry.   Neurological:      Mental Status: She is alert and oriented to person, place, and time.   Psychiatric:         Mood and Affect: Mood is anxious.       Administrative Statements

## 2024-08-29 DIAGNOSIS — K80.20 MULTIPLE GALLSTONES: ICD-10-CM

## 2024-08-29 DIAGNOSIS — K21.00 REFLUX ESOPHAGITIS: ICD-10-CM

## 2024-08-29 PROBLEM — M10.9 GOUT OF RIGHT FOOT: Status: ACTIVE | Noted: 2024-08-29

## 2024-08-29 RX ORDER — URSODIOL 300 MG/1
300 CAPSULE ORAL 2 TIMES DAILY
Qty: 180 CAPSULE | Refills: 3 | Status: SHIPPED | OUTPATIENT
Start: 2024-08-29

## 2024-08-29 NOTE — TELEPHONE ENCOUNTER
Reason for call:   [x] Refill   [] Prior Auth  [] Other:     Office:   [x] PCP/Provider - Janny Ramirez, DO   [] Specialty/Provider -         Does the patient have enough for 3 days?   [x] Yes   [] No - Send as HP to POD

## 2024-08-30 DIAGNOSIS — K75.81 LIVER CIRRHOSIS SECONDARY TO NASH (HCC): ICD-10-CM

## 2024-08-30 DIAGNOSIS — K74.60 LIVER CIRRHOSIS SECONDARY TO NASH (HCC): ICD-10-CM

## 2024-08-30 LAB
AFP-TM SERPL-MCNC: 8.4 NG/ML
ALBUMIN SERPL-MCNC: 4 G/DL (ref 3.5–5.7)
ALP SERPL-CCNC: 63 U/L (ref 35–120)
ALT SERPL-CCNC: 35 U/L
ANION GAP SERPL CALCULATED.3IONS-SCNC: 8 MMOL/L (ref 3–11)
AST SERPL-CCNC: 37 U/L
BASOPHILS # BLD AUTO: 0 THOU/CMM (ref 0–0.1)
BASOPHILS NFR BLD AUTO: 1 %
BILIRUB SERPL-MCNC: 0.5 MG/DL (ref 0.2–1)
BUN SERPL-MCNC: 39 MG/DL (ref 7–25)
CALCIUM SERPL-MCNC: 9.1 MG/DL (ref 8.5–10.1)
CHLORIDE SERPL-SCNC: 101 MMOL/L (ref 100–109)
CHOLEST SERPL-MCNC: 153 MG/DL
CHOLEST/HDLC SERPL: 3.4 {RATIO}
CO2 SERPL-SCNC: 29 MMOL/L (ref 21–31)
CREAT SERPL-MCNC: 1.2 MG/DL (ref 0.4–1.1)
CYTOLOGY CMNT CVX/VAG CYTO-IMP: ABNORMAL
DIFFERENTIAL METHOD BLD: ABNORMAL
EOSINOPHIL # BLD AUTO: 0.1 THOU/CMM (ref 0–0.5)
EOSINOPHIL NFR BLD AUTO: 2 %
ERYTHROCYTE [DISTWIDTH] IN BLOOD BY AUTOMATED COUNT: 15.1 % (ref 12–16)
EST. AVERAGE GLUCOSE BLD GHB EST-MCNC: 134 MG/DL
GFR/BSA.PRED SERPLBLD CYS-BASED-ARV: 48 ML/MIN/{1.73_M2}
GLUCOSE SERPL-MCNC: 106 MG/DL (ref 65–99)
GLUCOSE UR QL STRIP: NEGATIVE MG/DL
HBA1C MFR BLD: 6.3 %
HCT VFR BLD AUTO: 33.1 % (ref 35–43)
HDLC SERPL-MCNC: 45 MG/DL (ref 23–92)
HGB BLD-MCNC: 11.2 G/DL (ref 11.5–14.5)
HGB UR QL STRIP: NEGATIVE MG/DL
INR PPP: 1.2
KETONES UR QL STRIP: NEGATIVE MG/DL
LDLC SERPL CALC-MCNC: 84 MG/DL
LEUKOCYTE ESTERASE UR QL STRIP: NEGATIVE /UL
LYMPHOCYTES # BLD AUTO: 0.9 THOU/CMM (ref 1–3)
LYMPHOCYTES NFR BLD AUTO: 18 %
MCH RBC QN AUTO: 27.3 PG (ref 26–34)
MCHC RBC AUTO-ENTMCNC: 33.7 G/DL (ref 32–37)
MCV RBC AUTO: 81 FL (ref 80–100)
MONOCYTES # BLD AUTO: 0.4 THOU/CMM (ref 0.3–1)
MONOCYTES NFR BLD AUTO: 8 %
NEUTROPHILS # BLD AUTO: 3.5 THOU/CMM (ref 1.8–7.8)
NEUTROPHILS NFR BLD AUTO: 71 %
NITRITE UR QL STRIP: NEGATIVE
NONHDLC SERPL-MCNC: 108 MG/DL
PH UR: 5 [PH] (ref 4.5–8)
PLATELET # BLD AUTO: 171 THOU/CMM (ref 140–350)
PMV BLD REES-ECKER: 8 FL (ref 7.5–11.3)
POTASSIUM SERPL-SCNC: 4.3 MMOL/L (ref 3.5–5.2)
PROT 24H UR-MRATE: NEGATIVE MG/DL
PROT SERPL-MCNC: 7.6 G/DL (ref 6.3–8.3)
PROTHROMBIN TIME: 14.9 SEC (ref 12–14.6)
RBC # BLD AUTO: 4.09 MILL/CMM (ref 3.7–4.7)
SL AMB POCT URINE COMMENT: NORMAL
SL AMB TYPE AND SCREEN: NORMAL
SODIUM SERPL-SCNC: 138 MMOL/L (ref 135–145)
SP GR UR: 1.02 (ref 1–1.03)
TRIGL SERPL-MCNC: 120 MG/DL
TSH SERPL-ACNC: 3.95 UIU/ML (ref 0.45–5.33)
WBC # BLD AUTO: 4.9 THOU/CMM (ref 4–10)

## 2024-08-30 RX ORDER — SPIRONOLACTONE 50 MG/1
100 TABLET, FILM COATED ORAL DAILY
Qty: 180 TABLET | Refills: 1 | Status: SHIPPED | OUTPATIENT
Start: 2024-08-30 | End: 2024-08-31 | Stop reason: SDUPTHER

## 2024-08-30 RX ORDER — FUROSEMIDE 20 MG
40 TABLET ORAL DAILY
Qty: 180 TABLET | Refills: 1 | Status: SHIPPED | OUTPATIENT
Start: 2024-08-30 | End: 2024-08-31 | Stop reason: SDUPTHER

## 2024-08-30 NOTE — TELEPHONE ENCOUNTER
Received call from [facility] pharmacy requesting for quantity to be adjusted on both furosemide and spironolactone to reflect a 90 day supply.  Was previously sent for a 45 day supply. Patient takes 2 tablets daily of each medication.

## 2024-08-30 NOTE — H&P (VIEW-ONLY)
Colon and Rectal Surgery   Martha Sandoval 74 y.o. female MRN [id number]  Encounter: [id number]  09/04/24 3:49 PM            Assessment: Martha Sandoval is a 74 y.o. female who has mid-rectal cancer.      Plan:   Rectal cancer (HCC)  The patient returns for follow-up after completion of the evaluation of her rectal cancer and after evaluation by various specialists.    She has a tumor at 11 cm from the anal verge.  This will require therapy in order to prevent ongoing bleeding and obstruction.  She is having some symptoms of a feel of a need to have bowel movements, chronically/constantly.    She seems to be relatively stable from a nephrology standpoint despite a rise in her creatinine to 1.2.  It had been at about 0.95 a short time ago.  Nephrology recommendations are noted.    The hepatologist felt that she is stable as well as she will be and has a reasonably low risk of postoperative morbidity and mortality related to liver disease.  Her liver function tests are good and she has not been symptomatic in some time.    Cardiology assessment included an echocardiogram and a stress test.  She did relatively well with those although she could not perform high levels of METS.  I await cardiology risk assessment which is planned for her visit tomorrow.    We discussed the risks and benefits of surgery.  Options for colostomy upfront versus a primary anastomosis with temporary ileostomy or straight upfront primary anastomosis without ileostomy were reviewed.  Risks of anastomotic leak were discussed at nausea him with description of what can happen with an anastomotic leak, the need for permanent colostomy, and much increased risk of morbidity and mortality if an anastomotic leak occurs.  An upfront colostomy was discussed as an option.  The use of a temporary ileostomy as indicated was also discussed.    Risks of surgery, including but not limited to bleeding, need for transfusion of blood products, pain, infection,  "hernia formation, injury to the ureter or other internal organs requiring surgery specific to these injuries, anastomotic leak, bowel obstruction, bladder dysfunction, deep vein thrombosis, renal failure, pulmonary embolism, myocardial infarction or heart failure, stroke, death, need for and enterostomy, or other unspecified complications were discussed. Benefits and alternatives were discussed. Questions were answered.    After lengthy discussion, we have opted to plan for low anterior resection with primary anastomosis.  This is with the understanding that a colostomy will be considered if intraoperative problems occur or if there are technical difficulties requiring consideration of a colostomy.      Subjective     HPI    Martha Sandoval is a 74 y.o. female who is here for a pre surgical discussion.     The patient reports fatigue and at least 8-10 soft and formed bowel movements per day; notes the sensation of \"always feeling like she has to go\". States rectal bleeding has decreased.     The patient is scheduled for laparoscopic low anterior resection on 9/10/2024 for rectal cancer.     Last seen in the office on 7/15/2024.     The patient was consulted by Dr. Fair on 8/1/2024 for her liver cirrhosis secondary to HOUSE, surgical optimization on 9/4/2024, nephrology on 8/12/2024 and cardiology on 8/14/2024 for pre op clearance; has appointment scheduled with cardiology tomorrow for clearance following stress test and echocardiogram.     Rectal/GI multidisciplinary case review on 8/8/2024.     Last MRI staging on 7/3/2024: 1.  Polypoid mass lower to mid rectum. 2.  MR-T category: T1/2 (tumor confined to rectal wall). 3.  MR-N category: N0 (no concerning, visible lymph nodes/deposits).  4. MRF: Clear. 5.  Anal sphincter involvement: No.   6.  Suspicious extra mesorectal lymph nodes: No.   7.  EMVI: no.     Last CT chest abdomen and pelvis on 6/26/2024.      Last colonoscopy performed on 7/26/2024.    Historical " Information   Past Medical History:   Diagnosis Date    Cancer (HCC)     Chest pain     last assessed 9/16/13    Chest pressure     last assessed 12/15/16    Chronic sinusitis     last assessed 5/4/17    Clotting disorder (HCC)     Compression fracture of lumbar vertebra (HCC)     last assessed 6/13/15    GERD (gastroesophageal reflux disease)     Hypertension     Liver disease     Lump of skin     last assessed 1/17/14     Past Surgical History:   Procedure Laterality Date    HYSTERECTOMY         Meds/Allergies       Current Outpatient Medications:     aspirin (ECOTRIN LOW STRENGTH) 81 mg EC tablet, Take 81 mg by mouth daily, Disp: , Rfl:     cetirizine (ZyrTEC) 10 mg tablet, Take 1 tablet by mouth daily as needed, Disp: , Rfl:     cyanocobalamin (VITAMIN B-12) 100 MCG tablet, Take 100 mcg by mouth daily, Disp: , Rfl:     furosemide (LASIX) 20 mg tablet, Take 2 tablets (40 mg total) by mouth daily, Disp: 180 tablet, Rfl: 1    guaiFENesin (MUCINEX) 600 mg 12 hr tablet, Take 600 mg by mouth every 12 (twelve) hours, Disp: , Rfl:     hydrocortisone (ANUSOL-HC) 2.5 % rectal cream, Apply topically 2 (two) times a day, Disp: 28 g, Rfl: 1    hyoscyamine (Levsin) 0.125 MG tablet, Take 1 tablet (0.125 mg total) by mouth every 4 (four) hours as needed for cramping, Disp: 30 tablet, Rfl: 0    metoprolol tartrate (LOPRESSOR) 100 mg tablet, Take 1 tablet (100 mg total) by mouth daily, Disp: 90 tablet, Rfl: 1    omeprazole (PriLOSEC) 20 mg delayed release capsule, Take 1 capsule (20 mg total) by mouth daily, Disp: 90 capsule, Rfl: 1    Pyridoxine HCl (VITAMIN B-6) 100 MG TABS, Take 1 tablet by mouth daily, Disp: , Rfl:     spironolactone (ALDACTONE) 50 mg tablet, Take 2 tablets (100 mg total) by mouth daily, Disp: 180 tablet, Rfl: 1    ursodiol (ACTIGALL) 300 mg capsule, Take 1 capsule (300 mg total) by mouth 2 (two) times a day, Disp: 180 capsule, Rfl: 3    valsartan (DIOVAN) 160 mg tablet, Take 1 tablet (160 mg total) by mouth  "daily, Disp: 90 tablet, Rfl: 1    Vitamin E 200 units TABS, Take 1 tablet by mouth daily, Disp: , Rfl:     colchicine (COLCRYS) 0.6 mg tablet, Take 2 tablets, then 1 tablet another hour later, then 1 tablet x 2 days after. (Patient not taking: Reported on 9/4/2024), Disp: , Rfl:     Na Sulfate-K Sulfate-Mg Sulf (Suprep Bowel Prep Kit) 17.5-3.13-1.6 GM/177ML SOLN, Take bowel prep as indicated for colonoscopy (Patient not taking: Reported on 8/28/2024), Disp: 354 mL, Rfl: 0  Allergies   Allergen Reactions    Azithromycin GI Intolerance    Colcrys [Colchicine] Diarrhea    Dicyclomine GI Intolerance and Hives    Erythromycin GI Intolerance    Naproxen Edema    Sulfa Antibiotics Hives    Sulfamethoxazole-Trimethoprim Rash       Social History   Social History     Substance and Sexual Activity   Drug Use No     Social History     Tobacco Use   Smoking Status Never   Smokeless Tobacco Never         Family History   Problem Relation Age of Onset    Hyperlipidemia Mother     Hypertension Mother     Cancer Father     Liver cancer Father     Stomach cancer Father     No Known Problems Son     No Known Problems Daughter          Review of Systems   Constitutional: Negative.    Respiratory: Negative.     Cardiovascular: Negative.    Gastrointestinal: Negative.         Urge for bowel movement all the time.   Genitourinary: Negative.        Objective   Current Vitals:  Vitals:    09/04/24 1449   Weight: 100 kg (221 lb)   Height: 5' 1\" (1.549 m)         Physical Exam  Constitutional:       Appearance: Normal appearance.   Eyes:      Conjunctiva/sclera: Conjunctivae normal.   Cardiovascular:      Rate and Rhythm: Normal rate and regular rhythm.   Pulmonary:      Effort: Pulmonary effort is normal.      Breath sounds: Normal breath sounds.   Abdominal:      General: Abdomen is flat.      Palpations: Abdomen is soft. There is no mass.      Tenderness: There is no abdominal tenderness. There is no guarding.   Neurological:      " General: No focal deficit present.      Mental Status: She is alert and oriented to person, place, and time.   Psychiatric:         Mood and Affect: Mood normal.         Behavior: Behavior normal.

## 2024-08-31 DIAGNOSIS — K74.60 LIVER CIRRHOSIS SECONDARY TO NASH (HCC): ICD-10-CM

## 2024-08-31 DIAGNOSIS — K75.81 LIVER CIRRHOSIS SECONDARY TO NASH (HCC): ICD-10-CM

## 2024-08-31 DIAGNOSIS — K21.00 REFLUX ESOPHAGITIS: ICD-10-CM

## 2024-09-01 RX ORDER — FUROSEMIDE 20 MG
40 TABLET ORAL DAILY
Qty: 180 TABLET | Refills: 1 | Status: ON HOLD | OUTPATIENT
Start: 2024-09-01

## 2024-09-01 RX ORDER — SPIRONOLACTONE 50 MG/1
100 TABLET, FILM COATED ORAL DAILY
Qty: 180 TABLET | Refills: 1 | Status: ON HOLD | OUTPATIENT
Start: 2024-09-01

## 2024-09-03 ENCOUNTER — TELEMEDICINE (OUTPATIENT)
Dept: ANESTHESIOLOGY | Facility: CLINIC | Age: 74
End: 2024-09-03
Payer: COMMERCIAL

## 2024-09-03 ENCOUNTER — TELEPHONE (OUTPATIENT)
Age: 74
End: 2024-09-03

## 2024-09-03 VITALS — SYSTOLIC BLOOD PRESSURE: 102 MMHG | HEART RATE: 68 BPM | DIASTOLIC BLOOD PRESSURE: 53 MMHG

## 2024-09-03 DIAGNOSIS — I12.9 BENIGN HYPERTENSION WITH CKD (CHRONIC KIDNEY DISEASE) STAGE III (HCC): ICD-10-CM

## 2024-09-03 DIAGNOSIS — Z01.89 ENCOUNTER FOR GERIATRIC ASSESSMENT: ICD-10-CM

## 2024-09-03 DIAGNOSIS — N18.31 STAGE 3A CHRONIC KIDNEY DISEASE (HCC): ICD-10-CM

## 2024-09-03 DIAGNOSIS — C20 RECTAL CANCER (HCC): Primary | ICD-10-CM

## 2024-09-03 DIAGNOSIS — N18.30 BENIGN HYPERTENSION WITH CKD (CHRONIC KIDNEY DISEASE) STAGE III (HCC): ICD-10-CM

## 2024-09-03 PROCEDURE — 99215 OFFICE O/P EST HI 40 MIN: CPT | Performed by: NURSE PRACTITIONER

## 2024-09-03 PROCEDURE — 3078F DIAST BP <80 MM HG: CPT | Performed by: NURSE PRACTITIONER

## 2024-09-03 PROCEDURE — 3074F SYST BP LT 130 MM HG: CPT | Performed by: NURSE PRACTITIONER

## 2024-09-03 NOTE — PROGRESS NOTES
[facility]  CONSULT       Patient has the ability to take their vital signs at home YES  Allergies reviewed today   PMH reviewed today   Medications reviewed today     See Assessment below...    Falls (last 6 months): no  [place] Total Score: 21  PHQ- 9 Depression Scale: 0  Nutrition Assessment Score: 14  METS: 6.79  DX SLEEP APNEA; NO:SCORE 5    Health goals:  -What are your overall health goals? To get through surgery     -What brings you strength?     -What activities are important to you?      As always we discussed having your BEST surgery, and BEST recovery.  Surgery goals reviewed today.      Breathing exercises   Patient was encouraged to begin lung exercises today.  This could be accomplished through deep breathing and cough exercises.    Eating/nutrition   Encouraged patient to increase oral protein intake prior to surgery.  This can be accomplished by consuming chicken, fish, tuna fish, cottage cheese, cheese, eggs, Greek yogurt, and protein shakes as needed.  I encouraged use of protein shakes such ENLIVE.  I also recommended making your own protein shakes with protein powder.   Sleep/Stress management  Patient was encouraged to rest their body prior to surgery.  Encouraged attempting to get 8 hours of sleep at night.  Avoid stress.  Avoid sick contacts.  Encouraged to find a relaxing hobby such as reading, meditation, listening to music.  Training exercises  Patient was encouraged to remain active as possible.  Today bilateral lower extremity generic exercises were taught for muscle strengthening and balance.  All exercises to be done sitting down.

## 2024-09-03 NOTE — PROGRESS NOTES
[facility]  CONSULT: GERIATRIC SURGERY     Brief Visit      Name: Martha Sandoval      : 1950      MRN: [id number]  Encounter Provider: ELENITA Judd  Encounter Date: 9/3/2024   Encounter department: [facility] [place]    This Visit is being completed by telephone. The Patient is located at Home and in the following state in which I hold an active license [place]    The patient was identified by name and date of birth. Martha Sandoval was informed that this is a telemedicine visit and that the visit is being conducted through Telephone.  My office door was closed. No one else was in the room.  She acknowledged consent and understanding of privacy and security of the platform. The patient has agreed to participate and understands they can discontinue the visit at any time.    Patient is aware this is a billable service.     Assessment & Plan   74 year old female referred to [facility] for pre-surgery geriatric screening 2.2 age   Other consult concerns include: surgical optimization & BEST program   Clearance for low anterior resection of the rectum   She is scheduled on   Case: [id number] Date/Time: 09/10/24 112   Procedure: RESECTION COLON LOW ANTERIOR LAPAROSCOPIC (Abdomen)   Anesthesia type: General   Diagnosis: Rectal cancer (HCC) [C20]   Pre-op diagnosis: Rectal cancer (HCC) [C20]   Location: [facility] OR ROOM 14 / [facility]   Surgeons: BRENDA Boone MD     Last anesthesia   No concerns in the past     Surgery to do   9.5.24 PAT call for surgery instructions  9.5.24- final CC   Work on BEST   Increase protein prior to surgery   Today I Reviewed anesthesia options with patient today   Today I Reviewed laparoscopic/robotic approach with patient today - still with many questions     1. Benign hypertension with CKD (chronic kidney disease) stage III (HCC)  Baseline low 100-110/50-70  On water pills per patient   Stable   No  complaints   Getting CC 9.5.24  METS 6   DENIES CP/DENIES SOB  Had echo.      9/3/2024 11:14 AM    /53   Pulse 68     ECHO 2D COMPLETE DOPPLER AND COLOR FLOW WITH CONTRAST  Order: [id number]  Narrative    This result has an attachment that is not available.    Left Ventricle: Systolic function is normal with an ejection fraction  of 61-65%. The EF by visual approximation is 60%. There is grade I (mild)  diastolic dysfunction and normal left atrial pressure.    Tricuspid Valve: The right ventricular systolic pressure is normal.    Left Ventricle  Left ventricle is normal in size. Wall thickness is normal. Systolic function is normal with an ejection fraction of 61-65%. The EF by visual approximation is 60%.Wall motion is within normal limits. There is grade I (mild) diastolic dysfunction and normal left atrial pressure.    Right Ventricle  Right ventricle cavity is normal. Systolic function is normal.    Left Atrium  Left atrium cavity size is normal.    Right Atrium  Right atrium cavity is normal.    IVC/SVC  The inferior vena cava demonstrates a diameter of <=21 mm and collapses >50%; therefore, the right atrial pressure is estimated at 0-5 mmHg.    Mitral Valve  Mitral valve structure is normal. There is no regurgitation or stenosis.    Tricuspid Valve  Tricuspid valve structure is normal. There is trace regurgitation. There is no evidence of tricuspid valve stenosis. The right ventricular systolic pressure is normal.    Aortic Valve  The aortic valve is trileaflet. There is no regurgitation or stenosis.    Pulmonic Valve  Pulmonic valve structure is normal. There is no regurgitation or stenosis. The estimated pulmonary artery systolic pressure is 12.7 mmHg.    Ascending Aorta  The aorta appears normal in size.    Pericardium  There is no pericardial effusion.    Study Details  A complete 2D echocardiogram was performed. Color flow, Pulse Wave and Continuous Wave Doppler was performed and analyzed.Definity  "contrast was used during the study. Overall the study quality was adequate. The study had technical difficulties. The study was difficult due to patient's body habitus.    Vitals  The EF by visual approximation is 60%.  All Measurements       2. Rectal cancer (HCC)  -     Ambulatory Referral to Surgical Optimization  SEEN FOR SO   SEEN FOR GERIATRICS   RECEIVED   RECEIVED NC  CC 9.5.24  Started BEST   Review surgery instructions with patient today- still with a lot of questions       3. Stage 3a chronic kidney disease (HCC)  VICTORIANO RISK HIGH   RECEIVED NC  CAN SEE NEPHROLOGY POST-OP    Latest Reference Range & Units 08/30/24 09:58   Sodium 135 - 145 mmol/L 138   Potassium 3.5 - 5.2 mmol/L 4.3   Chloride 100 - 109 mmol/L 101   Carbon Dioxide 21 - 31 mmol/L 29   ANION GAP 3 - 11  8   BUN 7 - 25 mg/dL 39 (H)   Creatinine 0.40 - 1.10 mg/dL 1.20 (H)   GLUCOSE 65 - 99 mg/dL 106 (H)   Calcium 8.5 - 10.1 mg/dL 9.1   AST <41 U/L 37   ALT <56 U/L 35   ALK PHOS 35 - 120 U/L 63   Total Protein 6.3 - 8.3 g/dL 7.6   Albumin 3.5 - 5.7 g/dL 4.0   Total Bilirubin 0.2 - 1.0 mg/dL 0.5   GFR, Calculated >59  48 (L)   (H): Data is abnormally high  (L): Data is abnormally low        PRE-OP WORKSHEET DATA  Assessment of Pre-Operative Risks     SOC Quality Hard Stops:    SSI Risk:  BMI (>40) : Estimated body mass index is 42.1 kg/m² as calculated from the following:    Height as of 8/28/24: 5' 1\" (1.549 m).    Weight as of 8/28/24: 101 kg (222 lb 12.8 oz). (if >than 40 then offer weight management)  -Weight management consult No- PATIENT DELCINED    HbA1c (<8.0) :   Lab Results   Component Value Date    HGBA1C 6.3 (H) 08/30/2024    (>8.0 recommend Endocrine consult)   -endocrine consult indicated No    Tobacco use:  No     -lifestyle management consult No    SOC Anemia review:  Hgb ( >11):   Lab Results   Component Value Date    HGB 11.2 (L) 08/30/2024    HGB 12.1 04/08/2024      -IV venofer indicated  No    VICTORIANO risk:  GFR (>60) (Less then " "45 = Nephrology consult):    Lab Results   Component Value Date    EGFR 48 (L) 08/30/2024    EGFR 48 (L) 06/21/2024    EGFR 57 (L) 04/08/2024      -Nephrology consult indicated Yes    GERIATRIC ASSESSMENT Yes  Mini-Cognitive Screen (MCI) score <2    inpatient geriatric consult Yes   -ordered delirium precautions on admit Yes  Depression Screen   Falls (yes within the last 6 months)   -Fall precautions ordered on admit No  Mini Nutritional Assessment (MNA) (score <12)   -Estimated body mass index is 42.1 kg/m² as calculated from the following:    Height as of 8/28/24: 5' 1\" (1.549 m).    Weight as of 8/28/24: 101 kg (222 lb 12.8 oz).   -order inpatient nutrition evaluation No    Obstructive Sleep Apnea Screening  -sleep medicine referral needed No   Past History:       Past Medical History:   Diagnosis Date    Cancer (HCC)     Chest pain     last assessed 9/16/13    Chest pressure     last assessed 12/15/16    Chronic sinusitis     last assessed 5/4/17    Clotting disorder (HCC)     Compression fracture of lumbar vertebra (HCC)     last assessed 6/13/15    GERD (gastroesophageal reflux disease)     Hypertension     Liver disease     Lump of skin     last assessed 1/17/14    Past Surgical History:   Procedure Laterality Date    HYSTERECTOMY            Social History     Tobacco Use    Smoking status: Never    Smokeless tobacco: Never   Vaping Use    Vaping status: Never Used   Substance Use Topics    Alcohol use: No    Drug use: No     Family History   Problem Relation Age of Onset    Hyperlipidemia Mother     Hypertension Mother     Cancer Father     Liver cancer Father     Stomach cancer Father     No Known Problems Son     No Known Problems Daughter           Allergies:     Allergies   Allergen Reactions    Azithromycin GI Intolerance    Dicyclomine GI Intolerance and Hives    Erythromycin GI Intolerance    Naproxen Edema    Sulfa Antibiotics Hives    Sulfamethoxazole-Trimethoprim Rash        Current Medications: "     Current Outpatient Medications   Medication Instructions    aspirin (ECOTRIN LOW STRENGTH) 81 mg, Oral, Daily    cetirizine (ZyrTEC) 10 mg tablet 1 tablet, Oral, Daily PRN    colchicine (COLCRYS) 0.6 mg tablet Take 2 tablets, then 1 tablet another hour later, then 1 tablet x 2 days after.    cyanocobalamin (VITAMIN B-12) 100 mcg, Daily    furosemide (LASIX) 40 mg, Oral, Daily    guaiFENesin (MUCINEX) 600 mg, Oral, Every 12 hours scheduled    hydrocortisone (ANUSOL-HC) 2.5 % rectal cream Topical, 2 times daily    hyoscyamine (LEVSIN) 0.125 mg, Oral, Every 4 hours PRN    metoprolol tartrate (LOPRESSOR) 100 mg, Oral, Daily    Na Sulfate-K Sulfate-Mg Sulf (Suprep Bowel Prep Kit) 17.5-3.13-1.6 GM/177ML SOLN Take bowel prep as indicated for colonoscopy    omeprazole (PRILOSEC) 20 mg, Oral, Daily    Pyridoxine HCl (VITAMIN B-6) 100 MG TABS 1 tablet, Oral, Daily    spironolactone (ALDACTONE) 100 mg, Oral, Daily    ursodiol (ACTIGALL) 300 mg, Oral, 2 times daily    valsartan (DIOVAN) 160 mg, Oral, Daily    Vitamin E 200 units TABS 1 tablet, Oral, Daily         History of Present Illness   74 Year old female referred to [facility] for pre-surgery geriatric screening and surgery optimization.  MEL Explains she was having frequent BMS and blood in her stools.unfortunately DX with colon CA.  Electing to have a resection.    I spoke to patient on the phone.  We did not connect via video.  She lives with .  She is independent with all ADLS.      As always we discussed having your BEST surgery, and BEST recovery.  Surgery goals reviewed today.      Breathing exercises   Patient was encouraged to begin lung exercises today.  This could be accomplished through deep breathing and cough exercises.  Patient was taught how to use an incentive spirometer.  Return demonstration provided.    Eating/nutrition    Encouraged patient to increase oral protein intake prior to surgery.  This can be accomplished by consuming chicken, fish,  tuna fish, cottage cheese, cheese, eggs, Greek yogurt, and protein shakes as needed.  I encouraged use of protein shakes such ENLIVE.  I also recommended making your own protein shakes with protein powder.   Sleep/Stress management  Patient was encouraged to rest their body prior to surgery.  Encouraged attempting to get 8 hours of sleep at night.  Avoid stress.  Avoid sick contacts.  Encouraged to find a relaxing hobby such as reading, meditation, listening to music.  Training exercises  Patient was encouraged to remain active as possible.  Today bilateral lower extremity generic exercises were taught for muscle strengthening and balance.  All exercises to be done sitting down.         Denies fevers and denies chills  Denies congestion and denies sore throat  Denies chest pain  Denies palpitations  Denies shortness of breath  Denies abdominal pain  Denies nausea, vomiting, and diarrhea  Denies any issues with their skin... example NO open wounds or sores  Denies rashes  Denies difficulty urinating  Denies any issues with their urine... example a dark color or an odor  Denies dizziness  Denies headaches  Denies confusion and denies hallucinations    Admits to being in well health today     Physical Assessment   We did not connect via video  Patient was alert and orientated times 3  Mood appropriate  Thought appropriate    I heard no respiratory distress over the phone today      Visit Time  Total Visit Duration: 45 MINUTES

## 2024-09-04 ENCOUNTER — OFFICE VISIT (OUTPATIENT)
Age: 74
End: 2024-09-04
Payer: COMMERCIAL

## 2024-09-04 VITALS — WEIGHT: 221 LBS | HEIGHT: 61 IN | BODY MASS INDEX: 41.72 KG/M2

## 2024-09-04 DIAGNOSIS — C20 RECTAL CANCER (HCC): Primary | ICD-10-CM

## 2024-09-04 PROCEDURE — 1160F RVW MEDS BY RX/DR IN RCRD: CPT | Performed by: COLON & RECTAL SURGERY

## 2024-09-04 PROCEDURE — 1159F MED LIST DOCD IN RCRD: CPT | Performed by: COLON & RECTAL SURGERY

## 2024-09-04 PROCEDURE — 99215 OFFICE O/P EST HI 40 MIN: CPT | Performed by: COLON & RECTAL SURGERY

## 2024-09-04 NOTE — ASSESSMENT & PLAN NOTE
The patient returns for follow-up after completion of the evaluation of her rectal cancer and after evaluation by various specialists.    She has a tumor at 11 cm from the anal verge.  This will require therapy in order to prevent ongoing bleeding and obstruction.  She is having some symptoms of a feel of a need to have bowel movements, chronically/constantly.    She seems to be relatively stable from a nephrology standpoint despite a rise in her creatinine to 1.2.  It had been at about 0.95 a short time ago.  Nephrology recommendations are noted.    The hepatologist felt that she is stable as well as she will be and has a reasonably low risk of postoperative morbidity and mortality related to liver disease.  Her liver function tests are good and she has not been symptomatic in some time.    Cardiology assessment included an echocardiogram and a stress test.  She did relatively well with those although she could not perform high levels of METS.  I await cardiology risk assessment which is planned for her visit tomorrow.    We discussed the risks and benefits of surgery.  Options for colostomy upfront versus a primary anastomosis with temporary ileostomy or straight upfront primary anastomosis without ileostomy were reviewed.  Risks of anastomotic leak were discussed at nausea him with description of what can happen with an anastomotic leak, the need for permanent colostomy, and much increased risk of morbidity and mortality if an anastomotic leak occurs.  An upfront colostomy was discussed as an option.  The use of a temporary ileostomy as indicated was also discussed.    Risks of surgery, including but not limited to bleeding, need for transfusion of blood products, pain, infection, hernia formation, injury to the ureter or other internal organs requiring surgery specific to these injuries, anastomotic leak, bowel obstruction, bladder dysfunction, deep vein thrombosis, renal failure, pulmonary embolism, myocardial  infarction or heart failure, stroke, death, need for and enterostomy, or other unspecified complications were discussed. Benefits and alternatives were discussed. Questions were answered.  Added risk recent related to her kidney problems, liver problems, cardiac problems, and obesity were discussed.    After lengthy discussion, we have opted to plan for low anterior resection with primary anastomosis.  This is with the understanding that a colostomy will be considered if intraoperative problems occur or if there are technical difficulties requiring consideration of a colostomy.    50 minutes with patient and her .

## 2024-09-04 NOTE — ADDENDUM NOTE
315mL removed total.  Site dressed with bacitracin, sterile gauze and tegaderm.   Addended by: JASON CORONEL on: 9/4/2024 11:06 AM     Modules accepted: Orders

## 2024-09-05 ENCOUNTER — TELEPHONE (OUTPATIENT)
Age: 74
End: 2024-09-05

## 2024-09-05 DIAGNOSIS — C20 RECTAL CANCER (HCC): Primary | ICD-10-CM

## 2024-09-05 RX ORDER — OMEGA-3S/DHA/EPA/FISH OIL/D3 300MG-1000
400 CAPSULE ORAL DAILY
COMMUNITY

## 2024-09-05 NOTE — PRE-PROCEDURE INSTRUCTIONS
Pre-Surgery Instructions:   Medication Instructions    Alum Hydroxide-Mag Carbonate (GAVISCON PO) Hold day of surgery.    aspirin (ECOTRIN LOW STRENGTH) 81 mg EC tablet Stop taking 5 days prior to surgery.    cetirizine (ZyrTEC) 10 mg tablet Hold day of surgery.    cholecalciferol (VITAMIN D3) 400 units tablet Stop taking 5 days prior to surgery.    cyanocobalamin (VITAMIN B-12) 100 MCG tablet Stop taking 5 days prior to surgery.    furosemide (LASIX) 20 mg tablet Hold day of surgery.    guaiFENesin (MUCINEX) 600 mg 12 hr tablet Hold day of surgery.    metoprolol tartrate (LOPRESSOR) 100 mg tablet Take day of surgery.    omeprazole (PriLOSEC) 20 mg delayed release capsule Take day of surgery.    Pyridoxine HCl (VITAMIN B-6) 100 MG TABS Stop taking 5 days prior to surgery.    spironolactone (ALDACTONE) 50 mg tablet Take night before surgery    ursodiol (ACTIGALL) 300 mg capsule Hold day of surgery.    valsartan (DIOVAN) 160 mg tablet Stop taking 1 day prior to surgery.    Vitamin E 200 units TABS Stop taking 7 days prior to surgery.    Medication instructions for day surgery reviewed. Please use only a sip of water to take your instructed medications. Avoid all over the counter vitamins, supplements and NSAIDS for one week prior to surgery per anesthesia guidelines. Tylenol is ok to take as needed.     You will receive a call one business day prior to surgery with an arrival time and hospital directions. If your surgery is scheduled on a Monday, the hospital will be calling you on the Friday prior to your surgery. If you have not heard from anyone by 8pm, please call the hospital supervisor through the hospital  at [phone]. ([place] [phone] or [place] [phone]).    Do not eat or drink anything after midnight the night before your surgery, including candy, mints, lifesavers, or chewing gum. Do not drink alcohol 24hrs before your surgery. Try not to smoke at least 24hrs before your surgery.    "    Follow the pre surgery showering instructions as listed in the “My Surgical Experience Booklet” or otherwise provided by your surgeon's office. Do not use a blade to shave the surgical area 1 week before surgery. It is okay to use a clean electric clippers up to 24 hours before surgery. Do not apply any lotions, creams, including makeup, cologne, deodorant, or perfumes after showering on the day of your surgery. Do not use dry shampoo, hair spray, hair gel, or any type of hair products.     No contact lenses, eye make-up, or artificial eyelashes. Remove nail polish, including gel polish, and any artificial, gel, or acrylic nails if possible. Remove all jewelry including rings and body piercing jewelry.     Wear causal clothing that is easy to take on and off. Consider your type of surgery.    Keep any valuables, jewelry, piercings at home. Please bring any specially ordered equipment (sling, braces) if indicated.    Arrange for a responsible person to drive you to and from the hospital on the day of your surgery. Please confirm the visitor policy for the day of your procedure when you receive your phone call with an arrival time.     Call the surgeon's office with any new illnesses, exposures, or additional questions prior to surgery.    Please reference your “My Surgical Experience Booklet” for additional information to prepare for your upcoming surgery.    Pt instructed to stop nsaids and supplements prior to surgery.  Pt given med instructions however pt states she is very confused about med instructions since \"everyone tells me something different\".  Pt instructed to take lopressor and omeprazole on day of surgery with 1-2 sips of water per anesthesia instructions. Pt did not receive antibiotics from surgeons office for day before surgery--pt did call surgeons office to call these prescriptions in to her pharmacy.  Pt did verbalize understanding of shower and pre-op ensure instructions.   "

## 2024-09-05 NOTE — TELEPHONE ENCOUNTER
Pt calling because she received a call from [facility] and they mentioned she is supposed to be taking an antibiotic. There was no antibiotic sent to the pharmacy and nothing on her after visit summary. She also had questions about her heart medications. Transferred call to Laurie

## 2024-09-06 RX ORDER — NEOMYCIN SULFATE 500 MG/1
TABLET ORAL
Qty: 4 TABLET | Refills: 0 | Status: SHIPPED | OUTPATIENT
Start: 2024-09-06 | End: 2024-09-06

## 2024-09-06 RX ORDER — METRONIDAZOLE 500 MG/1
TABLET ORAL
Qty: 2 TABLET | Refills: 0 | Status: SHIPPED | OUTPATIENT
Start: 2024-09-06 | End: 2024-09-06

## 2024-09-10 ENCOUNTER — TELEPHONE (OUTPATIENT)
Dept: UROLOGY | Facility: HOSPITAL | Age: 74
End: 2024-09-10

## 2024-09-10 ENCOUNTER — ANESTHESIA (OUTPATIENT)
Dept: PERIOP | Facility: HOSPITAL | Age: 74
DRG: 329 | End: 2024-09-10
Payer: COMMERCIAL

## 2024-09-10 ENCOUNTER — HOSPITAL ENCOUNTER (INPATIENT)
Facility: HOSPITAL | Age: 74
LOS: 7 days | Discharge: HOME HEALTH | DRG: 329 | End: 2024-09-17
Attending: COLON & RECTAL SURGERY | Admitting: COLON & RECTAL SURGERY
Payer: COMMERCIAL

## 2024-09-10 DIAGNOSIS — K80.20 MULTIPLE GALLSTONES: ICD-10-CM

## 2024-09-10 DIAGNOSIS — H81.10 BENIGN PAROXYSMAL POSITIONAL VERTIGO, UNSPECIFIED LATERALITY: ICD-10-CM

## 2024-09-10 DIAGNOSIS — K75.81 LIVER CIRRHOSIS SECONDARY TO NASH (HCC): ICD-10-CM

## 2024-09-10 DIAGNOSIS — Z01.89 ENCOUNTER FOR GERIATRIC ASSESSMENT: ICD-10-CM

## 2024-09-10 DIAGNOSIS — I12.9 BENIGN HYPERTENSION WITH CKD (CHRONIC KIDNEY DISEASE) STAGE III (HCC): ICD-10-CM

## 2024-09-10 DIAGNOSIS — S37.20XA: ICD-10-CM

## 2024-09-10 DIAGNOSIS — S37.20XD BLADDER INJURY, SUBSEQUENT ENCOUNTER: Primary | ICD-10-CM

## 2024-09-10 DIAGNOSIS — E03.9 HYPOTHYROIDISM, UNSPECIFIED TYPE: ICD-10-CM

## 2024-09-10 DIAGNOSIS — C20 RECTAL CANCER (HCC): ICD-10-CM

## 2024-09-10 DIAGNOSIS — N18.31 STAGE 3A CHRONIC KIDNEY DISEASE (HCC): Primary | ICD-10-CM

## 2024-09-10 DIAGNOSIS — N18.30 BENIGN HYPERTENSION WITH CKD (CHRONIC KIDNEY DISEASE) STAGE III (HCC): ICD-10-CM

## 2024-09-10 DIAGNOSIS — K74.60 LIVER CIRRHOSIS SECONDARY TO NASH (HCC): ICD-10-CM

## 2024-09-10 LAB
ABO GROUP BLD: NORMAL
ALBUMIN SERPL BCG-MCNC: 2.6 G/DL (ref 3.5–5)
ALP SERPL-CCNC: 29 U/L (ref 34–104)
ALT SERPL W P-5'-P-CCNC: 27 U/L (ref 7–52)
ANION GAP SERPL CALCULATED.3IONS-SCNC: 9 MMOL/L (ref 4–13)
AST SERPL W P-5'-P-CCNC: 35 U/L (ref 13–39)
BASE EXCESS BLDA CALC-SCNC: -6.1 MMOL/L
BASE EXCESS BLDA CALC-SCNC: -7 MMOL/L (ref -2–3)
BASE EXCESS BLDA CALC-SCNC: -7.8 MMOL/L
BASE EXCESS BLDA CALC-SCNC: -8 MMOL/L (ref -2–3)
BASOPHILS # BLD AUTO: 0 THOUSANDS/ΜL (ref 0–0.1)
BASOPHILS # BLD AUTO: 0.02 THOUSANDS/ΜL (ref 0–0.1)
BASOPHILS NFR BLD AUTO: 0 % (ref 0–1)
BASOPHILS NFR BLD AUTO: 0 % (ref 0–1)
BILIRUB SERPL-MCNC: 0.83 MG/DL (ref 0.2–1)
BLD GP AB SCN SERPL QL: NEGATIVE
BUN SERPL-MCNC: 25 MG/DL (ref 5–25)
CA-I BLD-SCNC: 1.09 MMOL/L (ref 1.12–1.32)
CA-I BLD-SCNC: 1.09 MMOL/L (ref 1.12–1.32)
CALCIUM ALBUM COR SERPL-MCNC: 8.2 MG/DL (ref 8.3–10.1)
CALCIUM SERPL-MCNC: 7.1 MG/DL (ref 8.4–10.2)
CHLORIDE SERPL-SCNC: 107 MMOL/L (ref 96–108)
CO2 SERPL-SCNC: 17 MMOL/L (ref 21–32)
CREAT SERPL-MCNC: 1 MG/DL (ref 0.6–1.3)
EOSINOPHIL # BLD AUTO: 0 THOUSAND/ΜL (ref 0–0.61)
EOSINOPHIL # BLD AUTO: 0 THOUSAND/ΜL (ref 0–0.61)
EOSINOPHIL NFR BLD AUTO: 0 % (ref 0–6)
EOSINOPHIL NFR BLD AUTO: 0 % (ref 0–6)
ERYTHROCYTE [DISTWIDTH] IN BLOOD BY AUTOMATED COUNT: 16.2 % (ref 11.6–15.1)
ERYTHROCYTE [DISTWIDTH] IN BLOOD BY AUTOMATED COUNT: 16.3 % (ref 11.6–15.1)
GFR SERPL CREATININE-BSD FRML MDRD: 56 ML/MIN/1.73SQ M
GLUCOSE SERPL-MCNC: 144 MG/DL (ref 65–140)
GLUCOSE SERPL-MCNC: 227 MG/DL (ref 65–140)
GLUCOSE SERPL-MCNC: 231 MG/DL (ref 65–140)
GLUCOSE SERPL-MCNC: 232 MG/DL (ref 65–140)
HCO3 BLDA-SCNC: 17.2 MMOL/L (ref 22–28)
HCO3 BLDA-SCNC: 18.3 MMOL/L (ref 24–30)
HCO3 BLDA-SCNC: 18.4 MMOL/L (ref 22–28)
HCO3 BLDA-SCNC: 18.5 MMOL/L (ref 22–28)
HCT VFR BLD AUTO: 19.8 % (ref 34.8–46.1)
HCT VFR BLD AUTO: 27 % (ref 34.8–46.1)
HCT VFR BLD CALC: 18 % (ref 34.8–46.1)
HCT VFR BLD CALC: 25 % (ref 34.8–46.1)
HGB BLD-MCNC: 6.5 G/DL (ref 11.5–15.4)
HGB BLD-MCNC: 8.8 G/DL (ref 11.5–15.4)
HGB BLDA-MCNC: 6.1 G/DL (ref 11.5–15.4)
HGB BLDA-MCNC: 8.5 G/DL (ref 11.5–15.4)
IMM GRANULOCYTES # BLD AUTO: 0.01 THOUSAND/UL (ref 0–0.2)
IMM GRANULOCYTES # BLD AUTO: 0.03 THOUSAND/UL (ref 0–0.2)
IMM GRANULOCYTES NFR BLD AUTO: 0 % (ref 0–2)
IMM GRANULOCYTES NFR BLD AUTO: 0 % (ref 0–2)
LACTATE SERPL-SCNC: 3.4 MMOL/L (ref 0.5–2)
LACTATE SERPL-SCNC: 4.3 MMOL/L (ref 0.5–2)
LYMPHOCYTES # BLD AUTO: 0.28 THOUSANDS/ΜL (ref 0.6–4.47)
LYMPHOCYTES # BLD AUTO: 0.44 THOUSANDS/ΜL (ref 0.6–4.47)
LYMPHOCYTES NFR BLD AUTO: 5 % (ref 14–44)
LYMPHOCYTES NFR BLD AUTO: 7 % (ref 14–44)
MAGNESIUM SERPL-MCNC: 1.5 MG/DL (ref 1.9–2.7)
MCH RBC QN AUTO: 27.2 PG (ref 26.8–34.3)
MCH RBC QN AUTO: 27.2 PG (ref 26.8–34.3)
MCHC RBC AUTO-ENTMCNC: 32.6 G/DL (ref 31.4–37.4)
MCHC RBC AUTO-ENTMCNC: 32.8 G/DL (ref 31.4–37.4)
MCV RBC AUTO: 83 FL (ref 82–98)
MCV RBC AUTO: 84 FL (ref 82–98)
MONOCYTES # BLD AUTO: 0.43 THOUSAND/ΜL (ref 0.17–1.22)
MONOCYTES # BLD AUTO: 0.88 THOUSAND/ΜL (ref 0.17–1.22)
MONOCYTES NFR BLD AUTO: 10 % (ref 4–12)
MONOCYTES NFR BLD AUTO: 9 % (ref 4–12)
NEUTROPHILS # BLD AUTO: 3.61 THOUSANDS/ΜL (ref 1.85–7.62)
NEUTROPHILS # BLD AUTO: 8.23 THOUSANDS/ΜL (ref 1.85–7.62)
NEUTS SEG NFR BLD AUTO: 83 % (ref 43–75)
NEUTS SEG NFR BLD AUTO: 86 % (ref 43–75)
NRBC BLD AUTO-RTO: 0 /100 WBCS
NRBC BLD AUTO-RTO: 0 /100 WBCS
O2 CT BLDA-SCNC: 13.5 ML/DL (ref 16–23)
O2 CT BLDA-SCNC: 9.9 ML/DL (ref 16–23)
OVALOCYTES BLD QL SMEAR: PRESENT
OXYHGB MFR BLDA: 97.1 % (ref 94–97)
OXYHGB MFR BLDA: 97.9 % (ref 94–97)
PCO2 BLD: 19 MMOL/L (ref 21–32)
PCO2 BLD: 20 MMOL/L (ref 21–32)
PCO2 BLD: 36.2 MM HG (ref 36–44)
PCO2 BLD: 40.8 MM HG (ref 42–50)
PCO2 BLDA: 32.5 MM HG (ref 36–44)
PCO2 BLDA: 33.1 MM HG (ref 36–44)
PH BLD: 7.26 [PH] (ref 7.3–7.4)
PH BLD: 7.31 [PH] (ref 7.35–7.45)
PH BLDA: 7.33 [PH] (ref 7.35–7.45)
PH BLDA: 7.37 [PH] (ref 7.35–7.45)
PHOSPHATE SERPL-MCNC: 4.1 MG/DL (ref 2.3–4.1)
PLATELET # BLD AUTO: 174 THOUSANDS/UL (ref 149–390)
PLATELET # BLD AUTO: 181 THOUSANDS/UL (ref 149–390)
PLATELET BLD QL SMEAR: ABNORMAL
PMV BLD AUTO: 9.3 FL (ref 8.9–12.7)
PMV BLD AUTO: 9.3 FL (ref 8.9–12.7)
PO2 BLD: 121 MM HG (ref 75–129)
PO2 BLD: 31 MM HG (ref 35–45)
PO2 BLDA: 148.2 MM HG (ref 75–129)
PO2 BLDA: 148.6 MM HG (ref 75–129)
POIKILOCYTOSIS BLD QL SMEAR: PRESENT
POTASSIUM BLD-SCNC: 3.5 MMOL/L (ref 3.5–5.3)
POTASSIUM BLD-SCNC: 4.3 MMOL/L (ref 3.5–5.3)
POTASSIUM SERPL-SCNC: 4.2 MMOL/L (ref 3.5–5.3)
PROT SERPL-MCNC: 4.4 G/DL (ref 6.4–8.4)
RBC # BLD AUTO: 2.39 MILLION/UL (ref 3.81–5.12)
RBC # BLD AUTO: 3.23 MILLION/UL (ref 3.81–5.12)
RBC MORPH BLD: PRESENT
RH BLD: POSITIVE
SAO2 % BLD FROM PO2: 50 % (ref 60–85)
SAO2 % BLD FROM PO2: 98 % (ref 60–85)
SODIUM BLD-SCNC: 137 MMOL/L (ref 136–145)
SODIUM BLD-SCNC: 141 MMOL/L (ref 136–145)
SODIUM SERPL-SCNC: 133 MMOL/L (ref 135–147)
SPECIMEN EXPIRATION DATE: NORMAL
SPECIMEN SOURCE: ABNORMAL
WBC # BLD AUTO: 4.33 THOUSAND/UL (ref 4.31–10.16)
WBC # BLD AUTO: 9.6 THOUSAND/UL (ref 4.31–10.16)

## 2024-09-10 PROCEDURE — 93005 ELECTROCARDIOGRAM TRACING: CPT

## 2024-09-10 PROCEDURE — C1769 GUIDE WIRE: HCPCS | Performed by: COLON & RECTAL SURGERY

## 2024-09-10 PROCEDURE — 86900 BLOOD TYPING SEROLOGIC ABO: CPT | Performed by: COLON & RECTAL SURGERY

## 2024-09-10 PROCEDURE — 86920 COMPATIBILITY TEST SPIN: CPT

## 2024-09-10 PROCEDURE — 30233N1 TRANSFUSION OF NONAUTOLOGOUS RED BLOOD CELLS INTO PERIPHERAL VEIN, PERCUTANEOUS APPROACH: ICD-10-PCS | Performed by: ANESTHESIOLOGY

## 2024-09-10 PROCEDURE — 44145 PARTIAL REMOVAL OF COLON: CPT | Performed by: COLON & RECTAL SURGERY

## 2024-09-10 PROCEDURE — 86850 RBC ANTIBODY SCREEN: CPT | Performed by: COLON & RECTAL SURGERY

## 2024-09-10 PROCEDURE — 86901 BLOOD TYPING SEROLOGIC RH(D): CPT | Performed by: COLON & RECTAL SURGERY

## 2024-09-10 PROCEDURE — 85014 HEMATOCRIT: CPT

## 2024-09-10 PROCEDURE — 88341 IMHCHEM/IMCYTCHM EA ADD ANTB: CPT | Performed by: PATHOLOGY

## 2024-09-10 PROCEDURE — 0DBN0ZZ EXCISION OF SIGMOID COLON, OPEN APPROACH: ICD-10-PCS | Performed by: COLON & RECTAL SURGERY

## 2024-09-10 PROCEDURE — 82330 ASSAY OF CALCIUM: CPT

## 2024-09-10 PROCEDURE — 84100 ASSAY OF PHOSPHORUS: CPT | Performed by: STUDENT IN AN ORGANIZED HEALTH CARE EDUCATION/TRAINING PROGRAM

## 2024-09-10 PROCEDURE — 99291 CRITICAL CARE FIRST HOUR: CPT | Performed by: EMERGENCY MEDICINE

## 2024-09-10 PROCEDURE — 85049 AUTOMATED PLATELET COUNT: CPT | Performed by: STUDENT IN AN ORGANIZED HEALTH CARE EDUCATION/TRAINING PROGRAM

## 2024-09-10 PROCEDURE — 86850 RBC ANTIBODY SCREEN: CPT

## 2024-09-10 PROCEDURE — 86923 COMPATIBILITY TEST ELECTRIC: CPT

## 2024-09-10 PROCEDURE — 51865 REPAIR OF BLADDER WOUND: CPT | Performed by: UROLOGY

## 2024-09-10 PROCEDURE — 0TQB0ZZ REPAIR BLADDER, OPEN APPROACH: ICD-10-PCS | Performed by: UROLOGY

## 2024-09-10 PROCEDURE — 82803 BLOOD GASES ANY COMBINATION: CPT

## 2024-09-10 PROCEDURE — 84132 ASSAY OF SERUM POTASSIUM: CPT

## 2024-09-10 PROCEDURE — 82948 REAGENT STRIP/BLOOD GLUCOSE: CPT

## 2024-09-10 PROCEDURE — 83605 ASSAY OF LACTIC ACID: CPT | Performed by: STUDENT IN AN ORGANIZED HEALTH CARE EDUCATION/TRAINING PROGRAM

## 2024-09-10 PROCEDURE — 0TJB8ZZ INSPECTION OF BLADDER, VIA NATURAL OR ARTIFICIAL OPENING ENDOSCOPIC: ICD-10-PCS | Performed by: UROLOGY

## 2024-09-10 PROCEDURE — 85025 COMPLETE CBC W/AUTO DIFF WBC: CPT

## 2024-09-10 PROCEDURE — 82947 ASSAY GLUCOSE BLOOD QUANT: CPT

## 2024-09-10 PROCEDURE — 83735 ASSAY OF MAGNESIUM: CPT

## 2024-09-10 PROCEDURE — 0DJD8ZZ INSPECTION OF LOWER INTESTINAL TRACT, VIA NATURAL OR ARTIFICIAL OPENING ENDOSCOPIC: ICD-10-PCS | Performed by: COLON & RECTAL SURGERY

## 2024-09-10 PROCEDURE — 82805 BLOOD GASES W/O2 SATURATION: CPT | Performed by: STUDENT IN AN ORGANIZED HEALTH CARE EDUCATION/TRAINING PROGRAM

## 2024-09-10 PROCEDURE — 84295 ASSAY OF SERUM SODIUM: CPT

## 2024-09-10 PROCEDURE — 44139 MOBILIZATION OF COLON: CPT | Performed by: COLON & RECTAL SURGERY

## 2024-09-10 PROCEDURE — 0DNL4ZZ RELEASE TRANSVERSE COLON, PERCUTANEOUS ENDOSCOPIC APPROACH: ICD-10-PCS | Performed by: COLON & RECTAL SURGERY

## 2024-09-10 PROCEDURE — 88309 TISSUE EXAM BY PATHOLOGIST: CPT | Performed by: PATHOLOGY

## 2024-09-10 PROCEDURE — P9016 RBC LEUKOCYTES REDUCED: HCPCS

## 2024-09-10 PROCEDURE — 86901 BLOOD TYPING SEROLOGIC RH(D): CPT

## 2024-09-10 PROCEDURE — 0DBP0ZZ EXCISION OF RECTUM, OPEN APPROACH: ICD-10-PCS | Performed by: COLON & RECTAL SURGERY

## 2024-09-10 PROCEDURE — 80048 BASIC METABOLIC PNL TOTAL CA: CPT

## 2024-09-10 PROCEDURE — 83735 ASSAY OF MAGNESIUM: CPT | Performed by: STUDENT IN AN ORGANIZED HEALTH CARE EDUCATION/TRAINING PROGRAM

## 2024-09-10 PROCEDURE — 86900 BLOOD TYPING SEROLOGIC ABO: CPT

## 2024-09-10 PROCEDURE — 82805 BLOOD GASES W/O2 SATURATION: CPT

## 2024-09-10 PROCEDURE — 30233N1 TRANSFUSION OF NONAUTOLOGOUS RED BLOOD CELLS INTO PERIPHERAL VEIN, PERCUTANEOUS APPROACH: ICD-10-PCS

## 2024-09-10 PROCEDURE — 80053 COMPREHEN METABOLIC PANEL: CPT

## 2024-09-10 PROCEDURE — 88342 IMHCHEM/IMCYTCHM 1ST ANTB: CPT | Performed by: PATHOLOGY

## 2024-09-10 RX ORDER — PANTOPRAZOLE SODIUM 40 MG/10ML
40 INJECTION, POWDER, LYOPHILIZED, FOR SOLUTION INTRAVENOUS
Status: DISCONTINUED | OUTPATIENT
Start: 2024-09-11 | End: 2024-09-13

## 2024-09-10 RX ORDER — ONDANSETRON 2 MG/ML
4 INJECTION INTRAMUSCULAR; INTRAVENOUS EVERY 4 HOURS PRN
Status: DISCONTINUED | OUTPATIENT
Start: 2024-09-10 | End: 2024-09-10

## 2024-09-10 RX ORDER — CALCIUM GLUCONATE 20 MG/ML
2 INJECTION, SOLUTION INTRAVENOUS ONCE
Status: COMPLETED | OUTPATIENT
Start: 2024-09-10 | End: 2024-09-10

## 2024-09-10 RX ORDER — SODIUM CHLORIDE, SODIUM LACTATE, POTASSIUM CHLORIDE, CALCIUM CHLORIDE 600; 310; 30; 20 MG/100ML; MG/100ML; MG/100ML; MG/100ML
150 INJECTION, SOLUTION INTRAVENOUS CONTINUOUS
Status: DISCONTINUED | OUTPATIENT
Start: 2024-09-10 | End: 2024-09-11

## 2024-09-10 RX ORDER — SODIUM CHLORIDE, SODIUM GLUCONATE, SODIUM ACETATE, POTASSIUM CHLORIDE, MAGNESIUM CHLORIDE, SODIUM PHOSPHATE, DIBASIC, AND POTASSIUM PHOSPHATE .53; .5; .37; .037; .03; .012; .00082 G/100ML; G/100ML; G/100ML; G/100ML; G/100ML; G/100ML; G/100ML
INJECTION, SOLUTION INTRAVENOUS CONTINUOUS PRN
Status: DISCONTINUED | OUTPATIENT
Start: 2024-09-10 | End: 2024-09-10

## 2024-09-10 RX ORDER — SODIUM CHLORIDE 9 MG/ML
INJECTION, SOLUTION INTRAVENOUS CONTINUOUS PRN
Status: DISCONTINUED | OUTPATIENT
Start: 2024-09-10 | End: 2024-09-10

## 2024-09-10 RX ORDER — METRONIDAZOLE 500 MG/100ML
500 INJECTION, SOLUTION INTRAVENOUS ONCE
Status: COMPLETED | OUTPATIENT
Start: 2024-09-10 | End: 2024-09-10

## 2024-09-10 RX ORDER — NEOMYCIN SULFATE 500 MG/1
1000 TABLET ORAL 3 TIMES DAILY
Status: DISCONTINUED | OUTPATIENT
Start: 2024-09-10 | End: 2024-09-10 | Stop reason: ALTCHOICE

## 2024-09-10 RX ORDER — SODIUM CHLORIDE, SODIUM GLUCONATE, SODIUM ACETATE, POTASSIUM CHLORIDE, MAGNESIUM CHLORIDE, SODIUM PHOSPHATE, DIBASIC, AND POTASSIUM PHOSPHATE .53; .5; .37; .037; .03; .012; .00082 G/100ML; G/100ML; G/100ML; G/100ML; G/100ML; G/100ML; G/100ML
1000 INJECTION, SOLUTION INTRAVENOUS ONCE
Status: COMPLETED | OUTPATIENT
Start: 2024-09-10 | End: 2024-09-11

## 2024-09-10 RX ORDER — SODIUM CHLORIDE, SODIUM GLUCONATE, SODIUM ACETATE, POTASSIUM CHLORIDE, MAGNESIUM CHLORIDE, SODIUM PHOSPHATE, DIBASIC, AND POTASSIUM PHOSPHATE .53; .5; .37; .037; .03; .012; .00082 G/100ML; G/100ML; G/100ML; G/100ML; G/100ML; G/100ML; G/100ML
1000 INJECTION, SOLUTION INTRAVENOUS ONCE
Status: COMPLETED | OUTPATIENT
Start: 2024-09-10 | End: 2024-09-10

## 2024-09-10 RX ORDER — EPHEDRINE SULFATE 50 MG/ML
INJECTION INTRAVENOUS AS NEEDED
Status: DISCONTINUED | OUTPATIENT
Start: 2024-09-10 | End: 2024-09-10

## 2024-09-10 RX ORDER — ONDANSETRON 2 MG/ML
4 INJECTION INTRAMUSCULAR; INTRAVENOUS ONCE AS NEEDED
Status: DISCONTINUED | OUTPATIENT
Start: 2024-09-10 | End: 2024-09-10 | Stop reason: HOSPADM

## 2024-09-10 RX ORDER — PHENYLEPHRINE HCL IN 0.9% NACL 1 MG/10 ML
SYRINGE (ML) INTRAVENOUS AS NEEDED
Status: DISCONTINUED | OUTPATIENT
Start: 2024-09-10 | End: 2024-09-10

## 2024-09-10 RX ORDER — DEXAMETHASONE SODIUM PHOSPHATE 10 MG/ML
INJECTION, SOLUTION INTRAMUSCULAR; INTRAVENOUS AS NEEDED
Status: DISCONTINUED | OUTPATIENT
Start: 2024-09-10 | End: 2024-09-10

## 2024-09-10 RX ORDER — SODIUM CHLORIDE, SODIUM LACTATE, POTASSIUM CHLORIDE, CALCIUM CHLORIDE 600; 310; 30; 20 MG/100ML; MG/100ML; MG/100ML; MG/100ML
125 INJECTION, SOLUTION INTRAVENOUS CONTINUOUS
Status: DISCONTINUED | OUTPATIENT
Start: 2024-09-10 | End: 2024-09-10

## 2024-09-10 RX ORDER — LIDOCAINE HCL/EPINEPHRINE/PF 2%-1:200K
VIAL (ML) INJECTION AS NEEDED
Status: DISCONTINUED | OUTPATIENT
Start: 2024-09-10 | End: 2024-09-10

## 2024-09-10 RX ORDER — ONDANSETRON 2 MG/ML
4 INJECTION INTRAMUSCULAR; INTRAVENOUS EVERY 6 HOURS PRN
Status: DISCONTINUED | OUTPATIENT
Start: 2024-09-10 | End: 2024-09-17 | Stop reason: HOSPADM

## 2024-09-10 RX ORDER — METOPROLOL TARTRATE 50 MG
100 TABLET ORAL DAILY
Status: DISCONTINUED | OUTPATIENT
Start: 2024-09-11 | End: 2024-09-11

## 2024-09-10 RX ORDER — CALCIUM CHLORIDE 100 MG/ML
INJECTION INTRAVENOUS; INTRAVENTRICULAR AS NEEDED
Status: DISCONTINUED | OUTPATIENT
Start: 2024-09-10 | End: 2024-09-10

## 2024-09-10 RX ORDER — HYDROMORPHONE HCL/PF 1 MG/ML
0.5 SYRINGE (ML) INJECTION EVERY 2 HOUR PRN
Status: DISCONTINUED | OUTPATIENT
Start: 2024-09-10 | End: 2024-09-16

## 2024-09-10 RX ORDER — FENTANYL CITRATE 50 UG/ML
INJECTION, SOLUTION INTRAMUSCULAR; INTRAVENOUS AS NEEDED
Status: DISCONTINUED | OUTPATIENT
Start: 2024-09-10 | End: 2024-09-10

## 2024-09-10 RX ORDER — CEFAZOLIN SODIUM 2 G/50ML
2000 SOLUTION INTRAVENOUS ONCE
Status: COMPLETED | OUTPATIENT
Start: 2024-09-10 | End: 2024-09-10

## 2024-09-10 RX ORDER — VASOPRESSIN 20 U/ML
INJECTION PARENTERAL AS NEEDED
Status: DISCONTINUED | OUTPATIENT
Start: 2024-09-10 | End: 2024-09-10

## 2024-09-10 RX ORDER — METOCLOPRAMIDE HYDROCHLORIDE 5 MG/ML
5 INJECTION INTRAMUSCULAR; INTRAVENOUS EVERY 6 HOURS PRN
Status: DISCONTINUED | OUTPATIENT
Start: 2024-09-10 | End: 2024-09-17 | Stop reason: HOSPADM

## 2024-09-10 RX ORDER — HEPARIN SODIUM 5000 [USP'U]/ML
5000 INJECTION, SOLUTION INTRAVENOUS; SUBCUTANEOUS EVERY 8 HOURS SCHEDULED
Status: DISCONTINUED | OUTPATIENT
Start: 2024-09-11 | End: 2024-09-13

## 2024-09-10 RX ORDER — METRONIDAZOLE 500 MG/1
500 TABLET ORAL EVERY 12 HOURS SCHEDULED
COMMUNITY
End: 2024-09-17

## 2024-09-10 RX ORDER — NEOMYCIN SULFATE 500 MG/1
1000 TABLET ORAL 2 TIMES DAILY
COMMUNITY
End: 2024-09-17

## 2024-09-10 RX ORDER — PROPOFOL 10 MG/ML
INJECTION, EMULSION INTRAVENOUS CONTINUOUS PRN
Status: DISCONTINUED | OUTPATIENT
Start: 2024-09-10 | End: 2024-09-10

## 2024-09-10 RX ORDER — SODIUM CHLORIDE 9 MG/ML
250 INJECTION, SOLUTION INTRAVENOUS CONTINUOUS
Status: DISCONTINUED | OUTPATIENT
Start: 2024-09-10 | End: 2024-09-10

## 2024-09-10 RX ORDER — ROCURONIUM BROMIDE 10 MG/ML
INJECTION, SOLUTION INTRAVENOUS AS NEEDED
Status: DISCONTINUED | OUTPATIENT
Start: 2024-09-10 | End: 2024-09-10

## 2024-09-10 RX ORDER — MIDAZOLAM HYDROCHLORIDE 2 MG/2ML
INJECTION, SOLUTION INTRAMUSCULAR; INTRAVENOUS AS NEEDED
Status: DISCONTINUED | OUTPATIENT
Start: 2024-09-10 | End: 2024-09-10

## 2024-09-10 RX ORDER — DIPHENHYDRAMINE HYDROCHLORIDE 50 MG/ML
25 INJECTION INTRAMUSCULAR; INTRAVENOUS EVERY 6 HOURS PRN
Status: DISCONTINUED | OUTPATIENT
Start: 2024-09-10 | End: 2024-09-17 | Stop reason: HOSPADM

## 2024-09-10 RX ORDER — LIDOCAINE HYDROCHLORIDE 10 MG/ML
INJECTION, SOLUTION EPIDURAL; INFILTRATION; INTRACAUDAL; PERINEURAL AS NEEDED
Status: DISCONTINUED | OUTPATIENT
Start: 2024-09-10 | End: 2024-09-10

## 2024-09-10 RX ORDER — METRONIDAZOLE 500 MG/1
1000 TABLET ORAL 3 TIMES DAILY
Status: DISCONTINUED | OUTPATIENT
Start: 2024-09-10 | End: 2024-09-10 | Stop reason: ALTCHOICE

## 2024-09-10 RX ORDER — ALBUMIN, HUMAN INJ 5% 5 %
SOLUTION INTRAVENOUS CONTINUOUS PRN
Status: DISCONTINUED | OUTPATIENT
Start: 2024-09-10 | End: 2024-09-10

## 2024-09-10 RX ORDER — NOREPINEPHRINE BITARTRATE 1 MG/ML
INJECTION, SOLUTION INTRAVENOUS
Status: DISPENSED
Start: 2024-09-10 | End: 2024-09-11

## 2024-09-10 RX ORDER — CEPHALEXIN 500 MG/1
500 CAPSULE ORAL EVERY 6 HOURS SCHEDULED
Qty: 12 CAPSULE | Refills: 0 | Status: SHIPPED | OUTPATIENT
Start: 2024-09-10 | End: 2024-09-13

## 2024-09-10 RX ORDER — HEPARIN SODIUM 5000 [USP'U]/ML
5000 INJECTION, SOLUTION INTRAVENOUS; SUBCUTANEOUS ONCE
Status: COMPLETED | OUTPATIENT
Start: 2024-09-10 | End: 2024-09-10

## 2024-09-10 RX ORDER — KETAMINE HCL IN NACL, ISO-OSM 100MG/10ML
SYRINGE (ML) INJECTION AS NEEDED
Status: DISCONTINUED | OUTPATIENT
Start: 2024-09-10 | End: 2024-09-10

## 2024-09-10 RX ORDER — OXYBUTYNIN CHLORIDE 10 MG/1
10 TABLET, EXTENDED RELEASE ORAL
Qty: 7 TABLET | Refills: 1 | Status: SHIPPED | OUTPATIENT
Start: 2024-09-10 | End: 2024-09-24

## 2024-09-10 RX ORDER — PROPOFOL 10 MG/ML
INJECTION, EMULSION INTRAVENOUS AS NEEDED
Status: DISCONTINUED | OUTPATIENT
Start: 2024-09-10 | End: 2024-09-10

## 2024-09-10 RX ORDER — ONDANSETRON 2 MG/ML
INJECTION INTRAMUSCULAR; INTRAVENOUS AS NEEDED
Status: DISCONTINUED | OUTPATIENT
Start: 2024-09-10 | End: 2024-09-10

## 2024-09-10 RX ORDER — ACETAMINOPHEN 325 MG/1
975 TABLET ORAL EVERY 6 HOURS PRN
Status: DISCONTINUED | OUTPATIENT
Start: 2024-09-10 | End: 2024-09-11

## 2024-09-10 RX ORDER — ALBUMIN, HUMAN INJ 5% 5 %
12.5 SOLUTION INTRAVENOUS ONCE
Status: COMPLETED | OUTPATIENT
Start: 2024-09-10 | End: 2024-09-10

## 2024-09-10 RX ORDER — NALOXONE HYDROCHLORIDE 0.4 MG/ML
0.1 INJECTION, SOLUTION INTRAMUSCULAR; INTRAVENOUS; SUBCUTANEOUS
Status: DISCONTINUED | OUTPATIENT
Start: 2024-09-10 | End: 2024-09-17 | Stop reason: HOSPADM

## 2024-09-10 RX ORDER — CEFAZOLIN SODIUM 2 G/50ML
2000 SOLUTION INTRAVENOUS
Status: COMPLETED | OUTPATIENT
Start: 2024-09-11 | End: 2024-09-11

## 2024-09-10 RX ORDER — MAGNESIUM SULFATE HEPTAHYDRATE 40 MG/ML
2 INJECTION, SOLUTION INTRAVENOUS ONCE
Status: COMPLETED | OUTPATIENT
Start: 2024-09-10 | End: 2024-09-11

## 2024-09-10 RX ORDER — ROPIVACAINE HYDROCHLORIDE 2 MG/ML
INJECTION, SOLUTION EPIDURAL; INFILTRATION; PERINEURAL AS NEEDED
Status: DISCONTINUED | OUTPATIENT
Start: 2024-09-10 | End: 2024-09-10

## 2024-09-10 RX ADMIN — Medication 200 MCG: at 19:34

## 2024-09-10 RX ADMIN — VASOPRESSIN 2 UNITS: 20 INJECTION INTRAVENOUS at 20:05

## 2024-09-10 RX ADMIN — VASOPRESSIN 2 UNITS: 20 INJECTION INTRAVENOUS at 20:13

## 2024-09-10 RX ADMIN — FENTANYL CITRATE 25 MCG: 50 INJECTION INTRAMUSCULAR; INTRAVENOUS at 11:18

## 2024-09-10 RX ADMIN — Medication 10 MG: at 16:06

## 2024-09-10 RX ADMIN — CEFAZOLIN SODIUM 2000 MG: 2 SOLUTION INTRAVENOUS at 17:30

## 2024-09-10 RX ADMIN — ALBUMIN (HUMAN): 12.5 INJECTION, SOLUTION INTRAVENOUS at 20:20

## 2024-09-10 RX ADMIN — Medication 100 MCG: at 17:25

## 2024-09-10 RX ADMIN — SODIUM CHLORIDE 250 ML: 0.9 INJECTION, SOLUTION INTRAVENOUS at 10:22

## 2024-09-10 RX ADMIN — SODIUM CHLORIDE, SODIUM LACTATE, POTASSIUM CHLORIDE, AND CALCIUM CHLORIDE 1000 ML: .6; .31; .03; .02 INJECTION, SOLUTION INTRAVENOUS at 20:00

## 2024-09-10 RX ADMIN — ROCURONIUM BROMIDE 20 MG: 10 INJECTION, SOLUTION INTRAVENOUS at 16:44

## 2024-09-10 RX ADMIN — SODIUM CHLORIDE, SODIUM GLUCONATE, SODIUM ACETATE, POTASSIUM CHLORIDE, MAGNESIUM CHLORIDE, SODIUM PHOSPHATE, DIBASIC, AND POTASSIUM PHOSPHATE 1000 ML: .53; .5; .37; .037; .03; .012; .00082 INJECTION, SOLUTION INTRAVENOUS at 23:32

## 2024-09-10 RX ADMIN — LIDOCAINE HYDROCHLORIDE 50 MG: 10 INJECTION, SOLUTION EPIDURAL; INFILTRATION; INTRACAUDAL; PERINEURAL at 13:20

## 2024-09-10 RX ADMIN — ROCURONIUM BROMIDE 10 MG: 10 INJECTION, SOLUTION INTRAVENOUS at 15:32

## 2024-09-10 RX ADMIN — Medication 200 MCG: at 18:09

## 2024-09-10 RX ADMIN — Medication 200 MCG: at 17:43

## 2024-09-10 RX ADMIN — CALCIUM CHLORIDE 200 MG: 100 INJECTION INTRAVENOUS; INTRAVENTRICULAR at 16:58

## 2024-09-10 RX ADMIN — Medication 100 MCG: at 17:17

## 2024-09-10 RX ADMIN — ROPIVACAINE HYDROCHLORIDE 3 ML: 2 INJECTION EPIDURAL; INFILTRATION; PERINEURAL at 18:48

## 2024-09-10 RX ADMIN — ALBUMIN (HUMAN): 12.5 INJECTION, SOLUTION INTRAVENOUS at 20:14

## 2024-09-10 RX ADMIN — SODIUM CHLORIDE, SODIUM LACTATE, POTASSIUM CHLORIDE, AND CALCIUM CHLORIDE: .6; .31; .03; .02 INJECTION, SOLUTION INTRAVENOUS at 13:13

## 2024-09-10 RX ADMIN — CALCIUM GLUCONATE 2 G: 20 INJECTION, SOLUTION INTRAVENOUS at 22:41

## 2024-09-10 RX ADMIN — ROCURONIUM BROMIDE 10 MG: 10 INJECTION, SOLUTION INTRAVENOUS at 16:06

## 2024-09-10 RX ADMIN — MIDAZOLAM 1 MG: 1 INJECTION INTRAMUSCULAR; INTRAVENOUS at 11:18

## 2024-09-10 RX ADMIN — Medication 200 MCG: at 19:28

## 2024-09-10 RX ADMIN — Medication 10 MG: at 13:59

## 2024-09-10 RX ADMIN — SODIUM CHLORIDE, SODIUM LACTATE, POTASSIUM CHLORIDE, AND CALCIUM CHLORIDE 150 ML/HR: .6; .31; .03; .02 INJECTION, SOLUTION INTRAVENOUS at 22:24

## 2024-09-10 RX ADMIN — SODIUM CHLORIDE, SODIUM GLUCONATE, SODIUM ACETATE, POTASSIUM CHLORIDE, MAGNESIUM CHLORIDE, SODIUM PHOSPHATE, DIBASIC, AND POTASSIUM PHOSPHATE: .53; .5; .37; .037; .03; .012; .00082 INJECTION, SOLUTION INTRAVENOUS at 17:30

## 2024-09-10 RX ADMIN — ONDANSETRON 4 MG: 2 INJECTION INTRAMUSCULAR; INTRAVENOUS at 18:23

## 2024-09-10 RX ADMIN — VASOPRESSIN 2 UNITS: 20 INJECTION INTRAVENOUS at 20:17

## 2024-09-10 RX ADMIN — VASOPRESSIN 2 UNITS: 20 INJECTION INTRAVENOUS at 20:09

## 2024-09-10 RX ADMIN — EPHEDRINE SULFATE 5 MG: 50 INJECTION, SOLUTION INTRAVENOUS at 16:27

## 2024-09-10 RX ADMIN — Medication 200 MCG: at 17:58

## 2024-09-10 RX ADMIN — HEPARIN SODIUM 5000 UNITS: 5000 INJECTION INTRAVENOUS; SUBCUTANEOUS at 13:32

## 2024-09-10 RX ADMIN — SODIUM CHLORIDE, SODIUM GLUCONATE, SODIUM ACETATE, POTASSIUM CHLORIDE, MAGNESIUM CHLORIDE, SODIUM PHOSPHATE, DIBASIC, AND POTASSIUM PHOSPHATE 1000 ML: .53; .5; .37; .037; .03; .012; .00082 INJECTION, SOLUTION INTRAVENOUS at 22:04

## 2024-09-10 RX ADMIN — Medication 50 MCG: at 14:28

## 2024-09-10 RX ADMIN — ALBUMIN (HUMAN): 12.5 INJECTION, SOLUTION INTRAVENOUS at 14:22

## 2024-09-10 RX ADMIN — DEXAMETHASONE SODIUM PHOSPHATE 10 MG: 10 INJECTION, SOLUTION INTRAMUSCULAR; INTRAVENOUS at 13:40

## 2024-09-10 RX ADMIN — SODIUM CHLORIDE: 0.9 INJECTION, SOLUTION INTRAVENOUS at 13:26

## 2024-09-10 RX ADMIN — CEFAZOLIN SODIUM 2000 MG: 2 SOLUTION INTRAVENOUS at 13:41

## 2024-09-10 RX ADMIN — Medication 100 MCG: at 16:27

## 2024-09-10 RX ADMIN — ALBUMIN (HUMAN): 12.5 INJECTION, SOLUTION INTRAVENOUS at 16:24

## 2024-09-10 RX ADMIN — MIDAZOLAM 1 MG: 1 INJECTION INTRAMUSCULAR; INTRAVENOUS at 13:20

## 2024-09-10 RX ADMIN — VASOPRESSIN 2 UNITS: 20 INJECTION INTRAVENOUS at 19:42

## 2024-09-10 RX ADMIN — MAGNESIUM SULFATE HEPTAHYDRATE 2 G: 40 INJECTION, SOLUTION INTRAVENOUS at 22:36

## 2024-09-10 RX ADMIN — Medication 20 MG: at 13:20

## 2024-09-10 RX ADMIN — FENTANYL CITRATE 25 MCG: 50 INJECTION INTRAMUSCULAR; INTRAVENOUS at 13:20

## 2024-09-10 RX ADMIN — METRONIDAZOLE: 500 INJECTION, SOLUTION INTRAVENOUS at 13:48

## 2024-09-10 RX ADMIN — Medication 200 MCG: at 17:47

## 2024-09-10 RX ADMIN — LIDOCAINE HYDROCHLORIDE,EPINEPHRINE BITARTRATE 3 ML: 20; .005 INJECTION, SOLUTION EPIDURAL; INFILTRATION; INTRACAUDAL; PERINEURAL at 16:08

## 2024-09-10 RX ADMIN — ROCURONIUM BROMIDE 20 MG: 10 INJECTION, SOLUTION INTRAVENOUS at 13:55

## 2024-09-10 RX ADMIN — SODIUM CHLORIDE: 0.9 INJECTION, SOLUTION INTRAVENOUS at 16:37

## 2024-09-10 RX ADMIN — PROPOFOL 180 MG: 10 INJECTION, EMULSION INTRAVENOUS at 13:20

## 2024-09-10 RX ADMIN — ALBUMIN (HUMAN) 12.5 G: 12.5 INJECTION, SOLUTION INTRAVENOUS at 20:35

## 2024-09-10 RX ADMIN — Medication 200 MCG: at 18:06

## 2024-09-10 RX ADMIN — Medication 100 MCG: at 16:47

## 2024-09-10 RX ADMIN — ROCURONIUM BROMIDE 20 MG: 10 INJECTION, SOLUTION INTRAVENOUS at 17:35

## 2024-09-10 RX ADMIN — VASOPRESSIN 2 UNITS: 20 INJECTION INTRAVENOUS at 19:38

## 2024-09-10 RX ADMIN — ALBUMIN (HUMAN): 12.5 INJECTION, SOLUTION INTRAVENOUS at 15:39

## 2024-09-10 RX ADMIN — PHENYLEPHRINE HYDROCHLORIDE 100 MCG/MIN: 50 INJECTION INTRAVENOUS at 20:15

## 2024-09-10 RX ADMIN — PHENYLEPHRINE HYDROCHLORIDE 20 MCG/MIN: 10 INJECTION INTRAVENOUS at 13:47

## 2024-09-10 RX ADMIN — ROCURONIUM BROMIDE 10 MG: 10 INJECTION, SOLUTION INTRAVENOUS at 14:32

## 2024-09-10 RX ADMIN — SODIUM CHLORIDE, SODIUM LACTATE, POTASSIUM CHLORIDE, AND CALCIUM CHLORIDE: .6; .31; .03; .02 INJECTION, SOLUTION INTRAVENOUS at 16:13

## 2024-09-10 RX ADMIN — EPHEDRINE SULFATE 5 MG: 50 INJECTION, SOLUTION INTRAVENOUS at 14:22

## 2024-09-10 RX ADMIN — ROCURONIUM BROMIDE 50 MG: 10 INJECTION, SOLUTION INTRAVENOUS at 13:20

## 2024-09-10 RX ADMIN — ROPIVACAINE HYDROCHLORIDE: 5 INJECTION EPIDURAL; INFILTRATION; PERINEURAL at 20:30

## 2024-09-10 RX ADMIN — EPHEDRINE SULFATE 10 MG: 50 INJECTION, SOLUTION INTRAVENOUS at 13:51

## 2024-09-10 RX ADMIN — PROPOFOL 50 MCG/KG/MIN: 10 INJECTION, EMULSION INTRAVENOUS at 13:25

## 2024-09-10 RX ADMIN — SUGAMMADEX 200 MG: 100 INJECTION, SOLUTION INTRAVENOUS at 19:07

## 2024-09-10 NOTE — PROGRESS NOTES
Pt taken to [facility] for epidural with Shweta. Missing chart components reviewed with Juan Manuel: interval note by Dr. Boone. Pt belongings with  EdgardBraxton

## 2024-09-10 NOTE — TELEPHONE ENCOUNTER
Status post complex cystorrhaphy, has a Conroy catheter in place.  I have ordered a 2-week cystogram.  Please arrange for Conroy catheter removal in roughly 2 weeks, pending negative cystogram

## 2024-09-10 NOTE — ANESTHESIA PROCEDURE NOTES
Epidural Block    Patient location during procedure: holding area  Start time: 9/10/2024 11:18 AM  Reason for block: post-op pain management  Staffing  Performed by: Cuco Roldan MD  Authorized by: Cuco Roldan MD    Preanesthetic Checklist  Completed: patient identified, IV checked, risks and benefits discussed, surgical consent, monitors and equipment checked, pre-op evaluation and timeout performed  Epidural  Patient position: sitting  Prep: ChloraPrep and site prepped and draped  Sedation Level: light sedation  Patient monitoring: continuous pulse oximetry and heart rate  Approach: midline  Location: thoracic, T9-10  Injection technique: SATYA air  Needle  Needle type: Tuohy   Needle gauge: 17 G  Needle insertion depth: 5.5 cm  Catheter type: side hole  Catheter size: 19 G  Catheter at skin depth: 12 cm  Catheter securement method: stabilization device, clear occlusive dressing and tape  Assessment  Number of attempts: 1negative aspiration for CSF, negative aspiration for heme and no paresthesia on injection  patient tolerated the procedure well with no immediate complications  Additional Notes  Epidural X 1 w/o incident. After no asp. CSF or Heme, 3 mls. N/S injected via needle w/o incident. 17G Epidural catheter inserted w/o incident. After no asp. CSF or heme, 3mls  N/S injected easily via catheter. Catheter taped to back. Alligator clip double taped. Pt. Supine and back elevated.

## 2024-09-10 NOTE — INTERVAL H&P NOTE
H&P reviewed. After examining the patient I find no changes in the patients condition since the H&P had been written.    Vitals:    09/10/24 1003   BP: 110/60   Pulse: 73   Resp: 18   Temp: (!) 96.6 °F (35.9 °C)   SpO2: 100%

## 2024-09-10 NOTE — DISCHARGE INSTR - AVS FIRST PAGE
Dr. Delilah Thomas here, with urology.  The colorectal surgery team called us in on your case today to help with a bladder injury incurred due to scar tissue around your sigmoid colon and rectum due to your cancer.  We were able to fix this and I expect you to make a full recovery.  We also placed a camera in the bladder and placed some small catheters in your ureters to ensure that these are open and not involved with your cancer process.    Whenever a bladder is fixed it does require constant drainage to allow this to heal.  We will arrange for a study called a cystogram to be performed in roughly 2 weeks in which some dye is placed into the bladder to ensure that this is not leaking out.  If this is negative then we will have your catheter removed in our office.    Has been advised that after a bladder repair it is not uncommon to have urgency and frequency of urination along with some blood in the urine as well.  You may also notice at first that you have some more frequent voiding/urinating as your bladder heals.    We wish you a rapid and uneventful recovery,  Dr. Mazariegos

## 2024-09-10 NOTE — OP NOTE
OPERATIVE REPORT  PATIENT NAME: Martha Sandoval    :  1950  MRN: [id number]  Pt Location: [facility] OR ROOM 14    SURGERY DATE: 9/10/2024    Surgeons and Role:  Panel 1:     * BRENDA Boone MD - Primary     * Jed Roldan MD - Assisting  Panel 2:     * Ismael Mazariegos MD - Primary    Preop Diagnosis:  Rectal cancer (HCC) [C20]    Post-Op Diagnosis Codes:     * Rectal cancer (HCC) [C20]  Cystotomy     Procedure(s):  RESECTION COLON LOW ANTERIOR LAPAROSCOPIC HAND ASSISTED CONVERTED TO HYBRID OPEN  Flexible sigmoidoscopy  Laparoscopic mobilization of the splenic flexure.  CYSTOSCOPY. COMPLEX CYSTORRHAPHY. B/L URETHRAL CATETERIZATION    Specimen(s):  ID Type Source Tests Collected by Time Destination   1 : RECTUM AND SIGMOID COLON AND ANASTOMATIC DONUTS Tissue Soft Tissue, Other TISSUE EXAM BRENDA Boone MD 9/10/2024 1753        Estimated Blood Loss:   400 mL    Drains:  Closed/Suction Drain Right Abdomen 10 Fr. (Active)   Number of days: 0       Closed/Suction Drain Left LLQ Bulb 19 Fr. (Active)   Number of days: 0       NG/OG/Enteral Tube Orogastric 18 Fr (Active)   Number of days: 0       Urethral Catheter Latex 22 Fr. (Active)   Number of days: 0       [REMOVED] Urethral Catheter Double-lumen;Non-latex 16 Fr. (Removed)   Number of days: 0       Anesthesia Type:   General    Operative Indications:  Rectal cancer (HCC) [C20]    Operative Findings:  Rectal cancer  Extensive pelvic adhesions  Obliteration of dissection planes throughout the retroperitoneum and between the rectum and bladder as well as retrorectal tissues    Complications:   Cystotomy related to extensive and dense adhesions in the pelvis with a thin-walled bladder adhesed to the anterior rectum.    Procedure and Technique:  The patient was placed in a supine position with legs in Nicolas boots.  Her arms were cushioned and tucked at her sides.  The chest was wrapped with pink pad material.  She was on a pink pad and the arms were cushioned  with pink pad material.  The abdomen was prepped widely using ChloraPrep with some difficulty and using 2 preps due to an extremely large pannus and intertriginous areas that created zones that could not be left exposed.  The pannus was very large despite apparent prior panniculectomy.    Because of the patient's body habitus, I elected to perform a hand-assisted procedure.  A lower midline incision was created with a feeling that we may have to convert to a hybrid open procedure at the end of the operation because of the patient's obesity.  This turned out to be the case.  An 11 mm trocar was placed in the right supraumbilical area, a 5 mm trocar was placed in the right lateral and left lateral abdomen, and a 12 mm trocar was placed in the right lower quadrant.  Hand-assisted technique was used to initiate dissection.    We initially began adhesiolysis with difficulty due to extensive adhesions in the pelvis.  Enseal technique was used to dissect the small bowel adhesions that were dense to allow for mobilization of the small bowel out of the pelvis.  This was done mostly using sharp technique.  No injuries were apparent.  The sigmoid colon was matted in the pelvis and there were very dense adhesions to the pelvic sidewall.    I then directed my attention to mobilization of the splenic flexure.  This was somewhat difficult due to the patient's obesity.  It was difficult to move the very thick mesentery and small bowel away from the tissues at the ligament of Treitz but I was able to do this with success.  Dissection posterior to the inferior mesenteric vein was difficult due to poorly defined avascular planes.  However, I was able to maintain dissection anterior to the kidney and avoiding the ureter during this dissection.  Lateral dissection was then performed on the descending colon at the white line of Toldt.  The bowel was lifted away from the retroperitoneal structures and Gerota's fascia.  The splenic flexure  was surrounded and mobilized all the way to the midportion of the transverse colon.  The inferior mesenteric vein was identified and was divided with multiple fires of an Enseal device.  This allowed for great mobility of the descending colon.    Attention was then directed to the retroperitoneal dissection going down the descending colon onto the sigmoid colon.  The bowel was mobilized medially down to the level of the adhesions in the sigmoid colon.  These were dense and included sigmoid adhesions to the left pelvic sidewall.  Medial dissection was then approached to see if I could find an easier dissection plane.  Again, the tissues were very densely adhered in the retroperitoneal layers but I was able to identify these layers and maintain appropriate planes of dissection posterior to the superior rectal artery.  The retroperitoneal structures were avoided.  Dissection was carried into the retrorectal space.  Hand-assisted blunt dissection was used to some degree and sharp dissection was used wherever I could accomplish it.    Again, we encountered remaining adhesions between the sigmoid colon and the rectum.  Lateral and anterior lysis of adhesions was continued until we could bring the sigmoid out of the pelvis.  I then attempted dissection in the area that was intended to separate the rectum from the vagina.  I assumed that the adhesions were related to the prior hysterectomy.      During this dissection, I identified a cystotomy, visualizing the Conroy catheter through the cystotomy.  The injury was made during delivery and careful dissection.  Even after the injury, I was surprised by the nature of the adhesion, the thinness of the bladder wall, the unusual position of the bladder adhesion to the anterior rectum, and the difficulty with identifying the tissue planes.  I am do not believe I could have avoided this injury as it was a part of this difficult pelvic dissection.  Dr. Mazariegos from urology was called  into the room.  With his guidance and participation, I felt the best option at this time was to complete the mobilization of the bladder off the anterior rectum.  Dissection was carried down into the pelvis to complete the rectal dissection in order to allow for a proper bladder repair that would not be later damaged by retraction that will be required for the rectal mobilization.    While I was waiting for Dr. Mazariegos to enter the room, I felt it was optimal for me to maximized further safety and cancer cure.  Given the dissection planes being obliterated to some degree and palpation of the tumor to be difficult due to very hard and thickened mesorectal tissues, I felt that hybrid open technique was optimal.  The lower incision was therefore extended to the pubis and up to the infraumbilical region.  A Bookwalter retractor was placed and retraction was maximized.  The patient had a 6 inch thick layer of subcutaneous fat in the suprapubic area that created some difficulties but we were able to expose the planes of dissection in order to maximize our safety during the remainder of the procedure.  Posterolateral and posterior dissection was undertaken using blunt and sharp dissection to free the rectum from the pelvic sidewalls.  Anteriorly, I was able to establish a plane between the rectum and the bladder and finally separate the bladder completely from the rectum.  I could then identify a clear cul-de-sac without adhesions in the actual cul-de-sac.  The rectal vaginal septum could then be  at the cul-de-sac and distally by 2 or 3 cm.    See Dr. Mazariegos's cystorrhaphy dictation.    Sigmoidoscopy was then performed to ensure that we were beyond the tumor.  The dissection was continued to a point distal to the tattoo and the mesorectum was then divided distal to the tumor itself.  The bowel was transected using a contour stapler with a good staple line identified.  The descending/sigmoid colon junction was then  prepared for placement of a proximal anvil of the stapler.  The mesentery was sequentially divided and ligated.  A pursestring device was positioned and a 2-0 Prolene pursestring was placed.  The bowel was divided and sent for pathologic evaluation.  The anvil of a 29 mm CDH stapler was positioned.  It was tied into position using 2 wraps of the Prolene pursestring around a full-thickness collar of supple and well-vascularized descending colon.    A tension-free double stapled anastomosis was then created.  Sigmoidoscopy under a water bath revealed no air leak.  The donuts were intact and thick circumferentially.      Sigmoidoscopy showed well-vascularized tissues proximally and distally at the anastomosis without any defects and with a circumferentially intact anastomosis that was widely patent.  The anastomosis was at 7 to 8 cm from the anal verge.  The anastomosis was loose by palpation.  This was inspected for any bleeding and none was identified.  A Reinaldo-Palmer drain was placed on the right side of the pelvis and brought out through a stab incision in the right lower quadrant.  A circular Reinaldo-Palmer was placed in the left side of the pelvis and brought out through a stab incision through the left lower quadrant.  They were both tied in position using 3-0 nylon suture.  Trocars were removed.    The wounds were irrigated and dried.  The lower midline was closed using a running 1 PDS suture placed from either end of the wound.  The subcutaneous tissues were irrigated and dried.  The wounds were closed using staples.    Sponge needle and instrument counts were correct.  Wand technique revealed no retained gauze.    I was present for the entire procedure.    Patient Disposition:  PACU     Colon Resection  Operation performed with curative intent Yes   Tumor Location (select all that apply) Rectum, NOS   Extent of colon and vascular resection (select all that apply) Other low anterior of the rectum to the mid  rectum with pull-through anastomosis.       The case was discussed with the patient's .      SIGNATURE: JUANY Boone MD  DATE: September 10, 2024  TIME: 6:53 PM

## 2024-09-10 NOTE — OP NOTE
OPERATIVE REPORT  PATIENT NAME: Martha Sandoval    :  1950  MRN: [id number]  Pt Location: [facility] OR ROOM 14    SURGERY DATE: 9/10/2024    Surgeons and Role:     * BERNDA Boone MD - Primary     * Ismael Mazariegos MD - consulting surgeon     * Jed Roldan MD - Assisting    Preop Diagnosis:  Rectal cancer (HCC) [C20]    Post-Op Diagnosis Codes:     * Rectal cancer (HCC) [C20]    Procedure(s):  RESECTION COLON LOW ANTERIOR LAPAROSCOPIC HAND ASSISTED CONVERTED TO OPEN  Complex cystorrhaphy, cystoscopy, ureteral catheter placement    Specimen(s):  ID Type Source Tests Collected by Time Destination   1 : RECTUM AND SIGMOID COLON Tissue Soft Tissue, Other TISSUE EXAM BRENDA Boone MD 9/10/2024 5873        Estimated Blood Loss:   300 mL    Drains:  NG/OG/Enteral Tube Orogastric 18 Fr (Active)   Number of days: 0       [REMOVED] Urethral Catheter Double-lumen;Non-latex 16 Fr. (Removed)   Number of days: 0       Anesthesia Type:   General    Operative Indications:  Rectal cancer (HCC) [C20]      Operative Findings:  Intraoperative consultation to urology called when a cystotomy was noted.  This is due to the disease characteristics of the rectal/colon cancer.  Along with the colorectal surgery team the bladder was  from the sigmoid colon and rectum and a complex cystorrhaphy performed in multiple layers was performed.  Cystoscopy was also performed with passage of ureteral catheters bilaterally which were palpable but not visible indicating no violation of the ureters.  No bloody urine is noted coming from either the right or left ureter.  A modified Conroy catheter was placed with a planned dwell time of 2 weeks with a cystogram to be performed prior to catheter removal      Complications:   None    Procedure and Technique:    The patient is a 74-year-old woman undergoing resection for rectal cancer.  Urology was called intraoperatively due to concern for a cystotomy.  We were briefed on the  patient's history and the operative events prior to our entering into the operating room.  The case was started as a hand-assisted laparoscopic case and converted to open given poor tissue planes about the sigmoid colon and rectum.    The cystorrhaphy is noted to be roughly 4 cm and transverse at the dome of the bladder.  Does not appear to involve the trigone.    I was able to help in suction and retraction and dissection of the sigmoid colon and rectum.  Once this was divided by the colorectal surgery team we were able to more fully visualize the bladder injury.  This was closed in a transverse fashion first with a running mucosal layer of 3-0 Vicryl followed by a seromuscular layer of 2-0 Vicryl.    Following this a rigid cystoscope was placed per urethra and a open-ended ureteral catheter was placed easily up the right ureter which was not visualized in the surgical field but was palpable.    Following this a left-sided ureteral catheter was placed, also passing easily to the proximal ureter which was palpable but not visible indicative of intact ureters.    Clear urine is seen draining from both ureters.    During the higher hydrostatic pressure of cystoscopy 2 small areas of leakage were noted at the site of cystorrhaphy.  These were further closed with a 2-0 Vicryl suture in figure-of-eight fashion.    An additional drainage hole proximal to the Conroy catheter balloon was fashioned on a 22 Welsh Conroy catheter for maximal bladder drainage.  This was inflated with 10 cc of sterile water and put to gravity drainage.    At the end of the urologic portion of today's procedure the patient remains under the excellent care of the colorectal surgery team, intubated and under general anesthesia.       I was present for the entire procedure. and A qualified resident physician was not available.    Patient Disposition:  PACU     Plan: Please keep the patient's Conroy catheter as a urologic requirement.  We will arrange  for a CT cystogram to be performed in roughly 2 weeks to assess for healing.  We will also arrange for an outpatient visit for Conroy catheter removal in the office setting.         SIGNATURE: Ismael Mazariegos MD  DATE: September 10, 2024  TIME: 6:08 PM

## 2024-09-10 NOTE — ANESTHESIA PREPROCEDURE EVALUATION
Procedure:  RESECTION COLON LOW ANTERIOR LAPAROSCOPIC (Abdomen)    Relevant Problems   ENDO   (+) Hypothyroidism      GI/HEPATIC   (+) 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)   (+) Rectal cancer (HCC)      /RENAL   (+) Benign hypertension with CKD (chronic kidney disease) stage III (HCC)   (+) Stage 3a chronic kidney disease (HCC)      MUSCULOSKELETAL   (+) Gout of right foot      Other   (+) BMI 40.0-44.9, adult (HCC)   8/24    Left Ventricle: Systolic function is normal with an ejection fraction   of 61-65%. The EF by visual approximation is 60%. There is grade I (mild)   diastolic dysfunction and normal left atrial pressure.  Normal Valvular Fxn.     Physical Exam    Airway    Mallampati score: II  TM Distance: >3 FB  Neck ROM: full     Dental   No notable dental hx     Cardiovascular      Pulmonary   Breath sounds clear to auscultation    Other Findings  post-pubertal.      Anesthesia Plan  ASA Score- 3     Anesthesia Type- general with ASA Monitors.         Additional Monitors: arterial line.    Airway Plan: ETT.    Comment: Epidural Planned for Post op Pain Control.       Plan Factors-Exercise tolerance (METS): >4 METS.    Chart reviewed. EKG reviewed. Imaging results reviewed. Existing labs reviewed. Patient summary reviewed.    Patient is not a current smoker.              Induction- intravenous.    Postoperative Plan-         Informed Consent- Anesthetic plan and risks discussed with patient and spouse.  I personally reviewed this patient with the CRNA. Discussed and agreed on the Anesthesia Plan with the CRNA..  Epidural discussed with patient. Side effects, including post dural puncture headache discussed. All questions answered. Consent given by patient.

## 2024-09-11 ENCOUNTER — APPOINTMENT (INPATIENT)
Dept: NON INVASIVE DIAGNOSTICS | Facility: HOSPITAL | Age: 74
DRG: 329 | End: 2024-09-11
Payer: COMMERCIAL

## 2024-09-11 ENCOUNTER — APPOINTMENT (OUTPATIENT)
Dept: RADIOLOGY | Facility: HOSPITAL | Age: 74
DRG: 329 | End: 2024-09-11
Payer: COMMERCIAL

## 2024-09-11 ENCOUNTER — APPOINTMENT (INPATIENT)
Dept: RADIOLOGY | Facility: HOSPITAL | Age: 74
DRG: 329 | End: 2024-09-11
Payer: COMMERCIAL

## 2024-09-11 PROBLEM — S37.20XD BLADDER INJURY, SUBSEQUENT ENCOUNTER: Status: ACTIVE | Noted: 2024-09-11

## 2024-09-11 LAB
ABO GROUP BLD BPU: NORMAL
ABO GROUP BLD: NORMAL
ALBUMIN SERPL BCG-MCNC: 3.3 G/DL (ref 3.5–5)
ALP SERPL-CCNC: 25 U/L (ref 34–104)
ALT SERPL W P-5'-P-CCNC: 25 U/L (ref 7–52)
ANION GAP SERPL CALCULATED.3IONS-SCNC: 10 MMOL/L (ref 4–13)
ANION GAP SERPL CALCULATED.3IONS-SCNC: 2 MMOL/L (ref 4–13)
ANION GAP SERPL CALCULATED.3IONS-SCNC: 5 MMOL/L (ref 4–13)
ANION GAP SERPL CALCULATED.3IONS-SCNC: 8 MMOL/L (ref 4–13)
AORTIC ROOT: 2.9 CM
APICAL FOUR CHAMBER EJECTION FRACTION: 63 %
AST SERPL W P-5'-P-CCNC: 37 U/L (ref 13–39)
ATRIAL RATE: 89 BPM
BASE EXCESS BLDA CALC-SCNC: -5.5 MMOL/L
BASE EXCESS BLDA CALC-SCNC: -6.9 MMOL/L
BASOPHILS # BLD AUTO: 0 THOUSANDS/ΜL (ref 0–0.1)
BASOPHILS # BLD AUTO: 0.01 THOUSANDS/ΜL (ref 0–0.1)
BASOPHILS NFR BLD AUTO: 0 % (ref 0–1)
BASOPHILS NFR BLD AUTO: 0 % (ref 0–1)
BILIRUB DIRECT SERPL-MCNC: 0.44 MG/DL (ref 0–0.2)
BILIRUB SERPL-MCNC: 1.25 MG/DL (ref 0.2–1)
BLD GP AB SCN SERPL QL: NEGATIVE
BPU ID: NORMAL
BSA FOR ECHO PROCEDURE: 1.97 M2
BUN SERPL-MCNC: 23 MG/DL (ref 5–25)
BUN SERPL-MCNC: 23 MG/DL (ref 5–25)
BUN SERPL-MCNC: 24 MG/DL (ref 5–25)
BUN SERPL-MCNC: 24 MG/DL (ref 5–25)
CA-I BLD-SCNC: 1.04 MMOL/L (ref 1.12–1.32)
CALCIUM SERPL-MCNC: 7.6 MG/DL (ref 8.4–10.2)
CALCIUM SERPL-MCNC: 7.6 MG/DL (ref 8.4–10.2)
CALCIUM SERPL-MCNC: 7.7 MG/DL (ref 8.4–10.2)
CALCIUM SERPL-MCNC: 7.8 MG/DL (ref 8.4–10.2)
CHLORIDE SERPL-SCNC: 106 MMOL/L (ref 96–108)
CHLORIDE SERPL-SCNC: 106 MMOL/L (ref 96–108)
CHLORIDE SERPL-SCNC: 107 MMOL/L (ref 96–108)
CHLORIDE SERPL-SCNC: 110 MMOL/L (ref 96–108)
CO2 SERPL-SCNC: 20 MMOL/L (ref 21–32)
CO2 SERPL-SCNC: 20 MMOL/L (ref 21–32)
CO2 SERPL-SCNC: 21 MMOL/L (ref 21–32)
CO2 SERPL-SCNC: 22 MMOL/L (ref 21–32)
CREAT FLD-MCNC: 1.41 MG/DL
CREAT FLD-MCNC: 12.01 MG/DL
CREAT SERPL-MCNC: 0.94 MG/DL (ref 0.6–1.3)
CREAT SERPL-MCNC: 0.95 MG/DL (ref 0.6–1.3)
CREAT SERPL-MCNC: 1.01 MG/DL (ref 0.6–1.3)
CREAT SERPL-MCNC: 1.03 MG/DL (ref 0.6–1.3)
CROSSMATCH: NORMAL
DOP CALC LVOT CARDIAC INDEX: 3.9 L/MIN/M2
DOP CALC LVOT CARDIAC OUTPUT: 7.7 L/MIN
E WAVE DECELERATION TIME: 202 MS
E/A RATIO: 1.07
EOSINOPHIL # BLD AUTO: 0 THOUSAND/ΜL (ref 0–0.61)
EOSINOPHIL # BLD AUTO: 0 THOUSAND/ΜL (ref 0–0.61)
EOSINOPHIL NFR BLD AUTO: 0 % (ref 0–6)
EOSINOPHIL NFR BLD AUTO: 0 % (ref 0–6)
ERYTHROCYTE [DISTWIDTH] IN BLOOD BY AUTOMATED COUNT: 15.9 % (ref 11.6–15.1)
ERYTHROCYTE [DISTWIDTH] IN BLOOD BY AUTOMATED COUNT: 16.1 % (ref 11.6–15.1)
GFR SERPL CREATININE-BSD FRML MDRD: 54 ML/MIN/1.73SQ M
GFR SERPL CREATININE-BSD FRML MDRD: 55 ML/MIN/1.73SQ M
GFR SERPL CREATININE-BSD FRML MDRD: 59 ML/MIN/1.73SQ M
GFR SERPL CREATININE-BSD FRML MDRD: 60 ML/MIN/1.73SQ M
GLUCOSE SERPL-MCNC: 149 MG/DL (ref 65–140)
GLUCOSE SERPL-MCNC: 152 MG/DL (ref 65–140)
GLUCOSE SERPL-MCNC: 163 MG/DL (ref 65–140)
GLUCOSE SERPL-MCNC: 194 MG/DL (ref 65–140)
HCO3 BLDA-SCNC: 18.1 MMOL/L (ref 22–28)
HCO3 BLDA-SCNC: 18.1 MMOL/L (ref 22–28)
HCT VFR BLD AUTO: 25.9 % (ref 34.8–46.1)
HCT VFR BLD AUTO: 26.8 % (ref 34.8–46.1)
HGB BLD-MCNC: 8.6 G/DL (ref 11.5–15.4)
HGB BLD-MCNC: 8.9 G/DL (ref 11.5–15.4)
IMM GRANULOCYTES # BLD AUTO: 0.03 THOUSAND/UL (ref 0–0.2)
IMM GRANULOCYTES # BLD AUTO: 0.03 THOUSAND/UL (ref 0–0.2)
IMM GRANULOCYTES NFR BLD AUTO: 0 % (ref 0–2)
IMM GRANULOCYTES NFR BLD AUTO: 0 % (ref 0–2)
INR PPP: 1.53 (ref 0.85–1.19)
LACTATE SERPL-SCNC: 2.4 MMOL/L (ref 0.5–2)
LACTATE SERPL-SCNC: 2.4 MMOL/L (ref 0.5–2)
LACTATE SERPL-SCNC: 2.6 MMOL/L (ref 0.5–2)
LACTATE SERPL-SCNC: 3.3 MMOL/L (ref 0.5–2)
LEFT VENTRICULAR INTERNAL DIMENSION IN DIASTOLE: 3.4 CM (ref 3.5–6)
LYMPHOCYTES # BLD AUTO: 0.42 THOUSANDS/ΜL (ref 0.6–4.47)
LYMPHOCYTES # BLD AUTO: 0.49 THOUSANDS/ΜL (ref 0.6–4.47)
LYMPHOCYTES NFR BLD AUTO: 5 % (ref 14–44)
LYMPHOCYTES NFR BLD AUTO: 6 % (ref 14–44)
MAGNESIUM SERPL-MCNC: 2 MG/DL (ref 1.9–2.7)
MAGNESIUM SERPL-MCNC: 2.1 MG/DL (ref 1.9–2.7)
MAGNESIUM SERPL-MCNC: 2.1 MG/DL (ref 1.9–2.7)
MCH RBC QN AUTO: 27.4 PG (ref 26.8–34.3)
MCH RBC QN AUTO: 27.6 PG (ref 26.8–34.3)
MCHC RBC AUTO-ENTMCNC: 33.2 G/DL (ref 31.4–37.4)
MCHC RBC AUTO-ENTMCNC: 33.2 G/DL (ref 31.4–37.4)
MCV RBC AUTO: 83 FL (ref 82–98)
MCV RBC AUTO: 83 FL (ref 82–98)
MONOCYTES # BLD AUTO: 0.51 THOUSAND/ΜL (ref 0.17–1.22)
MONOCYTES # BLD AUTO: 0.81 THOUSAND/ΜL (ref 0.17–1.22)
MONOCYTES NFR BLD AUTO: 8 % (ref 4–12)
MONOCYTES NFR BLD AUTO: 8 % (ref 4–12)
MV E'TISSUE VEL-SEP: 10 CM/S
MV PEAK A VEL: 0.86 M/S
MV PEAK E VEL: 92 CM/S
MV STENOSIS PRESSURE HALF TIME: 59 MS
MV VALVE AREA P 1/2 METHOD: 3.73
NEUTROPHILS # BLD AUTO: 5.71 THOUSANDS/ΜL (ref 1.85–7.62)
NEUTROPHILS # BLD AUTO: 8.63 THOUSANDS/ΜL (ref 1.85–7.62)
NEUTS SEG NFR BLD AUTO: 86 % (ref 43–75)
NEUTS SEG NFR BLD AUTO: 87 % (ref 43–75)
NRBC BLD AUTO-RTO: 0 /100 WBCS
NRBC BLD AUTO-RTO: 0 /100 WBCS
O2 CT BLDA-SCNC: 12.4 ML/DL (ref 16–23)
O2 CT BLDA-SCNC: 13 ML/DL (ref 16–23)
OVALOCYTES BLD QL SMEAR: PRESENT
OXYHGB MFR BLDA: 93.6 % (ref 94–97)
OXYHGB MFR BLDA: 96.6 % (ref 94–97)
P AXIS: 73 DEGREES
PCO2 BLDA: 28.8 MM HG (ref 36–44)
PCO2 BLDA: 34.4 MM HG (ref 36–44)
PH BLDA: 7.34 [PH] (ref 7.35–7.45)
PH BLDA: 7.42 [PH] (ref 7.35–7.45)
PHOSPHATE SERPL-MCNC: 2.6 MG/DL (ref 2.3–4.1)
PHOSPHATE SERPL-MCNC: 3.1 MG/DL (ref 2.3–4.1)
PLATELET # BLD AUTO: 126 THOUSANDS/UL (ref 149–390)
PLATELET # BLD AUTO: 97 THOUSANDS/UL (ref 149–390)
PLATELET BLD QL SMEAR: ABNORMAL
PMV BLD AUTO: 9.6 FL (ref 8.9–12.7)
PMV BLD AUTO: 9.9 FL (ref 8.9–12.7)
PO2 BLDA: 131.8 MM HG (ref 75–129)
PO2 BLDA: 85.3 MM HG (ref 75–129)
POIKILOCYTOSIS BLD QL SMEAR: PRESENT
POTASSIUM SERPL-SCNC: 4.3 MMOL/L (ref 3.5–5.3)
POTASSIUM SERPL-SCNC: 4.5 MMOL/L (ref 3.5–5.3)
POTASSIUM SERPL-SCNC: 4.6 MMOL/L (ref 3.5–5.3)
POTASSIUM SERPL-SCNC: 5 MMOL/L (ref 3.5–5.3)
PR INTERVAL: 162 MS
PROT SERPL-MCNC: 4.9 G/DL (ref 6.4–8.4)
PROTHROMBIN TIME: 18.6 SECONDS (ref 12.3–15)
QRS AXIS: 52 DEGREES
QRSD INTERVAL: 78 MS
QT INTERVAL: 364 MS
QTC INTERVAL: 442 MS
RBC # BLD AUTO: 3.14 MILLION/UL (ref 3.81–5.12)
RBC # BLD AUTO: 3.23 MILLION/UL (ref 3.81–5.12)
RBC MORPH BLD: PRESENT
RH BLD: POSITIVE
SL CV ECHO HEART RATE: 71 BPM
SL CV LV EF: 65
SODIUM SERPL-SCNC: 132 MMOL/L (ref 135–147)
SODIUM SERPL-SCNC: 134 MMOL/L (ref 135–147)
SODIUM SERPL-SCNC: 135 MMOL/L (ref 135–147)
SODIUM SERPL-SCNC: 136 MMOL/L (ref 135–147)
SPECIMEN EXPIRATION DATE: NORMAL
SPECIMEN SOURCE: ABNORMAL
T WAVE AXIS: 53 DEGREES
TRICUSPID ANNULAR PLANE SYSTOLIC EXCURSION: 1.5 CM
UNIT DISPENSE STATUS: NORMAL
UNIT PRODUCT CODE: NORMAL
UNIT PRODUCT VOLUME: 350 ML
UNIT RH: NORMAL
VENTRICULAR RATE: 89 BPM
WBC # BLD AUTO: 6.67 THOUSAND/UL (ref 4.31–10.16)
WBC # BLD AUTO: 9.97 THOUSAND/UL (ref 4.31–10.16)

## 2024-09-11 PROCEDURE — 82330 ASSAY OF CALCIUM: CPT

## 2024-09-11 PROCEDURE — 93308 TTE F-UP OR LMTD: CPT

## 2024-09-11 PROCEDURE — 72194 CT PELVIS W/O & W/DYE: CPT

## 2024-09-11 PROCEDURE — NC001 PR NO CHARGE: Performed by: COLON & RECTAL SURGERY

## 2024-09-11 PROCEDURE — 93325 DOPPLER ECHO COLOR FLOW MAPG: CPT | Performed by: INTERNAL MEDICINE

## 2024-09-11 PROCEDURE — 84100 ASSAY OF PHOSPHORUS: CPT

## 2024-09-11 PROCEDURE — 82805 BLOOD GASES W/O2 SATURATION: CPT

## 2024-09-11 PROCEDURE — 93321 DOPPLER ECHO F-UP/LMTD STD: CPT | Performed by: INTERNAL MEDICINE

## 2024-09-11 PROCEDURE — 99024 POSTOP FOLLOW-UP VISIT: CPT | Performed by: UROLOGY

## 2024-09-11 PROCEDURE — 80048 BASIC METABOLIC PNL TOTAL CA: CPT | Performed by: PHYSICIAN ASSISTANT

## 2024-09-11 PROCEDURE — 93308 TTE F-UP OR LMTD: CPT | Performed by: INTERNAL MEDICINE

## 2024-09-11 PROCEDURE — 83605 ASSAY OF LACTIC ACID: CPT

## 2024-09-11 PROCEDURE — 82570 ASSAY OF URINE CREATININE: CPT

## 2024-09-11 PROCEDURE — 76775 US EXAM ABDO BACK WALL LIM: CPT

## 2024-09-11 PROCEDURE — 99223 1ST HOSP IP/OBS HIGH 75: CPT

## 2024-09-11 PROCEDURE — 99024 POSTOP FOLLOW-UP VISIT: CPT | Performed by: COLON & RECTAL SURGERY

## 2024-09-11 PROCEDURE — 93321 DOPPLER ECHO F-UP/LMTD STD: CPT

## 2024-09-11 PROCEDURE — 83735 ASSAY OF MAGNESIUM: CPT

## 2024-09-11 PROCEDURE — 80048 BASIC METABOLIC PNL TOTAL CA: CPT

## 2024-09-11 PROCEDURE — 99291 CRITICAL CARE FIRST HOUR: CPT | Performed by: STUDENT IN AN ORGANIZED HEALTH CARE EDUCATION/TRAINING PROGRAM

## 2024-09-11 PROCEDURE — P9016 RBC LEUKOCYTES REDUCED: HCPCS

## 2024-09-11 PROCEDURE — 82805 BLOOD GASES W/O2 SATURATION: CPT | Performed by: PHYSICIAN ASSISTANT

## 2024-09-11 PROCEDURE — 85025 COMPLETE CBC W/AUTO DIFF WBC: CPT | Performed by: PHYSICIAN ASSISTANT

## 2024-09-11 PROCEDURE — 93325 DOPPLER ECHO COLOR FLOW MAPG: CPT

## 2024-09-11 PROCEDURE — 93010 ELECTROCARDIOGRAM REPORT: CPT | Performed by: INTERNAL MEDICINE

## 2024-09-11 PROCEDURE — 80076 HEPATIC FUNCTION PANEL: CPT | Performed by: STUDENT IN AN ORGANIZED HEALTH CARE EDUCATION/TRAINING PROGRAM

## 2024-09-11 PROCEDURE — 99223 1ST HOSP IP/OBS HIGH 75: CPT | Performed by: INTERNAL MEDICINE

## 2024-09-11 PROCEDURE — 85610 PROTHROMBIN TIME: CPT | Performed by: STUDENT IN AN ORGANIZED HEALTH CARE EDUCATION/TRAINING PROGRAM

## 2024-09-11 PROCEDURE — 85025 COMPLETE CBC W/AUTO DIFF WBC: CPT

## 2024-09-11 PROCEDURE — 74178 CT ABD&PLV WO CNTR FLWD CNTR: CPT

## 2024-09-11 PROCEDURE — 82570 ASSAY OF URINE CREATININE: CPT | Performed by: PHYSICIAN ASSISTANT

## 2024-09-11 RX ORDER — FUROSEMIDE 10 MG/ML
40 INJECTION INTRAMUSCULAR; INTRAVENOUS ONCE
Status: DISCONTINUED | OUTPATIENT
Start: 2024-09-11 | End: 2024-09-11

## 2024-09-11 RX ORDER — FLUCONAZOLE 2 MG/ML
400 INJECTION, SOLUTION INTRAVENOUS EVERY 24 HOURS
Status: DISCONTINUED | OUTPATIENT
Start: 2024-09-11 | End: 2024-09-12

## 2024-09-11 RX ORDER — CALCIUM GLUCONATE 20 MG/ML
2 INJECTION, SOLUTION INTRAVENOUS ONCE
Status: COMPLETED | OUTPATIENT
Start: 2024-09-11 | End: 2024-09-11

## 2024-09-11 RX ORDER — ACETAMINOPHEN 325 MG/1
650 TABLET ORAL EVERY 6 HOURS SCHEDULED
Status: DISCONTINUED | OUTPATIENT
Start: 2024-09-11 | End: 2024-09-12

## 2024-09-11 RX ADMIN — Medication: at 07:42

## 2024-09-11 RX ADMIN — PERFLUTREN 0.6 ML/MIN: 6.52 INJECTION, SUSPENSION INTRAVENOUS at 10:45

## 2024-09-11 RX ADMIN — SODIUM CHLORIDE, SODIUM LACTATE, POTASSIUM CHLORIDE, AND CALCIUM CHLORIDE 1000 ML: .6; .31; .03; .02 INJECTION, SOLUTION INTRAVENOUS at 04:41

## 2024-09-11 RX ADMIN — ONDANSETRON 4 MG: 2 INJECTION INTRAMUSCULAR; INTRAVENOUS at 18:23

## 2024-09-11 RX ADMIN — SODIUM CHLORIDE, SODIUM LACTATE, POTASSIUM CHLORIDE, AND CALCIUM CHLORIDE 150 ML/HR: .6; .31; .03; .02 INJECTION, SOLUTION INTRAVENOUS at 04:03

## 2024-09-11 RX ADMIN — CALCIUM GLUCONATE 2 G: 20 INJECTION, SOLUTION INTRAVENOUS at 06:10

## 2024-09-11 RX ADMIN — Medication: at 10:02

## 2024-09-11 RX ADMIN — FLUCONAZOLE 400 MG: 2 INJECTION, SOLUTION INTRAVENOUS at 14:51

## 2024-09-11 RX ADMIN — HEPARIN SODIUM 5000 UNITS: 5000 INJECTION INTRAVENOUS; SUBCUTANEOUS at 13:22

## 2024-09-11 RX ADMIN — CEFTRIAXONE SODIUM 2000 MG: 10 INJECTION, POWDER, FOR SOLUTION INTRAVENOUS at 16:33

## 2024-09-11 RX ADMIN — NOREPINEPHRINE BITARTRATE 3 MCG/MIN: 1 INJECTION, SOLUTION, CONCENTRATE INTRAVENOUS at 08:01

## 2024-09-11 RX ADMIN — PANTOPRAZOLE SODIUM 40 MG: 40 INJECTION, POWDER, FOR SOLUTION INTRAVENOUS at 09:16

## 2024-09-11 RX ADMIN — VASOPRESSIN 0.04 UNITS/MIN: 20 INJECTION INTRAVENOUS at 07:52

## 2024-09-11 RX ADMIN — NOREPINEPHRINE BITARTRATE 3 MCG/MIN: 1 INJECTION, SOLUTION, CONCENTRATE INTRAVENOUS at 03:21

## 2024-09-11 RX ADMIN — IOHEXOL 100 ML: 350 INJECTION, SOLUTION INTRAVENOUS at 18:01

## 2024-09-11 RX ADMIN — CEFAZOLIN SODIUM 2000 MG: 2 SOLUTION INTRAVENOUS at 07:40

## 2024-09-11 RX ADMIN — VASOPRESSIN 0.04 UNITS/MIN: 20 INJECTION INTRAVENOUS at 15:16

## 2024-09-11 RX ADMIN — HEPARIN SODIUM 5000 UNITS: 5000 INJECTION INTRAVENOUS; SUBCUTANEOUS at 06:15

## 2024-09-11 NOTE — ANESTHESIA PROCEDURE NOTES
"Arterial Line Insertion    Performed by: Devin Hastings MD  Authorized by: Devin Hastings MD  Consent: The procedure was performed in an emergent situation.  Risks and benefits: risks, benefits and alternatives were discussed  Test results: test results available and properly labeled  Site marked: the operative site was marked  Patient identity confirmed: arm band  Time out: Immediately prior to procedure a \"time out\" was called to verify the correct patient, procedure, equipment, support staff and site/side marked as required.  Preparation: Patient was prepped and draped in the usual sterile fashion.  Indications: multiple ABGs and hemodynamic monitoring  Orientation:  Right  Location: radial artery  Sedation:  Patient sedated: no    Procedure Details:  Nicolas's test normal: yes  Needle gauge: 20  Seldinger technique: Seldinger technique used  Number of attempts: 2    Post-procedure:  Post-procedure: dressing applied  Waveform: good waveform  Patient tolerance: patient tolerated the procedure well with no immediate complications          "

## 2024-09-11 NOTE — PROGRESS NOTES
Progress Note - Colorectal   Name: Martha Sandoval 74 y.o. female I MRN: [id number]  Unit/Bed#: [facility]-305-01 I Date of Admission: 9/10/2024   Date of Service: 9/11/2024 I Hospital Day: 1    Assessment & Plan  Rectal cancer (HCC)  - Patient now s/p 9/10 lap converted to open LAR, primary bladder repair  - Required multiple fluid boluses overnight.   - Based on chart review thus far received 3U of PRBC, albumin 5% x4, 2L of isolyte, 2L of LR, receiving another LR bolus  - Mariana MAPS more recent >60  - Currently on Levo at 3  - RADHA drains with serosang output  - Continue to monitor BP and wean levo  - Encourage out of bed and ambulation  - Consider restarting baseline Lasix once BP has improved  - Keep Mariana and Conroy for now  - Appreciate ICU level of care        History of Present Illness   Patient with systolic hypotension overnight. See above for amount of fluid and blood product rescucitation given. Currently denies having nausea, vomiting, fevers, chills, chest pain, shortness of breath. Currently NPO. Urine output has been minimal. RADHA drains with serosang output. No bowel movements and no flatus per the patient.     Objective      Temp:  [96.3 °F (35.7 °C)-98.3 °F (36.8 °C)] 96.3 °F (35.7 °C)  HR:  [70-93] 82  Resp:  [10-21] 15  BP: ()/(32-70) 97/48  Arterial Line BP: ()/(36-66) 112/42  O2 Device: Nasal cannula          I/O         09/09 0701  09/10 0700 09/10 0701 09/11 0700    I.V. (mL/kg)  5998.3 (60.1)    Blood  700    IV Piggyback  1400    Total Intake(mL/kg)  8098.3 (81.1)    Urine (mL/kg/hr)  460    Drains  1020    Stool  0    Blood  400    Total Output  1880    Net  +6218.3          Unmeasured Stool Occurrence  1 x          Lines/Drains/Airways       Active Status       Name Placement date Placement time Site Days    Epidural Catheter 09/10/24 09/10/24  1118  -- less than 1    Arterial Line 09/10/24 Right Radial 09/10/24  0815  Radial  less than 1    Closed/Suction Drain Right Abdomen 10 Fr.  09/10/24  1838  Abdomen  less than 1    Closed/Suction Drain Left LLQ Bulb 19 Fr. 09/10/24  1839  LLQ  less than 1    Urethral Catheter Latex 22 Fr. 09/10/24  1800  Latex  less than 1                  Physical Exam     Physical Exam:  General: No acute distress, alert and oriented  Neuro: alert, oriented x3  HENT: PERRL, EOMI  CV: Well perfused, regular rate and rhythm  Lungs: Normal work of breathing, no increased respiratory effort  Abdomen: Soft, appropriately tender, nondistended. RADHA x2 with serosang output  MSK/Extremities: No edema, clubbing or cyanosis  Skin: Warm, dry      Lab Results: I have reviewed the following results: CBC/BMP:   .     09/11/24  0415   WBC 9.97   HGB 8.9*   HCT 26.8*   *   SODIUM 135   K 4.5      CO2 21   BUN 24   CREATININE 1.03   GLUC 194*   CAIONIZED 1.04*   MG 2.1   PHOS 3.1    , Lactic Acid:   .     09/11/24 0415   LACTICACID 2.4*    , ABG:   .     09/10/24  2019 09/10/24  2234 09/11/24  0212   PHART 7.333* 7.374 7.339*   KIH2ESI 33.1* 32.5* 34.4*   PO2ART 148.2* 148.6* 85.3   TPV2IHJ 17.2* 18.5* 18.1*   BEART -7.8 -6.1 -6.9      Imaging Review: No pertinent imaging studies reviewed.  Other Studies: No additional pertinent studies reviewed.    VTE Pharmacologic Prophylaxis: Heparin  VTE Mechanical Prophylaxis: sequential compression device

## 2024-09-11 NOTE — PHYSICAL THERAPY NOTE
09/11/24 0935   Note Type   Note type Cancelled Session   Cancel Reasons Medical status     Eval deferred by nursing due to medical status.  Will follow.  Chi Patterson PT, DPT, [employer], CSRS

## 2024-09-11 NOTE — OCCUPATIONAL THERAPY NOTE
Occupational Therapy Cancel Note        Patient Name: Martha Sandoval  Today's Date: 9/11/2024 09/11/24 1215   OT Last Visit   OT Visit Date 09/11/24   Note Type   Note type Cancelled Session   Cancel Reasons Medical status   Additional Comments OT consult received, chart reviewed. Per RN, pt not medically stable for therapy at this time. Will continue to follow as appropriate.       Mariama Perry, WILLEM, OTR/L

## 2024-09-11 NOTE — CONSULTS
Consultation - Critical Care/ICU   Name: Martha Sandoval 74 y.o. female I MRN: [id number]  Unit/Bed#: PPHP-305-01 I Date of Admission: 9/10/2024   Date of Service: 9/11/2024 I Hospital Day: 1   Inpatient consult to Surgical Critical Care  Consult performed by: Maribel Mijares PA-C  Consult ordered by: Jed Roldan MD              Assessment & Plan   Neuro:   Acute post-operative pain  Epidural PCEA in place, appreciate APS recommendations  Dose adjusted d/t hypotension  Continuous rate of 1ml/hr (previously 4ml/hr) with 4mL PCEA dose  Dilaudid 0.5mg Q2 prn for breakthrough pain  Daily CAM-ICU, delirium precautions. Regulate sleep/wake cycle.        CV:   Hypotension  Likely hypovolemia from prolonged OR course, acute blood loss anemia. Fluid resuscitation, levophed for MAP > 65.    Hx of HTN  Hold home metoprolol, valsartan, spironolactone in the setting of hypotension   HFpEF  Previous echo 8/14/24: EF 61-65%, G1DD       Pulm:  No active issues. Encourage IS, pulmonary toilet    GI:   Rectal cancer s/p LAR converted to open 9/10  Appreciate colorectal surgery recommendations  Two RADHA drains in place, monitor output/character  Local wound care to incisions  Serial abdominal exams  GERD  Protonix    :   Intra-operative cystotomy s/p complex cystorrhaphy. Oliguria.   Urology consulted intra-operatively. Appreciate recommendations  Conroy to remain in place x 2 weeks, will be removed as an outpatient in the office  Trend strict I/Os    F/E/N:   LR at 150cc/hr  Replete electrolytes as needed for goal Mag > 2.0, Phos >3.0, K >4.0  NPO    Heme/Onc:   ABLA  Received 2u prbcs intra-operatively, 1u prbcs post-operatively  Trend Hgb, transfuse < 7.0.   SQH for DVT ppx    Endo:   Goal -180    ID:   [facility]-operative antibiotics      MSK/Skin:   Local wound care to abdominal incisions  PT/OT when appropriate    Disposition: Critical care    History of Present Illness   Martha Sandoval is a 74 y.o. with PMHx of  HTN, CKD stage 3, HFpEF, and rectal cancer who presents to the ICU s/p low anterior laparoscopic hand assisted resection converted to open. Intra-operatively, there was noted to by a cystotomy likely due to dense adhesions. Urology was called to assist and performed a complex cystorrhaphy with bilateral ureteral catheter placement and leon catheter placement. She received 2u prbcs intra-operatively. Post-operatively, patient was noted to be hypotensive requiring 3L crystalloid and 1u prbcs.     History obtained from chart review and the patient.  Review of Systems: See HPI for Review of Systems    Historical Information   Past Medical History:  No date: Cancer (HCC)  No date: Chest pain      Comment:  last assessed 9/16/13  No date: Chest pressure      Comment:  last assessed 12/15/16  No date: Chronic sinusitis      Comment:  last assessed 5/4/17  No date: Clotting disorder (HCC)  No date: Compression fracture of lumbar vertebra (HCC)      Comment:  last assessed 6/13/15  No date: GERD (gastroesophageal reflux disease)  No date: Hypertension  No date: Liver disease  No date: Lump of skin      Comment:  last assessed 1/17/14 Past Surgical History:  No date: COLONOSCOPY  No date: HYSTERECTOMY  No date: TONSILLECTOMY   Current Outpatient Medications   Medication Instructions    Alum Hydroxide-Mag Carbonate (GAVISCON PO) Oral, Daily    aspirin (ECOTRIN LOW STRENGTH) 81 mg, Oral, Daily    cephalexin (KEFLEX) 500 mg, Oral, Every 6 hours scheduled, Start the day before catheter removal    cetirizine (ZyrTEC) 10 mg tablet 1 tablet, Oral, Daily PRN    cholecalciferol (VITAMIN D3) 400 Units, Oral, Daily    cyanocobalamin (VITAMIN B-12) 100 mcg, Oral, Daily    furosemide (LASIX) 40 mg, Oral, Daily    guaiFENesin (MUCINEX) 600 mg, Oral, Every 12 hours scheduled    metoprolol tartrate (LOPRESSOR) 100 mg, Oral, Daily    metroNIDAZOLE (FLAGYL) 500 mg, Oral, Every 12 hours scheduled    Na Sulfate-K Sulfate-Mg Sulf (Suprep Bowel  Prep Kit) 17.5-3.13-1.6 GM/177ML SOLN Take bowel prep as indicated for colonoscopy    neomycin (MYCIFRADIN) 1,000 mg, Oral, 2 times daily    omeprazole (PRILOSEC) 20 mg, Oral, Daily    oxybutynin (DITROPAN-XL) 10 mg, Oral, Daily at bedtime    Pyridoxine HCl (VITAMIN B-6) 100 MG TABS 1 tablet, Oral, Daily    spironolactone (ALDACTONE) 100 mg, Oral, Daily    ursodiol (ACTIGALL) 300 mg, Oral, 2 times daily    valsartan (DIOVAN) 160 mg, Oral, Daily    Vitamin E 200 units TABS 1 tablet, Oral, Daily    Allergies   Allergen Reactions    Azithromycin GI Intolerance    Colcrys [Colchicine] Diarrhea    Dicyclomine GI Intolerance and Hives    Erythromycin GI Intolerance    Naproxen Edema    Sulfa Antibiotics Hives    Sulfamethoxazole-Trimethoprim Rash      Social History     Tobacco Use    Smoking status: Never    Smokeless tobacco: Never   Vaping Use    Vaping status: Never Used   Substance Use Topics    Alcohol use: Not Currently    Drug use: Never    Family History   Problem Relation Age of Onset    Hyperlipidemia Mother     Hypertension Mother     Cancer Father     Liver cancer Father     Stomach cancer Father     No Known Problems Son     No Known Problems Daughter           Objective                          Vitals I/O      Most Recent Min/Max in 24hrs   Temp (!) 96.3 °F (35.7 °C) Temp  Min: 96.3 °F (35.7 °C)  Max: 98.3 °F (36.8 °C)   Pulse 74 Pulse  Min: 73  Max: 93   Resp (!) 11 Resp  Min: 10  Max: 21   BP (!) 79/43 BP  Min: 65/39  Max: 159/70   O2 Sat 97 % SpO2  Min: 96 %  Max: 100 %      Intake/Output Summary (Last 24 hours) at 9/11/2024 0303  Last data filed at 9/11/2024 0230  Gross per 24 hour   Intake 8098.33 ml   Output 1605 ml   Net 6493.33 ml       Diet NPO; Sips with meds    Invasive Monitoring   Arterial Line  [place] BP 95/45  Arterial Line BP  Min: 71/36  Max: 169/62   MAP (!) 63 mmHg  Arterial Line MAP (mmHg)  Min: 48 mmHg  Max: 100 mmHg           Physical Exam   Physical Exam  Vitals and nursing note  reviewed.   Eyes:      Pupils: Pupils are equal, round, and reactive to light.   Skin:     General: Skin is warm and dry.   HENT:      Head: Normocephalic and atraumatic.   Cardiovascular:      Rate and Rhythm: Normal rate and regular rhythm.      Pulses:           Radial pulses are 2+ on the right side and 2+ on the left side.        Dorsalis pedis pulses are 2+ on the right side and 2+ on the left side.      Heart sounds: Normal heart sounds.   Musculoskeletal:      Right lower leg: No edema.      Left lower leg: No edema.   Abdominal: General: There is no distension.      Palpations: Abdomen is soft.      Tenderness: There is abdominal tenderness.      Comments: Surgical incisions C/D/I. Two RADHA drains in place with serosanguinous output   Constitutional:       Appearance: She is morbidly obese.   Pulmonary:      Effort: Pulmonary effort is normal.      Breath sounds: Normal breath sounds.   Psychiatric:         Behavior: Behavior is cooperative.   Neurological:      General: No focal deficit present.      Mental Status: She is alert and oriented to person, place, and time.      GCS: GCS eye subscore is 4. GCS verbal subscore is 5. GCS motor subscore is 6.          Diagnostic Studies        Lab Results: I have reviewed the following results: CBC/BMP:   .     09/10/24  2017 09/10/24  2018 09/10/24  2018 09/10/24  2234 09/10/24  2342   WBC  --  9.60   < > 4.33  --    HGB 8.5* 8.8*   < > 6.5*  --    HCT 25* 27.0*   < > 19.8*  --    PLT  --  181  174  --   --   --    SODIUM  --  133*   < >  --  136   K  --  4.2   < >  --  4.3   CL  --  107   < >  --  106   CO2 19* 17*   < >  --  20*   BUN  --  25   < >  --  23   CREATININE  --  1.00   < >  --  0.95   GLUC  --  231*   < >  --  163*   CAIONIZED 1.09*  --   --   --   --    MG  --  1.5*   < >  --  2.1   PHOS  --  4.1  --   --   --     < > = values in this interval not displayed.    , LFTs:   .     09/10/24  2018   AST 35   ALT 27   ALB 2.6*   TBILI 0.83   ALKPHOS 29*     , PTT/INR:No new results in last 24 hours. , Lactic Acid:   .     09/11/24  0213   LACTICACID 2.4*         Medications:  Scheduled PRN   cefazolin, 2,000 mg, On Call To OR  heparin (porcine), 5,000 Units, Q8H AISHWARYA  metoprolol tartrate, 100 mg, Daily  pantoprazole, 40 mg, Q24H AISHWARYA      acetaminophen, 975 mg, Q6H PRN  diphenhydrAMINE, 25 mg, Q6H PRN  HYDROmorphone, 0.5 mg, Q2H PRN  lactated ringers, 1,000 mL, Once PRN   And  lactated ringers, 1,000 mL, Once PRN  metoclopramide, 5 mg, Q6H PRN  naloxone, 0.1 mg, Q3 min PRN  ondansetron, 4 mg, Q6H PRN  sodium chloride, 1,000 mL, Once PRN   And  sodium chloride, 1,000 mL, Once PRN       Continuous    lactated ringers, 150 mL/hr, Last Rate: 150 mL/hr (09/10/24 2224)  ropivacaine 0.1% and fentaNYL 2 mcg/mL,          Labs:   CBC    Recent Labs     09/10/24  2018 09/10/24  2234   WBC 9.60 4.33   HGB 8.8* 6.5*   HCT 27.0* 19.8*     174  --      BMP    Recent Labs     09/10/24  2018 09/10/24  2342   SODIUM 133* 136   K 4.2 4.3    106   CO2 17* 20*   AGAP 9 10   BUN 25 23   CREATININE 1.00 0.95   CALCIUM 7.1* 7.6*       Coags    No recent results     Additional Electrolytes  Recent Labs     09/10/24  1730 09/10/24  2017 09/10/24  2018 09/10/24  2342   MG  --   --  1.5* 2.1   PHOS  --   --  4.1  --    CAIONIZED 1.09* 1.09*  --   --           Blood Gas    Recent Labs     09/10/24  2234 09/11/24  0212   PHART 7.374 7.339*   [id number] 32.5* 34.4*   PO2ART 148.6* 85.3   [id number] 18.5* 18.1*   BEART -6.1 -6.9   SOURCE Line, Venous  --      Recent Labs     09/10/24  2234   SOURCE Line, Venous    LFTs  Recent Labs     09/10/24  2018   ALT 27   AST 35   ALKPHOS 29*   ALB 2.6*   TBILI 0.83       Infectious  No recent results  Glucose  Recent Labs     09/10/24  2018 09/10/24  2342   GLUC 231* 163*

## 2024-09-11 NOTE — PROGRESS NOTES
Progress Note - Colorectal   Name: Martha Sandoval 74 y.o. female I MRN: [id number]  Unit/Bed#: PPHP-305-01 I Date of Admission: 9/10/2024   Date of Service: 9/11/2024 I Hospital Day: 1     Discussed with Dr. Mazariegos re: scans and f/u RADHA creatinine. Plans from Urology outlined.

## 2024-09-11 NOTE — PROGRESS NOTES
Progress Note - Critical Care/ICU   Name: Martha Sandoval 74 y.o. female I MRN: [id number]  Unit/Bed#: PPHP-305-01 I Date of Admission: 9/10/2024   Date of Service: 9/11/2024 I Hospital Day: 1       Assessment & Plan   Neuro:   Acute post-operative pain  Epidural PCEA in place, appreciate APS recommendations  Dose adjusted d/t hypotension  Continuous rate of 1ml/hr (previously 4ml/hr) with 4mL PCEA dose  Dilaudid 0.5mg Q2 prn for breakthrough pain  Daily CAM-ICU, delirium precautions. Regulate sleep/wake cycle.         CV:   Hypotension  Likely hypovolemia from prolonged OR course, acute blood loss anemia. Fluid resuscitation, levophed for MAP > 65 currently at 3mcg/min  Hx of HTN  Hold home metoprolol, valsartan, spironolactone in the setting of hypotension   HFpEF  Previous echo 8/14/24: EF 61-65%, G1DD. Consider repeat echo if ongoing hypotension.        Pulm:  No active issues. Encourage IS, pulmonary toilet     GI:   Rectal cancer s/p LAR converted to open 9/10  Appreciate colorectal surgery recommendations  Two RADHA drains in place, monitor output/character  Local wound care to incisions  Serial abdominal exams  GERD  Protonix     :   Intra-operative cystotomy s/p complex cystorrhaphy. Oliguria.   Urology consulted intra-operatively. Appreciate recommendations  Conroy to remain in place x 2 weeks, will be removed as an outpatient in the office  Trend strict I/Os     F/E/N:   LR at 150cc/hr  Replete electrolytes as needed for goal Mag > 2.0, Phos >3.0, K >4.0  NPO     Heme/Onc:   ABLA  Received 2u prbcs intra-operatively, 1u prbcs post-operatively  Trend Hgb, transfuse < 7.0.   SQH for DVT ppx     Endo:   Goal -180     ID:   [facility]-operative antibiotics        MSK/Skin:   Local wound care to abdominal incisions  PT/OT when appropriate  Disposition: Critical care    ICU Core Measures     A: Assess, Prevent, and Manage Pain Has pain been assessed? Yes  Need for changes to pain regimen? No   B: Both SAT/SAT  N/A  Pt called reporting vaginal odor and thin milky white d/c and requested Metronidazole. Rx Metronidazole 500mg BID x 7 days sent to pharmacy. Stressed importance of abstaining from alcohol while on this medication. Instructed pt to call back and schedule appt if symptoms persist for further eval. Pt verbalized understanding. No further questions or concerns at this time.        C: Choice of Sedation RASS Goal: N/A patient not on sedation  Need for changes to sedation or analgesia regimen? NA   D: Delirium CAM-ICU: Negative   E: Early Mobility  Plan for early mobility? Yes   F: Family Engagement Plan for family engagement today? Yes       Antibiotic Review: Post op requirements     Review of Invasive Devices:    Conroy Plan: Conroy placed by urology. Removal plans per their team    Mariana Plan: Keep arterial line for hemodynamic monitoring    Prophylaxis:  VTE VTE covered by:  heparin (porcine), Subcutaneous       Stress Ulcer  covered byomeprazole (PriLOSEC) 20 mg delayed release capsule [[id number]] (Long-Term Med), pantoprazole (PROTONIX) injection 40 mg [[id number]]         24 Hour Events   24hr events: Ongoing hypotension. Received 2L crystalloid, 250cc albumin, and 1u prbcs post operatively. Started on levophed, currently at 3.   Subjective   Review of Systems: Review of Systems   Constitutional:  Positive for fatigue.   Gastrointestinal:  Positive for abdominal distention and abdominal pain.       Objective                          Vitals I/O      Most Recent Min/Max in 24hrs   Temp (!) 96.3 °F (35.7 °C) Temp  Min: 96.3 °F (35.7 °C)  Max: 98.3 °F (36.8 °C)   Pulse 74 Pulse  Min: 73  Max: 93   Resp (!) 11 Resp  Min: 10  Max: 21   BP (!) 79/43 BP  Min: 65/39  Max: 159/70   O2 Sat 97 % SpO2  Min: 96 %  Max: 100 %      Intake/Output Summary (Last 24 hours) at 9/11/2024 0407  Last data filed at 9/11/2024 0230  Gross per 24 hour   Intake 8098.33 ml   Output 1605 ml   Net 6493.33 ml       Diet NPO; Sips with meds    Invasive Monitoring   Arterial Line  [place] BP 95/45  Arterial Line BP  Min: 71/36  Max: 169/62   MAP (!) 63 mmHg  Arterial Line MAP (mmHg)  Min: 48 mmHg  Max: 100 mmHg           Physical Exam   Physical Exam  Vitals and nursing note reviewed.   Eyes:      Pupils: Pupils are equal, round, and reactive to light.   Skin:     General: Skin is warm and dry.   HENT:      Head:  Normocephalic and atraumatic.   Cardiovascular:      Rate and Rhythm: Normal rate and regular rhythm.      Pulses:           Radial pulses are 2+ on the right side and 2+ on the left side.        Dorsalis pedis pulses are 2+ on the right side and 2+ on the left side.      Heart sounds: Normal heart sounds.   Musculoskeletal:      Right lower leg: No edema.      Left lower leg: No edema.   Abdominal: General: There is no distension.      Palpations: Abdomen is soft.      Tenderness: There is abdominal tenderness.      Comments: Surgical incisions C/D/I  Two RADHA drains in place w/ serosanguinous output   Constitutional:       General: She is not in acute distress.     Appearance: She is well-developed and well-nourished. She is not ill-appearing.   Pulmonary:      Effort: Pulmonary effort is normal.      Breath sounds: Normal breath sounds.   Psychiatric:         Behavior: Behavior is cooperative.   Neurological:      General: No focal deficit present.      Mental Status: She is alert and oriented to person, place, and time.          Diagnostic Studies        Lab Results: I have reviewed the following results: CBC/BMP:   .       0000 09/10/24  2017 09/10/24  2018 09/10/24  2234 09/10/24  2342 09/11/24  0401   WBC  --   --  9.60   < >  --  6.67   HGB  --  8.5* 8.8*   < >  --  8.6*   HCT  --  25* 27.0*   < >  --  25.9*   PLT   < >  --  181  174  --   --  97*   SODIUM   < >  --  133*  --  136  --    K   < >  --  4.2  --  4.3  --    CL   < >  --  107  --  106  --    CO2   < > 19* 17*  --  20*  --    BUN   < >  --  25  --  23  --    CREATININE   < >  --  1.00  --  0.95  --    GLUC   < >  --  231*  --  163*  --    CAIONIZED  --  1.09*  --   --   --   --    MG   < >  --  1.5*  --  2.1  --    PHOS  --   --  4.1  --   --   --     < > = values in this interval not displayed.         Medications:  Scheduled PRN   cefazolin, 2,000 mg, On Call To OR  heparin (porcine), 5,000 Units, Q8H AISHWARYA  metoprolol tartrate, 100 mg,  Daily  pantoprazole, 40 mg, Q24H AISHWARYA      acetaminophen, 975 mg, Q6H PRN  diphenhydrAMINE, 25 mg, Q6H PRN  HYDROmorphone, 0.5 mg, Q2H PRN  lactated ringers, 1,000 mL, Once PRN   And  lactated ringers, 1,000 mL, Once PRN  metoclopramide, 5 mg, Q6H PRN  naloxone, 0.1 mg, Q3 min PRN  ondansetron, 4 mg, Q6H PRN  sodium chloride, 1,000 mL, Once PRN   And  sodium chloride, 1,000 mL, Once PRN       Continuous    lactated ringers, 150 mL/hr, Last Rate: 150 mL/hr (09/11/24 0403)  norepinephrine, 1-30 mcg/min, Last Rate: 3 mcg/min (09/11/24 0321)  ropivacaine 0.1% and fentaNYL 2 mcg/mL,          Labs:   CBC    Recent Labs     09/10/24  2018 09/10/24  2234 09/11/24  0401   WBC 9.60 4.33 6.67   HGB 8.8* 6.5* 8.6*   HCT 27.0* 19.8* 25.9*     174  --  97*     BMP    Recent Labs     09/10/24  2018 09/10/24  2342   SODIUM 133* 136   K 4.2 4.3    106   CO2 17* 20*   AGAP 9 10   BUN 25 23   CREATININE 1.00 0.95   CALCIUM 7.1* 7.6*       Coags    No recent results     Additional Electrolytes  Recent Labs     09/10/24  1730 09/10/24  2017 09/10/24  2018 09/10/24  2342   MG  --   --  1.5* 2.1   PHOS  --   --  4.1  --    CAIONIZED 1.09* 1.09*  --   --           Blood Gas    Recent Labs     09/10/24  2234 09/11/24  0212   PHART 7.374 7.339*   [id number] 32.5* 34.4*   PO2ART 148.6* 85.3   [id number] 18.5* 18.1*   BEART -6.1 -6.9   SOURCE Line, Venous  --      Recent Labs     09/10/24  2234   SOURCE Line, Venous    LFTs  Recent Labs     09/10/24  2018   ALT 27   AST 35   ALKPHOS 29*   ALB 2.6*   TBILI 0.83       Infectious  No recent results  Glucose  Recent Labs     09/10/24  2018 09/10/24  2342   GLUC 231* 163*

## 2024-09-11 NOTE — CASE MANAGEMENT
Case Management Assessment & Discharge Planning Note    Patient name Martha Sandovla  Location [facility] MRN [id number]  : 1950 Date 2024       Current Admission Date: 9/10/2024  Current Admission Diagnosis:Rectal cancer (HCC)   Patient Active Problem List    Diagnosis Date Noted Date Diagnosed    Gout of right foot 2024     Stage 3a chronic kidney disease (HCC) 2024     Rectal cancer (HCC) 07/15/2024     NSAIDs adverse reaction 2022     BMI 40.0-44.9, adult (HCC) 10/21/2021     Elevated liver enzymes 2017     Multiple gallstones 2017     Benign paroxysmal positional vertigo 2016     Abnormal CAT scan 2015     Atherosclerotic plaque 2015     Elevated blood sugar 2015     Hyperlipidemia 2014     Vitamin D deficiency 2014     Benign hypertension with CKD (chronic kidney disease) stage III (HCC) 2012     Hypothyroidism 2012     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       LOS (days): 1  Geometric Mean LOS (GMLOS) (days): 5.1  Days to GMLOS:4.2     OBJECTIVE:    Risk of Unplanned Readmission Score: 17.16         Current admission status: Inpatient       Preferred Pharmacy:   [facility] #[id number] - [place] - [address]  [address]  [place] [address]  Phone: [phone] Fax: [phone]    [facility] - [place] - [address]  [address] [facility]  [place] [address]  Phone: [phone] Fax: [phone]    Primary Care Provider: Janny Ramirez DO    [facility]  Secondary Insurance:     ASSESSMENT:  Active Health Care Proxies    There are no active Health Care Proxies on file.       Advance Directives  Does patient have a Health Care POA?: No  Was patient offered paperwork?: Yes (declined)  Does patient currently have a Health Care decision maker?: Yes, please see Health Care Proxy section  Does patient have Advance Directives?:  No  Was patient offered paperwork?: Yes (declined)  Primary Contact: Willi Sandoval           Readmission Root Cause  30 Day Readmission: No    Patient Information  Admitted from:: Home  Mental Status: Alert  During Assessment patient was accompanied by: Not accompanied during assessment  Assessment information provided by:: Patient  Primary Caregiver: Self  Support Systems: Spouse/significant other  County of Residence: [place]  What city do you live in[place]  Home entry access options. Select all that apply.: Stairs  Number of steps to enter home.: 2  Do the steps have railings?: Yes  Type of Current Residence: 2 story home, Other (Comment) (first floor setup)  Upon entering residence, is there a bedroom on the main floor (no further steps)?: Yes  Upon entering residence, is there a bathroom on the main floor (no further steps)?: Yes  Living Arrangements: Lives w/ Spouse/significant other  Is patient a ?: No    Activities of Daily Living Prior to Admission  Functional Status: Assistance  Completes ADLs independently?: Yes  Ambulates independently?: Yes  Does patient use assisted devices?: Yes  Assisted Devices (DME) used: Straight Cane, Walker  Does patient currently own DME?: Yes  What DME does the patient currently own?: Straight Cane, Walker  Does patient have a history of Outpatient Therapy (PT/OT)?: Yes  Does the patient have a history of Short-Term Rehab?: No  Does patient have a history of HHC?: Yes ([facility])  Does patient currently have HHC?: No         Patient Information Continued  Income Source: Unemployed  Does patient have prescription coverage?: Yes  Does patient receive dialysis treatments?: No  Does patient have a history of substance abuse?: No  Does patient have a history of Mental Health Diagnosis?: No         Means of Transportation  Means of Transport to Appts:: Family transport      Social Determinants of Health (SDOH)      Flowsheet Row Most Recent Value   Housing Stability     In the last 12 months, was there a time when you were not able to pay the mortgage or rent on time? N   In the past 12 months, how many times have you moved where you were living? 0   At any time in the past 12 months, were you homeless or living in a shelter (including now)? N   Transportation Needs    In the past 12 months, has lack of transportation kept you from medical appointments or from getting medications? no   In the past 12 months, has lack of transportation kept you from meetings, work, or from getting things needed for daily living? No   Food Insecurity    Within the past 12 months, you worried that your food would run out before you got the money to buy more. Never true   Within the past 12 months, the food you bought just didn't last and you didn't have money to get more. Never true   Utilities    In the past 12 months has the electric, gas, oil, or water company threatened to shut off services in your home? No            DISCHARGE DETAILS:    Discharge planning discussed with:: patient---prefers [facility], referral sent  [place] of Choice: Yes     CM contacted family/caregiver?: No- see comments (pt alert and oriented)  Were Treatment Team discharge recommendations reviewed with patient/caregiver?: Yes  Did patient/caregiver verbalize understanding of patient care needs?: Yes  Were patient/caregiver advised of the risks associated with not following Treatment Team discharge recommendations?: Yes    Contacts  Patient Contacts:  Willi Sandoval  Relationship to Patient:: Family  Contact Method: Phone  Phone Number: [phone] home, cell [phone]  Reason/Outcome: Continuity of Care, Emergency Contact, Discharge Planning    Requested Home Health Care         Is the patient interested in HHC at discharge?: Yes  Home Health Discipline requested:: Nursing, Physical Therapy  HHA External Referral Reason (only applicable if external HHA name selected): Patient has established relationship with  provider  Home Health Follow-Up Provider:: PCP  Home Health Services Needed:: Post-Op Care and Assessment, Wound/Ostomy Care, Urinary Incontinence Catheter Management, Strengthening/Theraputic Exercises to Improve Function, Evaluate Functional Status and Safety, Gait/ADL Training  Homebound Criteria Met:: Requires the Assistance of Another Person for Safe Ambulation or to Leave the Home, Uses an Assist Device (i.e. cane, walker, etc)  Supporting Clincal Findings:: Limited Endurance, Fatigues Easliy in Short Distances         Other Referral/Resources/Interventions Provided:  Interventions: [facility]  Referral Comments: Pt. requesting [facility] at discharge, will need nsg and PT. Possible home with drains. Referral sent.    Would you like to participate in our [facility] service program?  : Yes    Treatment Team Recommendation: Home with Home Health Care  Discharge Destination Plan:: Home with Home Health Care  Transport at Discharge : Family                                      Additional Comments: 75yo female with dx rectal CA is POD#1 open colon resection and complex cystorrhaphy and bilateral urethral catherization. She states doing well, has epidural cath. for pain, on Levophed and Vasopressin gtts for hypotension. Has LLQ bulb drain and RADHA drain right side of abdomen. Lives with spouse, Has RWlisette used prn. Pt. requested [facility] and referral sent.

## 2024-09-11 NOTE — CONSULTS
Consultation - Geriatric Medicine   Martha Sandoval 74 y.o. female MRN: [id number]  Unit/Bed#: [facility]01 Encounter: [id number]      Assessment & Plan     Rectal cancer  Rectal cancer diagnosed in 07/2024.  S/p laparoscopic rectal cancer resection converted to open LAR and primary bladder repair on 9/10.  Postop day 1  Denied fever, chills, abdominal pain, nausea, vomiting.  Following colorectal surgery    At risk for Delirium  Patient is high risk of delirium due to age, change in environment, pain, the metabolic imbalance, decreased mobility  Delirium precautions  Maintain normal sleep/wake cycle  Minimize overnight interruptions, group overnight vitals/labs/nursing checks as possible  Dim lights, close blinds and turn off tv to minimize stimulation and encourage sleep environment in evenings  Ensure that pain is well controlled   Monitor for fecal and urinary retention which may precipitate delirium  Encourage early mobilization and ambulation  Avoid medications which may precipitate or worsen delirium such as tramadol, benzodiazepine, anticholinergics, and antihistamine        Post op pain  S/p laparoscopic rectal resection transformed to open  Current pain regimen  Tylenol 650 mg every 6 hours scheduled  PCA pump ropivacaine and fentanyl  Patient received 5 doses of PCA last night  Acute pain service consult have placed.      Acute blood loss anemia  Hb: 8.9 -> 8.6-> 6.5 -> 8.8  S/p 3 units PRBC transfusion  Monitor CBC    Hypotension  Baseline BP low 100-110/50-70   Hypovolemic shock due to acute blood loss anemia  S/p IV fluids and IV albumin infusion  Monitored on telemetry  Currently on levophed and vasopressin  Holding home BP meds (metoprolol, valsartan, spironolactone)    Oliguria  Intra-operative cystotomy s/p complex cystorrhaphy.   Urology is on board  Oliguria postop day 1.   High risks for VICTORIANO in the setting of CKD stage IIIa.  Creatinine at baseline today.  Conroy in place and monitoring  I/Os  Bladder scan  KUB ultrasound/CT cystogram  Urinary retention protocol  Management per urology      Cognitive screening  Geriatric evaluation on 09/03/24  Mini cog screening score <2   Patient denied memory issues  Need MMSE or MoCA after full recovery from surgery.    CKD stage 3  Baseline creatinine 0.9-1.1  Creatinine stable at baseline 1.03 today  High risk for VICTORIANO in the setting of postop oliguria  Urology is on board    HFpEF  Following cardiology had LVH  Last echo obtained in 08/2024 for evaluation showed LVEF 65%  Home meds includes Lasix 40 mg daily and Aldactone 100 mg daily  Holding diuretics currently in the setting of low blood pressure  Recheck echo in the setting of hyotension    GERD  Home med oral omeprazole 20 mg daily  IV Protonix 40 mg daily while inpatient.  Follow-up with family medicine and outpatient hepatologist.      Cirrhosis of liver secondary to HOUSE  [facility] Fair  Last paracentesis in 2021.    MELD-Na score 7, MELD score 9 in April 2024  Maintained on diuretics.  (Lasix 40 mg daily, methadone 100 mg daily, sodium 2 g diet)  Renal function stable.   Monitoring LFTs every 3 to 6 months.   Prilosec 20 mg daily       Home medication review  Aspirin 81 mg daily cetirizine 10 mg as needed daily  Vitamin D3 400 units daily  Vitamin B12 100 mcg daily  Vitamin B6 100 mg daily  Vitamin D 8200 unit daily  Lasix 40 mg daily  Mucinex 600 mg twice daily  Prilosec 20 mg daily   Spironolactone 100 mg daily  Valsartan 60 mg daily  Ursodiol 300 mg twice daily    Home meds on hold currently.      History of Present Illness   Physician Requesting Consult: Raoul Canela MD  Reason for Consult / Principal Problem: High risk for delirium during postop period  Hx and PE limited by: none  Additional history obtained from: Chart review and patient evaluation      HPI: Martha Sandoval is a 74 y.o. year old female with who was admitted for colorectal cancer resection.  She has a  past medical history of hypertension, CKD stage III, HFpEF, rectal cancer s/p resection and cirrhosis of liver secondary to HOUSE.  She had a past surgical history of hysterectomy in 2000 due to fibroids.  She had the laparoscopic rectal cancer resection converted to open LAR and primary bladder repair on 9/10.    Geriatric preop evaluation done on 9/3/24.  She lives at home with her .  There are 12 steps at home.  She is independent for ADLs.  She manages her home medications. Denied any memory issues.  She needs assistant for IADLs.  Her  drives car and manages bills at home.  She denies imbalance.  However she uses a cane occasionally at home due to fall last year.    09/11/2024: Patient seen and examined at bedside this morning.  Patient's  is at bedside at the time of my evaluation.  Nursing staff denied any significant overnight event.  Martha is alert awake and oriented x3.  She denied any pain.  She says she did not sleep last night which is not new for her.  She has difficulty falling asleep.  She denied any dizziness, chest pain, abdominal pain, shortness of breath.  Denied nausea, vomiting, fever, chills.  She is aware of reduced urine output and require to take ultrasound and CT scan.      Inpatient consult to Gerontology  Consult performed by: Deepa Marshall DO  Consult ordered by: ELENITA Low        Review of Systems   Constitutional:  Negative for chills and fever.   HENT:  Negative for ear pain and sore throat.    Eyes:  Negative for pain and visual disturbance.   Respiratory:  Negative for cough and shortness of breath.    Cardiovascular:  Negative for chest pain and palpitations.   Gastrointestinal:  Negative for abdominal pain, diarrhea, nausea and vomiting.   Genitourinary:  Negative for dysuria and hematuria.   Musculoskeletal:  Negative for arthralgias and back pain.   Skin:  Negative for color change and rash.   Neurological:  Negative for dizziness, seizures,  syncope, weakness and headaches.   Hematological:  Negative for adenopathy.   Psychiatric/Behavioral:  Negative for confusion and hallucinations.    All other systems reviewed and are negative.      Historical Information   Past Medical History:   Diagnosis Date    Cancer (HCC)     Chest pain     last assessed 9/16/13    Chest pressure     last assessed 12/15/16    Chronic sinusitis     last assessed 5/4/17    Clotting disorder (HCC)     Compression fracture of lumbar vertebra (HCC)     last assessed 6/13/15    GERD (gastroesophageal reflux disease)     Hypertension     Liver disease     Lump of skin     last assessed 1/17/14     Past Surgical History:   Procedure Laterality Date    COLONOSCOPY      HYSTERECTOMY      TONSILLECTOMY       Social History   Social History     Substance and Sexual Activity   Alcohol Use Not Currently     Social History     Substance and Sexual Activity   Drug Use Never     Social History     Tobacco Use   Smoking Status Never   Smokeless Tobacco Never     Family History:   Family History   Problem Relation Age of Onset    Hyperlipidemia Mother     Hypertension Mother     Cancer Father     Liver cancer Father     Stomach cancer Father     No Known Problems Son     No Known Problems Daughter        Meds/Allergies   current meds:   Current Facility-Administered Medications:     acetaminophen (TYLENOL) tablet 650 mg, Q6H AISHWARYA    diphenhydrAMINE (BENADRYL) injection 25 mg, Q6H PRN    heparin (porcine) subcutaneous injection 5,000 Units, Q8H AISHWARYA **AND** [COMPLETED] Platelet count, Once    HYDROmorphone (DILAUDID) injection 0.5 mg, Q2H PRN    metoclopramide (REGLAN) injection 5 mg, Q6H PRN    naloxone (NARCAN) injection 0.1 mg, Q3 min PRN    norepinephrine (LEVOPHED) 1 mg/mL injection **ADS Override Pull**,     norepinephrine (LEVOPHED) 4 mg (STANDARD CONCENTRATION) IV in sodium chloride 0.9% 250 mL, Titrated, Last Rate: 1 mcg/min (09/11/24 0803)    ondansetron (ZOFRAN) injection 4 mg, Q6H PRN     pantoprazole (PROTONIX) injection 40 mg, Q24H AISHWARYA    ropivacaine 0.1% and fentaNYL 2 mcg/mL PCEA, Continuous    vasopressin (PITRESSIN) 20 Units in sodium chloride 0.9 % 100 mL infusion, Continuous, Last Rate: 0.04 Units/min (09/11/24 0752)    Allergies   Allergen Reactions    Azithromycin GI Intolerance    Colcrys [Colchicine] Diarrhea    Dicyclomine GI Intolerance and Hives    Erythromycin GI Intolerance    Naproxen Edema    Sulfa Antibiotics Hives    Sulfamethoxazole-Trimethoprim Rash       Objective     Intake/Output Summary (Last 24 hours) at 9/11/2024 0807  Last data filed at 9/11/2024 0742  Gross per 24 hour   Intake 40179.46 ml   Output 2400 ml   Net 7629.46 ml     Invasive Devices       Peripheral Intravenous Line  Duration             Peripheral IV 09/10/24 Dorsal (posterior);Left Hand 1 day    Peripheral IV 09/10/24 Distal;Dorsal (posterior);Right Forearm <1 day              Epidural Line  Duration             Epidural Catheter 09/10/24 <1 day              Arterial Line  Duration             Arterial Line 09/10/24 Right Radial <1 day              Drain  Duration             Closed/Suction Drain Left LLQ Bulb 19 Fr. <1 day    Closed/Suction Drain Right Abdomen 10 Fr. <1 day    Urethral Catheter Latex 22 Fr. <1 day                    Physical Exam  Vitals and nursing note reviewed.   Constitutional:       General: She is not in acute distress.     Appearance: She is well-developed. She is ill-appearing. She is not toxic-appearing.   HENT:      Head: Normocephalic and atraumatic.      Mouth/Throat:      Mouth: Mucous membranes are moist.   Eyes:      Conjunctiva/sclera: Conjunctivae normal.      Pupils: Pupils are equal, round, and reactive to light.   Cardiovascular:      Rate and Rhythm: Normal rate and regular rhythm.      Heart sounds: Normal heart sounds. No murmur heard.  Pulmonary:      Effort: Pulmonary effort is normal. No respiratory distress.      Breath sounds: Normal breath sounds.   Abdominal:       General: There is no distension.      Palpations: Abdomen is soft.      Tenderness: There is no abdominal tenderness.      Comments: Incisions and laparoscopic wounds covered in bandage.  Patient denied abdominal pain and tenderness. Two RADHA drains in place.   Genitourinary:     Comments: Conroy's in place.  Reduced urine output.  Dark yellow concentrated urine.  Musculoskeletal:         General: No swelling.      Cervical back: Neck supple.      Right lower leg: No edema.      Left lower leg: No edema.   Skin:     General: Skin is warm and dry.      Capillary Refill: Capillary refill takes less than 2 seconds.      Coloration: Skin is not jaundiced or pale.      Findings: No bruising or rash.   Neurological:      Mental Status: She is alert and oriented to person, place, and time.   Psychiatric:         Mood and Affect: Mood normal.         Lab Results:   I have personally reviewed pertinent lab results including the following:  Lab Results   Component Value Date    WBC 9.97 09/11/2024    HGB 8.9 (L) 09/11/2024    HCT 26.8 (L) 09/11/2024    MCV 83 09/11/2024     (L) 09/11/2024      Lab Results   Component Value Date    SODIUM 135 09/11/2024    K 4.5 09/11/2024     09/11/2024    CO2 21 09/11/2024    BUN 24 09/11/2024    CREATININE 1.03 09/11/2024    GLUC 194 (H) 09/11/2024    CALCIUM 7.6 (L) 09/11/2024     Lactic acid 09/11: 2.4, 3.3, 2.6  Magnesium 2.1  Phosphorus 3.1    I have personally reviewed the following imaging study reports in PACS:    Therapies:   PT: pending  OT: pending    VTE Prophylaxis: Heparin    Code Status: Level 1 - Full Code  Advance Directive and Living Will:      Power of :    POLST:      Family and Social Support:   Living Arrangements: Lives w/ Spouse/significant other  Support Systems: Spouse/significant other  Assistance Needed: no  Type of Current Residence: Private residence  Current Home Care Services: No  Patient is interested in home health care services or  visiting nurse for discharge plan.    Goals of Care: Pain control and recovery from rectal resection operation

## 2024-09-11 NOTE — ASSESSMENT & PLAN NOTE
- Patient now s/p 9/10 lap converted to open LAR, primary bladder repair  - Required multiple fluid boluses overnight.   - Based on chart review thus far received 3U of PRBC, albumin 5% x4, 2L of isolyte, 2L of LR, receiving another LR bolus  - [place] MAPS more recent >60  - Currently on Levo at 3  - RADHA drains with serosang output  - Continue to monitor BP and wean levo  - Encourage out of bed and ambulation  - Consider restarting baseline Lasix once BP has improved  - Keep [place] and Conroy for now  - Appreciate ICU level of care

## 2024-09-11 NOTE — PROGRESS NOTES
Progress Note - Urology  Martha Sandoval 1950, 74 y.o. female MRN: [id number]    Unit/Bed#: PPHP-305-01 Encounter: [id number]      Bladder injury, subsequent encounter  Assessment & Plan  Patient was undergoing resection for rectal cancer on 9/10/2024 urology was consulted intraoperatively due to concern for cystotomy  Postop Day #1 status post LAR converted to open and Intra-operative cystotomy s/p complex cystorrhaphy and ureteral catheter placement  Manually irrigated Conroy catheter x 4.  No clot return on manual irrigation and urine was ice tea colored.  Ultrasound of the kidney and bladder (9/11) showed no hydronephrosis bilaterally and a normally distended bladder  Plan for patient to undergo CT urogram today (9/11).  Pending final read.  Vitals:  Temp: 97.9   Pulse: 72  Resp: 20  BP: 101/78   Labs:  Creatinine: 1.01  GFR: 55  BIUN: 23    Pain: Currently controlled  I/O:  Urine daily total: 460 mL  Drains:   Left RADHA drain total output: 600 mL  Right RADHA drain total output: 730 mL  Continue to monitor RADHA drain output, creatinine, and await finalized imaging results. Determine next steps based off results of CT urogram.  Plan to maintain Conroy catheter for up to 2 weeks.  Patient scheduled for outpatient CT cystogram.        Subjective:   Patient is a 74-year-old female.  Patient was undergoing resection for rectal cancer on 9/10/2024 and urology was consulted intraoperatively due to concern for a cystotomy. The cystorrhaphy is noted to be roughly 4 cm and transverse at the dome of the bladder and did not appear to involve the trigone.  Required multiple fluid boluses overnight. Patient is postop day 1.  She was lying comfortably in bed upon encounter.  Patient had Conroy catheter in place and intact draining to bag.  Patient had low urine output overnight with 140 mL.  Urine was Coca-Cola colored in tubing.  Manually irrigated Conroy catheter x 4 with no clot return in ice tea colored urine.  Patient noted that  "she felt like she had to be.  The patient denied fever, chills, nausea, vomiting, or significant pain.    Review of Systems   Constitutional:  Negative for chills and fever.   HENT:  Negative for ear pain and sore throat.    Eyes:  Negative for pain and visual disturbance.   Respiratory:  Negative for cough and shortness of breath.    Cardiovascular:  Negative for chest pain and palpitations.   Gastrointestinal:  Negative for abdominal pain and vomiting.   Genitourinary:  Negative for difficulty urinating, dysuria, flank pain, frequency, hematuria and urgency.   Musculoskeletal:  Negative for arthralgias and back pain.   Skin:  Negative for color change and rash.   Neurological:  Negative for seizures and syncope.   All other systems reviewed and are negative.      Objective:  Vitals: Blood pressure 115/53, pulse 88, temperature 97.9 °F (36.6 °C), temperature source Oral, resp. rate (!) 30, height 5' 1\" (1.549 m), weight 99.8 kg (220 lb), SpO2 97%, not currently breastfeeding.,Body mass index is 41.57 kg/m².    Intake/Output Summary (Last 24 hours) at 9/11/2024 1818  Last data filed at 9/11/2024 1745  Gross per 24 hour   Intake 7204.78 ml   Output 2770 ml   Net 4434.78 ml     Invasive Devices       Peripheral Intravenous Line  Duration             Peripheral IV 09/10/24 Distal;Dorsal (posterior);Right Forearm 1 day    Peripheral IV 09/11/24 Right;Upper;Ventral (anterior) Arm <1 day              Epidural Line  Duration             Epidural Catheter 09/10/24 1 day              Arterial Line  Duration             Arterial Line 09/10/24 Right Radial 1 day              Drain  Duration             Urethral Catheter Latex 22 Fr. 1 day    Closed/Suction Drain Left LLQ Bulb 19 Fr. <1 day    Closed/Suction Drain Right Abdomen 10 Fr. <1 day                    Physical Exam  Vitals reviewed.   Constitutional:       General: She is not in acute distress.     Appearance: Normal appearance. She is not ill-appearing.   HENT:      " Head: Normocephalic and atraumatic.      Nose: Nose normal.   Eyes:      General: No scleral icterus.  Pulmonary:      Effort: No respiratory distress.   Abdominal:      General: Abdomen is flat. There is no distension.      Palpations: Abdomen is soft.      Tenderness: There is abdominal tenderness.      Comments: Two RADHA drains in place w/ serosanguinous output   Genitourinary:     Comments: Conroy catheter in place and intact draining to bag.  No clots visualized in tubing or in bag.  Urine was Coca-Cola colored.  Musculoskeletal:         General: Normal range of motion.   Skin:     General: Skin is warm.      Coloration: Skin is not jaundiced.   Neurological:      Mental Status: She is alert and oriented to person, place, and time.   Psychiatric:         Mood and Affect: Mood normal.         Behavior: Behavior normal.         Labs:  Recent Labs     09/10/24  2018 09/10/24  2234 09/11/24  0401 09/11/24  0415   WBC 9.60 4.33 6.67 9.97     Recent Labs     09/10/24  1730 09/10/24  2017 09/10/24  2018 09/10/24  2234 09/11/24  0401 09/11/24  0415   HGB 6.1* 8.5* 8.8* 6.5* 8.6* 8.9*       Recent Labs     09/10/24  2018 09/10/24  2342 09/11/24  0415 09/11/24  1223 09/11/24  1713   CREATININE 1.00 0.95 1.03 1.01 0.94       History:    Past Medical History:   Diagnosis Date    Cancer (HCC)     Chest pain     last assessed 9/16/13    Chest pressure     last assessed 12/15/16    Chronic sinusitis     last assessed 5/4/17    Clotting disorder (HCC)     Compression fracture of lumbar vertebra (HCC)     last assessed 6/13/15    GERD (gastroesophageal reflux disease)     Hypertension     Liver disease     Lump of skin     last assessed 1/17/14     Past Surgical History:   Procedure Laterality Date    COLONOSCOPY      CYSTOSCOPY N/A 9/10/2024    Procedure: CYSTOSCOPY, COMPLEX CYSTORRHAPHY, B/L URETHRAL CATETERIZATION;  Surgeon: Ismael Mazariegos MD;  Location: [facility];  Service: Urology    HYSTERECTOMY      FL LAPAROSCOPY  COLECTOMY PARTIAL W/ANASTOMOSIS N/A 9/10/2024    Procedure: RESECTION COLON LOW ANTERIOR LAPAROSCOPIC HAND ASSISTED CONVERTED TO OPEN;  Surgeon: BRENDA Boone MD;  Location: [facility];  Service: Colorectal    TONSILLECTOMY       Family History   Problem Relation Age of Onset    Hyperlipidemia Mother     Hypertension Mother     Cancer Father     Liver cancer Father     Stomach cancer Father     No Known Problems Son     No Known Problems Daughter      Social History     Socioeconomic History    Marital status: /Civil Union     Spouse name: None    Number of children: None    Years of education: None    Highest education level: None   Occupational History    None   Tobacco Use    Smoking status: Never    Smokeless tobacco: Never   Vaping Use    Vaping status: Never Used   Substance and Sexual Activity    Alcohol use: Not Currently    Drug use: Never    Sexual activity: Not Currently     Partners: Male   Other Topics Concern    None   Social History Narrative    Always uses seatbelts    Feels safe at home    No caffeine use     Social Determinants of Health     Financial Resource Strain: Patient Declined (8/20/2024)    Received from [facility]    Overall Financial Resource Strain (CARDIA)     Difficulty of Paying Living Expenses: Patient declined   Food Insecurity: No Food Insecurity (9/11/2024)    Hunger Vital Sign     Worried About Running Out of Food in the Last Year: Never true     Ran Out of Food in the Last Year: Never true   Transportation Needs: No Transportation Needs (9/11/2024)    PRAPARE - Transportation     Lack of Transportation (Medical): No     Lack of Transportation (Non-Medical): No   Physical Activity: Not on file   Stress: Stress Concern Present (8/20/2024)    Received from [facility]    [employer] - Occupational Stress Questionnaire     Feeling of Stress : To some extent   Social Connections: Feeling Socially Integrated  (8/20/2024)    Received from [facility]    OASIS : Social Isolation     How often do you feel lonely or isolated from those around you?: Rarely   Intimate Partner Violence: Not At Risk (8/20/2024)    Received from [facility]    Humiliation, Afraid, Rape, and Kick questionnaire     Fear of Current or Ex-Partner: No     Emotionally Abused: No     Physically Abused: No     Sexually Abused: No   Housing Stability: Low Risk  (9/11/2024)    Housing Stability Vital Sign     Unable to Pay for Housing in the Last Year: No     Number of Times Moved in the Last Year: 0     Homeless in the Last Year: No         Andrew Gama PA-C  Date: 9/11/2024 Time: 6:18 PM

## 2024-09-11 NOTE — ASSESSMENT & PLAN NOTE
Status post laparoscopic converted to open LAR and primary bladder repair on 9/10/2024 with colorectal surgery  Thoracic epidural placed 9/10/2024 for postoperative analgesia  Infusion rate lowered and briefly paused overnight in setting of hypotension.  Plan to restart continuous rate to maintain catheter patency. Thoracic epidural unlikely to be contributing to hypotension at current infusion rate; recommend continued treatment per intensive care team.      Continue thoracic epidural infusion of ropivacaine 0.1% and fentanyl 2 mcg/mL   Current settings- continuous rate: 1 mL/h, PCEA dose: 4 mL, PCEA lockout: 10 min, max doses per hour: 3 doses/hr    Multimodal analgesia:  Tylenol 650 mg every 6 hours scheduled  IV Dilaudid 0.5 mg every 2 hours as needed for breakthrough pain  Narcan as needed for respiratory depression/opioid reversal

## 2024-09-11 NOTE — ASSESSMENT & PLAN NOTE
- Patient now s/p 9/10 lap converted to open LAR, primary bladder repair  - Got 500cc IVF in last 24h  - BP 80s/40s on [place], 120/70 on cuff, has no access for pressors  - PICC placement  - CLD  - Consider restarting baseline Lasix once BP has improved  - Appreciate APS recs for epidural  - Appreciate analilia recs  - Appreciate ICU level of care

## 2024-09-11 NOTE — ASSESSMENT & PLAN NOTE
Status post complex cystorrhaphy, patient is postoperative day 2 at this time.  Both cystogram as well as CT urogram are negative for bladder leak and ureteral leak.  The ratio of urine output to drain output is improving.  The patient feels well overall.  He is alert and oriented  She is breathing comfortably  Her drains are serosanguineous, her urine is ice tea colored.  Her Conroy catheter is draining well.    74-year-old woman postoperative day #2 status post complex cystorrhaphy.  With a slight urine leak which is improving.  No plan for reoperation at this time.  She will require Conroy catheter for a full 2 weeks with a cystogram prior to removal.  Her left drain may require adjustment given that it was near the area of bladder repair on her CT scan.    Urology following

## 2024-09-11 NOTE — UTILIZATION REVIEW
Initial Clinical Review    Elective Inpatient surgical procedure  Age/Sex: 74 y.o. female  Surgery Date: 9/10/24  Procedure: RESECTION COLON LOW ANTERIOR LAPAROSCOPIC HAND ASSISTED CONVERTED TO HYBRID OPEN  Flexible sigmoidoscopy  Laparoscopic mobilization of the splenic flexure.  CYSTOSCOPY. COMPLEX CYSTORRHAPHY. B/L URETHRAL CATETERIZATION  Anesthesia: general  Operative Findings: Rectal cancer  Extensive pelvic adhesions  Obliteration of dissection planes throughout the retroperitoneum and between the rectum and bladder as well as retrorectal tissues     Complications:   Cystotomy related to extensive and dense adhesions in the pelvis with a thin-walled bladder adhesed to the anterior rectum.    POD#1 Progress Note:  The patient is wide-awake and feels well. Her blood pressure has been soft but she does not appear to be weak. She is using her PCA and I asked her to hold off on it when not needed. She has received a substantial amount of fluid support overnight. Urine output is low. The abdomen is benign. Wounds are well. The Reinaldo-Palmer drains have had over a liter out and the fluid is relatively clear. We will send this for creatinine as a baseline to know if the bladder repair may be leaking.     - Required multiple fluid boluses overnight.   - Based on chart review thus far received 3U of PRBC, albumin 5% x4, 2L of isolyte, 2L of LR, receiving another LR bolus  - [place] MAPS more recent >60  - Currently on Levo at 3  - RADHA drains with serosang output  - Continue to monitor BP and wean levo  - Encourage out of bed and ambulation  - Consider restarting baseline Lasix once BP has improved  - Keep [place] and Conroy for now  - ICU level of care    Admission Orders: Date/Time/Statement:   Admission Orders (From admission, onward)       Ordered        09/10/24 1953  Inpatient Admission  Once                          Orders Placed This Encounter   Procedures    Inpatient Admission     Standing Status:   Standing      Number of Occurrences:   1     Order Specific Question:   Level of Care     Answer:   Level 1 Stepdown [13]     Order Specific Question:   Estimated length of stay     Answer:   More than 2 Midnights     Order Specific Question:   Certification     Answer:   I certify that inpatient services are medically necessary for this patient for a duration of greater than two midnights. See H&P and MD Progress Notes for additional information about the patient's course of treatment.       Vital Signs (last 3 days)       Date/Time Temp Pulse Resp BP MAP (mmHg) Arterial Line BP MAP SpO2 Calculated FIO2 (%) - Nasal Cannula O2 Flow Rate (L/min) Nasal Cannula O2 Flow Rate (L/min) O2 Device Cardiac (WDL) Patient Position - Orthostatic VS [place] Coma Scale Score Pain    09/11/24 0708 97.9 °F (36.6 °C) -- -- -- -- -- -- -- -- -- -- -- -- -- -- --    09/11/24 0700 -- 83 16 91/51 69 108/44 60 mmHg 98 % -- -- -- None (Room air) -- -- -- No Pain    09/11/24 0540 -- 82 15 97/48 69 112/42 60 mmHg 97 % -- -- -- -- -- -- -- --    09/11/24 0500 -- 70 10 93/44 64 102/39 57 mmHg 95 % -- -- -- -- -- -- -- --    09/11/24 0420 -- 85 16 89/46 64 108/44 62 mmHg 97 % -- -- -- -- -- -- -- --    09/11/24 0400 -- 78 13 74/36 50 69/41 55 mmHg 95 % -- -- -- None (Room air) -- -- 15 No Pain    09/11/24 0240 -- 74 11 79/43 57 95/45 63 mmHg 97 % -- -- -- -- -- -- -- --    09/11/24 0230 -- 74 10 78/38 54 104/40 58 mmHg 97 % -- -- -- -- -- -- -- --    09/11/24 0210 -- 81 14 71/34 47 86/38 51 mmHg 98 % -- -- -- -- -- -- -- --    09/11/24 0200 -- 78 14 100/55 75 126/53 77 mmHg 98 % -- -- -- -- -- -- -- --    09/11/24 0120 -- 82 19 94/53 70 104/51 69 mmHg 98 % -- -- -- -- -- -- -- --    09/11/24 0110 -- 90 15 67/32 44 73/44 55 mmHg 96 % -- -- -- -- -- -- -- --    09/11/24 0100 -- 80 12 76/37 51 102/48 63 mmHg 96 % -- -- -- -- -- -- -- --    09/10/24 2330 96.3 °F (35.7 °C) 89 19 -- -- 114/56 77 mmHg 100 % -- -- -- -- -- -- 15 --    09/10/24 2249 -- -- -- -- -- --  -- 100 % 32 -- 3 L/min Nasal cannula -- -- -- --    09/10/24 2240 -- 88 -- -- -- 118/50 66 mmHg 100 % -- -- -- -- -- -- -- --    09/10/24 2239 -- 90 -- -- -- 118/50 66 mmHg 100 % -- -- -- -- -- -- -- --    09/10/24 2238 -- 90 -- -- -- 118/48 66 mmHg 100 % -- -- -- -- -- -- -- --    09/10/24 2237 -- 90 -- -- -- 114/48 64 mmHg 100 % -- -- -- -- -- -- -- --    09/10/24 2236 -- 90 -- -- -- 112/48 64 mmHg 100 % -- -- -- -- -- -- -- --    09/10/24 2235 -- 90 -- -- -- 112/48 64 mmHg 100 % -- -- -- -- -- -- -- --    09/10/24 2210 -- 88 -- 79/33 45 94/42 54 mmHg 100 % -- -- -- -- -- -- -- --    09/10/24 2205 -- 90 -- 65/39 47 90/40 54 mmHg 100 % -- -- -- -- -- -- -- --    09/10/24 2200 97.5 °F (36.4 °C) 88 18 80/37 49 90/40 52 mmHg 100 % 32 -- 3 L/min Nasal cannula -- Lying -- No Pain    09/10/24 2157 -- 88 -- 84/44 56 -- -- 100 % -- -- -- -- -- -- -- --    09/10/24 2139 -- 92 -- 70/44 58 90/40 52 mmHg 100 % -- -- -- -- -- -- -- --    09/10/24 2130 -- -- -- -- -- -- -- -- -- -- -- -- -- -- 15 No Pain    09/10/24 2128 -- -- -- -- -- -- -- -- -- -- -- -- -- -- -- No Pain    09/10/24 2100 98 °F (36.7 °C) 93 19 114/58 76 121/47 70 mmHg 100 % 32 -- 3 L/min Nasal cannula WDL -- 15 No Pain    09/10/24 2045 -- 90 19 113/56 80 123/52 79 mmHg 100 % -- -- -- -- -- -- -- No Pain    09/10/24 2030 -- 82 16 159/70 100 169/62 100 mmHg 100 % -- -- -- -- -- -- 15 No Pain    09/10/24 2025 -- -- -- -- -- 107/38 53 mmHg 98 % -- -- -- -- -- -- -- --    09/10/24 2020 -- -- -- -- -- 87/66 77 mmHg 98 % -- -- -- -- -- -- -- --    09/10/24 2017 -- -- -- -- -- 71/36 48 mmHg 98 % -- -- -- -- -- -- -- --    09/10/24 2015 -- 79 19 92/46 66 -- -- 98 % -- -- -- -- -- -- -- --    09/10/24 2000 -- 81 21 91/49 66 -- -- 98 % 32 -- 3 L/min Nasal cannula -- -- -- --    09/10/24 1945 98.3 °F (36.8 °C) 80 17 120/56 80 -- -- 100 % -- 6 L/min -- Simple mask WDL -- 14 --    09/10/24 1003 96.6 °F (35.9 °C) 73 18 110/60 -- -- -- 100 % -- -- -- None (Room air) -- -- --  No Pain          Weight (last 2 days)       Date/Time Weight    09/10/24 1003 99.8 (220)            Pertinent Labs/Diagnostic Test Results:   Radiology:  CT cystogram    (Results Pending)     Cardiology:  No orders to display     GI:  No orders to display           Results from last 7 days   Lab Units 09/11/24 0415 09/11/24  0401 09/10/24  2234 09/10/24  2018 09/10/24  2018 09/10/24  2017   WBC Thousand/uL 9.97 6.67 4.33   < > 9.60  --    HEMOGLOBIN g/dL 8.9* 8.6* 6.5*  --  8.8*  --    I STAT HEMOGLOBIN g/dl  --   --   --   --   --  8.5*   HEMATOCRIT % 26.8* 25.9* 19.8*  --  27.0*  --    HEMATOCRIT, ISTAT %  --   --   --   --   --  25*   PLATELETS Thousands/uL 126* 97*  --   --  181  174  --    TOTAL NEUT ABS Thousands/µL 8.63* 5.71 3.61   < > 8.23*  --     < > = values in this interval not displayed.         Results from last 7 days   Lab Units 09/11/24  0415 09/10/24  2342 09/10/24  2018 09/10/24  2017 09/10/24  1730   SODIUM mmol/L 135 136 133*  --   --    POTASSIUM mmol/L 4.5 4.3 4.2  --   --    CHLORIDE mmol/L 106 106 107  --   --    CO2 mmol/L 21 20* 17*  --   --    CO2, I-STAT mmol/L  --   --   --  19* 20*   ANION GAP mmol/L 8 10 9  --   --    BUN mg/dL 24 23 25  --   --    CREATININE mg/dL 1.03 0.95 1.00  --   --    EGFR ml/min/1.73sq m 54 59 56  --   --    CALCIUM mg/dL 7.6* 7.6* 7.1*  --   --    CALCIUM, IONIZED mmol/L 1.04*  --   --   --   --    CALCIUM, IONIZED, ISTAT mmol/L  --   --   --  1.09* 1.09*   MAGNESIUM mg/dL 2.1 2.1 1.5*  --   --    PHOSPHORUS mg/dL 3.1  --  4.1  --   --      Results from last 7 days   Lab Units 09/10/24  2018   AST U/L 35   ALT U/L 27   ALK PHOS U/L 29*   TOTAL PROTEIN g/dL 4.4*   ALBUMIN g/dL 2.6*   TOTAL BILIRUBIN mg/dL 0.83     Results from last 7 days   Lab Units 09/10/24  2015   POC GLUCOSE mg/dl 232*     Results from last 7 days   Lab Units 09/11/24  0415 09/10/24  2342 09/10/24  2018   GLUCOSE RANDOM mg/dL 194* 163* 231*      Results from last 7 days   Lab Units  09/11/24  0735 09/11/24  0212 09/10/24  2234 09/10/24  2019   PH ART  7.417 7.339* 7.374 7.333*   PCO2 ART mm Hg 28.8* 34.4* 32.5* 33.1*   PO2 ART mm Hg 131.8* 85.3 148.6* 148.2*   HCO3 ART mmol/L 18.1* 18.1* 18.5* 17.2*   BASE EXC ART mmol/L -5.5 -6.9 -6.1 -7.8   O2 CONTENT ART mL/dL 13.0* 12.4* 9.9* 13.5*   O2 HGB, ARTERIAL % 96.6 93.6* 97.1* 97.9*   ABG SOURCE  Line, Arterial  --  Line, Venous Line, Arterial         Results from last 7 days   Lab Units 09/10/24  2017 09/10/24  1730   PH, SRIKANTH I-STAT   --  7.259*   PCO2, SRIKANTH ISTAT mm HG  --  40.8*   PO2, SRIKANTH ISTAT mm HG  --  31.0*   HCO3, SRIKANTH ISTAT mmol/L  --  18.3*   I STAT BASE EXC mmol/L -7* -8*   I STAT O2 SAT % 98* 50*   ISTAT PH ART  7.313*  --    I STAT ART PCO2 mm HG 36.2  --    I STAT ART PO2 mm .0  --    I STAT ART HCO3 mmol/L 18.4*  --      Results from last 7 days   Lab Units 09/11/24  0618 09/11/24  0415 09/11/24  0213 09/10/24  2213 09/10/24  2026   LACTIC ACID mmol/L 3.3* 2.4* 2.4* 3.4* 4.3*       Results from last 7 days   Lab Units 09/11/24  0555   UNIT PRODUCT CODE  [id number]  [id number]  M9061H89  C9408C14   UNIT NUMBER  [id number]  [id number]  [id number]  [id number]   UNITABO  B  B  B  B   UNITRH  POS  POS  POS  POS   CROSSMATCH  Compatible  Compatible  Compatible  Compatible   UNIT DISPENSE STATUS  Presumed Trans  Presumed Trans  Presumed Trans  Crossmatched   UNIT PRODUCT VOL ml 350  300  300  300     Diet: NPO with sips  Mobility: OOB and ambulatory  DVT Prophylaxis: heparin, scd, ambulation    Medications/Pain Control:   Scheduled Medications:  acetaminophen, 650 mg, Oral, Q6H AISHWARYA  heparin (porcine), 5,000 Units, Subcutaneous, Q8H AISHWARYA  norepinephrine, , ,   pantoprazole, 40 mg, Intravenous, Q24H AISHWARYA      Continuous IV Infusions:  norepinephrine, 1-30 mcg/min, Intravenous, Titrated  ropivacaine 0.1% and fentaNYL 2 mcg/mL, , Epidural, Continuous  vasopressin, 0.04 Units/min, Intravenous,  Continuous      PRN Meds:  diphenhydrAMINE, 25 mg, Intravenous, Q6H PRN  HYDROmorphone, 0.5 mg, Intravenous, Q2H PRN  metoclopramide, 5 mg, Intravenous, Q6H PRN  naloxone, 0.1 mg, Intravenous, Q3 min PRN  norepinephrine, , ,   ondansetron, 4 mg, Intravenous, Q6H PRN        Network Utilization Review Department  ATTENTION: Please call with any questions or concerns to [phone] and carefully listen to the prompts so that you are directed to the right person. All voicemails are confidential.   For Discharge needs, contact Care Management DC Support Team at [phone] opt. [phone]  Send all requests for admission clinical reviews, approved or denied determinations and any other requests to dedicated fax number below belonging to the [facility] where the patient is receiving treatment. List of dedicated fax numbers for the Facilities:  FACILITY NAME UR FAX NUMBER   ADMISSION DENIALS (Administrative/Medical Necessity) [phone]   DISCHARGE SUPPORT TEAM (NETWORK) [phone]   PARENT CHILD HEALTH (Maternity/NICU/Pediatrics) [phone]   [facility] [phone]   [facility] [phone]   [facility] [phone]   [facility] [phone]   [facility] [phone]   [facility] [phone]   [facility] [phone]   [facility] [phone]   [facility] [phone]   [facility] [phone]   [facility] [phone]   [facility] [phone]

## 2024-09-11 NOTE — ASSESSMENT & PLAN NOTE
- Patient now s/p 9/10 lap converted to open LAR, primary bladder repair  - Required multiple fluid boluses overnight.   - Based on chart review thus far received 3U of PRBC, albumin 5% x4, 2L of isolyte, 2L of LR, receiving another LR bolus  - [place] MAPS more recent >60  - Currently on Levo at 3  - RADHA drains with serosang output  - Continue to monitor BP and wean levo  - Encourage out of bed and ambulation  - Consider restarting baseline Lasix once BP has improved  - Keep [place] and Conroy for now  - Appreciate ICU level of care

## 2024-09-11 NOTE — QUICK NOTE
Post Op Check:    74 y.o. F now POD 0 s/p Lap converted to open LAR, flex sig, bladder repair.    The patient denied any nausea, vomiting, chest pain, shortness of breath, fevers, chills. Currently on 3L of NC. RADHA drains with serosang output. Leon with bloody tinged urine output. No bowel movements and no flatus yet. Still NPO, sips of meds.    Vitals:    09/10/24 2210   BP: (!) 79/33   Pulse: 88   Resp:    Temp:    SpO2: 100%       General: NAD  HENT: NCAT MMM  Neck: supple, no JVD  CV: nl rate  Lungs: nl wob. No resp distress  ABD: Soft, appropriately tender, nondistended. Incisions appear clear/dry/intact. See above for appearance of RADHA drain output.  Extrem: No CCE  Neuro: AAOx3     Plan:  Diet NPO; Sips with meds  Continue IV fluids at 150  Giving additional bolus  Will obtain CBC and ABG along with repeat lactic given last lactic was 4  Discontinued Christopher - no pressors for now  Decreased continuous rate of infusion from 6 to 1  Continue to monitor RADHA drain output  Monitor leon output  Pain and nausea control PRN  Multimodal pain control  DVT prophylaxis  Encourage IS  Upgraded to critical care level of care on P3 given systolic hypotension to the 60's.    Aren Madison MD  General Surgery Resident

## 2024-09-11 NOTE — ANESTHESIA POSTPROCEDURE EVALUATION
Post-Op Assessment Note    CV Status:  Stable  Pain Score: 0    Pain management: adequate    Multimodal analgesia used between 6 hours prior to anesthesia start to PACU discharge    Mental Status:  Agitated and arousable   Hydration Status:  Stable   PONV Controlled:  None   Airway Patency:  Patent  Two or more mitigation strategies used for obstructive sleep apnea   Post Op Vitals Reviewed: Yes    No anethesia notable event occurred.    Staff: CRNA, Anesthesiologist (A-line placed in PACU due to persistant hypotension, i-stat drawn in PACU)               BP (!) 91/49 (09/10/24 2000)    Temp   37   Pulse 81 (09/10/24 2000)   Resp 21 (09/10/24 2000)    SpO2   98

## 2024-09-11 NOTE — CONSULTS
Consultation - Acute Pain   Name: Martha Sandoval 74 y.o. female I MRN: [id number]  Unit/Bed#: [facility]-01 I Date of Admission: 9/10/2024   Date of Service: 9/11/2024 I Hospital Day: 1   Inpatient consult to Acute Pain Service  Consult performed by: ELENITA Martinez  Consult ordered by: Cuco Roldan MD        Physician Requesting Evaluation: Raoul Canela MD   Reason for Evaluation / Principal Problem: Epidural management/status post LAR    Assessment & Plan  Rectal cancer (HCC)  Status post laparoscopic converted to open LAR and primary bladder repair on 9/10/2024 with colorectal surgery  Thoracic epidural placed 9/10/2024 for postoperative analgesia  Infusion rate lowered and briefly paused overnight in setting of hypotension.  Plan to restart continuous rate to maintain catheter patency. Thoracic epidural unlikely to be contributing to hypotension at current infusion rate; recommend continued treatment per intensive care team.      Continue thoracic epidural infusion of ropivacaine 0.1% and fentanyl 2 mcg/mL   Current settings- continuous rate: 1 mL/h, PCEA dose: 4 mL, PCEA lockout: 10 min, max doses per hour: 3 doses/hr    Multimodal analgesia:  Tylenol 650 mg every 6 hours scheduled  IV Dilaudid 0.5 mg every 2 hours as needed for breakthrough pain  Narcan as needed for respiratory depression/opioid reversal    Acute Pain service will follow.    APS will continue to follow. Please contact Acute Pain Service - via SecureChat from [phone] with additional questions or concerns. See SecureChat or [employer] for additional contacts and after hours information.     History of Present Illness    HPI: Martha Sandoval is a 74 y.o. year old female with past medical history significant for hypertension, chronic kidney disease III, HFpEF, rectal cancer and cirrhosis.  She is status post laparoscopic converted to open LAR and primary bladder repair on 9/10/2024 for which she had a preoperative thoracic epidural placed  for postoperative analgesia.  APS consulted for postoperative block follow-up.  Patient was seen at the bedside this morning, reports minimal abdominal pain and denies any lower extremity weakness/paresthesias.  Patient remained hypotensive overnight and thoracic epidural infusion was decreased and paused temporarily with no significant improvement.  Received fluids and blood products for resuscitation overnight and currently requiring pressor support..    Current pain location(s): Pain Score: 0  Pain Scale: Pain Assessment Tool: 0-10      Pain History: Denies  Pain Management Physician: N/A  I have reviewed the patient's controlled substance dispensing history in the Prescription Drug Monitoring Program in compliance with the [facility] regulations before prescribing any controlled substances.     Review of Systems   Constitutional:  Positive for activity change.   Respiratory:  Negative for shortness of breath.    Cardiovascular:  Negative for chest pain.   Gastrointestinal:  Positive for abdominal pain.   Neurological:  Negative for dizziness, weakness and numbness.   All other systems reviewed and are negative.    Historical Information   Past Medical History:   Diagnosis Date    Cancer (HCC)     Chest pain     last assessed 9/16/13    Chest pressure     last assessed 12/15/16    Chronic sinusitis     last assessed 5/4/17    Clotting disorder (HCC)     Compression fracture of lumbar vertebra (HCC)     last assessed 6/13/15    GERD (gastroesophageal reflux disease)     Hypertension     Liver disease     Lump of skin     last assessed 1/17/14     Past Surgical History:   Procedure Laterality Date    COLONOSCOPY      HYSTERECTOMY      TONSILLECTOMY       Social History     Tobacco Use    Smoking status: Never    Smokeless tobacco: Never   Vaping Use    Vaping status: Never Used   Substance and Sexual Activity    Alcohol use: Not Currently    Drug use: Never    Sexual activity: Not Currently     Partners: Male      E-Cigarette/Vaping    E-Cigarette Use Never User      E-Cigarette/Vaping Substances    Nicotine No     THC No     CBD No     Flavoring No     Other No     Unknown No      Family History   Problem Relation Age of Onset    Hyperlipidemia Mother     Hypertension Mother     Cancer Father     Liver cancer Father     Stomach cancer Father     No Known Problems Son     No Known Problems Daughter        Objective      Temp:  [96.3 °F (35.7 °C)-98.3 °F (36.8 °C)] 97.9 °F (36.6 °C)  HR:  [70-93] 72  Resp:  [10-21] 20  BP: ()/(32-78) 101/78  Arterial Line BP: ()/(36-66) 102/38  Physical Exam  Vitals reviewed.   Constitutional:       General: She is not in acute distress.     Appearance: She is obese.   Cardiovascular:      Rate and Rhythm: Normal rate.   Pulmonary:      Effort: Pulmonary effort is normal.   Chest:      Chest wall: No tenderness.   Abdominal:      General: There is no distension.      Tenderness: There is abdominal tenderness.   Musculoskeletal:         General: Normal range of motion.      Cervical back: Normal range of motion.   Skin:     General: Skin is warm and dry.   Neurological:      Mental Status: She is alert and oriented to person, place, and time. Mental status is at baseline.   Psychiatric:         Mood and Affect: Mood normal.         Behavior: Behavior normal.      Epidural: Site clean/dry/intact, no surrounding erythema/edema/induration, infusion functioning appropriately      Lab Results: I have reviewed the following results: CBC/BMP:   .     09/11/24  0415   WBC 9.97   HGB 8.9*   HCT 26.8*   *   SODIUM 135   K 4.5      CO2 21   BUN 24   CREATININE 1.03   GLUC 194*   CAIONIZED 1.04*   MG 2.1   PHOS 3.1      Estimated Creatinine Clearance: 52.7 mL/min (by C-G formula based on SCr of 1.03 mg/dL).  Lab Results   Component Value Date    WBC 9.97 09/11/2024    HGB 8.9 (L) 09/11/2024    HCT 26.8 (L) 09/11/2024     (L) 09/11/2024         Component Value Date/Time     K 4.5 09/11/2024 0415    K 4.3 08/30/2024 0958    K 4.0 06/21/2024 1353     09/11/2024 0415     08/30/2024 0958     06/21/2024 1353    CO2 21 09/11/2024 0415    CO2 19 (L) 09/10/2024 2017    CO2 29 08/30/2024 0958    CO2 28 06/21/2024 1353    BUN 24 09/11/2024 0415    BUN 39 (H) 08/30/2024 0958    BUN 32 (H) 06/21/2024 1353    CREATININE 1.03 09/11/2024 0415    CREATININE 1.19 (H) 06/21/2024 1353         Component Value Date/Time    CALCIUM 7.6 (L) 09/11/2024 0415    CALCIUM 9.1 08/30/2024 0958    CALCIUM 8.8 06/21/2024 1353    ALKPHOS 29 (L) 09/10/2024 2018    ALKPHOS 63 08/30/2024 0958    ALKPHOS 57 06/21/2024 1353    AST 35 09/10/2024 2018    AST 37 08/30/2024 0958    AST 34 06/21/2024 1353    ALT 27 09/10/2024 2018    ALT 35 08/30/2024 0958    ALT 32 06/21/2024 1353    TP 4.4 (L) 09/10/2024 2018    TP 7.6 08/30/2024 0958    TP 7.2 06/21/2024 1353    ALB 2.6 (L) 09/10/2024 2018    ALB 3.8 06/21/2024 1353       Imaging Review: No pertinent imaging studies reviewed.

## 2024-09-11 NOTE — CASE MANAGEMENT
Case Management Discharge Planning Note    Patient name Martha Sandoval  Location [facility]-01 MRN [id number]  : 1950 Date 2024       Current Admission Date: 9/10/2024  Current Admission Diagnosis:Rectal cancer (HCC)   Patient Active Problem List    Diagnosis Date Noted Date Diagnosed    Gout of right foot 2024     Stage 3a chronic kidney disease (HCC) 2024     Rectal cancer (HCC) 07/15/2024     NSAIDs adverse reaction 2022     BMI 40.0-44.9, adult (HCC) 10/21/2021     Elevated liver enzymes 2017     Multiple gallstones 2017     Benign paroxysmal positional vertigo 2016     Abnormal CAT scan 2015     Atherosclerotic plaque 2015     Elevated blood sugar 2015     Hyperlipidemia 2014     Vitamin D deficiency 2014     Benign hypertension with CKD (chronic kidney disease) stage III (HCC) 2012     Hypothyroidism 2012     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       LOS (days): 1  Geometric Mean LOS (GMLOS) (days): 5.1  Days to GMLOS:4.2     OBJECTIVE:  Risk of Unplanned Readmission Score: 17.16         Current admission status: Inpatient   Preferred Pharmacy:   [facility]/pharmacy #[id number] - [place] - [address]  [address]  [place] [address]  Phone: [phone] Fax: [phone]    [facility] - [place] - [address]  [address] [facility]  [place] [address]  Phone: [phone] Fax: [phone]    Primary Care Provider: Janny Ramirez DO    [facility] Insurance[facility]  Secondary Insurance:     DISCHARGE DETAILS:    Discharge planning discussed with:: patient---prefers [facility], referral sent  [place] of Choice: Yes     CM contacted family/caregiver?: No- see comments (pt alert and oriented)  Were Treatment Team discharge recommendations reviewed with patient/caregiver?: Yes  Did patient/caregiver verbalize understanding of patient care needs?: Yes  Were  patient/caregiver advised of the risks associated with not following Treatment Team discharge recommendations?: Yes    Contacts  Patient Contacts:  Willi Sandoval  Relationship to Patient:: Family  Contact Method: Phone  Phone Number: [phone] home, cell [phone]  Reason/Outcome: Continuity of Care, Emergency Contact, Discharge Planning    Requested Home Health Care         Is the patient interested in HHC at discharge?: Yes  Home Health Discipline requested:: Nursing, Physical Therapy  Home Health Agency Name:: [facility]  HHA External Referral Reason (only applicable if external HHA name selected): Patient has established relationship with provider  Home Health Follow-Up Provider:: PCP  Home Health Services Needed:: Post-Op Care and Assessment, Wound/Ostomy Care, Urinary Incontinence Catheter Management, Strengthening/Theraputic Exercises to Improve Function, Evaluate Functional Status and Safety, Gait/ADL Training  Homebound Criteria Met:: Requires the Assistance of Another Person for Safe Ambulation or to Leave the Home, Uses an Assist Device (i.e. cane, walker, etc)  Supporting Clincal Findings:: Limited Endurance, Fatigues Easliy in Short Distances         Other Referral/Resources/Interventions Provided:  Interventions: HHC  Referral Comments: Pt. requesting [facility] at discharge, will need nsg and PT. Possible home with drains. Referral sent.    Would you like to participate in our [facility] service program?  : Yes    Treatment Team Recommendation: Home with Home Health Care  Discharge Destination Plan:: Home with Home Health Care  Transport at Discharge : Family                                      Additional Comments: accepted by PAMELA.

## 2024-09-12 LAB
ABO GROUP BLD BPU: NORMAL
ANION GAP SERPL CALCULATED.3IONS-SCNC: 5 MMOL/L (ref 4–13)
BASOPHILS # BLD AUTO: 0.01 THOUSANDS/ΜL (ref 0–0.1)
BASOPHILS NFR BLD AUTO: 0 % (ref 0–1)
BPU ID: NORMAL
BUN SERPL-MCNC: 21 MG/DL (ref 5–25)
CALCIUM SERPL-MCNC: 8 MG/DL (ref 8.4–10.2)
CHLORIDE SERPL-SCNC: 105 MMOL/L (ref 96–108)
CO2 SERPL-SCNC: 22 MMOL/L (ref 21–32)
CREAT SERPL-MCNC: 0.88 MG/DL (ref 0.6–1.3)
CREAT SERPL-MCNC: 0.92 MG/DL (ref 0.6–1.3)
CROSSMATCH: NORMAL
EOSINOPHIL # BLD AUTO: 0.01 THOUSAND/ΜL (ref 0–0.61)
EOSINOPHIL NFR BLD AUTO: 0 % (ref 0–6)
ERYTHROCYTE [DISTWIDTH] IN BLOOD BY AUTOMATED COUNT: 16.4 % (ref 11.6–15.1)
GFR SERPL CREATININE-BSD FRML MDRD: 61 ML/MIN/1.73SQ M
GFR SERPL CREATININE-BSD FRML MDRD: 65 ML/MIN/1.73SQ M
GLUCOSE SERPL-MCNC: 134 MG/DL (ref 65–140)
HCT VFR BLD AUTO: 24.1 % (ref 34.8–46.1)
HGB BLD-MCNC: 8 G/DL (ref 11.5–15.4)
IMM GRANULOCYTES # BLD AUTO: 0.04 THOUSAND/UL (ref 0–0.2)
IMM GRANULOCYTES NFR BLD AUTO: 1 % (ref 0–2)
LYMPHOCYTES # BLD AUTO: 0.53 THOUSANDS/ΜL (ref 0.6–4.47)
LYMPHOCYTES NFR BLD AUTO: 6 % (ref 14–44)
MAGNESIUM SERPL-MCNC: 1.8 MG/DL (ref 1.9–2.7)
MCH RBC QN AUTO: 27.5 PG (ref 26.8–34.3)
MCHC RBC AUTO-ENTMCNC: 33.2 G/DL (ref 31.4–37.4)
MCV RBC AUTO: 83 FL (ref 82–98)
MONOCYTES # BLD AUTO: 0.83 THOUSAND/ΜL (ref 0.17–1.22)
MONOCYTES NFR BLD AUTO: 10 % (ref 4–12)
NEUTROPHILS # BLD AUTO: 6.98 THOUSANDS/ΜL (ref 1.85–7.62)
NEUTS SEG NFR BLD AUTO: 83 % (ref 43–75)
NRBC BLD AUTO-RTO: 0 /100 WBCS
PHOSPHATE SERPL-MCNC: 2.2 MG/DL (ref 2.3–4.1)
PLATELET # BLD AUTO: 95 THOUSANDS/UL (ref 149–390)
PLATELET BLD QL SMEAR: ABNORMAL
PMV BLD AUTO: 9.6 FL (ref 8.9–12.7)
POTASSIUM SERPL-SCNC: 4.6 MMOL/L (ref 3.5–5.3)
RBC # BLD AUTO: 2.91 MILLION/UL (ref 3.81–5.12)
RBC MORPH BLD: PRESENT
SODIUM SERPL-SCNC: 132 MMOL/L (ref 135–147)
UNIT DISPENSE STATUS: NORMAL
UNIT PRODUCT CODE: NORMAL
UNIT PRODUCT VOLUME: 300 ML
UNIT RH: NORMAL
WBC # BLD AUTO: 8.4 THOUSAND/UL (ref 4.31–10.16)

## 2024-09-12 PROCEDURE — 99233 SBSQ HOSP IP/OBS HIGH 50: CPT | Performed by: STUDENT IN AN ORGANIZED HEALTH CARE EDUCATION/TRAINING PROGRAM

## 2024-09-12 PROCEDURE — 83735 ASSAY OF MAGNESIUM: CPT | Performed by: EMERGENCY MEDICINE

## 2024-09-12 PROCEDURE — 02HV33Z INSERTION OF INFUSION DEVICE INTO SUPERIOR VENA CAVA, PERCUTANEOUS APPROACH: ICD-10-PCS | Performed by: COLON & RECTAL SURGERY

## 2024-09-12 PROCEDURE — 97163 PT EVAL HIGH COMPLEX 45 MIN: CPT

## 2024-09-12 PROCEDURE — 80048 BASIC METABOLIC PNL TOTAL CA: CPT | Performed by: EMERGENCY MEDICINE

## 2024-09-12 PROCEDURE — 97167 OT EVAL HIGH COMPLEX 60 MIN: CPT

## 2024-09-12 PROCEDURE — C1751 CATH, INF, PER/CENT/MIDLINE: HCPCS

## 2024-09-12 PROCEDURE — 99024 POSTOP FOLLOW-UP VISIT: CPT | Performed by: COLON & RECTAL SURGERY

## 2024-09-12 PROCEDURE — 84100 ASSAY OF PHOSPHORUS: CPT | Performed by: EMERGENCY MEDICINE

## 2024-09-12 PROCEDURE — 82565 ASSAY OF CREATININE: CPT

## 2024-09-12 PROCEDURE — 85025 COMPLETE CBC W/AUTO DIFF WBC: CPT | Performed by: EMERGENCY MEDICINE

## 2024-09-12 PROCEDURE — 99024 POSTOP FOLLOW-UP VISIT: CPT | Performed by: UROLOGY

## 2024-09-12 PROCEDURE — 36569 INSJ PICC 5 YR+ W/O IMAGING: CPT

## 2024-09-12 PROCEDURE — 99232 SBSQ HOSP IP/OBS MODERATE 35: CPT | Performed by: INTERNAL MEDICINE

## 2024-09-12 PROCEDURE — 99232 SBSQ HOSP IP/OBS MODERATE 35: CPT | Performed by: STUDENT IN AN ORGANIZED HEALTH CARE EDUCATION/TRAINING PROGRAM

## 2024-09-12 RX ORDER — SODIUM CHLORIDE, SODIUM GLUCONATE, SODIUM ACETATE, POTASSIUM CHLORIDE, MAGNESIUM CHLORIDE, SODIUM PHOSPHATE, DIBASIC, AND POTASSIUM PHOSPHATE .53; .5; .37; .037; .03; .012; .00082 G/100ML; G/100ML; G/100ML; G/100ML; G/100ML; G/100ML; G/100ML
1000 INJECTION, SOLUTION INTRAVENOUS ONCE
Status: COMPLETED | OUTPATIENT
Start: 2024-09-12 | End: 2024-09-12

## 2024-09-12 RX ORDER — ALBUMIN (HUMAN) 12.5 G/50ML
25 SOLUTION INTRAVENOUS EVERY 6 HOURS
Status: DISCONTINUED | OUTPATIENT
Start: 2024-09-12 | End: 2024-09-12

## 2024-09-12 RX ORDER — HYDROMORPHONE HCL/PF 1 MG/ML
0.5 SYRINGE (ML) INJECTION ONCE
Status: COMPLETED | OUTPATIENT
Start: 2024-09-12 | End: 2024-09-12

## 2024-09-12 RX ORDER — MAGNESIUM SULFATE HEPTAHYDRATE 40 MG/ML
2 INJECTION, SOLUTION INTRAVENOUS ONCE
Status: COMPLETED | OUTPATIENT
Start: 2024-09-12 | End: 2024-09-12

## 2024-09-12 RX ORDER — FUROSEMIDE 10 MG/ML
40 INJECTION INTRAMUSCULAR; INTRAVENOUS ONCE
Status: DISCONTINUED | OUTPATIENT
Start: 2024-09-12 | End: 2024-09-12

## 2024-09-12 RX ORDER — METHOCARBAMOL 500 MG/1
500 TABLET, FILM COATED ORAL EVERY 8 HOURS SCHEDULED
Status: DISCONTINUED | OUTPATIENT
Start: 2024-09-12 | End: 2024-09-17 | Stop reason: HOSPADM

## 2024-09-12 RX ORDER — LIDOCAINE HYDROCHLORIDE 10 MG/ML
5 INJECTION, SOLUTION EPIDURAL; INFILTRATION; INTRACAUDAL; PERINEURAL ONCE
Status: COMPLETED | OUTPATIENT
Start: 2024-09-12 | End: 2024-09-12

## 2024-09-12 RX ORDER — LIDOCAINE HYDROCHLORIDE 10 MG/ML
INJECTION, SOLUTION EPIDURAL; INFILTRATION; INTRACAUDAL; PERINEURAL
Status: DISPENSED
Start: 2024-09-12 | End: 2024-09-13

## 2024-09-12 RX ORDER — ACETAMINOPHEN 325 MG/1
650 TABLET ORAL EVERY 6 HOURS PRN
Status: DISCONTINUED | OUTPATIENT
Start: 2024-09-12 | End: 2024-09-13

## 2024-09-12 RX ORDER — ALBUMIN, HUMAN INJ 5% 5 %
25 SOLUTION INTRAVENOUS ONCE
Status: COMPLETED | OUTPATIENT
Start: 2024-09-12 | End: 2024-09-12

## 2024-09-12 RX ADMIN — HYDROMORPHONE HYDROCHLORIDE 0.5 MG: 1 INJECTION, SOLUTION INTRAMUSCULAR; INTRAVENOUS; SUBCUTANEOUS at 14:50

## 2024-09-12 RX ADMIN — SODIUM CHLORIDE, SODIUM GLUCONATE, SODIUM ACETATE, POTASSIUM CHLORIDE, MAGNESIUM CHLORIDE, SODIUM PHOSPHATE, DIBASIC, AND POTASSIUM PHOSPHATE 1000 ML: .53; .5; .37; .037; .03; .012; .00082 INJECTION, SOLUTION INTRAVENOUS at 19:47

## 2024-09-12 RX ADMIN — METHOCARBAMOL 500 MG: 500 TABLET ORAL at 14:53

## 2024-09-12 RX ADMIN — LIDOCAINE HYDROCHLORIDE 5 ML: 10 INJECTION, SOLUTION EPIDURAL; INFILTRATION; INTRACAUDAL at 14:52

## 2024-09-12 RX ADMIN — HEPARIN SODIUM 5000 UNITS: 5000 INJECTION INTRAVENOUS; SUBCUTANEOUS at 00:30

## 2024-09-12 RX ADMIN — HEPARIN SODIUM 5000 UNITS: 5000 INJECTION INTRAVENOUS; SUBCUTANEOUS at 21:31

## 2024-09-12 RX ADMIN — ALBUMIN (HUMAN) 25 G: 12.5 INJECTION, SOLUTION INTRAVENOUS at 06:13

## 2024-09-12 RX ADMIN — MAGNESIUM SULFATE HEPTAHYDRATE 2 G: 40 INJECTION, SOLUTION INTRAVENOUS at 08:28

## 2024-09-12 RX ADMIN — HEPARIN SODIUM 5000 UNITS: 5000 INJECTION INTRAVENOUS; SUBCUTANEOUS at 06:25

## 2024-09-12 RX ADMIN — SODIUM PHOSPHATE, MONOBASIC, MONOHYDRATE AND SODIUM PHOSPHATE, DIBASIC, ANHYDROUS 30 MMOL: 276; 142 INJECTION, SOLUTION INTRAVENOUS at 11:01

## 2024-09-12 RX ADMIN — CEFTRIAXONE SODIUM 2000 MG: 10 INJECTION, POWDER, FOR SOLUTION INTRAVENOUS at 12:40

## 2024-09-12 RX ADMIN — METHOCARBAMOL 500 MG: 500 TABLET ORAL at 21:31

## 2024-09-12 RX ADMIN — Medication: at 02:59

## 2024-09-12 RX ADMIN — HEPARIN SODIUM 5000 UNITS: 5000 INJECTION INTRAVENOUS; SUBCUTANEOUS at 14:53

## 2024-09-12 RX ADMIN — PANTOPRAZOLE SODIUM 40 MG: 40 INJECTION, POWDER, FOR SOLUTION INTRAVENOUS at 08:31

## 2024-09-12 NOTE — PLAN OF CARE
Problem: OCCUPATIONAL THERAPY ADULT  Goal: Performs self-care activities at highest level of function for planned discharge setting.  See evaluation for individualized goals.  Description: Treatment Interventions: ADL retraining, Functional transfer training, Endurance training, Patient/family training, Equipment evaluation/education, Continued evaluation, Energy conservation          See flowsheet documentation for full assessment, interventions and recommendations.   Outcome: Progressing  Note: Limitation: Decreased ADL status, Decreased endurance, Decreased self-care trans, Decreased high-level ADLs  Prognosis: Good  Assessment: Pt is a 74 y.o. female who was admitted to [facility] on 9/10/2024 with Rectal cancer (HCC), s/p 9/10 lap converted to open LAR, primary bladder repair. Pt seen for an OT evaluation per active OT orders, RADHA/A-line in place.  Pt  has a past medical history of Cancer (HCC), Chest pain, Chest pressure, Chronic sinusitis, Clotting disorder (HCC), Compression fracture of lumbar vertebra (HCC), GERD (gastroesophageal reflux disease), Hypertension, Liver disease, and Lump of skin. Pt lives in a [facility] with [facility], has a FFSU with recliner chair although upstairs full bath with walk-in shower and standard toilet. Pta, pt was independent w/ ADL/IADL and functional mobility, was (-) driving and has a rollator and cane. Currently, pt is Min-Mod Ax1 for UB ADL, Mod Ax1 for LB ADL, and completed transfers/short FM w Min Ax2 with B HHA. Pt currently presents with impairments in the following categories -steps to enter environment and difficulty performing ADLS activity tolerance, endurance, standing balance/tolerance, and safety . These impairments, as well as pt's fatigue, pain, and risk for falls  limit pt's ability to safely engage in all baseline areas of occupation, includinggrooming, bathing, dressing, toileting, functional mobility/transfers, and community mobility.  The patient's raw  score on the AM-PAC Daily Activity Inpatient Short Form is 16. A raw score of greater than or equal to 19 suggests the patient may benefit from discharge to home. Please refer to the recommendation of the Occupational Therapist for safe discharge planning. Although AMPAC is less than 19, pt is anticipated to progress well and has adequate assist at home. Pt would benefit from continued acute OT services throughout hospital course and following D/C. Plan for OT interventions 2-3x per week. From OT standpoint, recommend home with increased social support, level III services upon D/C. Pt was left seated in bedside chair with all needs within reach.     Rehab Resource Intensity Level, OT: III (Minimum Resource Intensity) (pending progress)

## 2024-09-12 NOTE — PROGRESS NOTES
Progress Note - Geriatric Medicine   Name: Martha Sandoval 74 y.o. female I MRN: [id number]  Unit/Bed#: [id number]-305-01 I Date of Admission: 9/10/2024   Date of Service: 9/12/2024 I Hospital Day: 2     Assessment & Plan    Rectal cancer  Rectal cancer diagnosed in 07/2024.  S/p laparoscopic rectal cancer resection converted to open LAR and primary bladder repair on 9/10.  Postop day#2  Denied fever, chills, abdominal pain, nausea, vomiting.  Following colorectal surgery  Intra-operative cystotomy s/p complex cystorrhaphy.   Urology is on board for possible bladder injury.  Continue IV Rocephin 2000mg Q24 hr Day 2/7  Continue Diflucan 400 mg every 24 hours Day 2/7     At risk for Delirium  Patient is high risk of delirium due to age, change in environment, pain, the metabolic imbalance, decreased mobility  Delirium precautions  Maintain normal sleep/wake cycle  Minimize overnight interruptions, group overnight vitals/labs/nursing checks as possible  Dim lights, close blinds and turn off tv to minimize stimulation and encourage sleep environment in evenings  Ensure that pain is well controlled   Monitor for fecal and urinary retention which may precipitate delirium  Encourage early mobilization and ambulation  Avoid medications which may precipitate or worsen delirium such as tramadol, benzodiazepine, anticholinergics, and antihistamine         Post op pain  S/p laparoscopic rectal resection transformed to open  Current pain regimen  Tylenol 650 mg every 6 hours scheduled  PCA pump 4 mL every 10 minute as needed ropivacaine and fentanyl (maximum 4 doses per hour)  IV Dilaudid 0.5 mg every 2 hours as needed for breakthrough pain  Acute pain service consulted recommendations appreciated.  Consider Robaxin low-dose        Acute blood loss anemia  Hb: 8.9 -> 8.6-> 6.5 -> 8.8  S/p 2 units PRBC transfusion  Monitor CBC     Hypotension  Baseline BP low 100-110/50-70   Hypovolemic shock due to acute blood loss anemia  S/p IV fluids  and IV albumin infusion  Monitored on telemetry  09/12/24: Blood pressure has improved with MAP more than 75  Currently on levophed   Holding home BP meds (metoprolol, valsartan, spironolactone)       Cognitive screening  Geriatric evaluation on 09/03/24  Mini cog screening score <2   Patient denied memory issues  Need MMSE or MoCA after full recovery from surgery.     CKD stage 3  Baseline creatinine 0.9-1.1  Creatinine stable at baseline 1.03 today  High risks for VICTORIANO in the setting of CKD stage IIIa.  Bladder scan, KUB ultrasound/CT cystogram/CT renal protocol done  Creatinine at baseline today 9/12.  Conroy in place and monitoring I/Os  I/O last 24 hours:  In: 1203.5 [I.V.:1003.5; IV Piggyback:200]  Out: 3260 [Urine:1955; Drains:1305]      HFpEF  Following cardiology had LVH  Last echo obtained in 08/2024 for evaluation showed LVEF 65%  Home meds includes Lasix 40 mg daily and Aldactone 100 mg daily  Holding diuretics currently in the setting of low blood pressure  Recheck echo show LVEF 65% with normal systolic function.  Consider to resume home med Lasix once blood pressure improved.    GERD  Home med oral omeprazole 20 mg daily  IV Protonix 40 mg daily while inpatient.  Follow-up with family medicine and outpatient hepatologist.        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 Dr. Dereck Fair  Last paracentesis in 2021.    MELD-Na score 7, MELD score 9 in April 2024  Maintained on diuretics.  (Lasix 40 mg daily, methadone 100 mg daily, sodium 2 g diet)  Renal function stable.   Monitoring LFTs every 3 to 6 months.   Prilosec 20 mg daily         Home medication review  Aspirin 81 mg daily cetirizine 10 mg as needed daily  Vitamin D3 400 units daily  Vitamin B12 100 mcg daily  Vitamin B6 100 mg daily  Vitamin D 8200 unit daily  Lasix 40 mg daily  Mucinex 600 mg twice daily  Prilosec 20 mg daily   Spironolactone 100 mg daily  Valsartan 60 mg daily  Ursodiol 300 mg twice daily     Home meds on  hold currently.    VTE Pharmacologic Prophylaxis:   High Risk (Score >/= 5) - Pharmacological DVT Prophylaxis Ordered: heparin. Sequential Compression Devices Ordered.    Mobility:   JH-HLM Achieved: 2: Bed activities/Dependent transfer  JH-HLM Goal NOT achieved. Continue with multidisciplinary rounding and encourage appropriate mobility to improve upon JH-HLM goals.    Patient Centered Rounds: I performed bedside rounds with nursing staff today.       Education and Discussions with Family / Patient: Updated  () at bedside.    Current Length of Stay: 2 day(s)  Current Patient Status: Inpatient   Code Status: Level 1 - Full Code    Subjective   Pt seen and examined at bedside this morning. Martha was having her clear liquid breakfast. Her  was at bedside.  Patient was AAO x 3.  Patient said that she pressed multiple times of PCA due to low abdominal pressure and pain.  Denied fever, chills, chest pain, shortness of breath, dizziness, nausea, vomiting.    Objective     Vitals:   Temp (24hrs), Av.3 °F (36.8 °C), Min:97.9 °F (36.6 °C), Max:98.7 °F (37.1 °C)    Temp:  [97.9 °F (36.6 °C)-98.7 °F (37.1 °C)] 98.7 °F (37.1 °C)  HR:  [] 91  Resp:  [12-30] 17  BP: ()/(29-62) 109/52  SpO2:  [95 %-99 %] 97 %  Body mass index is 41.57 kg/m².     Input and Output Summary (last 24 hours):     Intake/Output Summary (Last 24 hours) at 2024 0915  Last data filed at 2024 0800  Gross per 24 hour   Intake 1006.57 ml   Output 3050 ml   Net -2043.43 ml       Physical Exam  Constitutional:       General: She is not in acute distress.     Appearance: She is ill-appearing. She is not toxic-appearing.   HENT:      Mouth/Throat:      Mouth: Mucous membranes are moist.   Eyes:      Extraocular Movements: Extraocular movements intact.      Pupils: Pupils are equal, round, and reactive to light.   Abdominal:      General: There is no distension.      Tenderness: There is no abdominal  tenderness.      Comments:  Incisions CDI. 2 RADHA drains.   Genitourinary:     Comments: Conroy's in place with pink colored urine.   Musculoskeletal:         General: No swelling.      Right lower leg: No edema.      Left lower leg: No edema.   Skin:     Capillary Refill: Capillary refill takes less than 2 seconds.      Coloration: Skin is not jaundiced or pale.      Findings: No lesion or rash.   Neurological:      Mental Status: She is oriented to person, place, and time.          Lines/Drains:  Lines/Drains/Airways       Active Status       Name Placement date Placement time Site Days    Epidural Catheter 09/10/24 09/10/24  1118  -- 1    Arterial Line 09/10/24 Right Radial 09/10/24  0815  Radial  2    Closed/Suction Drain Right Abdomen 10 Fr. 09/10/24  1838  Abdomen  1    Closed/Suction Drain Left LLQ Bulb 19 Fr. 09/10/24  1839  LLQ  1    Urethral Catheter Latex 22 Fr. 09/10/24  1800  Latex  1                  Urinary Catheter:  Goal for removal: Voiding trial when ambulation improves        Lab Results: I have reviewed the following results: CBC/BMP:   .     09/12/24  0322   WBC 8.40   HGB 8.0*   HCT 24.1*   PLT 95*   SODIUM 132*   K 4.6      CO2 22   BUN 21   CREATININE 0.88   GLUC 134   MG 1.8*   PHOS 2.2*       Results from last 7 days   Lab Units 09/12/24  0322   WBC Thousand/uL 8.40   HEMOGLOBIN g/dL 8.0*   HEMATOCRIT % 24.1*   PLATELETS Thousands/uL 95*   SEGS PCT % 83*   LYMPHO PCT % 6*   MONO PCT % 10   EOS PCT % 0     Results from last 7 days   Lab Units 09/12/24  0322 09/11/24  1223 09/11/24  0415   SODIUM mmol/L 132*   < > 135   POTASSIUM mmol/L 4.6   < > 4.5   CHLORIDE mmol/L 105   < > 106   CO2 mmol/L 22   < > 21   BUN mg/dL 21   < > 24   CREATININE mg/dL 0.88   < > 1.03   ANION GAP mmol/L 5   < > 8   CALCIUM mg/dL 8.0*   < > 7.6*   ALBUMIN g/dL  --   --  3.3*   TOTAL BILIRUBIN mg/dL  --   --  1.25*   ALK PHOS U/L  --   --  25*   ALT U/L  --   --  25   AST U/L  --   --  37   GLUCOSE RANDOM mg/dL  134   < > 194*    < > = values in this interval not displayed.     Results from last 7 days   Lab Units 09/11/24  0852   INR  1.53*     Results from last 7 days   Lab Units 09/10/24  2015   POC GLUCOSE mg/dl 232*         Results from last 7 days   Lab Units 09/11/24  0852 09/11/24  0618 09/11/24  0415 09/11/24  0213   LACTIC ACID mmol/L 2.6* 3.3* 2.4* 2.4*       Recent Cultures (last 7 days):         Imaging Review: Reviewed radiology reports from this admission including: CT Renal.  Other Studies: EKG was reviewed.     Last 24 Hours Medication List:     Current Facility-Administered Medications:     acetaminophen (TYLENOL) tablet 650 mg, Q6H AISHWARYA    cefTRIAXone (ROCEPHIN) 2,000 mg in dextrose 5 % 50 mL IVPB, Q24H, Last Rate: Stopped (09/11/24 1820)    diphenhydrAMINE (BENADRYL) injection 25 mg, Q6H PRN    fluconazole (DIFLUCAN) IVPB (premix) 400 mg 200 mL, Q24H, Last Rate: 400 mg (09/11/24 1451)    heparin (porcine) subcutaneous injection 5,000 Units, Q8H AISHWARYA **AND** [COMPLETED] Platelet count, Once    HYDROmorphone (DILAUDID) injection 0.5 mg, Q2H PRN    magnesium sulfate 2 g/50 mL IVPB (premix) 2 g, Once, Last Rate: 2 g (09/12/24 0828)    metoclopramide (REGLAN) injection 5 mg, Q6H PRN    naloxone (NARCAN) injection 0.1 mg, Q3 min PRN    norepinephrine (LEVOPHED) 4 mg (STANDARD CONCENTRATION) IV in sodium chloride 0.9% 250 mL, Titrated, Last Rate: Stopped (09/12/24 0615)    ondansetron (ZOFRAN) injection 4 mg, Q6H PRN    pantoprazole (PROTONIX) injection 40 mg, Q24H AISHWARYA    ropivacaine 0.1% and fentaNYL 2 mcg/mL PCEA, Continuous    sodium phosphate 30 mmol in dextrose 5 % 250 mL Infusion, Once    Administrative Statements   Today, Patient Was Seen By: Stacy Villarreal MD  I have spent a total time of 45 minutes in caring for this patient on the day of the visit/encounter including Diagnostic results, Risks and benefits of tx options, Instructions for management, Counseling / Coordination of care, Documenting in the  medical record, Reviewing / ordering tests, medicine, procedures  , Obtaining or reviewing history  , and Communicating with other healthcare professionals .    **Please Note: This note may have been constructed using a voice recognition system.**

## 2024-09-12 NOTE — OCCUPATIONAL THERAPY NOTE
Occupational Therapy Evaluation     Patient Name: Martha Sandoval  Today's Date: 9/12/2024  Problem List  Principal Problem:    Rectal cancer (HCC)  Active Problems:    Bladder injury, subsequent encounter    Past Medical History  Past Medical History:   Diagnosis Date    Cancer (HCC)     Chest pain     last assessed 9/16/13    Chest pressure     last assessed 12/15/16    Chronic sinusitis     last assessed 5/4/17    Clotting disorder (HCC)     Compression fracture of lumbar vertebra (HCC)     last assessed 6/13/15    GERD (gastroesophageal reflux disease)     Hypertension     Liver disease     Lump of skin     last assessed 1/17/14     Past Surgical History  Past Surgical History:   Procedure Laterality Date    COLONOSCOPY      CT CYSTOGRAM  9/11/2024    CYSTOSCOPY N/A 9/10/2024    Procedure: CYSTOSCOPY, COMPLEX CYSTORRHAPHY, B/L URETHRAL CATETERIZATION;  Surgeon: Ismael Mazariegos MD;  Location: [facility];  Service: Urology    HYSTERECTOMY      SC LAPAROSCOPY COLECTOMY PARTIAL W/ANASTOMOSIS N/A 9/10/2024    Procedure: RESECTION COLON LOW ANTERIOR LAPAROSCOPIC HAND ASSISTED CONVERTED TO OPEN;  Surgeon: BRENDA Boone MD;  Location: [facility];  Service: Colorectal    TONSILLECTOMY               09/12/24 1020   OT Last Visit   OT Visit Date 09/12/24   Note Type   Note type Evaluation   Pain Assessment   Pain Assessment Tool 0-10   Pain Score 2   Pain Location/Orientation Location: Abdomen   Patient's Stated Pain Goal No pain   Hospital Pain Intervention(s) Repositioned;Ambulation/increased activity   Restrictions/Precautions   Other Precautions Multiple lines;Telemetry;Fall Risk;Pain  (JPx2, A-line, epidural)   Home Living   Type of Home House   Home Layout Two level;Bed/bath upstairs;Stairs to enter with rails  (2 NAEL, FFSU with recliner)   Bathroom Shower/Tub Walk-in shower   Bathroom Toilet Standard   Bathroom Accessibility Accessible   Home Equipment Cane;Other (Comment)  (rollator)   Additional Comments  "Pt lives in a 2  with 2 NAEL, uses a FFSU with recliner although full bath is on 2nd fl with walk-in shower and standard toilet.   Prior Function   Level of [place] Independent with functional mobility;Independent with ADLs;Independent with IADLS   Lives With Spouse   Receives Help From Family   IADLs Independent with meal prep;Independent with medication management;Family/Friend/Other provides transportation   Falls in the last 6 months 0   Vocational Retired   Lifestyle   Autonomy Pta pt I with ADL, most IADL, and functional mobility, (-)    Reciprocal Relationships supportive spouse   Service to Others retired   Intrinsic Gratification enjoys walking   General   Family/Caregiver Present Yes  (spouse)   Subjective   Subjective \"I don't have much of an appetite\"   ADL   Where Assessed Chair   Eating Assistance 5  Supervision/Setup   Grooming Assistance 4  Minimal Assistance   UB Bathing Assistance 3  Moderate Assistance   LB Bathing Assistance 3  Moderate Assistance   UB Dressing Assistance 4  Minimal Assistance   LB Dressing Assistance 2  Maximal Assistance   Toileting Assistance  3  Moderate Assistance   Functional Assistance 3  Moderate Assistance   Bed Mobility   Supine to Sit 4  Minimal assistance   Additional items Assist x 2;Increased time required;Verbal cues   Additional Comments Pt greeted in bed, left in cahair with all needs within reach.   Transfers   Sit to Stand 4  Minimal assistance   Additional items Assist x 2;Increased time required;Verbal cues   Stand to Sit 4  Minimal assistance   Additional items Assist x 2;Increased time required;Verbal cues   Additional Comments B HHA   Functional Mobility   Functional Mobility 4  Minimal assistance   Additional Comments Pt performs steps bed>chair with MIN A x 2 with B HHA   Additional items Hand hold assistance   Balance   Static Sitting Fair   Dynamic Sitting Fair -   Static Standing Poor +   Dynamic Standing Poor +   Ambulatory Poor "   Activity Tolerance   Activity Tolerance Patient limited by fatigue;Patient limited by pain   Medical Staff Made Aware Co-eval with DPT due to high medical complexity, assist from restorative Roselyn   Nurse Made Aware RN cleared for therapy   RUE Assessment   RUE Assessment WFL   LUE Assessment   LUE Assessment WFL   Hand Function   Gross Motor Coordination Functional   Fine Motor Coordination Functional   Sensation   Light Touch No apparent deficits   Cognition   Overall Cognitive Status WFL   Arousal/Participation Cooperative   Attention Attends with cues to redirect   Orientation Level Oriented X4   Memory Within functional limits   Following Commands Follows one step commands without difficulty   Comments Pt cooperative to therapy, pt very cautious about moving benefitting from vc for encouragement.   Assessment   Limitation Decreased ADL status;Decreased endurance;Decreased self-care trans;Decreased high-level ADLs   Prognosis Good   Assessment Pt is a 74 y.o. female who was admitted to [facility] on 9/10/2024 with Rectal cancer (HCC), s/p 9/10 lap converted to open LAR, primary bladder repair. Pt seen for an OT evaluation per active OT orders, RADHA/A-line in place.  Pt  has a past medical history of Cancer (HCC), Chest pain, Chest pressure, Chronic sinusitis, Clotting disorder (HCC), Compression fracture of lumbar vertebra (HCC), GERD (gastroesophageal reflux disease), Hypertension, Liver disease, and Lump of skin. Pt lives in a 2  with 2 NAEL, has a FFSU with recliner chair although upstairs full bath with walk-in shower and standard toilet. Pta, pt was independent w/ ADL/IADL and functional mobility, was (-) driving and has a rollator and cane. Currently, pt is Min-Mod Ax1 for UB ADL, Mod Ax1 for LB ADL, and completed transfers/short FM w Min Ax2 with B HHA. Pt currently presents with impairments in the following categories -steps to enter environment and difficulty performing ADLS activity  tolerance, endurance, standing balance/tolerance, and safety . These impairments, as well as pt's fatigue, pain, and risk for falls  limit pt's ability to safely engage in all baseline areas of occupation, includinggrooming, bathing, dressing, toileting, functional mobility/transfers, and community mobility.  The patient's raw score on the AM-PAC Daily Activity Inpatient Short Form is 16. A raw score of greater than or equal to 19 suggests the patient may benefit from discharge to home. Please refer to the recommendation of the Occupational Therapist for safe discharge planning. Although AMPAC is less than 19, pt is anticipated to progress well and has adequate assist at home. Pt would benefit from continued acute OT services throughout hospital course and following D/C. Plan for OT interventions 2-3x per week. From OT standpoint, recommend home with increased social support, level III services upon D/C. Pt was left seated in bedside chair with all needs within reach.   Goals   Patient Goals to feel better   Plan   Treatment Interventions ADL retraining;Functional transfer training;Endurance training;Patient/family training;Equipment evaluation/education;Continued evaluation;Energy conservation   Goal Expiration Date 09/26/24   OT Frequency 2-3x/wk   Discharge Recommendation   Rehab Resource Intensity Level, OT III (Minimum Resource Intensity)  (pending progress)   -PAC Daily Activity Inpatient   Lower Body Dressing 2   Bathing 2   Toileting 2   Upper Body Dressing 3   Grooming 3   Eating 4   Daily Activity Raw Score 16   Daily Activity Standardized Score (Calc for Raw Score >=11) 35.96   AM-PAC Applied Cognition Inpatient   Following a Speech/Presentation 4   Understanding Ordinary Conversation 4   Taking Medications 4   Remembering Where Things Are Placed or Put Away 3   Remembering List of 4-5 Errands 3   Taking Care of Complicated Tasks 3   Applied Cognition Raw Score 21   Applied Cognition Standardized Score  44.3   Modified [place] Scale   Modified [place] Scale 4   End of Consult   Education Provided Yes;Family or social support of family present for education by provider   Patient Position at End of Consult Bedside chair;All needs within reach   Nurse Communication Nurse aware of consult       OT Goals    - Pt will be Min A  with LB ADL by end of hospital course.    - Pt will be Supervision with UB ADL by end of hospital course.    - Pt will be Supervision with all functional transfers required for patient safety by end of hospital course.    - Pt will be Supervision with functional mobility to/from bathroom for increased independence with toileting tasks.    - Pt will independently recall and implement safety precautions during OT sessions.     - Pt activity tolerance will increase to 30 minutes in order to safely engage in ADL and transfers.     - Pt standing tolerance will increase to 5 minutes  in order to promote sink side ADLs and IADL activities.          WILLEM Baron, OTR/L

## 2024-09-12 NOTE — PHYSICAL THERAPY NOTE
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)    BMI 40.0-44.9, adult (HCC)    NSAIDs adverse reaction    Rectal cancer (HCC)    Stage 3a chronic kidney disease (HCC)    Gout of right foot    Bladder injury, subsequent encounter       Past Medical History  Past Medical History:   Diagnosis Date    Cancer (HCC)     Chest pain     last assessed 9/16/13    Chest pressure     last assessed 12/15/16    Chronic sinusitis     last assessed 5/4/17    Clotting disorder (HCC)     Compression fracture of lumbar vertebra (HCC)     last assessed 6/13/15    GERD (gastroesophageal reflux disease)     Hypertension     Liver disease     Lump of skin     last assessed 1/17/14       Past Surgical History  Past Surgical History:   Procedure Laterality Date    COLONOSCOPY      CT CYSTOGRAM  9/11/2024    CYSTOSCOPY N/A 9/10/2024    Procedure: CYSTOSCOPY, COMPLEX CYSTORRHAPHY, B/L URETHRAL CATETERIZATION;  Surgeon: Ismael Mazariegos MD;  Location: [facility];  Service: Urology    HYSTERECTOMY      RI LAPAROSCOPY COLECTOMY PARTIAL W/ANASTOMOSIS N/A 9/10/2024    Procedure: RESECTION COLON LOW ANTERIOR LAPAROSCOPIC HAND ASSISTED CONVERTED TO OPEN;  Surgeon: BRENDA Boone MD;  Location: [facility];  Service: Colorectal    TONSILLECTOMY          09/12/24 1015   PT Last Visit   PT Visit Date 09/12/24   Note Type   Note type Evaluation   Pain Assessment   Pain Assessment Tool 0-10   Pain Score 5   Pain Location/Orientation Location: Abdomen   Patient's Stated Pain Goal No pain   Hospital  Pain Intervention(s) Ambulation/increased activity   Restrictions/Precautions   Weight Bearing Precautions Per Order No   Other Precautions Multiple lines;Telemetry;Fall Risk;Pain  (pt w/ epidural)   Home Living   Type of Home House   Additional Comments Resides in 2 story home w/ 2 NAEL w. .  Indep self care, owns devices but typically ambulates w/ out   Prior Function   Level of [place] Independent with ADLs;Independent with functional mobility   Falls in the last 6 months 0   General   Family/Caregiver Present No   Cognition   Overall Cognitive Status WFL   Arousal/Participation Responsive   Attention Attends with cues to redirect   Orientation Level Oriented X4   Memory Within functional limits   Following Commands Follows one step commands without difficulty   Subjective   Subjective states she feels OK.  pain as expected.  cooperative w eval   RLE Assessment   RLE Assessment   (strength grossly 4-/5- assessed w/ mobility)   LLE Assessment   LLE Assessment   (strength grossly 4-/5- assessed w/ mobility)   Coordination   Movements are Fluid and Coordinated 0   Coordination and Movement Description mild ataxia   Bed Mobility   Supine to Sit 4  Minimal assistance   Additional items Assist x 2;Increased time required;LE management;Verbal cues   Additional Comments sat EOB x few minutes prior to transfers   Transfers   Sit to Stand 4  Minimal assistance   Additional items Assist x 2;Increased time required;Verbal cues   Stand to Sit 4  Minimal assistance   Additional items Assist x 2;Increased time required;Verbal cues   Ambulation/Elevation   Gait pattern   (slow, mild ataxia, short step length)   Gait Assistance 4  Minimal assist   Additional items Assist x 2   Assistive Device   (HHA of 2- start w/ RW)   Distance 3-4'x1 from bed to bedside chair   Balance   Static Sitting Good   Dynamic Sitting Fair -   Static Standing Poor +   Dynamic Standing Poor +   Ambulatory Poor +   Endurance Deficit   Endurance  Deficit Yes   Endurance Deficit Description fatigue, weakness, pain   Activity Tolerance   Activity Tolerance Patient limited by fatigue;Patient limited by pain;Treatment limited secondary to medical complications (Comment)   Nurse Made Aware yes   Assessment   Prognosis Good   Problem List Decreased strength;Decreased endurance;Impaired balance;Decreased mobility;Pain;Decreased coordination   Assessment Pt seen for physical therapy evaluation, portion of which was performed as a co-evaluation w/ OT due to concerns re: medical stability. PT portion of evaluation focused on mobility assessment where OT portion focused more on ADLs, self care.  Pt is a 75 y/o female w/ history/comorbidities of GERD, HTN, lumbar fx, CKD, heart failure who is now admitted w/ known mid rectal CA for lap resection of colon, hand assisted converted to open w/ cysto, ureteral cath placement and bladder repair 9/10/24.  Due to acute medical issues, acute procedure, pain, fall risk, note unstable clinical picture.  PT consulted to assess post op mobility, d/c needs.  Pt presents w/ decreased functional mob, standing balance, endurance, B LE strength, barriers at home.  Pt will benefit from skilled PT to correct for the above problems.  pending continued progress, anticipate d/c home w/ Level III (minimal) vs no post acute care therapy needs.  will follow   Goals   STG Expiration Date 09/26/24   Short Term Goal #1 1-2 wks: bed mob and transfers w/ indep, standing balance to good/normal w/ device as needed, ambulate 200-300 ft w/ RW vs leats restrictive device and mod I, increase B LE strength by 1/2 -1 grade, ambulate 5-7 stairs w/ S   PT Treatment Day 0   Plan   Treatment/Interventions Functional transfer training;Elevations;LE strengthening/ROM;Therapeutic exercise;Endurance training;Patient/family training;Equipment eval/education;Bed mobility;Gait training   PT Frequency 3-5x/wk   Discharge Recommendation   Rehab Resource Intensity Level,  PT No post-acute rehabilitation needs   Equipment Recommended   (TBD)   AM-PAC Basic Mobility Inpatient   Turning in Flat Bed Without Bedrails 3   Lying on Back to Sitting on Edge of Flat Bed Without Bedrails 2   Moving Bed to Chair 2   Standing Up From Chair Using Arms 2   Walk in Room 2   Climb 3-5 Stairs With Railing 1   Basic Mobility Inpatient Raw Score 12   Basic Mobility Standardized Score 32.23   [place] Highest Level Of Mobility   -[facility] Goal 4: Move to chair/commode   -HLM Achieved 4: Move to chair/commode           Chi Patterson PT, DPT, CSRS

## 2024-09-12 NOTE — ASSESSMENT & PLAN NOTE
Status post laparoscopic converted to open LAR and primary bladder repair on 9/10/2024 with colorectal surgery  Thoracic epidural placed 9/10/2024 for postoperative analgesia  Infusion rate lowered and briefly paused overnight in setting of hypotension.  Plan to restart continuous rate to maintain catheter patency. Thoracic epidural unlikely to be contributing to hypotension at current infusion rate; recommend continued treatment per intensive care team.      Continue thoracic epidural infusion of ropivacaine 0.1% and fentanyl 2 mcg/mL   Current settings- continuous rate: 1 mL/h, PCEA dose: 4 mL, PCEA lockout: 10 min, max doses per hour: 3 doses/hr  Increase to 4/4/10/4 if pressures allow 9/12  Plan for removal tomorrow or Saturday pending bowel ROF  Pause trial PCEA 9/13  Removed tip intact    Transition to oxycodone 5-10mg q4hrs PRN pain  IV Dilaudid 0.5 mg every 2 hours as needed for breakthrough pain    Multimodal analgesia:  Schedule tylenol  Add Robaxin low dose 500mg TID  Narcan as needed for respiratory depression/opioid reversal

## 2024-09-12 NOTE — PROGRESS NOTES
Progress Note - Urology   Name: Martha Sandoval 74 y.o. female I MRN: [id number]  Unit/Bed#: PPHP-305-01 I Date of Admission: 9/10/2024   Date of Service: 9/12/2024 I Hospital Day: 2     Assessment & Plan  Rectal cancer (HCC)    Bladder injury, subsequent encounter  Status post complex cystorrhaphy, patient is postoperative day 2 at this time.  Both cystogram as well as CT urogram are negative for bladder leak and ureteral leak.  The ratio of urine output to drain output is improving.  The patient feels well overall.  He is alert and oriented  She is breathing comfortably  Her drains are serosanguineous, her urine is ice tea colored.  Her Conroy catheter is draining well.    74-year-old woman postoperative day #2 status post complex cystorrhaphy.  With a slight urine leak which is improving.  No plan for reoperation at this time.  She will require Conroy catheter for a full 2 weeks with a cystogram prior to removal.  Her left drain may require adjustment given that it was near the area of bladder repair on her CT scan.    Urology following

## 2024-09-12 NOTE — ASSESSMENT & PLAN NOTE
Status post laparoscopic converted to open LAR and primary bladder repair on 9/10/2024 with colorectal surgery  Thoracic epidural placed 9/10/2024 for postoperative analgesia  Infusion rate lowered and briefly paused overnight in setting of hypotension.  Plan to restart continuous rate to maintain catheter patency. Thoracic epidural unlikely to be contributing to hypotension at current infusion rate; recommend continued treatment per intensive care team.      Continue thoracic epidural infusion of ropivacaine 0.1% and fentanyl 2 mcg/mL   Current settings- continuous rate: 1 mL/h, PCEA dose: 4 mL, PCEA lockout: 10 min, max doses per hour: 3 doses/hr  Increase to 4/4/10/4 if pressures allow 9/12  Plan for removal tomorrow or Saturday pending bowel ROF    Multimodal analgesia:  Tylenol 650 mg every 6 hours scheduled  IV Dilaudid 0.5 mg every 2 hours as needed for breakthrough pain  Add Robaxin low dose 500mg TID  Narcan as needed for respiratory depression/opioid reversal

## 2024-09-12 NOTE — PLAN OF CARE
Problem: PHYSICAL THERAPY ADULT  Goal: Performs mobility at highest level of function for planned discharge setting.  See evaluation for individualized goals.  Description: Treatment/Interventions: Functional transfer training, Elevations, LE strengthening/ROM, Therapeutic exercise, Endurance training, Patient/family training, Equipment eval/education, Bed mobility, Gait training  Equipment Recommended:  (TBD)       See flowsheet documentation for full assessment, interventions and recommendations.  Note: Prognosis: Good  Problem List: Decreased strength, Decreased endurance, Impaired balance, Decreased mobility, Pain, Decreased coordination  Assessment: Pt seen for physical therapy evaluation, portion of which was performed as a co-evaluation w/ OT due to concerns re: medical stability. PT portion of evaluation focused on mobility assessment where OT portion focused more on ADLs, self care.  Pt is a 73 y/o female w/ history/comorbidities of GERD, HTN, lumbar fx, CKD, heart failure who is now admitted w/ known mid rectal CA for lap resection of colon, hand assisted converted to open w/ cysto, ureteral cath placement and bladder repair 9/10/24.  Due to acute medical issues, acute procedure, pain, fall risk, note unstable clinical picture.  PT consulted to assess post op mobility, d/c needs.  Pt presents w/ decreased functional mob, standing balance, endurance, B LE strength, barriers at home.  Pt will benefit from skilled PT to correct for the above problems.  pending continued progress, anticipate d/c home w/ Level III (minimal) vs no post acute care therapy needs.  will follow        Rehab Resource Intensity Level, PT: No post-acute rehabilitation needs    See flowsheet documentation for full assessment.

## 2024-09-12 NOTE — PROGRESS NOTES
Progress Note - Acute Pain   Name: Martha Sandoval 74 y.o. female I MRN: [id number]  Unit/Bed#: PPHP-305-01 I Date of Admission: 9/10/2024   Date of Service: 9/12/2024 I Hospital Day: 2    Assessment & Plan  Rectal cancer (HCC)  Status post laparoscopic converted to open LAR and primary bladder repair on 9/10/2024 with colorectal surgery  Thoracic epidural placed 9/10/2024 for postoperative analgesia  Infusion rate lowered and briefly paused overnight in setting of hypotension.  Plan to restart continuous rate to maintain catheter patency. Thoracic epidural unlikely to be contributing to hypotension at current infusion rate; recommend continued treatment per intensive care team.      Continue thoracic epidural infusion of ropivacaine 0.1% and fentanyl 2 mcg/mL   Current settings- continuous rate: 1 mL/h, PCEA dose: 4 mL, PCEA lockout: 10 min, max doses per hour: 3 doses/hr  Increase to 4/4/10/4 if pressures allow 9/12  Plan for removal tomorrow or Saturday pending bowel ROF    Multimodal analgesia:  Tylenol 650 mg every 6 hours scheduled  IV Dilaudid 0.5 mg every 2 hours as needed for breakthrough pain  Add Robaxin low dose 500mg TID  Narcan as needed for respiratory depression/opioid reversal    Bladder injury, subsequent encounter      APS will continue to follow. Please contact Acute Pain Service - via SecureChat from [phone] with additional questions or concerns. See [employer] charliet or [facility] for additional contacts and after hours information.     History of Present Illness   Martha Sandoval is a 74 y.o. female postop day #2 status post complex cystorrhaphy    Pt seen and examined today, sitting upright in chair upon arrival.  Pain with movement, shooting in nature, crampy at times.  Some nausea as well, outside of movement pain is controlled.  Denies further n/v/pruritus/LE weakness.  Advanced to clears today, potential epidural removal tomorrow    Pain History  Current pain location(s):  Pain Score: 0  Pain  Location/Orientation: Location: Abdomen  Pain Scale: Pain Assessment Tool: 0-10  24 hour history: as above    Opioid requirement previous 24 hours: 0    Meds/Allergies   all current active meds have been reviewed  Allergies   Allergen Reactions    Azithromycin GI Intolerance    Colcrys [Colchicine] Diarrhea    Dicyclomine GI Intolerance and Hives    Erythromycin GI Intolerance    Naproxen Edema    Sulfa Antibiotics Hives    Sulfamethoxazole-Trimethoprim Rash       Objective      Temp:  [97.9 °F (36.6 °C)-98.7 °F (37.1 °C)] 98.7 °F (37.1 °C)  HR:  [] 97  Resp:  [12-30] 18  BP: ()/(29-91) 116/54  Arterial Line BP: ()/(38-96) 66/38  Physical Exam  Vitals and nursing note reviewed.   Constitutional:       General: She is not in acute distress.     Appearance: Normal appearance.   HENT:      Head: Normocephalic and atraumatic.      Mouth/Throat:      Mouth: Mucous membranes are moist.   Eyes:      Extraocular Movements: Extraocular movements intact.   Cardiovascular:      Rate and Rhythm: Normal rate.   Pulmonary:      Effort: Pulmonary effort is normal.   Abdominal:      General: There is no distension.      Palpations: Abdomen is soft.      Tenderness: There is abdominal tenderness.   Musculoskeletal:         General: No swelling.   Skin:     General: Skin is warm and dry.   Neurological:      Mental Status: She is alert and oriented to person, place, and time.   Psychiatric:         Mood and Affect: Mood normal.         Behavior: Behavior normal.      Epidural: Site clean/dry/intact, no surrounding erythema/edema/induration, infusion functioning appropriately        Lab Results: I have reviewed the following results:   Estimated Creatinine Clearance: 61.7 mL/min (by C-G formula based on SCr of 0.88 mg/dL).  Lab Results   Component Value Date    WBC 8.40 09/12/2024    HGB 8.0 (L) 09/12/2024    HCT 24.1 (L) 09/12/2024    PLT 95 (L) 09/12/2024         Component Value Date/Time    K 4.6 09/12/2024 0322     K 4.3 08/30/2024 0958    K 4.0 06/21/2024 1353     09/12/2024 0322     08/30/2024 0958     06/21/2024 1353    CO2 22 09/12/2024 0322    CO2 19 (L) 09/10/2024 2017    CO2 29 08/30/2024 0958    CO2 28 06/21/2024 1353    BUN 21 09/12/2024 0322    BUN 39 (H) 08/30/2024 0958    BUN 32 (H) 06/21/2024 1353    CREATININE 0.88 09/12/2024 0322    CREATININE 1.19 (H) 06/21/2024 1353         Component Value Date/Time    CALCIUM 8.0 (L) 09/12/2024 0322    CALCIUM 9.1 08/30/2024 0958    CALCIUM 8.8 06/21/2024 1353    ALKPHOS 25 (L) 09/11/2024 0415    ALKPHOS 63 08/30/2024 0958    ALKPHOS 57 06/21/2024 1353    AST 37 09/11/2024 0415    AST 37 08/30/2024 0958    AST 34 06/21/2024 1353    ALT 25 09/11/2024 0415    ALT 35 08/30/2024 0958    ALT 32 06/21/2024 1353    TP 4.9 (L) 09/11/2024 0415    TP 7.6 08/30/2024 0958    TP 7.2 06/21/2024 1353    ALB 3.3 (L) 09/11/2024 0415    ALB 3.8 06/21/2024 1353     Imaging Review: No pertinent imaging studies reviewed.  Other Studies: No additional pertinent studies reviewed.     Administrative Statements   I have spent a total time of 20 minutes in caring for this patient on the day of the visit/encounter including Risks and benefits of tx options, Instructions for management, Patient and family education, Importance of tx compliance, Risk factor reductions, Impressions, Counseling / Coordination of care, Documenting in the medical record, Reviewing / ordering tests, medicine, procedures  , Obtaining or reviewing history  , and Communicating with other healthcare professionals .

## 2024-09-12 NOTE — PROGRESS NOTES
Progress Note - Colorectal   Name: Martha Sandoval 74 y.o. female I MRN: [id number]  Unit/Bed#: PPHP-305-01 I Date of Admission: 9/10/2024   Date of Service: 9/13/2024 I Hospital Day: 3     Assessment & Plan  Rectal cancer (HCC)  S/p 9/10 lap converted to open LAR, primary bladder repair.    Plan:  - advance to lo-res diet  - restart baseline Lasix once BP has improved  - appreciate APS recs for epidural  - appreciate analilia recs  - DVT ppx  Bladder injury, subsequent encounter  - Appreciate nephro recs  - Appreciate urology recs  - Keep [place] and Conroy    Subjective/Objective   NAOE. Pain controlled. Tolerating CLD, no n/v. Having BMs. flatus. OOB to chair.    Physical Exam:  General: NAD  CV: nl rate  Lungs: nl wob. No resp distress.  ABD: Soft, ND, NT, CDI. R RADHA with 320cc ss output. L RADHA with 330cc ss output.  Extrem: No CCE  Conroy with 1430cc bloody output.  Stool x1    Patient Vitals for the past 24 hrs:   BP Temp Temp src Pulse Resp SpO2   09/13/24 0400 -- -- -- -- -- 97 %   09/13/24 0300 101/51 -- -- 82 15 97 %   09/13/24 0100 -- -- -- -- -- 99 %   09/13/24 0000 107/53 -- -- 80 13 94 %   09/12/24 2338 -- 97.5 °F (36.4 °C) -- -- -- --   09/12/24 2300 110/54 -- -- 80 13 98 %   09/12/24 2200 126/58 -- -- 83 13 97 %   09/12/24 2000 122/58 -- -- 86 (!) 23 97 %   09/12/24 1935 104/65 97.6 °F (36.4 °C) Oral 88 22 96 %   09/12/24 1900 94/52 -- -- 92 (!) 25 97 %   09/12/24 1800 92/55 -- -- 100 (!) 26 96 %   09/12/24 1700 102/50 -- -- 100 (!) 24 98 %   09/12/24 1600 106/52 -- -- 84 15 99 %   09/12/24 1500 114/56 -- -- 94 20 98 %   09/12/24 1400 104/51 -- -- 83 16 99 %   09/12/24 1300 104/50 -- -- (!) 106 (!) 26 99 %   09/12/24 1200 107/51 -- -- 98 (!) 33 98 %   09/12/24 1100 121/59 -- -- 93 (!) 24 99 %   09/12/24 1047 -- 98 °F (36.7 °C) Oral -- -- --   09/12/24 1000 116/54 -- -- 97 18 98 %   09/12/24 0900 122/91 -- -- 104 (!) 27 99 %   09/12/24 0800 109/52 -- -- 91 17 97 %   09/12/24 0727 -- 98.7 °F (37.1 °C) Oral -- --  --       I/O         09/10 0701  09/11 0700 09/11 0701 09/12 0700 09/12 0701 09/13 0700    P.O.   420    I.V. (mL/kg) 6925.1 (69.4) 1003.5 (10.1) 60 (0.6)    Blood 700      IV Piggyback 2400 200 100    Total Intake(mL/kg) 39064.1 (100.5) 1203.5 (12.1) 580 (5.8)    Urine (mL/kg/hr) 460 1830 (0.8) 515 (0.5)    Drains 1330 1215 375    Stool 0  0    Blood 400      Total Output 2190 3045 890    Net +7835.1 -1841.6 -310           Unmeasured Stool Occurrence 1 x  0 x            Recent Labs     09/10/24  2018 09/10/24  2026 09/11/24  0415 09/11/24  0618 09/11/24  0852 09/11/24  1223 09/12/24  0322 09/12/24  1945 09/13/24  0527   WBC 9.60   < > 9.97  --   --   --  8.40  --  11.65*   HGB 8.8*   < > 8.9*  --   --   --  8.0*  --  8.6*     174   < > 126*  --   --   --  95*  --  143*   SODIUM 133*   < > 135  --   --    < > 132*  --  134*   K 4.2   < > 4.5  --   --    < > 4.6  --  3.7      < > 106  --   --    < > 105  --  107   CO2 17*   < > 21  --   --    < > 22  --  18*   BUN 25   < > 24  --   --    < > 21  --  18   CREATININE 1.00   < > 1.03  --   --    < > 0.88 0.92 0.80   GLUC 231*   < > 194*  --   --    < > 134  --  129   CALCIUM 7.1*   < > 7.6*  --   --    < > 8.0*  --  7.2*   MG 1.5*   < > 2.1  --   --    < > 1.8*  --  2.1   PHOS 4.1  --  3.1  --   --    < > 2.2*  --  2.1*   AST 35  --  37  --   --   --   --   --   --    ALT 27  --  25  --   --   --   --   --   --    ALKPHOS 29*  --  25*  --   --   --   --   --   --    TBILI 0.83  --  1.25*  --   --   --   --   --   --    EGFR 56   < > 54  --   --    < > 65 61 73   INR  --   --   --   --  1.53*  --   --   --   --    LACTICACID  --    < > 2.4* 3.3* 2.6*  --   --   --   --     < > = values in this interval not displayed.     Lab Results   Component Value Date    URINECX 10,000-19,000 cfu/ml Lactobacillus species (A) 05/07/2019    LEUKOCYTESUR Negative 08/30/2024    LEUKOCYTESUR neg 06/01/2019    LEUKOCYTESUR NEGATIVE 04/20/2017

## 2024-09-12 NOTE — PROCEDURES
Insert Complex Venous Access Line    Date/Time: 9/12/2024 9:25 AM    Performed by: Ivelsise Alfonso RN  Authorized by: Venita Conroy PA-C    Patient location:  Bedside  Other Assisting Provider: Yes (comment) (Yoli ROWE, [facility])    Consent:     Consent obtained:  Written    Consent given by:  Patient    Risks discussed:  Arterial puncture, incorrect placement, nerve damage, bleeding, infection and pneumothorax    Alternatives discussed:  No treatment  Universal protocol:     Procedure explained and questions answered to patient or proxy's satisfaction: yes      Immediately prior to procedure, a time out was called: yes      Site/side marked: yes      Patient identity confirmed:  Verbally with patient, arm band, provided demographic data and hospital-assigned identification number  Pre-procedure details:     Hand hygiene: Hand hygiene performed prior to insertion      Sterile barrier technique: All elements of maximal sterile technique followed      Skin preparation:  ChloraPrep    Skin preparation agent: Skin preparation agent completely dried prior to procedure    Procedure details:     Complex Venous Access Line Type: PICC      Complex Venous Access Line Indications: medications requiring central line      Catheter tip vessel location: atriocaval junction      Orientation:  Left    Location:  Basilic    Procedural supplies:  Double lumen    Catheter size:  5 Fr    Total catheter length (cm):  48    Catheter out on skin (cm):  0    Max flow rate:  999    Arm circumference:  33    Patient evaluated for contraindications to access (i.e. fistula, thrombosis, etc): Yes      Site selection rationale:  Largest most patent vessel, arterial line right arm    Approach: percutaneous technique used      Patient position:  Flat    Ultrasound image availability:  Not saved    Sterile ultrasound techniques: Sterile gel and sterile probe covers were used      Number of attempts:  1    Successful placement: yes      Landmarks  identified: yes      Vessel of catheter tip end:  [place] 3CG confirmed  Anesthesia (see MAR for exact dosages):     Anesthesia method:  Local infiltration    Local anesthetic:  Lidocaine 1% w/o epi (2 ml)  Post-procedure details:     Post-procedure:  Dressing applied and securement device placed    Assessment:  Blood return through all ports and free fluid flow    Post-procedure complications: none      Patient tolerance of procedure:  Tolerated well, no immediate complications    Observer: Yes      Observer name:  Spouse in room during procedure

## 2024-09-12 NOTE — QUICK NOTE
Removed the left RADHA drain stitch, moved the RADHA drain 5cm out of the abdomen, and placed new 2-0 Nylon stitch. Patient tolerated the procedure well without any issues. Local anesthesia and additional pain meds were given.

## 2024-09-12 NOTE — PROGRESS NOTES
Progress Note - Surgery-General   Name: Martha Sandoval 74 y.o. female I MRN: [id number]  Unit/Bed#: PPHP-305-01 I Date of Admission: 9/10/2024   Date of Service: 9/12/2024 I Hospital Day: 2    Assessment & Plan  Rectal cancer (HCC)  - Patient now s/p 9/10 lap converted to open LAR, primary bladder repair  - Got 500cc IVF in last 24h  - BP 80s/40s on [place], 120/70 on cuff, has no access for pressors  - PICC placement  - CLD  - Consider restarting baseline Lasix once BP has improved  - Appreciate APS recs for epidural  - Appreciate analilia recs  - Appreciate ICU level of care  Bladder injury, subsequent encounter  - Appreciate nephro recs  - Appreciate urology recs  - Keep Mariana and Conroy for now      History of Present Illness       Objective      Temp:  [97.9 °F (36.6 °C)-98.4 °F (36.9 °C)] 98.4 °F (36.9 °C)  HR:  [] 92  Resp:  [12-30] 17  BP: ()/(29-78) 116/58  Arterial Line BP: ()/(38-96) 94/94  O2 Device: None (Room air)          I/O         09/10 0701  09/11 0700 09/11 0701 09/12 0700 09/12 0701 09/13 0700    I.V. (mL/kg) 6925.1 (69.4) 1003.5 (10.1)     Blood 700      IV Piggyback 2400 200     Total Intake(mL/kg) 13883.1 (100.5) 1203.5 (12.1)     Urine (mL/kg/hr) 460 1830 (0.8)     Drains 1330 1215     Stool 0      Blood 400      Total Output 2190 3045     Net +7835.1 -1841.6            Unmeasured Stool Occurrence 1 x            Lines/Drains/Airways       Active Status       Name Placement date Placement time Site Days    Epidural Catheter 09/10/24 09/10/24  1118  -- 1    Arterial Line 09/10/24 Right Radial 09/10/24  0815  Radial  1    Closed/Suction Drain Right Abdomen 10 Fr. 09/10/24  1838  Abdomen  1    Closed/Suction Drain Left LLQ Bulb 19 Fr. 09/10/24  1839  LLQ  1    Urethral Catheter Latex 22 Fr. 09/10/24  1800  Latex  1                  Physical Exam  Constitutional:       Appearance: Normal appearance.   HENT:      Head: Normocephalic and atraumatic.      Mouth/Throat:      Mouth: Mucous  membranes are moist.   Eyes:      General: No scleral icterus.     Extraocular Movements: Extraocular movements intact.      Conjunctiva/sclera: Conjunctivae normal.   Cardiovascular:      Rate and Rhythm: Normal rate and regular rhythm.      Pulses: Normal pulses.      Heart sounds: Normal heart sounds.   Pulmonary:      Effort: Pulmonary effort is normal.      Breath sounds: Normal breath sounds.   Abdominal:      General: Bowel sounds are normal. There is no distension.      Palpations: Abdomen is soft.      Tenderness: There is abdominal tenderness (minimal).      Comments: Incisions CDI  LLQ RADHA 635cc ss  RLQ RADHA 530cc ss   Genitourinary:     Comments: Conroy with 1755cc jourdan  Musculoskeletal:         General: No swelling.      Cervical back: Normal range of motion and neck supple.   Skin:     General: Skin is warm and dry.   Neurological:      Mental Status: She is alert and oriented to person, place, and time. Mental status is at baseline.   Psychiatric:         Mood and Affect: Mood normal.         Behavior: Behavior normal.         Thought Content: Thought content normal.          Lab Results: I have reviewed the following results: CBC/BMP:   .     09/12/24  0322   WBC 8.40   HGB 8.0*   HCT 24.1*   PLT 95*   SODIUM 132*   K 4.6      CO2 22   BUN 21   CREATININE 0.88   GLUC 134   MG 1.8*   PHOS 2.2*      Imaging Review: Reviewed radiology reports from this admission including: CT renal.  Other Studies: No additional pertinent studies reviewed.    VTE Pharmacologic Prophylaxis: Heparin  VTE Mechanical Prophylaxis: sequential compression device

## 2024-09-12 NOTE — PLAN OF CARE
Problem: Prexisting or High Potential for Compromised Skin Integrity  Goal: Skin integrity is maintained or improved  Description: INTERVENTIONS:  - Identify patients at risk for skin breakdown  - Assess and monitor skin integrity  - Assess and monitor nutrition and hydration status  - Monitor labs   - Assess for incontinence   - Turn and reposition patient  - Assist with mobility/ambulation  - Relieve pressure over bony prominences  - Avoid friction and shearing  - Provide appropriate hygiene as needed including keeping skin clean and dry  - Evaluate need for skin moisturizer/barrier cream  - Collaborate with interdisciplinary team   - Patient/family teaching  - Consider wound care consult   Outcome: Progressing     Problem: GENITOURINARY - ADULT  Goal: Urinary catheter remains patent  Description: INTERVENTIONS:  - Assess patency of urinary catheter  - If patient has a chronic leon, consider changing catheter if non-functioning  - Follow guidelines for intermittent irrigation of non-functioning urinary catheter  Outcome: Progressing

## 2024-09-12 NOTE — PROGRESS NOTES
Progress Note - Trauma   Name: Martha Sandoval 74 y.o. female I MRN: [id number]  Unit/Bed#: PPHP-305-01 I Date of Admission: 9/10/2024   Date of Service: 9/12/2024 I Hospital Day: 2       Assessment & Plan   Neuro:   Acute post-operative pain  Epidural PCEA in place, appreciate APS recommendations  S/p dose adjusted d/t hypotension  PCEA continuous rate of 1ml/hr with demand dose  Dilaudid 0.5mg Q2 prn for breakthrough pain  Daily CAM-ICU, delirium precautions. Regulate sleep/wake cycle.       CV:   Hypotension  Suspect component of  hypovolemia from prolonged OR course, acute blood loss anemia.   Fluid resuscitation, currently with albumin bolus  Levophed for MAP > 65, currently off, but intermittently requiring low dose  Hx of HTN  Holding home metoprolol, valsartan, spironolactone in the setting of hypotension   HFpEF  Previous echo 8/14/24: EF 61-65%, G1DD.   Repeat echo 9/11: EF 65%, systolic and diastolic function wnl, aortic stenosis      Pulm:  No active issues. Encourage IS, pulmonary toilet     GI:   Rectal cancer s/p LAR converted to open 9/10  Appreciate colorectal surgery recommendations  Two RADHA drains in place, monitor output/character  Local wound care to incisions  Serial abdominal exams  GERD  Protonix     :   Intra-operative cystotomy s/p complex cystorrhaphy with oliguria postoperatively, increased drain output, and drain creatinine concerning for urinary tract defect  Urology consulted intra-operatively and following, appreciate recommendations  CT cystogram 9/11 without extravasation  Can consider repeat drain cr   Conroy to remain in place x 2 weeks, will be removed as an outpatient in the office  Trend strict I/Os     F/E/N:   S/p continuous IVF, now bolusing prn  Replete electrolytes as needed for goal Mag > 2.0, Phos >3.0, K >4.0  NPO, considering advancing diet - will coordinate with surgical teams     Heme/Onc:   Acute blood loss anemia  Received 2u prbcs intra-operatively, 1u prbcs  post-operatively  Trend Hgb, transfuse < 7.0.   SQH for DVT ppx     Endo:   Pre-diabetic with pre-op HbA1c 6.3  Goal -180  Largely within goal, though will start SSI if elevated     ID:   Diflucan/ceftriaxone in the setting of suspected urinary tract leak       MSK/Skin:   Local wound care to abdominal incisions  PT/OT when appropriate    Disposition: Critical care    ICU Core Measures     A: Assess, Prevent, and Manage Pain Has pain been assessed? Yes  Need for changes to pain regimen? No   B: Both SAT/SAT  N/A   C: Choice of Sedation RASS Goal: N/A patient not on sedation  Need for changes to sedation or analgesia regimen? NA   D: Delirium CAM-ICU: Negative   E: Early Mobility  Plan for early mobility? Yes   F: Family Engagement Plan for family engagement today? Yes       Antibiotic Review: Post op requirements     Review of Invasive Devices:    Conroy Plan: Conroy placed by urology. Removal plans per their team    Mariana Plan: Keep arterial line for hemodynamic monitoring    Prophylaxis:  VTE VTE covered by:  heparin (porcine), Subcutaneous, 5,000 Units at 09/12/24 0625       Stress Ulcer  covered byomeprazole (PriLOSEC) 20 mg delayed release capsule [[id number]] (Long-Term Med), pantoprazole (PROTONIX) injection 40 mg [[id number]]         24 Hour Events   24hr events:   -persistent hypotension  -Elevated drain creatinine, s/p CT urogram w/o urinary leak    Subjective   Review of Systems: Review of Systems   Constitutional:  Positive for fatigue.   Gastrointestinal:  Positive for abdominal distention and abdominal pain.       Objective                          Vitals I/O      Most Recent Min/Max in 24hrs   Temp 98.4 °F (36.9 °C) Temp  Min: 97.9 °F (36.6 °C)  Max: 98.4 °F (36.9 °C)   Pulse 72 Pulse  Min: 72  Max: 109   Resp 13 Resp  Min: 12  Max: 30   BP (!) 81/39 BP  Min: 76/39  Max: 135/62   O2 Sat 96 % SpO2  Min: 95 %  Max: 99 %      Intake/Output Summary (Last 24 hours) at 9/12/2024 0614  Last data filed at  9/12/2024 0300  Gross per 24 hour   Intake 768.57 ml   Output 3110 ml   Net -2341.43 ml       Diet NPO; Sips with meds    Invasive Monitoring   Arterial Line  [place] BP 60/49  Arterial Line BP  Min: 58/49  Max: 108/44   MAP (!) 54 mmHg  Arterial Line MAP (mmHg)  Min: 53 mmHg  Max: 97 mmHg           Physical Exam   Physical Exam  Vitals and nursing note reviewed.   Eyes:      Pupils: Pupils are equal, round, and reactive to light.   Skin:     General: Skin is warm and dry.   HENT:      Head: Normocephalic and atraumatic.      Mouth/Throat:      Mouth: Mucous membranes are moist.   Cardiovascular:      Rate and Rhythm: Normal rate and regular rhythm.      Pulses:           Radial pulses are 2+ on the right side and 2+ on the left side.        Dorsalis pedis pulses are 2+ on the right side and 2+ on the left side.      Heart sounds: Normal heart sounds.   Musculoskeletal:      Right lower leg: No edema.      Left lower leg: No edema.   Abdominal: General: There is no distension.      Palpations: Abdomen is soft.      Tenderness: There is no abdominal tenderness. There is no guarding.      Comments: Surgical incisions C/D/I with dermabond and surrounding ecchymoses   Two RADHA drains in place w/ serosanguinous output   Constitutional:       General: She is not in acute distress.     Appearance: She is well-developed and well-nourished. She is not ill-appearing or toxic-appearing.   Pulmonary:      Effort: Pulmonary effort is normal.      Breath sounds: Normal breath sounds.   Psychiatric:         Behavior: Behavior is cooperative.   Neurological:      General: No focal deficit present.      Mental Status: She is alert and oriented to person, place, and time.   Genitourinary/Anorectal:  Conroy present.        Diagnostic Studies        Lab Results: I have reviewed the following results: CBC/BMP:   .     09/12/24  0322   WBC 8.40   HGB 8.0*   HCT 24.1*   PLT 95*   SODIUM 132*   K 4.6      CO2 22   BUN 21   CREATININE 0.88    GLUC 134   MG 1.8*   PHOS 2.2*         Medications:  Scheduled PRN   acetaminophen, 650 mg, Q6H AISHWARYA  cefTRIAXone, 2,000 mg, Q24H  fluconazole, 400 mg, Q24H  heparin (porcine), 5,000 Units, Q8H AISHWARYA  magnesium sulfate, 2 g, Once  pantoprazole, 40 mg, Q24H AISHWARYA  sodium phosphate, 30 mmol, Once      diphenhydrAMINE, 25 mg, Q6H PRN  HYDROmorphone, 0.5 mg, Q2H PRN  metoclopramide, 5 mg, Q6H PRN  naloxone, 0.1 mg, Q3 min PRN  ondansetron, 4 mg, Q6H PRN       Continuous    norepinephrine, 1-30 mcg/min, Last Rate: Stopped (09/12/24 0615)  ropivacaine 0.1% and fentaNYL 2 mcg/mL,          Labs:   CBC    Recent Labs     09/11/24  0415 09/12/24  0322   WBC 9.97 8.40   HGB 8.9* 8.0*   HCT 26.8* 24.1*   * 95*     BMP    Recent Labs     09/11/24  1713 09/12/24  0322   SODIUM 134* 132*   K 5.0 4.6    105   CO2 22 22   AGAP 5 5   BUN 24 21   CREATININE 0.94 0.88   CALCIUM 7.8* 8.0*       Coags    Recent Labs     09/11/24  0852   INR 1.53*        Additional Electrolytes  Recent Labs     09/10/24  2017 09/10/24  2018 09/11/24  0415 09/11/24  1223 09/12/24  0322   MG  --    < > 2.1 2.0 1.8*   PHOS  --    < > 3.1 2.6 2.2*   CAIONIZED 1.09*  --  1.04*  --   --     < > = values in this interval not displayed.          Blood Gas    Recent Labs     09/11/24  0735   PHART 7.417   BTO6APH 28.8*   PO2ART 131.8*   THG2TRN 18.1*   BEART -5.5   SOURCE Line, Arterial     Recent Labs     09/11/24  0735   SOURCE Line, Arterial    LFTs  Recent Labs     09/10/24  2018 09/11/24  0415   ALT 27 25   AST 35 37   ALKPHOS 29* 25*   ALB 2.6* 3.3*   TBILI 0.83 1.25*       Infectious  No recent results  Glucose  Recent Labs     09/11/24  0415 09/11/24  1223 09/11/24  1713 09/12/24  0322   GLUC 194* 149* 152* 134

## 2024-09-12 NOTE — ASSESSMENT & PLAN NOTE
S/p 9/10 lap converted to open LAR, primary bladder repair.    Plan:  - advance to lo-res diet  - restart baseline Lasix once BP has improved  - appreciate APS recs for epidural  - appreciate analilia recs  - DVT ppx

## 2024-09-13 PROBLEM — S37.20XA BLADDER INJURY: Status: ACTIVE | Noted: 2024-09-11

## 2024-09-13 LAB
ANION GAP SERPL CALCULATED.3IONS-SCNC: 9 MMOL/L (ref 4–13)
BUN SERPL-MCNC: 18 MG/DL (ref 5–25)
CALCIUM SERPL-MCNC: 7.2 MG/DL (ref 8.4–10.2)
CHLORIDE SERPL-SCNC: 107 MMOL/L (ref 96–108)
CO2 SERPL-SCNC: 18 MMOL/L (ref 21–32)
CREAT FLD-MCNC: 0.93 MG/DL
CREAT SERPL-MCNC: 0.8 MG/DL (ref 0.6–1.3)
ERYTHROCYTE [DISTWIDTH] IN BLOOD BY AUTOMATED COUNT: 16.7 % (ref 11.6–15.1)
GFR SERPL CREATININE-BSD FRML MDRD: 73 ML/MIN/1.73SQ M
GLUCOSE SERPL-MCNC: 129 MG/DL (ref 65–140)
HCT VFR BLD AUTO: 26.6 % (ref 34.8–46.1)
HGB BLD-MCNC: 8.6 G/DL (ref 11.5–15.4)
MAGNESIUM SERPL-MCNC: 2.1 MG/DL (ref 1.9–2.7)
MCH RBC QN AUTO: 28.1 PG (ref 26.8–34.3)
MCHC RBC AUTO-ENTMCNC: 32.3 G/DL (ref 31.4–37.4)
MCV RBC AUTO: 87 FL (ref 82–98)
PHOSPHATE SERPL-MCNC: 2.1 MG/DL (ref 2.3–4.1)
PLATELET # BLD AUTO: 143 THOUSANDS/UL (ref 149–390)
PMV BLD AUTO: 9.7 FL (ref 8.9–12.7)
POTASSIUM SERPL-SCNC: 3.7 MMOL/L (ref 3.5–5.3)
RBC # BLD AUTO: 3.06 MILLION/UL (ref 3.81–5.12)
SODIUM SERPL-SCNC: 134 MMOL/L (ref 135–147)
WBC # BLD AUTO: 11.65 THOUSAND/UL (ref 4.31–10.16)

## 2024-09-13 PROCEDURE — 99024 POSTOP FOLLOW-UP VISIT: CPT | Performed by: NURSE PRACTITIONER

## 2024-09-13 PROCEDURE — 85027 COMPLETE CBC AUTOMATED: CPT | Performed by: STUDENT IN AN ORGANIZED HEALTH CARE EDUCATION/TRAINING PROGRAM

## 2024-09-13 PROCEDURE — 83735 ASSAY OF MAGNESIUM: CPT | Performed by: STUDENT IN AN ORGANIZED HEALTH CARE EDUCATION/TRAINING PROGRAM

## 2024-09-13 PROCEDURE — 97530 THERAPEUTIC ACTIVITIES: CPT

## 2024-09-13 PROCEDURE — 84100 ASSAY OF PHOSPHORUS: CPT | Performed by: STUDENT IN AN ORGANIZED HEALTH CARE EDUCATION/TRAINING PROGRAM

## 2024-09-13 PROCEDURE — 80048 BASIC METABOLIC PNL TOTAL CA: CPT | Performed by: STUDENT IN AN ORGANIZED HEALTH CARE EDUCATION/TRAINING PROGRAM

## 2024-09-13 PROCEDURE — 97116 GAIT TRAINING THERAPY: CPT

## 2024-09-13 PROCEDURE — 97535 SELF CARE MNGMENT TRAINING: CPT

## 2024-09-13 PROCEDURE — 82570 ASSAY OF URINE CREATININE: CPT | Performed by: NURSE PRACTITIONER

## 2024-09-13 PROCEDURE — 99233 SBSQ HOSP IP/OBS HIGH 50: CPT | Performed by: STUDENT IN AN ORGANIZED HEALTH CARE EDUCATION/TRAINING PROGRAM

## 2024-09-13 PROCEDURE — 99024 POSTOP FOLLOW-UP VISIT: CPT | Performed by: COLON & RECTAL SURGERY

## 2024-09-13 RX ORDER — CALCIUM GLUCONATE 20 MG/ML
2 INJECTION, SOLUTION INTRAVENOUS ONCE
Status: COMPLETED | OUTPATIENT
Start: 2024-09-13 | End: 2024-09-13

## 2024-09-13 RX ORDER — PANTOPRAZOLE SODIUM 40 MG/1
40 TABLET, DELAYED RELEASE ORAL
Status: DISCONTINUED | OUTPATIENT
Start: 2024-09-14 | End: 2024-09-17 | Stop reason: HOSPADM

## 2024-09-13 RX ORDER — ACETAMINOPHEN 325 MG/1
975 TABLET ORAL EVERY 8 HOURS SCHEDULED
Status: DISCONTINUED | OUTPATIENT
Start: 2024-09-13 | End: 2024-09-17 | Stop reason: HOSPADM

## 2024-09-13 RX ORDER — OXYCODONE HYDROCHLORIDE 5 MG/1
5 TABLET ORAL EVERY 4 HOURS PRN
Status: DISCONTINUED | OUTPATIENT
Start: 2024-09-13 | End: 2024-09-17 | Stop reason: HOSPADM

## 2024-09-13 RX ORDER — OXYCODONE HYDROCHLORIDE 10 MG/1
10 TABLET ORAL EVERY 4 HOURS PRN
Status: DISCONTINUED | OUTPATIENT
Start: 2024-09-13 | End: 2024-09-17 | Stop reason: HOSPADM

## 2024-09-13 RX ORDER — FUROSEMIDE 40 MG
40 TABLET ORAL DAILY
Status: DISCONTINUED | OUTPATIENT
Start: 2024-09-13 | End: 2024-09-14

## 2024-09-13 RX ORDER — HEPARIN SODIUM 5000 [USP'U]/ML
7500 INJECTION, SOLUTION INTRAVENOUS; SUBCUTANEOUS EVERY 8 HOURS SCHEDULED
Status: DISCONTINUED | OUTPATIENT
Start: 2024-09-13 | End: 2024-09-17

## 2024-09-13 RX ADMIN — ONDANSETRON 4 MG: 2 INJECTION INTRAMUSCULAR; INTRAVENOUS at 01:38

## 2024-09-13 RX ADMIN — SODIUM PHOSPHATE, MONOBASIC, MONOHYDRATE AND SODIUM PHOSPHATE, DIBASIC, ANHYDROUS 21 MMOL: 276; 142 INJECTION, SOLUTION INTRAVENOUS at 12:41

## 2024-09-13 RX ADMIN — HEPARIN SODIUM 5000 UNITS: 5000 INJECTION INTRAVENOUS; SUBCUTANEOUS at 05:51

## 2024-09-13 RX ADMIN — PANTOPRAZOLE SODIUM 40 MG: 40 INJECTION, POWDER, FOR SOLUTION INTRAVENOUS at 10:10

## 2024-09-13 RX ADMIN — HEPARIN SODIUM 7500 UNITS: 5000 INJECTION INTRAVENOUS; SUBCUTANEOUS at 21:09

## 2024-09-13 RX ADMIN — HYDROMORPHONE HYDROCHLORIDE 0.5 MG: 1 INJECTION, SOLUTION INTRAMUSCULAR; INTRAVENOUS; SUBCUTANEOUS at 19:53

## 2024-09-13 RX ADMIN — METHOCARBAMOL 500 MG: 500 TABLET ORAL at 21:09

## 2024-09-13 RX ADMIN — METHOCARBAMOL 500 MG: 500 TABLET ORAL at 13:17

## 2024-09-13 RX ADMIN — ROPIVACAINE HYDROCHLORIDE: 5 INJECTION EPIDURAL; INFILTRATION; PERINEURAL at 06:41

## 2024-09-13 RX ADMIN — ACETAMINOPHEN 975 MG: 325 TABLET ORAL at 13:17

## 2024-09-13 RX ADMIN — FUROSEMIDE 40 MG: 40 TABLET ORAL at 15:20

## 2024-09-13 RX ADMIN — OXYCODONE HYDROCHLORIDE 5 MG: 5 TABLET ORAL at 18:45

## 2024-09-13 RX ADMIN — METHOCARBAMOL 500 MG: 500 TABLET ORAL at 05:51

## 2024-09-13 RX ADMIN — CALCIUM GLUCONATE 2 G: 20 INJECTION, SOLUTION INTRAVENOUS at 10:06

## 2024-09-13 RX ADMIN — ACETAMINOPHEN 975 MG: 325 TABLET ORAL at 10:06

## 2024-09-13 NOTE — PROGRESS NOTES
Progress Note - Acute Pain   Name: Martha Sandoval 74 y.o. female I MRN: [id number]  Unit/Bed#: PPHP-305-01 I Date of Admission: 9/10/2024   Date of Service: 9/13/2024 I Hospital Day: 3    Assessment & Plan  Rectal cancer (HCC)  Status post laparoscopic converted to open LAR and primary bladder repair on 9/10/2024 with colorectal surgery  Thoracic epidural placed 9/10/2024 for postoperative analgesia  Infusion rate lowered and briefly paused overnight in setting of hypotension.  Plan to restart continuous rate to maintain catheter patency. Thoracic epidural unlikely to be contributing to hypotension at current infusion rate; recommend continued treatment per intensive care team.      Continue thoracic epidural infusion of ropivacaine 0.1% and fentanyl 2 mcg/mL   Current settings- continuous rate: 1 mL/h, PCEA dose: 4 mL, PCEA lockout: 10 min, max doses per hour: 3 doses/hr  Increase to 4/4/10/4 if pressures allow 9/12  Plan for removal tomorrow or Saturday pending bowel ROF  Pause trial PCEA 9/13  Removed tip intact    Transition to oxycodone 5-10mg q4hrs PRN pain  IV Dilaudid 0.5 mg every 2 hours as needed for breakthrough pain    Multimodal analgesia:  Schedule tylenol  Add Robaxin low dose 500mg TID  Narcan as needed for respiratory depression/opioid reversal    Bladder injury, subsequent encounter      APS will continue to follow. Please contact Acute Pain Service - via SecureChat from [phone] with additional questions or concerns. See SecureChat or [place] for additional contacts and after hours information.     History of Present Illness   Martha Sandoval is a 74 y.o. female postop day # status post complex cystorrhaphy     Pt seen and examined today, sitting upright in chair upon arrival.  Pain remains well controlled, no breakthrough narcotics needed.  Denies n/v/pruritus/LE weakness.  Advanced to surgical soft diet.     Pain History  Current pain location(s):  Pain Score: 0  Pain Location/Orientation:  Location: Abdomen  Pain Scale: Pain Assessment Tool: 0-10  24 hour history: as above    Opioid requirement previous 24 hours: 0  Meds/Allergies   all current active meds have been reviewed  Allergies   Allergen Reactions    Azithromycin GI Intolerance    Colcrys [Colchicine] Diarrhea    Dicyclomine GI Intolerance and Hives    Erythromycin GI Intolerance    Naproxen Edema    Sulfa Antibiotics Hives    Sulfamethoxazole-Trimethoprim Rash       Objective      Temp:  [97.5 °F (36.4 °C)-98.1 °F (36.7 °C)] 98.1 °F (36.7 °C)  HR:  [] 82  Resp:  [13-33] 15  BP: ()/(50-65) 101/51  Arterial Line BP: (49-58)/(44-58) 49/44  Physical Exam  Vitals and nursing note reviewed.   Constitutional:       General: She is not in acute distress.     Appearance: Normal appearance.   HENT:      Head: Normocephalic and atraumatic.      Mouth/Throat:      Mouth: Mucous membranes are moist.   Eyes:      Extraocular Movements: Extraocular movements intact.   Cardiovascular:      Rate and Rhythm: Normal rate.   Pulmonary:      Effort: Pulmonary effort is normal.   Abdominal:      General: There is no distension.      Palpations: Abdomen is soft.      Tenderness: There is no abdominal tenderness.   Musculoskeletal:         General: No swelling.   Skin:     General: Skin is warm and dry.   Neurological:      Mental Status: She is alert and oriented to person, place, and time.   Psychiatric:         Mood and Affect: Mood normal.         Behavior: Behavior normal.            Lab Results: I have reviewed the following results:   Estimated Creatinine Clearance: 67.8 mL/min (by C-G formula based on SCr of 0.8 mg/dL).  Lab Results   Component Value Date    WBC 11.65 (H) 09/13/2024    HGB 8.6 (L) 09/13/2024    HCT 26.6 (L) 09/13/2024     (L) 09/13/2024         Component Value Date/Time    K 3.7 09/13/2024 0527    K 4.3 08/30/2024 0958    K 4.0 06/21/2024 1353     09/13/2024 0527     08/30/2024 0958     06/21/2024 1353     CO2 18 (L) 09/13/2024 0527    CO2 19 (L) 09/10/2024 2017    CO2 29 08/30/2024 0958    CO2 28 06/21/2024 1353    BUN 18 09/13/2024 0527    BUN 39 (H) 08/30/2024 0958    BUN 32 (H) 06/21/2024 1353    CREATININE 0.80 09/13/2024 0527    CREATININE 1.19 (H) 06/21/2024 1353         Component Value Date/Time    CALCIUM 7.2 (L) 09/13/2024 0527    CALCIUM 9.1 08/30/2024 0958    CALCIUM 8.8 06/21/2024 1353    ALKPHOS 25 (L) 09/11/2024 0415    ALKPHOS 63 08/30/2024 0958    ALKPHOS 57 06/21/2024 1353    AST 37 09/11/2024 0415    AST 37 08/30/2024 0958    AST 34 06/21/2024 1353    ALT 25 09/11/2024 0415    ALT 35 08/30/2024 0958    ALT 32 06/21/2024 1353    TP 4.9 (L) 09/11/2024 0415    TP 7.6 08/30/2024 0958    TP 7.2 06/21/2024 1353    ALB 3.3 (L) 09/11/2024 0415    ALB 3.8 06/21/2024 1353     Imaging Review: No pertinent imaging studies reviewed.  Other Studies: No additional pertinent studies reviewed.     Administrative Statements   I have spent a total time of 20 minutes in caring for this patient on the day of the visit/encounter including Risks and benefits of tx options, Instructions for management, Patient and family education, Importance of tx compliance, Risk factor reductions, Impressions, Counseling / Coordination of care, Documenting in the medical record, Reviewing / ordering tests, medicine, procedures  , and Obtaining or reviewing history  .

## 2024-09-13 NOTE — QUICK NOTE
Patient seen and examined at bedside this morning.  S/p laparoscopic hand-assisted converted to open LAR for rectal cancer.  Postop day 3.  Patient was sitting on the bedside commode.  Patient reports that she had a large bowel movement this morning.  She is AAOx3.  Her  was at bedside at the time of my evaluation.  Martha said that she feels better this morning.  She tolerated clear liquid diet yesterday.  Plans to start eating regular diet colorectal team.  Denied any fever, chills, chest pain, shortness of breath.    Vital stable  Blood pressure improved with MAP more than 65.  Off vasopressors.  Off PCA pump.  Receiving multimodal pain management with Tylenol and 75 mg 3 times daily, Robaxin 500 3 times daily.  As needed medications: Oxycodone 5 mg every 4 hours as needed for moderate pain, oxycodone 10 mg every 4 hours as needed for severe pain,  Dilaudid 0.5 mg every 2 hours as needed for breakthrough pain.  Management per primary team.  Continue fall precaution delirium precaution.

## 2024-09-13 NOTE — PHYSICAL THERAPY NOTE
PHYSICAL THERAPY NOTE          Patient Name: Martha Sandoval  Today's Date: 9/13/2024 09/13/24 0932   PT Last Visit   PT Visit Date 09/13/24   Note Type   Note Type Treatment   Pain Assessment   Pain Assessment Tool 0-10   Pain Score No Pain   Restrictions/Precautions   Braces or Orthoses   (denies)   Other Precautions Multiple lines;Telemetry  (epidural; RADHA x2;)   General   Chart Reviewed Yes   Additional Pertinent History cleared for Tx session by nsg   Response to Previous Treatment Patient with no complaints from previous session.   Family/Caregiver Present Yes   Cognition   Overall Cognitive Status WFL   Arousal/Participation Alert;Cooperative   Attention Attends with cues to redirect   Orientation Level Oriented to person;Oriented to place;Oriented to situation   Memory Within functional limits   Following Commands Follows one step commands without difficulty   Subjective   Subjective Alert; in the chair; somewhat emotional and tearful at times; reports she had extrensive BM earlier and not sure if she needs additional pericare (staff present); agreeable to amb after the pericare was completed   Transfers   Sit to Stand 4  Minimal assistance   Additional items Assist x 1;Increased time required;Verbal cues  (3 trials)   Stand to Sit 4  Minimal assistance   Additional items Assist x 1;Increased time required;Verbal cues  (3 trials)   Ambulation/Elevation   Gait pattern Short stride;Inconsistent yumiko;Excessively slow;Wide EDGAR   Gait Assistance 4  Minimal assist   Additional items Assist x 1;Verbal cues;Tactile cues   Assistive Device Rolling walker   Distance 2 x 160 ft w/ extended seated rest period in between   Balance   Static Sitting Fair   Dynamic Sitting Fair -   Static Standing Fair -   Dynamic Standing Poor +   Ambulatory Poor +   Activity Tolerance   Activity Tolerance Patient limited by fatigue   Medical Staff  Made Aware Co-Tx performed w/ OTR due to complexity of medical status   Exercises   Balance training  Standing balance/tolerance training x 2 min during pericare activities   Assessment   Prognosis Good   Problem List Decreased strength;Decreased endurance;Impaired balance;Decreased mobility;Obesity   Assessment Pt demonstrated overall significant improvement in balance, endurance and all aspects of observed mobility ambulating further distances w/ rw and less (A) required; pt still remains to be generally guarded and overall weak and deconditioned --> will cont to follow to address; will follow   Barriers to Discharge Inaccessible home environment   Goals   Patient Goals to get better   STG Expiration Date 09/26/24   PT Treatment Day 1   Plan   Treatment/Interventions Functional transfer training;LE strengthening/ROM;Elevations;Therapeutic exercise;Endurance training;Equipment eval/education;Bed mobility;Gait training;Spoke to nursing;Spoke to case management;OT   Progress Progressing toward goals   PT Frequency 3-5x/wk   Discharge Recommendation   Rehab Resource Intensity Level, PT (S)  III (Minimum Resource Intensity)  (change from prior recommendations)   Equipment Recommended Walker   AM-PAC Basic Mobility Inpatient   Turning in Flat Bed Without Bedrails 3   Lying on Back to Sitting on Edge of Flat Bed Without Bedrails 2   Moving Bed to Chair 3   Standing Up From Chair Using Arms 3   Walk in Room 3   Climb 3-5 Stairs With Railing 2   Basic Mobility Inpatient Raw Score 16   Basic Mobility Standardized Score 38.32   [facility] Highest Level Of Mobility   -HLM Goal 5: Stand one or more mins   -HLM Achieved 8: Walk 250 feet ot more   Education   Education Provided Mobility training;Assistive device   Patient Demonstrates verbal understanding   End of Consult   Patient Position at End of Consult Bedside chair;All needs within reach     Kam Nelson

## 2024-09-13 NOTE — ASSESSMENT & PLAN NOTE
History of colorectal cancer with low anterior laparoscopic hand-assisted converted to hybrid open flexible sigmoidoscopy, laparoscopic mobilization of the splenic flexure, complicated by bladder injury.    General postoperative care per primary surgical team CRS.

## 2024-09-13 NOTE — ASSESSMENT & PLAN NOTE
Voiding      Status post complex cystorrhaphy. postoperative day 3.  Both cystogram as well as CT urogram are negative for bladder leak and ureteral leak.    Afebrile, hemodynamically stable and in good spirits.  Normal creatinine.  Modest leukocytosis of 11.6 K.    Past 24-hour outputs-Conroy= 1.4 L.  Left drain 320, right drain 330 mL     Left drain adjusted given that it was near the area of bladder repair on her CT scan.  L drain creatinine--WNL     Conroy catheter for a full 2 weeks with a cystogram prior to removal.           Voiding

## 2024-09-13 NOTE — OCCUPATIONAL THERAPY NOTE
Occupational Therapy Progress Note     Patient Name: Martha Sandoval  Today's Date: 9/13/2024  Problem List  Principal Problem:    Rectal cancer (HCC)  Active Problems:    Bladder injury            09/13/24 0933   OT Last Visit   OT Visit Date 09/13/24   Note Type   Note Type Treatment   Pain Assessment   Pain Assessment Tool 0-10   Pain Score No Pain   Restrictions/Precautions   Other Precautions Multiple lines;Telemetry  (epidural,  RADHA x 2)   Lifestyle   Autonomy Pta pt I with ADL, most IADL, and functional mobility, (-)    Reciprocal Relationships supportive spouse   Service to Others retired   Intrinsic Gratification enjoys walking   ADL   Where Assessed Chair   Grooming Assistance 5  Supervision/Setup   Grooming Deficit Setup   Grooming Comments Washes hands seated in chair   LB Bathing Assistance 3  Moderate Assistance   LB Bathing Deficit Increased time to complete;Right lower leg including foot;Left lower leg including foot;Buttocks   LB Bathing Comments Pt requiring assist to wash distal LE and feet, buttocks, able to wash upper thighs down towards knees after setup.   LB Dressing Assistance 3  Moderate Assistance   LB Dressing Deficit Don/doff R sock;Don/doff L sock   LB Dressing Comments Pt unable to achieve seated figure 4 technique to change socks although reporting she will have assist as needed at home.   Functional Standing Tolerance   Time 3 minutes   Activity Standing during LB bathing   Comments Pt stands for 3 minutes during LB bathing due to need for comprehensive washing as pt reporting messy BM earlier, wishes to be further cleaned. Pt stands with MIN A x 1 with RW.   Bed Mobility   Additional Comments Pt greeted OOB in chair, left in chair with spouse present and all needs within reach.   Transfers   Sit to Stand 4  Minimal assistance   Additional items Assist x 1;Increased time required;Verbal cues   Stand to Sit 4  Minimal assistance   Additional items Assist x 1;Increased time  required;Verbal cues   Additional Comments with RW, STS x 3   Functional Mobility   Functional Mobility 4  Minimal assistance   Additional Comments Pt performs household distances with MIN A x 1 with RW and chair follow requiring 1 seated rest break.   Additional items Rolling walker   Cognition   Overall Cognitive Status WFL   Arousal/Participation Alert;Cooperative   Attention Attends with cues to redirect   Orientation Level Oriented to person;Oriented to place;Oriented to situation   Memory Within functional limits   Following Commands Follows one step commands without difficulty   Comments Pt cooperative to therapy although very anxious throughout sesson often requiring redirect to task, becomes tearful at one time with emotional support provided. Pt worried that instance of incontinence prior to session will repeat itself.   Activity Tolerance   Activity Tolerance Patient limited by fatigue;Other (Comment)  (anxiety)   Medical Staff Made Aware RN cleared for therapy, co-xtx with DPT due to high medical complexity and lines, assist from restorative Roselyn.   Assessment   Assessment Pt seen for OT treatment session day 1 on this date focused on ADL retraining, functional transfers and mobility, energy conservation, functional endurance. Pt was greeted in chair and was cooperative throughout session. Following session, pt was left in chair with all needs within reach. Pt continues to be limited by functional status related to ADLs and transfers requiring MOD A for LB bathing and dressing, MIN A x 1 with RW for functional transfers and mobility.   The patient's raw score on the AM-PAC Daily Activity Inpatient Short Form is 17. A raw score of greater than or equal to 19 suggests the patient may benefit from discharge to home. Please refer to the recommendation of the Occupational Therapist for safe discharge planning. Although AMPAC is less than 19, pt is expected to progress and will have adequate assist at home. Pt  would benefit from continued acute OT intervention with plan to continue OT treatment sessions 2-3x per week. Recommend d/c to home with increased social support, level III services.   Plan   Treatment Interventions ADL retraining;Functional transfer training;Endurance training;Patient/family training;Continued evaluation;Energy conservation   Goal Expiration Date 09/26/24   OT Treatment Day 1   OT Frequency 2-3x/wk   Discharge Recommendation   Rehab Resource Intensity Level, OT III (Minimum Resource Intensity)   AM-PAC Daily Activity Inpatient   Lower Body Dressing 2   Bathing 2   Toileting 3   Upper Body Dressing 3   Grooming 3   Eating 4   Daily Activity Raw Score 17   Daily Activity Standardized Score (Calc for Raw Score >=11) 37.26   AM-PAC Applied Cognition Inpatient   Following a Speech/Presentation 4   Understanding Ordinary Conversation 4   Taking Medications 4   Remembering Where Things Are Placed or Put Away 3   Remembering List of 4-5 Errands 3   Taking Care of Complicated Tasks 3   Applied Cognition Raw Score 21   Applied Cognition Standardized Score 44.3   Modified Luebbering Scale   Modified Shan Scale 4   End of Consult   Education Provided Yes;Family or social support of family present for education by provider   Patient Position at End of Consult Bedside chair;All needs within reach   Nurse Communication Nurse aware of consult       WILLEM Baron, OTR/L

## 2024-09-13 NOTE — PROGRESS NOTES
Progress Note - Urology   Name: Martha Sandoval 74 y.o. female I MRN: [id number]  Unit/Bed#: [facility]-305-01 I Date of Admission: 9/10/2024   Date of Service: 9/13/2024 I Hospital Day: 3    Assessment & Plan  Rectal cancer (HCC)  History of colorectal cancer with low anterior laparoscopic hand-assisted converted to hybrid open flexible sigmoidoscopy, laparoscopic mobilization of the splenic flexure, complicated by bladder injury.    General postoperative care per primary surgical team CRS.  Bladder injury  Status post complex cystorrhaphy. postoperative day 3.  Both cystogram as well as CT urogram are negative for bladder leak and ureteral leak.    Afebrile, hemodynamically stable and in good spirits.  Normal creatinine.  Modest leukocytosis of 11.6 K.    Past 24-hour outputs-Conroy= 1.4 L.  Left drain 320, right drain 330 mL     Left drain adjusted given that it was near the area of bladder repair on her CT scan.  L drain creatinine--WNL     Conroy catheter for a full 2 weeks with a cystogram prior to removal.              History of Present Illness   Martha Sandoval  Is a 74-year-old female with CRC, postoperative day 3 resection colon low anterior laparoscopic hand-assisted converted to open by CRS with in concert cystoscopy, complex hysteroscopy, bilateral ureteral catheterization by urology.    Objective      Temp:  [97.5 °F (36.4 °C)-98.1 °F (36.7 °C)] 98.1 °F (36.7 °C)  HR:  [] 82  Resp:  [13-26] 15  BP: ()/(50-65) 101/51  Arterial Line BP: (49)/(44) 49/44  O2 Device: None (Room air)          I/O         09/11 0701  09/12 0700 09/12 0701  09/13 0700 09/13 0701  09/14 0700    P.O.  470     I.V. (mL/kg) 1003.5 (10.1) 1198.1 (12) 15.9 (0.2)    Blood       IV Piggyback 200 350     Total Intake(mL/kg) 1203.5 (12.1) 2018.1 (20.2) 15.9 (0.2)    Urine (mL/kg/hr) 1830 (0.8) 1430 (0.6)     Drains 1215 650     Stool  0     Blood       Total Output 3045 2080     Net -1841.6 -61.9 +15.9           Unmeasured Stool  Occurrence  1 x           Lines/Drains/Airways       Active Status       Name Placement date Placement time Site Days    PICC Line 09/12/24 Left Basilic 09/12/24  0927  Basilic  1    Epidural Catheter 09/10/24 09/10/24  1118  -- 3    Closed/Suction Drain Right Abdomen 10 Fr. 09/10/24  1838  Abdomen  2    Closed/Suction Drain Left LLQ Bulb 19 Fr. 09/10/24  1839  LLQ  2    Urethral Catheter Latex 22 Fr. 09/10/24  1800  Latex  2                  Physical Exam   General appearance: alert and oriented, in no acute distress, appears stated age, cooperative, no distress, and morbidly obese  Head: Normocephalic, without obvious abnormality, atraumatic  Neck: no JVD and supple, symmetrical, trachea midline  Lungs: diminished breath sounds  Heart: regular rate and rhythm, S1, S2 normal, no murmur, click, rub or gallop  Abdomen: abnormal findings:  mild tenderness in the lower abdomen  Extremities: extremities normal, warm and well-perfused; no cyanosis, clubbing, or edema  Pulses: 2+ and symmetric  Neurologic: Grossly normal  Incision lap sites clean dry and intact.  Right and left RADHA drain--serosanguineous.  Conroy--jourdan.      Lab Results: I have reviewed the following results:      Lab Results   Component Value Date    WBC 11.65 (H) 09/13/2024    HGB 8.6 (L) 09/13/2024    HCT 26.6 (L) 09/13/2024    MCV 87 09/13/2024     (L) 09/13/2024     Lab Results   Component Value Date    SODIUM 134 (L) 09/13/2024    K 3.7 09/13/2024     09/13/2024    CO2 18 (L) 09/13/2024    BUN 18 09/13/2024    CREATININE 0.80 09/13/2024    GLUC 129 09/13/2024    CALCIUM 7.2 (L) 09/13/2024         VTE Pharmacologic Prophylaxis: Heparin  VTE Mechanical Prophylaxis: sequential compression device

## 2024-09-13 NOTE — RESTORATIVE TECHNICIAN NOTE
Restorative Technician Note      Patient Name: Martha Sandoval     Restorative Tech Visit Date: 09/13/24  Note Type: Mobility  Patient Position Upon Consult: Bedside chair  Activity Performed: Stood  Assistive Device: Other (Comment) (none)  Patient Position at End of Consult: Bed/Chair alarm activated; Bedside chair; All needs within reach

## 2024-09-13 NOTE — PLAN OF CARE
Problem: PHYSICAL THERAPY ADULT  Goal: Performs mobility at highest level of function for planned discharge setting.  See evaluation for individualized goals.  Description: Treatment/Interventions: Functional transfer training, Elevations, LE strengthening/ROM, Therapeutic exercise, Endurance training, Patient/family training, Equipment eval/education, Bed mobility, Gait training  Equipment Recommended:  (TBD)       See flowsheet documentation for full assessment, interventions and recommendations.  Outcome: Progressing  Note: Prognosis: Good  Problem List: Decreased strength, Decreased endurance, Impaired balance, Decreased mobility, Obesity  Assessment: Pt demonstrated overall significant improvement in balance, endurance and all aspects of observed mobility ambulating further distances w/ rw and less (A) required; pt still remains to be generally guarded and overall weak and deconditioned --> will cont to follow to address; will follow  Barriers to Discharge: Inaccessible home environment     Rehab Resource Intensity Level, PT: (S) III (Minimum Resource Intensity) (change from prior recommendations)    See flowsheet documentation for full assessment.

## 2024-09-13 NOTE — PLAN OF CARE
Problem: OCCUPATIONAL THERAPY ADULT  Goal: Performs self-care activities at highest level of function for planned discharge setting.  See evaluation for individualized goals.  Description: Treatment Interventions: ADL retraining, Functional transfer training, Endurance training, Patient/family training, Equipment evaluation/education, Continued evaluation, Energy conservation          See flowsheet documentation for full assessment, interventions and recommendations.   Outcome: Progressing  Note: Limitation: Decreased ADL status, Decreased endurance, Decreased self-care trans, Decreased high-level ADLs  Prognosis: Good  Assessment: Pt seen for OT treatment session day 1 on this date focused on ADL retraining, functional transfers and mobility, energy conservation, functional endurance. Pt was greeted in chair and was cooperative throughout session. Following session, pt was left in chair with all needs within reach. Pt continues to be limited by functional status related to ADLs and transfers requiring MOD A for LB bathing and dressing, MIN A x 1 with RW for functional transfers and mobility.   The patient's raw score on the -PAC Daily Activity Inpatient Short Form is 17. A raw score of greater than or equal to 19 suggests the patient may benefit from discharge to home. Please refer to the recommendation of the Occupational Therapist for safe discharge planning. Although AMPAC is less than 19, pt is expected to progress and will have adequate assist at home. Pt would benefit from continued acute OT intervention with plan to continue OT treatment sessions 2-3x per week. Recommend d/c to home with increased social support, level III services.     Rehab Resource Intensity Level, OT: III (Minimum Resource Intensity)

## 2024-09-14 LAB
ANION GAP SERPL CALCULATED.3IONS-SCNC: 7 MMOL/L (ref 4–13)
BASOPHILS # BLD AUTO: 0.02 THOUSANDS/ΜL (ref 0–0.1)
BASOPHILS NFR BLD AUTO: 0 % (ref 0–1)
BUN SERPL-MCNC: 19 MG/DL (ref 5–25)
CALCIUM SERPL-MCNC: 7.7 MG/DL (ref 8.4–10.2)
CHLORIDE SERPL-SCNC: 102 MMOL/L (ref 96–108)
CO2 SERPL-SCNC: 25 MMOL/L (ref 21–32)
CREAT SERPL-MCNC: 0.81 MG/DL (ref 0.6–1.3)
EOSINOPHIL # BLD AUTO: 0.14 THOUSAND/ΜL (ref 0–0.61)
EOSINOPHIL NFR BLD AUTO: 2 % (ref 0–6)
ERYTHROCYTE [DISTWIDTH] IN BLOOD BY AUTOMATED COUNT: 16.4 % (ref 11.6–15.1)
GFR SERPL CREATININE-BSD FRML MDRD: 72 ML/MIN/1.73SQ M
GLUCOSE SERPL-MCNC: 116 MG/DL (ref 65–140)
HCT VFR BLD AUTO: 24.3 % (ref 34.8–46.1)
HGB BLD-MCNC: 8 G/DL (ref 11.5–15.4)
IMM GRANULOCYTES # BLD AUTO: 0.04 THOUSAND/UL (ref 0–0.2)
IMM GRANULOCYTES NFR BLD AUTO: 1 % (ref 0–2)
LYMPHOCYTES # BLD AUTO: 0.84 THOUSANDS/ΜL (ref 0.6–4.47)
LYMPHOCYTES NFR BLD AUTO: 12 % (ref 14–44)
MCH RBC QN AUTO: 28.1 PG (ref 26.8–34.3)
MCHC RBC AUTO-ENTMCNC: 32.9 G/DL (ref 31.4–37.4)
MCV RBC AUTO: 85 FL (ref 82–98)
MONOCYTES # BLD AUTO: 0.66 THOUSAND/ΜL (ref 0.17–1.22)
MONOCYTES NFR BLD AUTO: 10 % (ref 4–12)
NEUTROPHILS # BLD AUTO: 5.28 THOUSANDS/ΜL (ref 1.85–7.62)
NEUTS SEG NFR BLD AUTO: 75 % (ref 43–75)
NRBC BLD AUTO-RTO: 0 /100 WBCS
PHOSPHATE SERPL-MCNC: 2.9 MG/DL (ref 2.3–4.1)
PLATELET # BLD AUTO: 123 THOUSANDS/UL (ref 149–390)
PMV BLD AUTO: 9.6 FL (ref 8.9–12.7)
POTASSIUM SERPL-SCNC: 3.6 MMOL/L (ref 3.5–5.3)
RBC # BLD AUTO: 2.85 MILLION/UL (ref 3.81–5.12)
SODIUM SERPL-SCNC: 134 MMOL/L (ref 135–147)
WBC # BLD AUTO: 6.98 THOUSAND/UL (ref 4.31–10.16)

## 2024-09-14 PROCEDURE — 84100 ASSAY OF PHOSPHORUS: CPT

## 2024-09-14 PROCEDURE — 85025 COMPLETE CBC W/AUTO DIFF WBC: CPT

## 2024-09-14 PROCEDURE — 99232 SBSQ HOSP IP/OBS MODERATE 35: CPT | Performed by: ANESTHESIOLOGY

## 2024-09-14 PROCEDURE — 99024 POSTOP FOLLOW-UP VISIT: CPT | Performed by: UROLOGY

## 2024-09-14 PROCEDURE — 80048 BASIC METABOLIC PNL TOTAL CA: CPT

## 2024-09-14 PROCEDURE — 99024 POSTOP FOLLOW-UP VISIT: CPT | Performed by: COLON & RECTAL SURGERY

## 2024-09-14 RX ORDER — FUROSEMIDE 40 MG
40 TABLET ORAL DAILY
Status: DISCONTINUED | OUTPATIENT
Start: 2024-09-14 | End: 2024-09-14

## 2024-09-14 RX ORDER — OXYBUTYNIN CHLORIDE 5 MG/1
5 TABLET ORAL 3 TIMES DAILY PRN
Status: DISCONTINUED | OUTPATIENT
Start: 2024-09-14 | End: 2024-09-17 | Stop reason: HOSPADM

## 2024-09-14 RX ORDER — POTASSIUM CHLORIDE 29.8 MG/ML
40 INJECTION INTRAVENOUS ONCE
Status: COMPLETED | OUTPATIENT
Start: 2024-09-14 | End: 2024-09-14

## 2024-09-14 RX ORDER — CALCIUM GLUCONATE 20 MG/ML
2 INJECTION, SOLUTION INTRAVENOUS ONCE
Status: COMPLETED | OUTPATIENT
Start: 2024-09-14 | End: 2024-09-14

## 2024-09-14 RX ORDER — FUROSEMIDE 40 MG
40 TABLET ORAL DAILY
Status: DISCONTINUED | OUTPATIENT
Start: 2024-09-14 | End: 2024-09-17 | Stop reason: HOSPADM

## 2024-09-14 RX ADMIN — PANTOPRAZOLE SODIUM 40 MG: 40 TABLET, DELAYED RELEASE ORAL at 05:01

## 2024-09-14 RX ADMIN — ACETAMINOPHEN 975 MG: 325 TABLET ORAL at 14:30

## 2024-09-14 RX ADMIN — ACETAMINOPHEN 975 MG: 325 TABLET ORAL at 05:01

## 2024-09-14 RX ADMIN — METHOCARBAMOL 500 MG: 500 TABLET ORAL at 14:30

## 2024-09-14 RX ADMIN — HEPARIN SODIUM 7500 UNITS: 5000 INJECTION INTRAVENOUS; SUBCUTANEOUS at 21:36

## 2024-09-14 RX ADMIN — ACETAMINOPHEN 975 MG: 325 TABLET ORAL at 21:36

## 2024-09-14 RX ADMIN — OXYCODONE HYDROCHLORIDE 10 MG: 10 TABLET ORAL at 00:03

## 2024-09-14 RX ADMIN — HEPARIN SODIUM 7500 UNITS: 5000 INJECTION INTRAVENOUS; SUBCUTANEOUS at 14:30

## 2024-09-14 RX ADMIN — OXYCODONE HYDROCHLORIDE 5 MG: 5 TABLET ORAL at 05:00

## 2024-09-14 RX ADMIN — OXYCODONE HYDROCHLORIDE 10 MG: 10 TABLET ORAL at 21:38

## 2024-09-14 RX ADMIN — HEPARIN SODIUM 7500 UNITS: 5000 INJECTION INTRAVENOUS; SUBCUTANEOUS at 05:01

## 2024-09-14 RX ADMIN — OXYCODONE HYDROCHLORIDE 5 MG: 5 TABLET ORAL at 13:27

## 2024-09-14 RX ADMIN — METHOCARBAMOL 500 MG: 500 TABLET ORAL at 05:01

## 2024-09-14 RX ADMIN — POTASSIUM CHLORIDE 40 MEQ: 29.8 INJECTION, SOLUTION INTRAVENOUS at 12:35

## 2024-09-14 RX ADMIN — CALCIUM GLUCONATE 2 G: 20 INJECTION, SOLUTION INTRAVENOUS at 12:35

## 2024-09-14 RX ADMIN — METHOCARBAMOL 500 MG: 500 TABLET ORAL at 21:36

## 2024-09-14 RX ADMIN — FUROSEMIDE 40 MG: 40 TABLET ORAL at 16:09

## 2024-09-14 NOTE — PROGRESS NOTES
Progress Note - Thoracic    Name: Martha Sandoval 74 y.o. female I MRN: [id number]  Unit/Bed#: [facility] 913-01 I Date of Admission: 9/10/2024   Date of Service: 9/14/2024 I Hospital Day: 4    Assessment & Plan  Rectal cancer (HCC)  S/p 9/10 lap converted to open LAR, primary bladder repair.    Plan:  - lo-res diet  - restart baseline Lasix   - appreciate analilia recs  - DVT ppx  Bladder injury  - Appreciate nephro recs  - Appreciate urology recs, follow recs  -maintain leon        History of Present Illness   .  No nausea no vomiting.  Passing flatus last bowel movement yesterday.    Objective      Temp:  [97.6 °F (36.4 °C)-98 °F (36.7 °C)] 97.6 °F (36.4 °C)  HR:  [] 87  Resp:  [18-25] 18  BP: ()/(53-70) 122/63  O2 Device: None (Room air)          I/O         09/12 0701  09/13 0700 09/13 0701  09/14 0700 09/14 0701  09/15 0700    P.O. 470 240     I.V. (mL/kg) 1198.1 (12) 15.9 (0.2)     IV Piggyback 350 130     Total Intake(mL/kg) 2018.1 (20.2) 385.9 (3.9)     Urine (mL/kg/hr) 1430 (0.6) 1460 (0.6)     Drains 650 420     Stool 0 0     Total Output 2080 1880     Net -61.9 -1494.1            Unmeasured Stool Occurrence 1 x 1 x           Lines/Drains/Airways       Active Status       Name Placement date Placement time Site Days    PICC Line 09/12/24 Left Basilic 09/12/24 0927  Basilic  1    Closed/Suction Drain Right Abdomen 10 Fr. 09/10/24  1838  Abdomen  3    Closed/Suction Drain Left LLQ Bulb 19 Fr. 09/10/24  1839  LLQ  3    Urethral Catheter Latex 22 Fr. 09/10/24  1800  Latex  3                  Physical Exam  Vitals and nursing note reviewed.   Constitutional:       General: She is not in acute distress.     Appearance: She is well-developed.   HENT:      Head: Normocephalic and atraumatic.   Eyes:      Conjunctiva/sclera: Conjunctivae normal.   Cardiovascular:      Rate and Rhythm: Normal rate and regular rhythm.      Heart sounds: No murmur heard.  Pulmonary:      Effort: Pulmonary effort is normal. No  respiratory distress.      Breath sounds: Normal breath sounds.   Abdominal:      Palpations: Abdomen is soft.      Tenderness: There is abdominal tenderness.      Comments: Appropriately tender   Musculoskeletal:         General: No swelling.      Cervical back: Neck supple.   Skin:     General: Skin is warm and dry.      Capillary Refill: Capillary refill takes less than 2 seconds.   Neurological:      Mental Status: She is alert.   Psychiatric:         Mood and Affect: Mood normal.

## 2024-09-14 NOTE — ASSESSMENT & PLAN NOTE
History of colorectal cancer with low anterior laparoscopic hand-assisted converted to hybrid open flexible sigmoidoscopy, laparoscopic mobilization of the splenic flexure, complicated by bladder injury due to extensive and dense adhesions in the pelvis with thin-walled bladder adhesed to the anterior rectum.    General postoperative care per primary surgical team CRS.

## 2024-09-14 NOTE — PROGRESS NOTES
Progress Note - Acute Pain   Name: Martha Sandoval 74 y.o. female I MRN: [id number]  Unit/Bed#: [facility] 913-01 I Date of Admission: 9/10/2024   Date of Service: 9/14/2024 I Hospital Day: 4    Assessment & Plan  Rectal cancer (HCC)  Status post laparoscopic converted to open LAR and primary bladder repair on 9/10/2024 with colorectal surgery  Thoracic epidural placed 9/10/2024 for postoperative analgesia  PCEA removed on 9/13    Continue oxycodone 5-10mg q4hrs PRN pain  IV Dilaudid 0.5 mg every 2 hours as needed for breakthrough pain    Multimodal analgesia:  Schedule tylenol  Robaxin low dose 500mg TID  Narcan as needed for respiratory depression/opioid reversal    Bladder injury      APS will sign off at this time. Thank you for the consult. All opioids and other analgesics to be written at discretion of primary team. Please contact Acute Pain Service - via SecureChat from [phone] with additional questions or concerns. See [employer] or [employer] for additional contacts and after hours information.    History of Present Illness   Martha Sandoval is a 74 y.o. female  postop day 4 status post complex cystorrhaphy with pre-op epidural placement for post op analgesia     Pain History  Current pain location(s):  Pain Score: 8  Pain Location/Orientation: Location: Abdomen  Pain Scale: Pain Assessment Tool: 0-10  24 hour history: Patient was seen during morning rounds, sitting up in chair with minimal distress. States that her pain level did increase yesterday after removal of the epidural and that she is continuing to take po pain meds with relief. She is concerned about taking more than required pain medication. Discussed that she is taking appropriate doses of her pain regimen and that she should continue to avoid IV opioids and utilize PO pain meds for analgesia. Informed patient that if needed she can be discharged home on pain medication if needed, she is in agreement.     Opioid requirement previous 24 hours: Oxycodone 5mg  x3 + 10mg x1, IV dilaudid 0.5mg x1  Meds/Allergies     all current active meds have been reviewed  Allergies   Allergen Reactions    Azithromycin GI Intolerance    Colcrys [Colchicine] Diarrhea    Dicyclomine GI Intolerance and Hives    Erythromycin GI Intolerance    Naproxen Edema    Sulfa Antibiotics Hives    Sulfamethoxazole-Trimethoprim Rash       Objective      Temp:  [97.6 °F (36.4 °C)-98 °F (36.7 °C)] 97.8 °F (36.6 °C)  HR:  [] 106  Resp:  [16-25] 16  BP: (115-157)/(63-71) 115/71  Physical Exam  Vitals and nursing note reviewed.   Constitutional:       Appearance: Normal appearance. She is obese.   HENT:      Head: Normocephalic and atraumatic.      Nose: Nose normal.      Mouth/Throat:      Mouth: Mucous membranes are moist.   Cardiovascular:      Rate and Rhythm: Normal rate.   Pulmonary:      Effort: Pulmonary effort is normal.   Musculoskeletal:         General: Normal range of motion.      Cervical back: Normal range of motion.   Skin:     General: Skin is dry.   Neurological:      Mental Status: She is alert and oriented to person, place, and time. Mental status is at baseline.        Epidural site: Clean, dry with no surrounding erythema, edema or induration       Lab Results: I have reviewed the following results:   Estimated Creatinine Clearance: 67 mL/min (by C-G formula based on SCr of 0.81 mg/dL).  Lab Results   Component Value Date    WBC 6.98 09/14/2024    HGB 8.0 (L) 09/14/2024    HCT 24.3 (L) 09/14/2024     (L) 09/14/2024         Component Value Date/Time    K 3.6 09/14/2024 0433    K 4.3 08/30/2024 0958    K 4.0 06/21/2024 1353     09/14/2024 0433     08/30/2024 0958     06/21/2024 1353    CO2 25 09/14/2024 0433    CO2 19 (L) 09/10/2024 2017    CO2 29 08/30/2024 0958    CO2 28 06/21/2024 1353    BUN 19 09/14/2024 0433    BUN 39 (H) 08/30/2024 0958    BUN 32 (H) 06/21/2024 1353    CREATININE 0.81 09/14/2024 0433    CREATININE 1.19 (H) 06/21/2024 1353          Component Value Date/Time    CALCIUM 7.7 (L) 09/14/2024 0433    CALCIUM 9.1 08/30/2024 0958    CALCIUM 8.8 06/21/2024 1353    ALKPHOS 25 (L) 09/11/2024 0415    ALKPHOS 63 08/30/2024 0958    ALKPHOS 57 06/21/2024 1353    AST 37 09/11/2024 0415    AST 37 08/30/2024 0958    AST 34 06/21/2024 1353    ALT 25 09/11/2024 0415    ALT 35 08/30/2024 0958    ALT 32 06/21/2024 1353    TP 4.9 (L) 09/11/2024 0415    TP 7.6 08/30/2024 0958    TP 7.2 06/21/2024 1353    ALB 3.3 (L) 09/11/2024 0415    ALB 3.8 06/21/2024 1353     Imaging Review: No pertinent imaging studies reviewed.  Other Studies: No additional pertinent studies reviewed.

## 2024-09-14 NOTE — ASSESSMENT & PLAN NOTE
Status post laparoscopic converted to open LAR and primary bladder repair on 9/10/2024 with colorectal surgery  Thoracic epidural placed 9/10/2024 for postoperative analgesia  PCEA removed on 9/13    Continue oxycodone 5-10mg q4hrs PRN pain  IV Dilaudid 0.5 mg every 2 hours as needed for breakthrough pain    Multimodal analgesia:  Schedule tylenol  Robaxin low dose 500mg TID  Narcan as needed for respiratory depression/opioid reversal

## 2024-09-14 NOTE — PROGRESS NOTES
Progress Note - Urology   Name: Martha Sandoval 74 y.o. female I MRN: [id number]  Unit/Bed#: [facility] 913-01 I Date of Admission: 9/10/2024   Date of Service: 9/14/2024 I Hospital Day: 4     Assessment & Plan  Rectal cancer (HCC)  History of colorectal cancer with low anterior laparoscopic hand-assisted converted to hybrid open flexible sigmoidoscopy, laparoscopic mobilization of the splenic flexure, complicated by bladder injury due to extensive and dense adhesions in the pelvis with thin-walled bladder adhesed to the anterior rectum.    General postoperative care per primary surgical team CRS.  Bladder injury  Status post complex cystorrhaphy. postoperative day 3.  Both cystogram as well as CT urogram are negative for bladder leak and ureteral leak.    Patient continues to be afebrile hemodynamically stable  Creatinine continues to be at baseline, leukocytosis resolved  Hemoglobin stable between 8    Past 24-hour outputs-Conroy= 1.1 L.  Left drain 110, right drain to 280 mL drain serosanguineous.  Will discuss with  attending timeframe for drain removal.  Anticipate will most likely be prior to discharge.    Drainage output decreasing.  L drain creatinine--WNL     Conroy catheter for a full 2 weeks with a cystogram prior to removal.          Urology service will follow.    Subjective:  Patient evaluated while on the toilet.  She requested that I come in to evaluate her while sitting.  She feels as though she needs to have a bowel movement but is unsure.  Has a lot of cramping.  She is ambulating with PT and ambulating from chair to bathroom.  She is having bowel movements and passing flatus.  Denies any nausea or vomiting tolerating advance diet.  Reports some mild leakage from urethral Conroy catheter.  To which she was initially concerned about.  But now feels better now reassured      Objective      Temp:  [97.6 °F (36.4 °C)-98.1 °F (36.7 °C)] 97.6 °F (36.4 °C)  HR:  [] 87  Resp:  [15-25] 18  BP:  ()/(53-70) 122/63  O2 Device: None (Room air)          I/O         09/12 0701  09/13 0700 09/13 0701  09/14 0700    P.O. 470 240    I.V. (mL/kg) 1198.1 (12) 15.9 (0.2)    IV Piggyback 350 130    Total Intake(mL/kg) 2018.1 (20.2) 385.9 (3.9)    Urine (mL/kg/hr) 1430 (0.6) 1160 (0.5)    Drains 650 390    Stool 0 0    Total Output 2080 1550    Net -61.9 -1164.1          Unmeasured Stool Occurrence 1 x 1 x          Lines/Drains/Airways       Active Status       Name Placement date Placement time Site Days    PICC Line 09/12/24 Left Basilic 09/12/24  0927  Basilic  1    Closed/Suction Drain Right Abdomen 10 Fr. 09/10/24  1838  Abdomen  3    Closed/Suction Drain Left LLQ Bulb 19 Fr. 09/10/24  1839  LLQ  3    Urethral Catheter Latex 22 Fr. 09/10/24  1800  Latex  3                  Physical Exam  Constitutional:       General: She is not in acute distress.     Appearance: She is obese. She is not ill-appearing, toxic-appearing or diaphoretic.   HENT:      Head: Normocephalic and atraumatic.      Right Ear: External ear normal.      Left Ear: External ear normal.      Nose: Nose normal.      Mouth/Throat:      Pharynx: Oropharynx is clear.   Eyes:      General: No scleral icterus.     Conjunctiva/sclera: Conjunctivae normal.   Cardiovascular:      Rate and Rhythm: Normal rate and regular rhythm.   Pulmonary:      Effort: Pulmonary effort is normal. No respiratory distress.   Abdominal:      General: Bowel sounds are normal. There is no distension.      Tenderness: There is no abdominal tenderness.      Comments:   Surgical incision intact, dry, no sign of wound dehiscence or underlying infection additional port incisions clean intact no sign of wound dehiscence or underlying infection, RADHA drains in place draining serosanguineous fluid, bowel sounds present abdomen soft appropriate incisional tenderness.    Genitourinary:     Comments: Urethra Conroy catheter in place with [place] colored urine  Musculoskeletal:       Cervical back: Normal range of motion.   Skin:     General: Skin is warm and dry.   Neurological:      Mental Status: She is alert. Mental status is at baseline.           Lab Results: I have reviewed the following results: CBC/BMP:   .     09/13/24  0527   WBC 11.65*   HGB 8.6*   HCT 26.6*   *   SODIUM 134*   K 3.7      CO2 18*   BUN 18   CREATININE 0.80   GLUC 129   MG 2.1   PHOS 2.1*      Imaging Review: Reviewed radiology reports from this admission including: CT abdomen/pelvis and CT urogram/cystogram.    VTE Pharmacologic Prophylaxis: Heparin  VTE Mechanical Prophylaxis: sequential compression device    Marina Rodriguez PA-C

## 2024-09-15 LAB
ANION GAP SERPL CALCULATED.3IONS-SCNC: 7 MMOL/L (ref 4–13)
BASOPHILS # BLD AUTO: 0.05 THOUSANDS/ΜL (ref 0–0.1)
BASOPHILS NFR BLD AUTO: 0 % (ref 0–1)
BUN SERPL-MCNC: 21 MG/DL (ref 5–25)
CALCIUM SERPL-MCNC: 8.3 MG/DL (ref 8.4–10.2)
CHLORIDE SERPL-SCNC: 101 MMOL/L (ref 96–108)
CO2 SERPL-SCNC: 25 MMOL/L (ref 21–32)
CREAT SERPL-MCNC: 0.95 MG/DL (ref 0.6–1.3)
EOSINOPHIL # BLD AUTO: 0.32 THOUSAND/ΜL (ref 0–0.61)
EOSINOPHIL NFR BLD AUTO: 3 % (ref 0–6)
ERYTHROCYTE [DISTWIDTH] IN BLOOD BY AUTOMATED COUNT: 16.9 % (ref 11.6–15.1)
GFR SERPL CREATININE-BSD FRML MDRD: 59 ML/MIN/1.73SQ M
GLUCOSE SERPL-MCNC: 137 MG/DL (ref 65–140)
HCT VFR BLD AUTO: 28 % (ref 34.8–46.1)
HGB BLD-MCNC: 9 G/DL (ref 11.5–15.4)
IMM GRANULOCYTES # BLD AUTO: 0.23 THOUSAND/UL (ref 0–0.2)
IMM GRANULOCYTES NFR BLD AUTO: 2 % (ref 0–2)
LYMPHOCYTES # BLD AUTO: 1.8 THOUSANDS/ΜL (ref 0.6–4.47)
LYMPHOCYTES NFR BLD AUTO: 15 % (ref 14–44)
MCH RBC QN AUTO: 27.4 PG (ref 26.8–34.3)
MCHC RBC AUTO-ENTMCNC: 32.1 G/DL (ref 31.4–37.4)
MCV RBC AUTO: 85 FL (ref 82–98)
MONOCYTES # BLD AUTO: 1.1 THOUSAND/ΜL (ref 0.17–1.22)
MONOCYTES NFR BLD AUTO: 9 % (ref 4–12)
NEUTROPHILS # BLD AUTO: 8.82 THOUSANDS/ΜL (ref 1.85–7.62)
NEUTS SEG NFR BLD AUTO: 71 % (ref 43–75)
NRBC BLD AUTO-RTO: 0 /100 WBCS
PLATELET # BLD AUTO: 243 THOUSANDS/UL (ref 149–390)
PMV BLD AUTO: 9.9 FL (ref 8.9–12.7)
POTASSIUM SERPL-SCNC: 3.9 MMOL/L (ref 3.5–5.3)
RBC # BLD AUTO: 3.28 MILLION/UL (ref 3.81–5.12)
SODIUM SERPL-SCNC: 133 MMOL/L (ref 135–147)
WBC # BLD AUTO: 12.32 THOUSAND/UL (ref 4.31–10.16)

## 2024-09-15 PROCEDURE — 85025 COMPLETE CBC W/AUTO DIFF WBC: CPT

## 2024-09-15 PROCEDURE — 80048 BASIC METABOLIC PNL TOTAL CA: CPT

## 2024-09-15 PROCEDURE — 99024 POSTOP FOLLOW-UP VISIT: CPT | Performed by: COLON & RECTAL SURGERY

## 2024-09-15 RX ADMIN — ACETAMINOPHEN 975 MG: 325 TABLET ORAL at 04:48

## 2024-09-15 RX ADMIN — ACETAMINOPHEN 975 MG: 325 TABLET ORAL at 13:42

## 2024-09-15 RX ADMIN — PANTOPRAZOLE SODIUM 40 MG: 40 TABLET, DELAYED RELEASE ORAL at 04:50

## 2024-09-15 RX ADMIN — METHOCARBAMOL 500 MG: 500 TABLET ORAL at 04:50

## 2024-09-15 RX ADMIN — FUROSEMIDE 40 MG: 40 TABLET ORAL at 18:05

## 2024-09-15 RX ADMIN — OXYCODONE HYDROCHLORIDE 10 MG: 10 TABLET ORAL at 04:48

## 2024-09-15 RX ADMIN — HEPARIN SODIUM 7500 UNITS: 5000 INJECTION INTRAVENOUS; SUBCUTANEOUS at 04:50

## 2024-09-15 RX ADMIN — OXYCODONE HYDROCHLORIDE 10 MG: 10 TABLET ORAL at 21:00

## 2024-09-15 RX ADMIN — HEPARIN SODIUM 7500 UNITS: 5000 INJECTION INTRAVENOUS; SUBCUTANEOUS at 13:42

## 2024-09-15 RX ADMIN — HEPARIN SODIUM 7500 UNITS: 5000 INJECTION INTRAVENOUS; SUBCUTANEOUS at 21:00

## 2024-09-15 RX ADMIN — METHOCARBAMOL 500 MG: 500 TABLET ORAL at 13:42

## 2024-09-15 RX ADMIN — ACETAMINOPHEN 975 MG: 325 TABLET ORAL at 21:00

## 2024-09-15 RX ADMIN — METHOCARBAMOL 500 MG: 500 TABLET ORAL at 21:00

## 2024-09-15 NOTE — ASSESSMENT & PLAN NOTE
75 yo F now s/p 9/10 lap converted to open LAR, primary bladder repair.    AVSS on RA  UOP 1075 cc  Left RADHA 225 cc serosang  Right RADHA 415 cc serosang  AM labs pending    Plan:  -Continue low res diet  -Keep leon  -Keep RADHA drains  -PRN pain and anti nausea meds  -DVT prophylaxis  -Encourage IS  -Encourage out of bed and ambulation  -Appreciate urology, nephrology recs  -Dispo planning

## 2024-09-15 NOTE — PLAN OF CARE
Problem: CARDIOVASCULAR - ADULT  Goal: Maintains optimal cardiac output and hemodynamic stability  Description: INTERVENTIONS:  - Monitor I/O, vital signs and rhythm  - Monitor for S/S and trends of decreased cardiac output  - Administer and titrate ordered vasoactive medications to optimize hemodynamic stability  - Assess quality of pulses, skin color and temperature  - Assess for signs of decreased coronary artery perfusion  - Instruct patient to report change in severity of symptoms  Outcome: Progressing     Problem: GENITOURINARY - ADULT  Goal: Urinary catheter remains patent  Description: INTERVENTIONS:  - Assess patency of urinary catheter  - If patient has a chronic leon, consider changing catheter if non-functioning  - Follow guidelines for intermittent irrigation of non-functioning urinary catheter  Outcome: Progressing     Problem: METABOLIC, FLUID AND ELECTROLYTES - ADULT  Goal: Electrolytes maintained within normal limits  Description: INTERVENTIONS:  - Monitor labs and assess patient for signs and symptoms of electrolyte imbalances  - Administer electrolyte replacement as ordered  - Monitor response to electrolyte replacements, including repeat lab results as appropriate  - Instruct patient on fluid and nutrition as appropriate  Outcome: Progressing

## 2024-09-15 NOTE — PLAN OF CARE
Problem: GENITOURINARY - ADULT  Goal: Urinary catheter remains patent  Description: INTERVENTIONS:  - Assess patency of urinary catheter  - If patient has a chronic leon, consider changing catheter if non-functioning  - Follow guidelines for intermittent irrigation of non-functioning urinary catheter  Outcome: Progressing

## 2024-09-15 NOTE — PROGRESS NOTES
Progress Note - Colorectal   Name: Martha Sandoval 74 y.o. female I MRN: [id number]  Unit/Bed#: [facility] 913-01 I Date of Admission: 9/10/2024   Date of Service: 9/15/2024 I Hospital Day: 5    Assessment & Plan  Rectal cancer (HCC)  73 yo F now s/p 9/10 lap converted to open LAR, primary bladder repair.    AVSS on RA  UOP 1075 cc  Left RADHA 225 cc serosang  Right RADHA 415 cc serosang  AM labs pending    Plan:  -Continue low res diet  -Keep leon  -Keep RADHA drains  -PRN pain and anti nausea meds  -DVT prophylaxis  -Encourage IS  -Encourage out of bed and ambulation  -Appreciate urology, nephrology recs  -Dispo planning        History of Present Illness   No acute events overnight. Patient states she has moderate abdominal pain. Denies having nausea, vomiting, fevers, chills, chest pain, shortness of breath. Minimal Po intake. Having bowel movements and passing flatus.     Objective      Temp:  [97.2 °F (36.2 °C)-98.4 °F (36.9 °C)] 97.2 °F (36.2 °C)  HR:  [] 94  Resp:  [16-18] 16  BP: (107-139)/(54-71) 139/71  O2 Device: None (Room air)          I/O         09/13 0701  09/14 0700 09/14 0701  09/15 0700    P.O. 240 410    I.V. (mL/kg) 15.9 (0.2)     IV Piggyback 130 200    Total Intake(mL/kg) 385.9 (3.9) 610 (6.1)    Urine (mL/kg/hr) 1460 (0.6) 1075 (0.4)    Drains 420 640    Stool 0     Total Output 1880 1715    Net -1494.1 -1105          Unmeasured Stool Occurrence 1 x           Lines/Drains/Airways       Active Status       Name Placement date Placement time Site Days    PICC Line 09/12/24 Left Basilic 09/12/24  0927  Basilic  2    Closed/Suction Drain Right Abdomen 10 Fr. 09/10/24  1838  Abdomen  4    Closed/Suction Drain Left LLQ Bulb 19 Fr. 09/10/24  1839  LLQ  4    Urethral Catheter Latex 22 Fr. 09/10/24  1800  Latex  4                  Physical Exam     Physical Exam:  General: No acute distress, alert and oriented  Neuro: alert, oriented x3  HENT: PERRL, EOMI  CV: Well perfused, regular rate and rhythm  Lungs:  Normal work of breathing, no increased respiratory effort  Abdomen: Soft, appropriately tender, mildly distended. Incisions c/d/I. See above for RADHA drain output and character. Conroy with blood tinged urine output.   MSK/Extremities: No edema, clubbing or cyanosis  Skin: Warm, dry      Lab Results: I have reviewed the following results: CBC/BMP:   .     09/15/24  0552   WBC 12.32*   HGB 9.0*   HCT 28.0*         Imaging Review: No pertinent imaging studies reviewed.  Other Studies: No additional pertinent studies reviewed.    VTE Pharmacologic Prophylaxis: Heparin  VTE Mechanical Prophylaxis: sequential compression device

## 2024-09-15 NOTE — ASSESSMENT & PLAN NOTE
Status post complex cystorrhaphy. postoperative day 3.  Both cystogram as well as CT urogram are negative for bladder leak and ureteral leak.    Patient continues to be afebrile hemodynamically stable  Creatinine continues to be at baseline, leukocytosis resolved  Hemoglobin stable between 8    Past 24-hour outputs-Conroy= 1.1 L.  Left drain 110, right drain to 280 mL drain serosanguineous.  Will discuss with  attending timeframe for drain removal.  Anticipate will most likely be prior to discharge.    Drainage output decreasing.  L drain creatinine--WNL     Conroy catheter for a full 2 weeks with a cystogram prior to removal.

## 2024-09-16 LAB
ALBUMIN SERPL BCG-MCNC: 3.1 G/DL (ref 3.5–5)
ALP SERPL-CCNC: 66 U/L (ref 34–104)
ALT SERPL W P-5'-P-CCNC: 29 U/L (ref 7–52)
ANION GAP SERPL CALCULATED.3IONS-SCNC: 10 MMOL/L (ref 4–13)
ANION GAP SERPL CALCULATED.3IONS-SCNC: 6 MMOL/L (ref 4–13)
AST SERPL W P-5'-P-CCNC: 33 U/L (ref 13–39)
BASOPHILS # BLD AUTO: 0.03 THOUSANDS/ΜL (ref 0–0.1)
BASOPHILS NFR BLD AUTO: 0 % (ref 0–1)
BILIRUB SERPL-MCNC: 0.59 MG/DL (ref 0.2–1)
BUN SERPL-MCNC: 23 MG/DL (ref 5–25)
BUN SERPL-MCNC: 24 MG/DL (ref 5–25)
CALCIUM ALBUM COR SERPL-MCNC: 8.6 MG/DL (ref 8.3–10.1)
CALCIUM SERPL-MCNC: 7.9 MG/DL (ref 8.4–10.2)
CALCIUM SERPL-MCNC: 8.1 MG/DL (ref 8.4–10.2)
CHLORIDE SERPL-SCNC: 100 MMOL/L (ref 96–108)
CHLORIDE SERPL-SCNC: 99 MMOL/L (ref 96–108)
CO2 SERPL-SCNC: 24 MMOL/L (ref 21–32)
CO2 SERPL-SCNC: 25 MMOL/L (ref 21–32)
CREAT FLD-MCNC: 0.99 MG/DL
CREAT FLD-MCNC: 1.06 MG/DL
CREAT SERPL-MCNC: 0.95 MG/DL (ref 0.6–1.3)
CREAT SERPL-MCNC: 0.97 MG/DL (ref 0.6–1.3)
EOSINOPHIL # BLD AUTO: 0.21 THOUSAND/ΜL (ref 0–0.61)
EOSINOPHIL NFR BLD AUTO: 3 % (ref 0–6)
ERYTHROCYTE [DISTWIDTH] IN BLOOD BY AUTOMATED COUNT: 17 % (ref 11.6–15.1)
GFR SERPL CREATININE-BSD FRML MDRD: 58 ML/MIN/1.73SQ M
GFR SERPL CREATININE-BSD FRML MDRD: 59 ML/MIN/1.73SQ M
GLUCOSE SERPL-MCNC: 117 MG/DL (ref 65–140)
GLUCOSE SERPL-MCNC: 125 MG/DL (ref 65–140)
HCT VFR BLD AUTO: 25.8 % (ref 34.8–46.1)
HGB BLD-MCNC: 8.1 G/DL (ref 11.5–15.4)
IMM GRANULOCYTES # BLD AUTO: 0.11 THOUSAND/UL (ref 0–0.2)
IMM GRANULOCYTES NFR BLD AUTO: 2 % (ref 0–2)
LYMPHOCYTES # BLD AUTO: 1.02 THOUSANDS/ΜL (ref 0.6–4.47)
LYMPHOCYTES NFR BLD AUTO: 14 % (ref 14–44)
MCH RBC QN AUTO: 27.6 PG (ref 26.8–34.3)
MCHC RBC AUTO-ENTMCNC: 31.4 G/DL (ref 31.4–37.4)
MCV RBC AUTO: 88 FL (ref 82–98)
MONOCYTES # BLD AUTO: 0.66 THOUSAND/ΜL (ref 0.17–1.22)
MONOCYTES NFR BLD AUTO: 9 % (ref 4–12)
NEUTROPHILS # BLD AUTO: 5.24 THOUSANDS/ΜL (ref 1.85–7.62)
NEUTS SEG NFR BLD AUTO: 72 % (ref 43–75)
NRBC BLD AUTO-RTO: 0 /100 WBCS
PLATELET # BLD AUTO: 156 THOUSANDS/UL (ref 149–390)
PMV BLD AUTO: 9.6 FL (ref 8.9–12.7)
POTASSIUM SERPL-SCNC: 3.7 MMOL/L (ref 3.5–5.3)
POTASSIUM SERPL-SCNC: 3.8 MMOL/L (ref 3.5–5.3)
PROT SERPL-MCNC: 5.8 G/DL (ref 6.4–8.4)
RBC # BLD AUTO: 2.94 MILLION/UL (ref 3.81–5.12)
SODIUM SERPL-SCNC: 130 MMOL/L (ref 135–147)
SODIUM SERPL-SCNC: 134 MMOL/L (ref 135–147)
WBC # BLD AUTO: 7.27 THOUSAND/UL (ref 4.31–10.16)

## 2024-09-16 PROCEDURE — 85025 COMPLETE CBC W/AUTO DIFF WBC: CPT | Performed by: STUDENT IN AN ORGANIZED HEALTH CARE EDUCATION/TRAINING PROGRAM

## 2024-09-16 PROCEDURE — 99024 POSTOP FOLLOW-UP VISIT: CPT | Performed by: PHYSICIAN ASSISTANT

## 2024-09-16 PROCEDURE — 99222 1ST HOSP IP/OBS MODERATE 55: CPT | Performed by: NURSE PRACTITIONER

## 2024-09-16 PROCEDURE — 80048 BASIC METABOLIC PNL TOTAL CA: CPT | Performed by: STUDENT IN AN ORGANIZED HEALTH CARE EDUCATION/TRAINING PROGRAM

## 2024-09-16 PROCEDURE — 82570 ASSAY OF URINE CREATININE: CPT

## 2024-09-16 PROCEDURE — 88342 IMHCHEM/IMCYTCHM 1ST ANTB: CPT | Performed by: PATHOLOGY

## 2024-09-16 PROCEDURE — 99024 POSTOP FOLLOW-UP VISIT: CPT | Performed by: COLON & RECTAL SURGERY

## 2024-09-16 PROCEDURE — 82570 ASSAY OF URINE CREATININE: CPT | Performed by: PHYSICIAN ASSISTANT

## 2024-09-16 PROCEDURE — 88341 IMHCHEM/IMCYTCHM EA ADD ANTB: CPT | Performed by: PATHOLOGY

## 2024-09-16 PROCEDURE — 97530 THERAPEUTIC ACTIVITIES: CPT

## 2024-09-16 PROCEDURE — 80053 COMPREHEN METABOLIC PANEL: CPT | Performed by: COLON & RECTAL SURGERY

## 2024-09-16 PROCEDURE — 88309 TISSUE EXAM BY PATHOLOGIST: CPT | Performed by: PATHOLOGY

## 2024-09-16 RX ORDER — POTASSIUM CHLORIDE 1500 MG/1
20 TABLET, EXTENDED RELEASE ORAL ONCE
Status: COMPLETED | OUTPATIENT
Start: 2024-09-16 | End: 2024-09-16

## 2024-09-16 RX ORDER — CEPHALEXIN 500 MG/1
500 CAPSULE ORAL EVERY 8 HOURS SCHEDULED
Qty: 15 CAPSULE | Refills: 0 | Status: SHIPPED | OUTPATIENT
Start: 2024-09-16 | End: 2024-09-17

## 2024-09-16 RX ADMIN — METHOCARBAMOL 500 MG: 500 TABLET ORAL at 04:54

## 2024-09-16 RX ADMIN — METHOCARBAMOL 500 MG: 500 TABLET ORAL at 13:58

## 2024-09-16 RX ADMIN — ACETAMINOPHEN 975 MG: 325 TABLET ORAL at 20:54

## 2024-09-16 RX ADMIN — HEPARIN SODIUM 7500 UNITS: 5000 INJECTION INTRAVENOUS; SUBCUTANEOUS at 13:58

## 2024-09-16 RX ADMIN — FUROSEMIDE 40 MG: 40 TABLET ORAL at 16:19

## 2024-09-16 RX ADMIN — PANTOPRAZOLE SODIUM 40 MG: 40 TABLET, DELAYED RELEASE ORAL at 04:53

## 2024-09-16 RX ADMIN — ACETAMINOPHEN 975 MG: 325 TABLET ORAL at 13:58

## 2024-09-16 RX ADMIN — METHOCARBAMOL 500 MG: 500 TABLET ORAL at 20:54

## 2024-09-16 RX ADMIN — POTASSIUM CHLORIDE 20 MEQ: 1500 TABLET, EXTENDED RELEASE ORAL at 09:23

## 2024-09-16 RX ADMIN — OXYCODONE HYDROCHLORIDE 10 MG: 10 TABLET ORAL at 20:54

## 2024-09-16 RX ADMIN — HEPARIN SODIUM 7500 UNITS: 5000 INJECTION INTRAVENOUS; SUBCUTANEOUS at 04:53

## 2024-09-16 RX ADMIN — ACETAMINOPHEN 975 MG: 325 TABLET ORAL at 04:54

## 2024-09-16 RX ADMIN — HEPARIN SODIUM 7500 UNITS: 5000 INJECTION INTRAVENOUS; SUBCUTANEOUS at 20:54

## 2024-09-16 NOTE — PROGRESS NOTES
Progress Note - Colorectal   Name: Martha Sandoval 74 y.o. female I MRN: [id number]  Unit/Bed#: [facility] 913-01 I Date of Admission: 9/10/2024   Date of Service: 9/16/2024 I Hospital Day: 6    Assessment & Plan  Rectal cancer (HCC)  75 yo F now s/p 9/10 lap converted to open LAR, primary bladder repair.    AVSS on RA   cc  Left RADHA 80 cc serosang  Right RADHA 335 cc serosang  AM labs pending    Plan:  -Continue low res diet  -Keep leon  -Keep RADHA drains  -PRN pain and anti nausea meds  -DVT prophylaxis  -Encourage IS  -Encourage out of bed and ambulation  -Appreciate urology, nephrology recs  -Dispo planning  Bladder injury  - Appreciate nephro recs  - Appreciate urology recs, follow recs  -maintain leon    Please contact the SecureChat role for the Colorectal service with any questions/concerns.    Subjective   NAEON. AVSS. Patient reports pain is controlled. Tolerating diet. Leon in place and having bowel function. Denies fever, chills, nausea, vomiting.     Objective      Temp:  [97.4 °F (36.3 °C)-98.3 °F (36.8 °C)] 98.3 °F (36.8 °C)  HR:  [] 92  Resp:  [14-18] 14  BP: (115-136)/(58-73) 115/58  O2 Device: None (Room air)          I/O         09/14 0701  09/15 0700 09/15 0701  09/16 0700    P.O. 410 598    IV Piggyback 200     Total Intake(mL/kg) 610 (6.1) 598 (6)    Urine (mL/kg/hr) 1075 (0.4) 700 (0.3)    Drains 640 415    Stool 0     Total Output 1715 1115    Net -1105 -517          Unmeasured Stool Occurrence 1 x           Lines/Drains/Airways       Active Status       Name Placement date Placement time Site Days    PICC Line 09/12/24 Left Basilic 09/12/24 0927  Basilic  3    Closed/Suction Drain Right Abdomen 10 Fr. 09/10/24  1838  Abdomen  5    Closed/Suction Drain Left LLQ Bulb 19 Fr. 09/10/24  1839  LLQ  5    Urethral Catheter Latex 22 Fr. 09/10/24  1800  Latex  5                  Physical Exam  Constitutional:       Appearance: Normal appearance.   HENT:      Head: Normocephalic and atraumatic.       Mouth/Throat:      Mouth: Mucous membranes are moist.      Pharynx: Oropharynx is clear.   Eyes:      Extraocular Movements: Extraocular movements intact.      Pupils: Pupils are equal, round, and reactive to light.   Cardiovascular:      Rate and Rhythm: Normal rate.   Pulmonary:      Effort: Pulmonary effort is normal.   Abdominal:      General: There is no distension.      Palpations: Abdomen is soft.      Tenderness: There is abdominal tenderness (mild).      Comments: Right and left RADHA drains in place with serosang output   Musculoskeletal:         General: Normal range of motion.   Skin:     General: Skin is warm and dry.   Neurological:      General: No focal deficit present.      Mental Status: She is alert and oriented to person, place, and time.   Psychiatric:         Mood and Affect: Mood normal.         Behavior: Behavior normal.        Lab Results: I have reviewed the following results: CBC/BMP:   .     09/15/24  0552   WBC 12.32*   HGB 9.0*   HCT 28.0*      SODIUM 133*   K 3.9      CO2 25   BUN 21   CREATININE 0.95   GLUC 137      Imaging Review: Reviewed radiology reports from this admission including: CT abdomen/pelvis and Ultrasound(s).  Other Studies: No additional pertinent studies reviewed.    VTE Pharmacologic Prophylaxis: Heparin  VTE Mechanical Prophylaxis: sequential compression device    Yessy Gonzalez MD  General Surgery Resident

## 2024-09-16 NOTE — PHYSICAL THERAPY NOTE
PHYSICAL THERAPY NOTE          Patient Name: Martha Sandoval  Today's Date: 9/16/2024 09/16/24 1351   PT Last Visit   PT Visit Date 09/16/24   Note Type   Note Type Treatment   Pain Assessment   Pain Assessment Tool 0-10   Pain Score No Pain   Restrictions/Precautions   Weight Bearing Precautions Per Order No   Other Precautions Multiple lines;Fall Risk;Pain   General   Chart Reviewed Yes   Response to Previous Treatment Patient with no complaints from previous session.   Family/Caregiver Present Yes   Cognition   Overall Cognitive Status WFL   Arousal/Participation Alert;Cooperative   Attention Attends with cues to redirect   Orientation Level Oriented X4   Memory Within functional limits   Following Commands Follows one step commands without difficulty   Comments pleasant and cooperative, very anxious t/o session requiring re-direction to tasks   Bed Mobility   Supine to Sit Unable to assess   Sit to Supine Unable to assess   Additional Comments pt found/ left sitting OOB in recliner with all needs within reach   Transfers   Sit to Stand 5  Supervision   Additional items Armrests;Increased time required;Verbal cues   Stand to Sit 5  Supervision   Additional items Armrests;Increased time required;Verbal cues   Additional Comments transfers with RW   Ambulation/Elevation   Gait pattern Short stride;Foward flexed;Inconsistent yumiko;Decreased foot clearance   Gait Assistance 5  Supervision  (1x lateral LOB with CGA to correct)   Additional items Verbal cues, Assist x1    Assistive Device Rolling walker   Distance 2 x 50'   Stair Management Assistance 5  Supervision  (To ascend steps, CGA to descend steps)    Additional items Assist x 1;Verbal cues   Stair Management Technique One rail R;Step to pattern;Foreward   Number of Stairs 4   Balance   Static Sitting Fair +   Dynamic Sitting Fair   Static Standing Fair -   Dynamic Standing  Fair -   Ambulatory Fair -  (RW)   Endurance Deficit   Endurance Deficit Yes   Endurance Deficit Description generalized weakness, fatigue, decreased activity tolerance   Activity Tolerance   Activity Tolerance Patient tolerated treatment well   Nurse Made Aware RN cleared   Assessment   Prognosis Good   Problem List Decreased strength;Decreased endurance;Impaired balance;Decreased mobility;Obesity   Assessment Pt seen for PT treatment session this date. Therapy session focused on functional transfers, gait training, stair training and endurance training in order to improve overall mobility and independence. Pt requires S/CGA for all mobility performed this date. Pt making steady progress toward goals as demonstrated by requiring decreased overall physical assistance as well as tolerating and completing stair trial. Pt reporting that she would like additional stair trial prior to d/c with therapy staff. Pt continues to have increased anxiety regarding functional status, increased time + encouragement required t/o session. Pt was left sitting OOB in recliner at the end of PT session with all needs in reach. Pt would benefit from continued PT services while in hospital to address remaining limitations. PT to continue to follow pt and recommends level III. The patient's AM-PAC Basic Mobility Inpatient Short Form Raw Score is 18. A Raw score of greater than 16 suggests the patient may benefit from discharge to home. Please also refer to the recommendation of the Physical Therapist for safe discharge planning.   Barriers to Discharge   (2 NAEL via garage, 1/2 bath on 1st level, 12 steps to second floor full bathroom/ bedroom)   Goals   Patient Goals to continue to get stronger, more independent   STG Expiration Date 09/26/24   PT Treatment Day 2   Plan   Treatment/Interventions Functional transfer training;LE strengthening/ROM;Elevations;Therapeutic exercise;Endurance training;Patient/family training;Equipment  eval/education;Bed mobility;Gait training;Spoke to nursing;Spoke to case management;OT   Progress Progressing toward goals   PT Frequency 3-5x/wk   Discharge Recommendation   Rehab Resource Intensity Level, PT III (Minimum Resource Intensity)   Equipment Recommended Walker   Additional Comments would like additional stair trial prior to d/c per pt   AM-PAC Basic Mobility Inpatient   Turning in Flat Bed Without Bedrails 3   Lying on Back to Sitting on Edge of Flat Bed Without Bedrails 3   Moving Bed to Chair 3   Standing Up From Chair Using Arms 3   Walk in Room 3   Climb 3-5 Stairs With Railing 3   Basic Mobility Inpatient Raw Score 18   Basic Mobility Standardized Score 41.05   [facility] Highest Level Of Mobility   -HLM Goal 6: Walk 10 steps or more   -HLM Achieved 7: Walk 25 feet or more   Education   Education Provided Mobility training;Assistive device   Patient Demonstrates acceptance/verbal understanding   End of Consult   Patient Position at End of Consult Bedside chair;All needs within reach     Yara Molina, PT, DPT

## 2024-09-16 NOTE — PLAN OF CARE
Problem: CARDIOVASCULAR - ADULT  Goal: Maintains optimal cardiac output and hemodynamic stability  Description: INTERVENTIONS:  - Monitor I/O, vital signs and rhythm  - Monitor for S/S and trends of decreased cardiac output  - Administer and titrate ordered vasoactive medications to optimize hemodynamic stability  - Assess quality of pulses, skin color and temperature  - Assess for signs of decreased coronary artery perfusion  - Instruct patient to report change in severity of symptoms  Outcome: Progressing     Problem: GENITOURINARY - ADULT  Goal: Urinary catheter remains patent  Description: INTERVENTIONS:  - Assess patency of urinary catheter  - If patient has a chronic leon, consider changing catheter if non-functioning  - Follow guidelines for intermittent irrigation of non-functioning urinary catheter  Outcome: Progressing

## 2024-09-16 NOTE — ASSESSMENT & PLAN NOTE
73 yo F now s/p 9/10 lap converted to open LAR, primary bladder repair.    AVSS on RA   cc  Left RADHA 80 cc serosang  Right RADHA 335 cc serosang  AM labs pending    Plan:  -Continue low res diet  -Keep leon  -Keep RADHA drains  -PRN pain and anti nausea meds  -DVT prophylaxis  -Encourage IS  -Encourage out of bed and ambulation  -Appreciate urology, nephrology recs  -Dispo planning

## 2024-09-16 NOTE — CONSULTS
PHYSICAL MEDICINE AND REHABILITATION CONSULT NOTE  Martha Sandoval 74 y.o. female MRN: [id number]  Unit/Bed#: [facility] 913-01 Encounter: [id number]    Requested by (Physician/Service): BRENDA Boone MD  Reason for Consultation:  Assessment of rehabilitation needs    Assessment:  Rehabilitation Diagnosis:   Debility due to rectal cancer s/p lap converted to open LAR, primary bladder repair   Impaired mobility and self care  Impaired cognition     Recommendations:  Rehabilitation Plan:  Continue PT/OT (SLP) while on acute care.  The patient report improvement physically and is hoping to walk with therapy again today. Last therapy evaluation was on 9/13 and at that time she was a candidate for inpatient rehabilitation. Would recommend stair trail as she reports 12 steps at home. Recommend therapy re-evaluations and if improved function may be able to d/c to home with therapy and VNA if needed.     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  Do not hesitate to contact service with further questions.      ELENITA Doyle  PM&R    I have spent a total time of 30 minutes on 09/16/24 in caring for this patient including Patient and family education, Counseling / Coordination of care, Documenting in the medical record, Reviewing / ordering tests, medicine, procedures  , Obtaining or reviewing history  , and Communicating with other healthcare professionals .      History of Present Illness:  Martha Sandoval is a 74 y.o. female with a PMH of  HTN, CKD stage 3, CHF and rectal cancer who presented to the [facility] on 9/10/2024 for resection for rectal cancer. She underwent low anterior laparoscopic hand assisted resection converted to open. Intra-operatively  "there was noted to be a cystotomy likely due to dense adhesions. Urology was called to assist and performed a complex cystorrhaphy with bilateral ureteral catheter placement and leon catheter placement. She was hypotensive post-op and required transfusions and crystalloid. She was admitted to ICU. APS was consulted for pain management and thoracic epidural was placed on 9/10/2024. PCEA was removed on 9/13/2024. PM&R are consulted for rehabilitation recommendations.      The patient was seen in her room with her spouse at bedside. She reports that she has been walking to the bathroom with the rolling walker. She is hopeful that therapy will be back to walk her again. She would also like to trial stairs as she as 12 to manage at home.     Review of Systems: 10 point ROS negative except for what is noted in HPI    Function:  Prior level of function and living situation: the patient lives with her spouse in a two level home and can have FFSU with recliner. She was independent.       Current level of function:  Physical Therapy: Minimal assist for transfers and ambulation   Occupational Therapy: Supervision for grooming, moderate assist for LB bathing, dressing       Physical Exam:  /58   Pulse 97   Temp 98.3 °F (36.8 °C)   Resp 14   Ht 5' 1\" (1.549 m)   Wt 99.8 kg (220 lb)   SpO2 98%   BMI 41.57 kg/m²        Intake/Output Summary (Last 24 hours) at 9/16/2024 1056  Last data filed at 9/16/2024 0355  Gross per 24 hour   Intake 480 ml   Output 1050 ml   Net -570 ml       Body mass index is 41.57 kg/m².      Physical Exam  Constitutional:       General: She is not in acute distress.     Appearance: She is not toxic-appearing.   HENT:      Head: Normocephalic and atraumatic.   Eyes:      Extraocular Movements: Extraocular movements intact.   Pulmonary:      Effort: Pulmonary effort is normal. No respiratory distress.   Abdominal:      General: There is no distension.   Genitourinary:     Comments: Rafiq "   Musculoskeletal:         General: Normal range of motion.      Right lower leg: Edema present.      Left lower leg: Edema present.   Skin:     General: Skin is warm and dry.   Neurological:      Mental Status: She is alert and oriented to person, place, and time.   Psychiatric:         Mood and Affect: Mood normal.        Social History:    Social History     Socioeconomic History    Marital status: /Civil Union     Spouse name: None    Number of children: None    Years of education: None    Highest education level: None   Occupational History    None   Tobacco Use    Smoking status: Never    Smokeless tobacco: Never   Vaping Use    Vaping status: Never Used   Substance and Sexual Activity    Alcohol use: Not Currently    Drug use: Never    Sexual activity: Not Currently     Partners: Male   Other Topics Concern    None   Social History Narrative    Always uses seatbelts    Feels safe at home    No caffeine use     Social Determinants of Health     Financial Resource Strain: Patient Declined (8/20/2024)    [facility]    Overall Financial Resource Strain (CARDIA)     Difficulty of Paying Living Expenses: Patient declined   Food Insecurity: No Food Insecurity (9/11/2024)    Hunger Vital Sign     Worried About Running Out of Food in the Last Year: Never true     Ran Out of Food in the Last Year: Never true   Transportation Needs: No Transportation Needs (9/11/2024)    PRAPARE - Transportation     Lack of Transportation (Medical): No     Lack of Transportation (Non-Medical): No   Physical Activity: Not on file   Stress: Stress Concern Present (8/20/2024)    Received from [facility] [employer] [facility]    [employer] [place] [employer] - Occupational Stress Questionnaire     Feeling of Stress : To some extent   Social Connections: Feeling Socially Integrated (8/20/2024)    [facility]    OASIS : Social Isolation     How often do you feel  lonely or isolated from those around you?: Rarely   Intimate Partner Violence: Not At Risk (8/20/2024)    Received from [facility]    Humiliation, Afraid, Rape, and Kick questionnaire     Fear of Current or Ex-Partner: No     Emotionally Abused: No     Physically Abused: No     Sexually Abused: No   Housing Stability: Low Risk  (9/11/2024)    Housing Stability Vital Sign     Unable to Pay for Housing in the Last Year: No     Number of Times Moved in the Last Year: 0     Homeless in the Last Year: No        Family History:    Family History   Problem Relation Age of Onset    Hyperlipidemia Mother     Hypertension Mother     Cancer Father     Liver cancer Father     Stomach cancer Father     No Known Problems Son     No Known Problems Daughter          Medications:     Current Facility-Administered Medications:     acetaminophen (TYLENOL) tablet 975 mg, 975 mg, Oral, Q8H AISHWARYA, Yudith Shi MD, 975 mg at 09/16/24 0454    diphenhydrAMINE (BENADRYL) injection 25 mg, 25 mg, Intravenous, Q6H PRN, Cuco Roldan MD    furosemide (LASIX) tablet 40 mg, 40 mg, Oral, Daily, Jed Roldan MD, 40 mg at 09/15/24 1805    heparin (porcine) subcutaneous injection 7,500 Units, 7,500 Units, Subcutaneous, Q8H AISHWARYA, Jed Roldan MD, 7,500 Units at 09/16/24 0453    methocarbamol (ROBAXIN) tablet 500 mg, 500 mg, Oral, Q8H AISHWARYA, Yudith Shi MD, 500 mg at 09/16/24 0454    metoclopramide (REGLAN) injection 5 mg, 5 mg, Intravenous, Q6H PRN, Cuco Roldan MD    naloxone (NARCAN) injection 0.1 mg, 0.1 mg, Intravenous, Q3 min PRN, Cuco Roldan MD    ondansetron (ZOFRAN) injection 4 mg, 4 mg, Intravenous, Q6H PRN, Jed Roldan MD, 4 mg at 09/13/24 0138    oxybutynin (DITROPAN) tablet 5 mg, 5 mg, Oral, TID PRN, Marina Rodriguez PA-C    oxyCODONE (ROXICODONE) immediate release tablet 10 mg, 10 mg, Oral, Q4H PRN, Yudith Shi MD, 10 mg at 09/15/24 2100    oxyCODONE (ROXICODONE) IR tablet 5 mg, 5 mg,  Oral, Q4H PRN, Yudith Shi MD, 5 mg at 09/14/24 1327    pantoprazole (PROTONIX) EC tablet 40 mg, 40 mg, Oral, Early Morning, Jed Roldan MD, 40 mg at 09/16/24 2303    Past Medical History:     Past Medical History:   Diagnosis Date    Cancer (HCC)     Chest pain     last assessed 9/16/13    Chest pressure     last assessed 12/15/16    Chronic sinusitis     last assessed 5/4/17    Clotting disorder (HCC)     Compression fracture of lumbar vertebra (HCC)     last assessed 6/13/15    GERD (gastroesophageal reflux disease)     Hypertension     Liver disease     Lump of skin     last assessed 1/17/14        Past Surgical History:     Past Surgical History:   Procedure Laterality Date    COLONOSCOPY      CT CYSTOGRAM  9/11/2024    CYSTOSCOPY N/A 9/10/2024    Procedure: CYSTOSCOPY, COMPLEX CYSTORRHAPHY, B/L URETHRAL CATETERIZATION;  Surgeon: Ismael Mazariegos MD;  [facility];  Service: Urology    HYSTERECTOMY      ND LAPAROSCOPY COLECTOMY PARTIAL W/ANASTOMOSIS N/A 9/10/2024    Procedure: RESECTION COLON LOW ANTERIOR LAPAROSCOPIC HAND ASSISTED CONVERTED TO OPEN;  Surgeon: BRENDA Boone MD;  Location: [facility];  Service: Colorectal    TONSILLECTOMY           Allergies:     Allergies   Allergen Reactions    Azithromycin GI Intolerance    Colcrys [Colchicine] Diarrhea    Dicyclomine GI Intolerance and Hives    Erythromycin GI Intolerance    Naproxen Edema    Sulfa Antibiotics Hives    Sulfamethoxazole-Trimethoprim Rash           LABORATORY RESULTS:      Lab Results   Component Value Date    HGB 8.1 (L) 09/16/2024    HCT 25.8 (L) 09/16/2024    WBC 7.27 09/16/2024     Lab Results   Component Value Date    BUN 24 09/16/2024    BUN 39 (H) 08/30/2024    BUN 32 (H) 06/21/2024    K 3.8 09/16/2024    K 4.3 08/30/2024    K 4.0 06/21/2024    CL 99 09/16/2024     08/30/2024     06/21/2024    GLUCOSE 227 (H) 09/10/2024    CREATININE 0.97 09/16/2024    CREATININE 1.19 (H) 06/21/2024     Lab Results    Component Value Date    PROTIME 18.6 (H) 09/11/2024    INR 1.53 (H) 09/11/2024    INR 1.1 04/08/2024        DIAGNOSTIC STUDIES: Reviewed  CT cystogram    Result Date: 9/11/2024  Impression: Intraluminal Conroy balloon in the bladder with postop changes with no contrast extravasation although bladder is not completely distended with contrast. Surgical drains in the pelvis. No pelvic fluid collections. Workstation performed: [id number]     CT renal protocol    Result Date: 9/11/2024  Impression: Intraluminal Conroy balloon in the bladder with small amount of intraluminal air and surrounding postop changes with air pockets. Delayed images with contrast layering in the bladder surrounding the Conroy balloon with no extravasation. Contrast in the renal collecting system and ureter draining into the bladder with no extravasation. 2 surgical drains in the pelvis with no surrounding fluid collections. Workstation performed: [id number]     US kidney and bladder    Result Date: 9/11/2024  Impression: Normal kidneys and bladder. Cholelithiasis. Workstation performed: [id number]

## 2024-09-16 NOTE — PROGRESS NOTES
"Progress Note - Urology  Martha Sandoval 1950, 74 y.o. female MRN: [id number]    Unit/Bed#: [facility] 913-01 Encounter: [id number]      Assessment & Plan  Rectal cancer (HCC)  History of colorectal cancer with low anterior laparoscopic hand-assisted converted to hybrid open flexible sigmoidoscopy, laparoscopic mobilization of the splenic flexure, complicated by bladder injury due to extensive and dense adhesions in the pelvis with thin-walled bladder adhesed to the anterior rectum.    General postoperative care per primary surgical team CRS.    Bladder injury  Status post complex cystorrhaphy 9/10/24  Both cystogram as well as CT urogram are negative for bladder leak and ureteral leak.    Patient continues to be afebrile hemodynamically stable  Creatinine continues to be at baseline, leukocytosis resolved  Hgb stable at 8    L drain creatinine 0.99  R drain creatinine 1.06  serum creatinine 0.97    Both pelvic RADHA drains removed today    Leon catheter for a full 2 weeks post-bladder repair with a cystogram prior to removal. Discussed antibiotic dosing x 3 days beginning day prior to leon removal.    Urology will follow peripherally at this time to ensure post-discharge arrangements.    Subjective: some urethral irritation. urine clear. eager for results of RADHA drain fluids from this morning. Outputs have decreased from both drains compared to last week. Eating solids. Passing gas. Has PT today for ambulating.    Review of Systems   Constitutional: Negative.    Respiratory: Negative.     Cardiovascular: Negative.    Genitourinary:  Negative for decreased urine volume, difficulty urinating, dysuria, flank pain, frequency, hematuria and urgency.   Musculoskeletal: Negative.        Objective:  Vitals: Blood pressure 138/65, pulse 81, temperature 98.4 °F (36.9 °C), resp. rate 19, height 5' 1\" (1.549 m), weight 99.8 kg (220 lb), SpO2 99%, not currently breastfeeding.    Physical Exam  Vitals and nursing note reviewed. "   Constitutional:       General: She is not in acute distress.     Appearance: She is well-developed. She is not diaphoretic.   HENT:      Head: Normocephalic and atraumatic.   Cardiovascular:      Rate and Rhythm: Normal rate and regular rhythm.   Pulmonary:      Effort: Pulmonary effort is normal.      Breath sounds: Normal breath sounds.   Abdominal:      General: Bowel sounds are normal.      Palpations: Abdomen is soft.      Comments: laparotomy incision clean and dry  rlq macario drain sanguinous output  llq macario drain sanguinous output  leon catheter per urethra clear jourdan output   Musculoskeletal:      Right lower leg: No edema.      Left lower leg: No edema.   Skin:     General: Skin is warm.      Capillary Refill: Capillary refill takes less than 2 seconds.   Neurological:      Mental Status: She is alert and oriented to person, place, and time.   Psychiatric:         Speech: Speech normal.         Behavior: Behavior normal.         Labs:  Recent Labs     09/14/24  0433 09/15/24  0552 09/16/24  0449   WBC 6.98 12.32* 7.27     Recent Labs     09/14/24  0433 09/15/24  0552 09/16/24  0449   HGB 8.0* 9.0* 8.1*       Recent Labs     09/14/24  0433 09/15/24  0552 09/16/24  0449 09/16/24  0739   CREATININE 0.81 0.95 0.95 0.97       History:    Past Medical History:   Diagnosis Date    Cancer (HCC)     Chest pain     last assessed 9/16/13    Chest pressure     last assessed 12/15/16    Chronic sinusitis     last assessed 5/4/17    Clotting disorder (HCC)     Compression fracture of lumbar vertebra (HCC)     last assessed 6/13/15    GERD (gastroesophageal reflux disease)     Hypertension     Liver disease     Lump of skin     last assessed 1/17/14     Past Surgical History:   Procedure Laterality Date    COLONOSCOPY      CT CYSTOGRAM  9/11/2024    CYSTOSCOPY N/A 9/10/2024    Procedure: CYSTOSCOPY, COMPLEX CYSTORRHAPHY, B/L URETHRAL CATETERIZATION;  Surgeon: Ismael Mazariegos MD;  [facility];  Service: Urology     HYSTERECTOMY      ND LAPAROSCOPY COLECTOMY PARTIAL W/ANASTOMOSIS N/A 9/10/2024    Procedure: RESECTION COLON LOW ANTERIOR LAPAROSCOPIC HAND ASSISTED CONVERTED TO OPEN;  Surgeon: BRENDA Boone MD;  [facility];  Service: Colorectal    TONSILLECTOMY       Family History   Problem Relation Age of Onset    Hyperlipidemia Mother     Hypertension Mother     Cancer Father     Liver cancer Father     Stomach cancer Father     No Known Problems Son     No Known Problems Daughter      Social History     Socioeconomic History    Marital status: /Civil Union     Spouse name: None    Number of children: None    Years of education: None    Highest education level: None   Occupational History    None   Tobacco Use    Smoking status: Never    Smokeless tobacco: Never   Vaping Use    Vaping status: Never Used   Substance and Sexual Activity    Alcohol use: Not Currently    Drug use: Never    Sexual activity: Not Currently     Partners: Male   Other Topics Concern    None   Social History Narrative    Always uses seatbelts    Feels safe at home    No caffeine use     Social Determinants of Health     Financial Resource Strain: Patient Declined (8/20/2024)    Received from [facility]    Overall Financial Resource Strain (CARDIA)     Difficulty of Paying Living Expenses: Patient declined   Food Insecurity: No Food Insecurity (9/11/2024)    Hunger Vital Sign     Worried About Running Out of Food in the Last Year: Never true     Ran Out of Food in the Last Year: Never true   Transportation Needs: No Transportation Needs (9/11/2024)    PRAPARE - Transportation     Lack of Transportation (Medical): No     Lack of Transportation (Non-Medical): No   Physical Activity: Not on file   Stress: Stress Concern Present (8/20/2024)    Received from [facility]    [employer] [place] [employer] - Occupational Stress Questionnaire     Feeling of Stress : To some extent   Social  "Connections: Feeling Socially Integrated (8/20/2024)    Received from [facility]    OASIS : Social Isolation     How often do you feel lonely or isolated from those around you?: Rarely   Intimate Partner Violence: Not At Risk (8/20/2024)    Received from [facility]    Humiliation, Afraid, Rape, and Kick questionnaire     Fear of Current or Ex-Partner: No     Emotionally Abused: No     Physically Abused: No     Sexually Abused: No   Housing Stability: Low Risk  (9/11/2024)    Housing Stability Vital Sign     Unable to Pay for Housing in the Last Year: No     Number of Times Moved in the Last Year: 0     Homeless in the Last Year: No         Meg Turner PA-C  Date: 9/16/2024 Time: 9:39 PM     Portions of the above record have been created with voice recognition software via dictation. Occasional wrong word or \"sound alike\" substitution may have occurred due to the inherent limitations of voice recognition software. Read the chart carefully and recognize, using context, where substitution may have occurred.     "

## 2024-09-16 NOTE — PLAN OF CARE
Problem: PHYSICAL THERAPY ADULT  Goal: Performs mobility at highest level of function for planned discharge setting.  See evaluation for individualized goals.  Description: Treatment/Interventions: Functional transfer training, Elevations, LE strengthening/ROM, Therapeutic exercise, Endurance training, Patient/family training, Equipment eval/education, Bed mobility, Gait training  Equipment Recommended:  (TBD)       See flowsheet documentation for full assessment, interventions and recommendations.  Outcome: Progressing  Note: Prognosis: Good  Problem List: Decreased strength, Decreased endurance, Impaired balance, Decreased mobility, Obesity  Assessment: Pt seen for PT treatment session this date. Therapy session focused on functional transfers, gait training, stair training and endurance training in order to improve overall mobility and independence. Pt requires S/CGA for all mobility performed this date. Pt making steady progress toward goals as demonstrated by requiring decreased overall physical assistance as well as tolerating and completing stair trial. Pt reporting that she would like additional stair trial prior to d/c with therapy staff. Pt continues to have increased anxiety regarding functional status, increased time + encouragement required t/o session. Pt was left sitting OOB in recliner at the end of PT session with all needs in reach. Pt would benefit from continued PT services while in hospital to address remaining limitations. PT to continue to follow pt and recommends level III. The patient's AM-PAC Basic Mobility Inpatient Short Form Raw Score is 18. A Raw score of greater than 16 suggests the patient may benefit from discharge to home. Please also refer to the recommendation of the Physical Therapist for safe discharge planning.  Barriers to Discharge:  (2 NAEL via garage, 1/2 bath on 1st level, 12 steps to second floor full bathroom/ bedroom)     Rehab Resource Intensity Level, PT: III  (Minimum Resource Intensity)    See flowsheet documentation for full assessment.

## 2024-09-16 NOTE — CASE MANAGEMENT
Case Management Discharge Planning Note    Patient name Martha Sandoval  Location [facility] 913/[facility] 913-01 MRN [id number]  : 1950 Date 2024       Current Admission Date: 9/10/2024  Current Admission Diagnosis:Rectal cancer (HCC)   Patient Active Problem List    Diagnosis Date Noted Date Diagnosed    Bladder injury 2024     Gout of right foot 2024     Stage 3a chronic kidney disease (HCC) 2024     Rectal cancer (HCC) 07/15/2024     NSAIDs adverse reaction 2022     BMI 40.0-44.9, adult (HCC) 10/21/2021     Elevated liver enzymes 2017     Multiple gallstones 2017     Benign paroxysmal positional vertigo 2016     Abnormal CAT scan 2015     Atherosclerotic plaque 2015     Elevated blood sugar 2015     Hyperlipidemia 2014     Vitamin D deficiency 2014     Benign hypertension with CKD (chronic kidney disease) stage III (HCC) 2012     Hypothyroidism 2012     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       LOS (days): 6  Geometric Mean LOS (GMLOS) (days): 9.8  Days to GMLOS:4     OBJECTIVE:  Risk of Unplanned Readmission Score: 16.76         Current admission status: Inpatient   Preferred Pharmacy:   [facility] #[id number] - [place] - [address]  [address]  [place] [address]  Phone: [phone] Fax: [phone]    [facility] - [place] - [address]  [address] [facility] [address]  Phone: [phone] Fax: [phone]    Primary Care Provider: Janny Ramirez DO    [facility] Insurance: [facility]  Secondary Insurance:     DISCHARGE DETAILS:    Discharge planning discussed with:: Pt and pt's   Freedom of Choice: Yes  Comments - Freedom of Choice: Pt chose [facility]  CM contacted family/caregiver?: Yes  Were Treatment Team discharge recommendations reviewed with patient/caregiver?: Yes  Did patient/caregiver verbalize understanding of patient care needs?:  Yes  Were patient/caregiver advised of the risks associated with not following Treatment Team discharge recommendations?: Yes    Contacts  Patient Contacts:  Willi Sandoval  Relationship to Patient:: Family  Contact Method: In Person  Reason/Outcome: Continuity of Care, Emergency Contact, Discharge Planning              Other Referral/Resources/Interventions Provided:  Interventions: [facility]  Referral Comments: Pt to follow-up with LENA KANGA on discharge         Treatment Team Recommendation: Home with Home Health Care  Discharge Destination Plan:: Home with Home Health Care

## 2024-09-16 NOTE — ASSESSMENT & PLAN NOTE
Status post complex cystorrhaphy 9/10/24  Both cystogram as well as CT urogram are negative for bladder leak and ureteral leak.    Patient continues to be afebrile hemodynamically stable  Creatinine continues to be at baseline, leukocytosis resolved  Hgb stable at 8    L drain creatinine 0.99  R drain creatinine 1.06  serum creatinine 0.97    Both pelvic drains removed today     Conroy catheter for a full 2 weeks post-bladder repair with a cystogram prior to removal.

## 2024-09-17 ENCOUNTER — NURSE TRIAGE (OUTPATIENT)
Dept: OTHER | Facility: OTHER | Age: 74
End: 2024-09-17

## 2024-09-17 VITALS
HEART RATE: 92 BPM | WEIGHT: 220 LBS | SYSTOLIC BLOOD PRESSURE: 128 MMHG | BODY MASS INDEX: 41.54 KG/M2 | RESPIRATION RATE: 18 BRPM | TEMPERATURE: 97.9 F | HEIGHT: 61 IN | DIASTOLIC BLOOD PRESSURE: 70 MMHG | OXYGEN SATURATION: 99 %

## 2024-09-17 LAB
ANION GAP SERPL CALCULATED.3IONS-SCNC: 6 MMOL/L (ref 4–13)
ANISOCYTOSIS BLD QL SMEAR: PRESENT
BASOPHILS # BLD MANUAL: 0 THOUSAND/UL (ref 0–0.1)
BASOPHILS NFR MAR MANUAL: 0 % (ref 0–1)
BUN SERPL-MCNC: 20 MG/DL (ref 5–25)
CALCIUM SERPL-MCNC: 8.2 MG/DL (ref 8.4–10.2)
CHLORIDE SERPL-SCNC: 100 MMOL/L (ref 96–108)
CO2 SERPL-SCNC: 25 MMOL/L (ref 21–32)
CREAT SERPL-MCNC: 0.74 MG/DL (ref 0.6–1.3)
DACRYOCYTES BLD QL SMEAR: PRESENT
EOSINOPHIL # BLD MANUAL: 0.22 THOUSAND/UL (ref 0–0.4)
EOSINOPHIL NFR BLD MANUAL: 3 % (ref 0–6)
ERYTHROCYTE [DISTWIDTH] IN BLOOD BY AUTOMATED COUNT: 16.9 % (ref 11.6–15.1)
GFR SERPL CREATININE-BSD FRML MDRD: 80 ML/MIN/1.73SQ M
GLUCOSE SERPL-MCNC: 121 MG/DL (ref 65–140)
HCT VFR BLD AUTO: 25.1 % (ref 34.8–46.1)
HGB BLD-MCNC: 8.1 G/DL (ref 11.5–15.4)
LYMPHOCYTES # BLD AUTO: 0.8 THOUSAND/UL (ref 0.6–4.47)
LYMPHOCYTES # BLD AUTO: 9 % (ref 14–44)
MCH RBC QN AUTO: 27.6 PG (ref 26.8–34.3)
MCHC RBC AUTO-ENTMCNC: 32.3 G/DL (ref 31.4–37.4)
MCV RBC AUTO: 85 FL (ref 82–98)
MONOCYTES # BLD AUTO: 0.14 THOUSAND/UL (ref 0–1.22)
MONOCYTES NFR BLD: 2 % (ref 4–12)
NEUTROPHILS # BLD MANUAL: 6.07 THOUSAND/UL (ref 1.85–7.62)
NEUTS BAND NFR BLD MANUAL: 1 % (ref 0–8)
NEUTS SEG NFR BLD AUTO: 83 % (ref 43–75)
OVALOCYTES BLD QL SMEAR: PRESENT
PLATELET # BLD AUTO: 172 THOUSANDS/UL (ref 149–390)
PLATELET BLD QL SMEAR: ADEQUATE
PMV BLD AUTO: 9.1 FL (ref 8.9–12.7)
POLYCHROMASIA BLD QL SMEAR: PRESENT
POTASSIUM SERPL-SCNC: 3.8 MMOL/L (ref 3.5–5.3)
RBC # BLD AUTO: 2.94 MILLION/UL (ref 3.81–5.12)
RBC MORPH BLD: PRESENT
SODIUM SERPL-SCNC: 131 MMOL/L (ref 135–147)
VARIANT LYMPHS # BLD AUTO: 2 %
WBC # BLD AUTO: 7.23 THOUSAND/UL (ref 4.31–10.16)

## 2024-09-17 PROCEDURE — 85007 BL SMEAR W/DIFF WBC COUNT: CPT | Performed by: COLON & RECTAL SURGERY

## 2024-09-17 PROCEDURE — 99024 POSTOP FOLLOW-UP VISIT: CPT | Performed by: COLON & RECTAL SURGERY

## 2024-09-17 PROCEDURE — 80048 BASIC METABOLIC PNL TOTAL CA: CPT | Performed by: COLON & RECTAL SURGERY

## 2024-09-17 PROCEDURE — 97530 THERAPEUTIC ACTIVITIES: CPT

## 2024-09-17 PROCEDURE — NC001 PR NO CHARGE: Performed by: COLON & RECTAL SURGERY

## 2024-09-17 PROCEDURE — 85027 COMPLETE CBC AUTOMATED: CPT | Performed by: COLON & RECTAL SURGERY

## 2024-09-17 PROCEDURE — 97116 GAIT TRAINING THERAPY: CPT

## 2024-09-17 RX ORDER — ENOXAPARIN SODIUM 100 MG/ML
40 INJECTION SUBCUTANEOUS 2 TIMES DAILY
Qty: 16.8 ML | Refills: 0 | Status: SHIPPED | OUTPATIENT
Start: 2024-09-17 | End: 2024-10-08

## 2024-09-17 RX ORDER — ENOXAPARIN SODIUM 100 MG/ML
40 INJECTION SUBCUTANEOUS DAILY
Qty: 8.4 ML | Refills: 0 | Status: SHIPPED | OUTPATIENT
Start: 2024-09-17 | End: 2024-10-08

## 2024-09-17 RX ORDER — METHOCARBAMOL 500 MG/1
500 TABLET, FILM COATED ORAL EVERY 8 HOURS SCHEDULED
Qty: 21 TABLET | Refills: 0 | Status: SHIPPED | OUTPATIENT
Start: 2024-09-17 | End: 2024-09-24

## 2024-09-17 RX ORDER — ENOXAPARIN SODIUM 100 MG/ML
40 INJECTION SUBCUTANEOUS DAILY
Qty: 8.4 ML | Refills: 0 | Status: SHIPPED | OUTPATIENT
Start: 2024-09-17 | End: 2024-09-17

## 2024-09-17 RX ORDER — ACETAMINOPHEN 325 MG/1
975 TABLET ORAL EVERY 8 HOURS SCHEDULED
COMMUNITY
Start: 2024-09-17

## 2024-09-17 RX ORDER — ENOXAPARIN SODIUM 100 MG/ML
40 INJECTION SUBCUTANEOUS
Qty: 8.4 ML | Refills: 0 | Status: SHIPPED | OUTPATIENT
Start: 2024-09-17 | End: 2024-09-17

## 2024-09-17 RX ORDER — OXYCODONE HYDROCHLORIDE 5 MG/1
5 TABLET ORAL EVERY 4 HOURS PRN
Qty: 20 TABLET | Refills: 0 | Status: SHIPPED | OUTPATIENT
Start: 2024-09-17 | End: 2024-09-22

## 2024-09-17 RX ORDER — ENOXAPARIN SODIUM 100 MG/ML
40 INJECTION SUBCUTANEOUS EVERY 12 HOURS SCHEDULED
Status: DISCONTINUED | OUTPATIENT
Start: 2024-09-17 | End: 2024-09-17 | Stop reason: HOSPADM

## 2024-09-17 RX ORDER — ENOXAPARIN SODIUM 100 MG/ML
40 INJECTION SUBCUTANEOUS
Status: DISCONTINUED | OUTPATIENT
Start: 2024-09-17 | End: 2024-09-17 | Stop reason: DRUGHIGH

## 2024-09-17 RX ADMIN — METHOCARBAMOL 500 MG: 500 TABLET ORAL at 05:27

## 2024-09-17 RX ADMIN — PANTOPRAZOLE SODIUM 40 MG: 40 TABLET, DELAYED RELEASE ORAL at 05:27

## 2024-09-17 RX ADMIN — OXYCODONE HYDROCHLORIDE 10 MG: 10 TABLET ORAL at 14:13

## 2024-09-17 RX ADMIN — HEPARIN SODIUM 7500 UNITS: 5000 INJECTION INTRAVENOUS; SUBCUTANEOUS at 05:27

## 2024-09-17 RX ADMIN — ENOXAPARIN SODIUM 40 MG: 40 INJECTION SUBCUTANEOUS at 11:40

## 2024-09-17 RX ADMIN — ACETAMINOPHEN 975 MG: 325 TABLET ORAL at 05:27

## 2024-09-17 RX ADMIN — METHOCARBAMOL 500 MG: 500 TABLET ORAL at 14:08

## 2024-09-17 RX ADMIN — ACETAMINOPHEN 975 MG: 325 TABLET ORAL at 14:07

## 2024-09-17 NOTE — TELEPHONE ENCOUNTER
"Answer Assessment - Initial Assessment Questions  1. NAME of MEDICINE: \"What medicine(s) are you calling about?\"      Lovenox 0.4mg  2. QUESTION: \"What is your question?\" (e.g., double dose of medicine, side effect)      to be sent to a different pharmacy  3. PRESCRIBER: \"Who prescribed the medicine?\" Reason: if prescribed by specialist, call should be referred to that group.      Jennifer Martínez PA-C  4. SYMPTOMS: \"Do you have any symptoms?\" If Yes, ask: \"What symptoms are you having?\"  \"How bad are the symptoms (e.g., mild, moderate, severe)      Denies    Protocols used: Medication Question Call-Adult-    "

## 2024-09-17 NOTE — OCCUPATIONAL THERAPY NOTE
Occupational Therapy Cancellation Note       09/17/24 1320   OT Last Visit   OT Visit Date 09/17/24   Note Type   Note Type Cancelled Session   Cancel Reasons Refusal  (Made contact w/ pt. Pt declined to participate in OT tx session as she is supposed to be d/c'd today. Will continue to follow pt and tx as appropriate/able.)     MESERET Jacome, OTR/L

## 2024-09-17 NOTE — TELEPHONE ENCOUNTER
"Regarding: medication issue / post surg  ----- Message from Kinza LUTZ sent at 9/17/2024  6:14 PM EDT -----  \"My wife had surgery today and the pharmacy it was sent to does not have it, the [facility] pharmacy in [place] does have it\"    "

## 2024-09-17 NOTE — PHYSICAL THERAPY NOTE
PHYSICAL THERAPY NOTE          Patient Name: Martha Sandoval  Today's Date: 9/17/2024 09/17/24 1027   PT Last Visit   PT Visit Date 09/17/24   Note Type   Note Type Treatment   Pain Assessment   Pain Assessment Tool 0-10   Pain Score 3   Pain Location/Orientation Location: Abdomen   Hospital Pain Intervention(s) Repositioned;Ambulation/increased activity;Emotional support   Restrictions/Precautions   Weight Bearing Precautions Per Order No   Other Precautions Multiple lines;Fall Risk;Pain   General   Chart Reviewed Yes   Response to Previous Treatment Patient with no complaints from previous session.   Family/Caregiver Present Yes   Cognition   Overall Cognitive Status WFL   Arousal/Participation Alert;Cooperative   Attention Attends with cues to redirect   Orientation Level Oriented X4   Memory Within functional limits   Following Commands Follows one step commands without difficulty   Comments pleasant and cooperative, requires occasional cues to re-direct attention to tasks. can be anxious   Bed Mobility   Supine to Sit Unable to assess   Sit to Supine Unable to assess   Additional Comments pt found/ left sitting OOB in recliner with all needs within reach   Transfers   Sit to Stand 5  Supervision   Additional items Armrests;Increased time required   Stand to Sit 5  Supervision   Additional items Armrests;Increased time required   Additional Comments transfers with RW vs rollator   Ambulation/Elevation   Gait pattern Decreased foot clearance;Foward flexed;Short stride   Gait Assistance 5  Supervision   Additional items Verbal cues   Assistive Device Rolling walker;4-wheeled walker   Distance 150 RW; 200' rollator   Stair Management Assistance 5  Supervision   Additional items Verbal cues   Stair Management Technique One rail R;Step to pattern;Foreward   Number of Stairs 7   Balance   Static Sitting Fair +   Dynamic Sitting Fair    Static Standing Fair -   Dynamic Standing Fair -   Ambulatory Fair -   Endurance Deficit   Endurance Deficit Yes   Endurance Deficit Description generalized weakness, fatigue, deconditioning   Activity Tolerance   Activity Tolerance Patient tolerated treatment well   Nurse Made Aware RN cleared   Assessment   Prognosis Good   Problem List Decreased strength;Decreased endurance;Impaired balance;Decreased mobility;Obesity   Assessment Pt seen for PT treatment session this date. Therapy session focused on functional transfers, gait training, stair training and endurance training in order to improve overall mobility and independence. Pt requires S level for all mobility performed this date. Pt requesting additional stair trial prior to d/c home- completes @ S level with step to pattern. Pt also ambulates increased distances with use of RW vs rollator. Also spoke about picking up throw rugs/ runners at home since will be using RW upon d/c. Pt continues to have clear drainage from abdomen- increased time for hygiene tasks needed (provided towel, new gown). Pt making progress toward goals. Pt was left sitting OOB in recliner at the end of PT session with all needs in reach. Pt would benefit from continued PT services while in hospital to address remaining limitations. PT to continue to follow pt and recommends level III. The patient's AM-PAC Basic Mobility Inpatient Short Form Raw Score is 18. A Raw score of greater than 16 suggests the patient may benefit from discharge to home. Please also refer to the recommendation of the Physical Therapist for safe discharge planning.   Barriers to Discharge None   Goals   Patient Goals to regain her independence   STG Expiration Date 09/26/24   PT Treatment Day 3   Plan   Treatment/Interventions Functional transfer training;LE strengthening/ROM;Elevations;Therapeutic exercise;Endurance training;Patient/family training;Equipment eval/education;Bed mobility;Gait training;Spoke to  nursing;Spoke to case management;OT   Progress Progressing toward goals   PT Frequency 3-5x/wk   Discharge Recommendation   Rehab Resource Intensity Level, PT III (Minimum Resource Intensity)   Equipment Recommended Walker   AM-PAC Basic Mobility Inpatient   Turning in Flat Bed Without Bedrails 3   Lying on Back to Sitting on Edge of Flat Bed Without Bedrails 3   Moving Bed to Chair 3   Standing Up From Chair Using Arms 3   Walk in Room 3   Climb 3-5 Stairs With Railing 3   Basic Mobility Inpatient Raw Score 18   Basic Mobility Standardized Score 41.05   [facility] Highest Level Of Mobility   -HLM Goal 6: Walk 10 steps or more   -HLM Achieved 8: Walk 250 feet ot more   Education   Education Provided Mobility training;Assistive device   Patient Demonstrates acceptance/verbal understanding   End of Consult   Patient Position at End of Consult Bedside chair;All needs within reach     Yara Molina, PT, DPT

## 2024-09-17 NOTE — ASSESSMENT & PLAN NOTE
73 yo F now s/p 9/10 lap converted to open LAR, primary bladder repair.    Plan:  -Continue low res diet  -Keep leon  -DVT prophylaxis  -Dispo planning

## 2024-09-17 NOTE — DISCHARGE SUMMARY
Discharge Summary - Colorectal   Name: Martha Sandoval 74 y.o. female I MRN: [id number]  [facility] I Date of Admission: 9/10/2024   Date of Service: 9/17/2024 I Hospital Day: 7     Assessment & Plan  Rectal cancer (HCC)  75 yo F now s/p 9/10 lap converted to open LAR, primary bladder repair.    Plan:  -Continue low res diet  -Keep leon  -DVT prophylaxis  -Dispo planning  Bladder injury  -Appreciate urology, nephro recs  -maintain leon    Admission Date: 9/10/2024 0937  Discharge Date: 09/17/24  Admitting Diagnosis: Rectal cancer (HCC) [C20]  Discharge Diagnosis:   Medical Problems       Resolved Problems  Date Reviewed: 9/11/2024   None         HPI: Martha is a 74-year-old woman who was found to have mid rectal cancer.  The cancer was 11 cm from the anal verge.  Patient is now being admitted for surgical intervention.    Procedures Performed: 9/10/2024 laparoscopic converted to open exploratory laparotomy, low anterior resection, primary bladder repair -Dr. Boone/Dr. Mazariegos      Summary of Hospital Course: Martha has done relatively well postoperatively.  Patient was kept on antibiotics from 9/10/2024 to 9/11/2024.  Patient was also transfused 2 units of packed red blood cells intraoperatively.  And another unit of packed red blood cells on 9/11/2024.  Her hemoglobin has since stayed stable at 8.1.  Patient's Leon will continue on discharge due to her primary bladder repair.  Patient did have an ultrasound of her kidneys and urinary bladder which revealed she had a 2.2 cm gallbladder neck stone.  Patient was kept on subcu heparin 7500 mg daily for DVT prophylaxis.  She was able to tolerate clear liquids and be slowly advanced to a low residue diet along with ensures.  Patient's abdominal incision has stayed clean and dry.  Her epidural catheter was removed on 9/13/2024 and placed for home pain medications.  Patient has not been ambulating the halls.  She has been cleared by PT OT for home with VNA.   "Scripts were sent to [facility] pharmacy in [place] for the Robaxin 500 mg every 6 hours x 7 days and no refills, oxycodone 5 mg every 4-6 hours as needed for pain, 20 pills were prescribed with no refills.  Patient is to continue taking Tylenol 1000 mg every 8 hours.  Prescription was also sent to the pharmacy for Lovenox 40 mg subcu every 12 hours due to her elevated BMI.  21 days of the Lovenox was prescribed with no refills.  Patient has an appointment with [facility]. Araiza at [facility] on 10/23/2024 at 320 in the afternoon.  All discharge instructions were given to the patient.    Pathology  Final Diagnosis   A. Rectum and sigmoid colon (low anterior resection):  - Moderately differentiated adenocarcinoma (8mm) arising in a tubular adenoma  - Thirty-one (31) lymph nodes negative for carcinoma (0/31)  - Margins negative for carcinoma   - Separate hyperplastic polyp   - See staging synoptic (pT1N0)       Complications: None    Discharge Day Visit / Exam:   /70   Pulse 92   Temp 97.9 °F (36.6 °C)   Resp 18   Ht 5' 1\" (1.549 m)   Wt 99.8 kg (220 lb)   SpO2 99%   BMI 41.57 kg/m²       Condition at Discharge: good     Discharge instructions/Information to patient and family:   See After Visit Summary (AVS) for information provided to patient and family.      Provisions for Follow-Up Care:  See after visit summary for information related to follow-up care and any pertinent home health orders.      PCP: Janny Ramirez DO    Disposition: Home with VNA    Planned Readmission: No    Discharge Medications:  See after visit summary for reconciled discharge medications provided to patient and family.      Discharge Statement:  I have spent a total time of 45 minutes in caring for this patient on the day of the visit/encounter. .  "

## 2024-09-17 NOTE — PLAN OF CARE
Problem: PHYSICAL THERAPY ADULT  Goal: Performs mobility at highest level of function for planned discharge setting.  See evaluation for individualized goals.  Description: Treatment/Interventions: Functional transfer training, Elevations, LE strengthening/ROM, Therapeutic exercise, Endurance training, Patient/family training, Equipment eval/education, Bed mobility, Gait training  Equipment Recommended:  (TBD)       See flowsheet documentation for full assessment, interventions and recommendations.  Outcome: Progressing  Note: Prognosis: Good  Problem List: Decreased strength, Decreased endurance, Impaired balance, Decreased mobility, Obesity  Assessment: Pt seen for PT treatment session this date. Therapy session focused on functional transfers, gait training, stair training and endurance training in order to improve overall mobility and independence. Pt requires S level for all mobility performed this date. Pt requesting additional stair trial prior to d/c home- completes @ S level with step to pattern. Pt also ambulates increased distances with use of RW vs rollator. Also spoke about picking up throw rugs/ runners at home since will be using RW upon d/c. Pt continues to have clear drainage from abdomen- increased time for hygiene tasks needed (provided towel, new gown). Pt making progress toward goals. Pt was left sitting OOB in recliner at the end of PT session with all needs in reach. Pt would benefit from continued PT services while in hospital to address remaining limitations. PT to continue to follow pt and recommends level III. The patient's AM-PAC Basic Mobility Inpatient Short Form Raw Score is 18. A Raw score of greater than 16 suggests the patient may benefit from discharge to home. Please also refer to the recommendation of the Physical Therapist for safe discharge planning.  Barriers to Discharge: None     Rehab Resource Intensity Level, PT: III (Minimum Resource Intensity)    See flowsheet  documentation for full assessment.

## 2024-09-17 NOTE — ASSESSMENT & PLAN NOTE
75 yo F now s/p 9/10 lap converted to open LAR, primary bladder repair.    Plan:  -Continue low res diet  -Keep leon  -DVT prophylaxis  -Dispo planning

## 2024-09-17 NOTE — PROGRESS NOTES
Progress Note - Colorectal   Name: Martha Sandoval 74 y.o. female I MRN: [id number]  Unit/Bed#: [facility] 913-01 I Date of Admission: 9/10/2024   Date of Service: 9/17/2024 I Hospital Day: 7     Assessment & Plan  Rectal cancer (HCC)  73 yo F now s/p 9/10 lap converted to open LAR, primary bladder repair.    Plan:  -Continue low res diet  -Keep leon  -DVT prophylaxis  -Dispo planning  Bladder injury  -Appreciate urology, nephro recs  -maintain leon    Subjective/Objective   NAOE. Pain controlled. Tolerating CLD still doesn't have much of an appetite, no n/v. Having BMs, flatus. OOB to chair.    Physical Exam:  General: NAD  CV: nl rate  Lungs: nl wob. No resp distress.  ABD: Soft, ND, NT, CDI.  Extrem: No CCE  Leon with 1300cc blood-tinged output.    Patient Vitals for the past 24 hrs:   BP Temp Pulse Resp SpO2   09/16/24 2354 146/75 97.8 °F (36.6 °C) 91 18 99 %   09/16/24 2016 -- -- 81 -- 99 %   09/16/24 1520 138/65 98.4 °F (36.9 °C) 84 19 98 %   09/16/24 1343 132/62 -- (!) 115 -- 99 %   09/16/24 0932 116/58 -- 97 -- 98 %       I/O         09/15 0701  09/16 0700 09/16 0701  09/17 0700    P.O. 598 420    Total Intake(mL/kg) 598 (6) 420 (4.2)    Urine (mL/kg/hr) 700 (0.3) 1300 (0.5)    Drains 415 85    Total Output 1115 1385    Net -517 -965                  Recent Labs     09/15/24  0552 09/16/24  0449 09/16/24  0739   WBC 12.32* 7.27  --    HGB 9.0* 8.1*  --     156  --    SODIUM 133* 134* 130*   K 3.9 3.7 3.8    100 99   CO2 25 24 25   BUN 21 23 24   CREATININE 0.95 0.95 0.97   GLUC 137 125 117   CALCIUM 8.3* 8.1* 7.9*   AST  --   --  33   ALT  --   --  29   ALKPHOS  --   --  66   TBILI  --   --  0.59   EGFR 59 59 58     Lab Results   Component Value Date    URINECX 10,000-19,000 cfu/ml Lactobacillus species (A) 05/07/2019    LEUKOCYTESUR Negative 08/30/2024    LEUKOCYTESUR neg 06/01/2019    LEUKOCYTESUR NEGATIVE 04/20/2017

## 2024-09-17 NOTE — TELEPHONE ENCOUNTER
i took Martha's RADHA drains out today. She has leon for about 1 more week, with cystogram prior. CT tech can remove leon if cystogram negative. If unsure, can schedule office visit with nurse for removal. She prefers [facility] if needed.    keflex 500mg tid starting one day prior to leon removal

## 2024-09-17 NOTE — CASE MANAGEMENT
Case Management Discharge Planning Note    Patient name Martha Sandoval  Location [facility] 913/[facility] 913-01 MRN [id number]  : 1950 Date 2024       Current Admission Date: 9/10/2024  Current Admission Diagnosis:Rectal cancer (HCC)   Patient Active Problem List    Diagnosis Date Noted Date Diagnosed    Bladder injury 2024     Gout of right foot 2024     Stage 3a chronic kidney disease (HCC) 2024     Rectal cancer (HCC) 07/15/2024     NSAIDs adverse reaction 2022     BMI 40.0-44.9, adult (HCC) 10/21/2021     Elevated liver enzymes 2017     Multiple gallstones 2017     Benign paroxysmal positional vertigo 2016     Abnormal CAT scan 2015     Atherosclerotic plaque 2015     Elevated blood sugar 2015     Hyperlipidemia 2014     Vitamin D deficiency 2014     Benign hypertension with CKD (chronic kidney disease) stage III (HCC) 2012     Hypothyroidism 2012     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       LOS (days): 7  Geometric Mean LOS (GMLOS) (days): 9.8  Days to GMLOS:3     OBJECTIVE:  Risk of Unplanned Readmission Score: 15.54         Current admission status: Inpatient   Preferred Pharmacy:   [facility]/pharmacy #[id number] - [place] - [address]  [address]  [place] [address]  Phone: [phone] Fax: [phone]    [facility] - [place] - [address]  [address] [facility] [address]  Phone: [phone] Fax: [phone]    Primary Care Provider: Janny Ramirez DO    Primary Insurance: [facility] RE[facility]  Secondary Insurance:     DISCHARGE DETAILS:    Discharge planning discussed with:: Pt and pt's   Freedom of Choice: Yes     CM contacted family/caregiver?: Yes  Were Treatment Team discharge recommendations reviewed with patient/caregiver?: Yes  Did patient/caregiver verbalize understanding of patient care needs?: Yes  Were patient/caregiver advised of the  risks associated with not following Treatment Team discharge recommendations?: Yes    Contacts  Patient Contacts:  Willi Sandoval  Relationship to Patient:: Family  Contact Method: In Person  Phone Number: [phone] home, cell [phone]  Reason/Outcome: Continuity of Care, Emergency Contact, Discharge Planning         DME Referral Provided  Referral made for DME?: No    Other Referral/Resources/Interventions Provided:  Interventions: C  Referral Comments: Pt to follow-up with LENA KANGA on discharge         Treatment Team Recommendation: Home with Home Health Care  Discharge Destination Plan:: Home with Home Health Care                                IMM Given (Date):: 09/17/24  IMM Given to:: Patient                            IMM reviewed with patient, patient agrees with discharge determination.

## 2024-09-18 ENCOUNTER — TRANSITIONAL CARE MANAGEMENT (OUTPATIENT)
Dept: FAMILY MEDICINE CLINIC | Facility: CLINIC | Age: 74
End: 2024-09-18

## 2024-09-18 ENCOUNTER — NURSE TRIAGE (OUTPATIENT)
Age: 74
End: 2024-09-18

## 2024-09-18 ENCOUNTER — HOME CARE VISIT (OUTPATIENT)
Dept: HOME HEALTH SERVICES | Facility: HOME HEALTHCARE | Age: 74
End: 2024-09-18

## 2024-09-18 ENCOUNTER — TELEPHONE (OUTPATIENT)
Dept: FAMILY MEDICINE CLINIC | Facility: CLINIC | Age: 74
End: 2024-09-18

## 2024-09-18 NOTE — UTILIZATION REVIEW
NOTIFICATION OF ADMISSION DISCHARGE   This is a Notification of Discharge from [facility]. Please be advised that this patient has been discharge from our facility. Below you will find the admission and discharge date and time including the patient’s disposition.   UTILIZATION REVIEW CONTACT:  Lesly Mackay  Utilization   Network Utilization Review Department  Phone: [phone] x carefully listen to the prompts. All voicemails are confidential.  Email: [email or web address]     ADMISSION INFORMATION  PRESENTATION DATE: 9/10/2024  9:37 AM  OBERVATION ADMISSION DATE: N/A  INPATIENT ADMISSION DATE: 9/10/24  7:53 PM   DISCHARGE DATE: 9/17/2024  4:03 PM   DISPOSITION:Home with Home Health Care    Network Utilization Review Department  ATTENTION: Please call with any questions or concerns to [phone] and carefully listen to the prompts so that you are directed to the right person. All voicemails are confidential.   For Discharge needs, contact Care Management DC Support Team at [phone] opt. 2  Send all requests for admission clinical reviews, approved or denied determinations and any other requests to dedicated fax number below belonging to the campus where the patient is receiving treatment. List of dedicated fax numbers for the Facilities:  FACILITY NAME UR FAX NUMBER   ADMISSION DENIALS (Administrative/Medical Necessity) [phone]   DISCHARGE SUPPORT TEAM ([facility]) [phone]   PARENT CHILD HEALTH (Maternity/NICU/Pediatrics) [phone]   [facility] [phone]   [facility] [phone]   Critical access hospital [phone]   [facility] [phone]   [facility] [phone]   [facility] [phone]   [facility] [phone]   [facility]  [phone]   [facility] [phone]   [facility] [phone]   [facility] [phone]   [facility] [phone]

## 2024-09-18 NOTE — TELEPHONE ENCOUNTER
Care advice and recommendations given.   Patient verbalized understanding.    Please follow up with patient to get full medication, only 8.4ml of the Lovenox was able to be filled.

## 2024-09-18 NOTE — TELEPHONE ENCOUNTER
BONNIE,    I spoke with Martha regarding her recent surgery.  She is doing well, but declined a TRANSITION OF CARE MANAGEMENT at this time due to her drains and urinary catheter, and difficulty getting around.  She is very limited in what she can do at this time and has visiting nurses coming to the house.  She was advised to let us know if she needs anything or has any questions.

## 2024-09-18 NOTE — TELEPHONE ENCOUNTER
Patient is status post laparoscopic hand assisted converted to open low anterior colon resection on 9/10/24.  Patient complains of heartburn for the past 2 days.  She was taking omeprazole prior to surgery.  Patient advised to try taking her omeprazole or pantoprazole which she was given while she was inpatient.  She will try OTC pantoprazole or Omeprazole  to see if that will help to decrease her heart burn.  If she continues to have heartburn she will call back for further triage and evaluation.

## 2024-09-18 NOTE — TELEPHONE ENCOUNTER
Had long conversation with patient. Address concerns with hospitalization. She has no questions regarding catheter. She will call to schedule cystogram on next Monday at the earliest. I will monitor to ensure leon is removed at CT appointment. If its not then I will set up for a leon removal.     Patient has concerns with fluid coming out of RADHA drain site. She reports it is clean liquid. Has changed gauze 2-3 times a day. Advised her to give it an additional day or two and if still having a lot of drainage to call office back.     Will monitor for cystogram scheduling and completion and will call patient back

## 2024-09-19 ENCOUNTER — HOME CARE VISIT (OUTPATIENT)
Dept: HOME HEALTH SERVICES | Facility: HOME HEALTHCARE | Age: 74
End: 2024-09-19

## 2024-09-19 ENCOUNTER — NURSE TRIAGE (OUTPATIENT)
Age: 74
End: 2024-09-19

## 2024-09-19 VITALS
RESPIRATION RATE: 20 BRPM | OXYGEN SATURATION: 96 % | HEART RATE: 62 BPM | DIASTOLIC BLOOD PRESSURE: 48 MMHG | TEMPERATURE: 96.1 F | SYSTOLIC BLOOD PRESSURE: 74 MMHG

## 2024-09-19 NOTE — TELEPHONE ENCOUNTER
"Patient's home health nurse reports that patient continues to have severe hypotension with her BP in the 70's/40's, dizziness upon change in position, chest pain, abdominal distention, bloating, +3 pitting BLE edema, and a large amount of drainage from her RADHA drain. Patient present at time of call and reports that everything is the same from when she was in the hospital and she is not concerned. Home care nurse and triage nurse in agreement that patient needs to go to the ED. Home care nurse ended call to call 911. Patient verbalized understanding.     Reason for Disposition   Systolic BP < 80 and NOT dizzy, lightheaded or weak (feels normal)   Systolic BP < 90 and feeling dizzy, lightheaded, or weak    Answer Assessment - Initial Assessment Questions  1. BLOOD PRESSURE: \"What is the blood pressure?\" \"Did you take at least two measurements 5 minutes apart?\"      70/42, 78/48   2. ONSET: \"When did you take your blood pressure?\"      1130, 1145   3. HOW: \"How did you obtain the blood pressure?\" (e.g., visiting nurse, automatic home BP monitor)      Visiting nurse   4. HISTORY: \"Do you have a history of low blood pressure?\" \"What is your blood pressure normally?\"      120-130/70-80, has been low since patient was in the hospital   5. MEDICATIONS: \"Are you taking any medications for blood pressure?\" If Yes, ask: \"Have they been changed recently?\"      Yes   6. PULSE RATE: \"Do you know what your pulse rate is?\"       60, 62  7. OTHER SYMPTOMS: \"Have you been sick recently?\" \"Have you had a recent injury?\"      Yes, in hospital   8. PREGNANCY: \"Is there any chance you are pregnant?\" \"When was your last menstrual period?\"      N/A    Protocols used: Blood Pressure - Low-ADULT-OH    "

## 2024-09-19 NOTE — TELEPHONE ENCOUNTER
Patient currently inpatient al [facility]. Patient required to do 21 daily injections of Lovenox s/p lap converted to open LAR, primary bladder repair. This was received. Unable to call patient given inpatient status.

## 2024-09-19 NOTE — CASE COMMUNICATION
Per Na patient should go to the ED. Patient refuses to go to the hospital. 911 called.     Notification of Assess not Admit    [facility] has assessed your patient for Home Health services and has determined the patient is not eligible for service due to the following: Needs beyond HHC Due to hypotension.    Ritesh Zambrano RN

## 2024-09-19 NOTE — CASE COMMUNICATION
TC to PCP notified Na of severe hypotension 70's over 40's HR 60-62.    Patient reports RADHA drains never slowed in the hospital before being pulled and since being pulled drainage increased. Loss of appetite since coming home. Very poor oral intake. Patient is very anxious. Has ABD distension and heartburn, +3 pitting edema BLE. Dizziness with changes in position. Chest pressure intermittantly that last minute or two since surgery.

## 2024-09-19 NOTE — CASE COMMUNICATION
.Three days ago restarted metoprolol tartrate 100mg daily and Valsartan 160 mg Daily. Patient not taking lasix.    Patient reports RADHA drains never slowed in the hospital before being pulled and since being pulled drainage increased. Loss of appetite since coming home. Very poor oral intake. Patient is very anxious. Has ABD distension and heartburn. Dizziness with changes in position. Chest pressure intermittantly that last minute or two  since surgery.

## 2024-09-23 ENCOUNTER — TELEPHONE (OUTPATIENT)
Dept: GASTROENTEROLOGY | Facility: CLINIC | Age: 74
End: 2024-09-23

## 2024-09-23 DIAGNOSIS — S37.20XD BLADDER INJURY, SUBSEQUENT ENCOUNTER: Primary | ICD-10-CM

## 2024-09-23 NOTE — TELEPHONE ENCOUNTER
Spoke with pt regarding 11/4/24 appointment with RAQUEL Portillo. Change in provider's schedule- patient states that she will call back to reschedule 11/4/24- to cancel appointment. She recently had surgery, in recovery.

## 2024-09-24 ENCOUNTER — TRANSITIONAL CARE MANAGEMENT (OUTPATIENT)
Dept: FAMILY MEDICINE CLINIC | Facility: CLINIC | Age: 74
End: 2024-09-24

## 2024-09-25 ENCOUNTER — TELEPHONE (OUTPATIENT)
Age: 74
End: 2024-09-25

## 2024-09-25 ENCOUNTER — VBI (OUTPATIENT)
Dept: ADMINISTRATIVE | Facility: OTHER | Age: 74
End: 2024-09-25

## 2024-09-25 ENCOUNTER — TELEPHONE (OUTPATIENT)
Dept: UROLOGY | Facility: MEDICAL CENTER | Age: 74
End: 2024-09-25

## 2024-09-25 NOTE — TELEPHONE ENCOUNTER
Patients GI provider:       Number to return call: [phone]    Reason for call: Pt calling stating her drain sights from surgery are still leaking.  Transferred call to CRS Triage Nurse    Scheduled procedure/appointment date if applicable: 10/23/24

## 2024-09-25 NOTE — TELEPHONE ENCOUNTER
09/25/24 9:43 AM     Chart reviewed for Mammogram was/were not submitted to the patient's insurance.     Sherry Gunn MA   [facility]

## 2024-09-25 NOTE — TELEPHONE ENCOUNTER
Pt's  presented to this office this afternoon.  He stated his wife's drains were removed over a week ago and the drain wound is still draining a good amount of clear fluid.  Pt is s/p RESECTION COLON LOW ANTERIOR LAPAROSCOPIC HAND ASSISTED CONVERTED TO OPEN (Abdomen)       CYSTOSCOPY, COMPLEX CYSTORRHAPHY, B/L URETHRAL CATETERIZATION (Bladder) on 9/10/24.    Advised pt's spouse to contact the colorectal surgeon who placed the drains.   The wound should be closed by now and perhaps pt needs to be seen.  Also advised that if he cannot get in touch with the colorectal surgeon's office he can contact this office and be referred to Dr. Mazariegos who participated in the surgery for cystorrhaphy. Pt was seen by JORGE LUIS this morning.

## 2024-09-25 NOTE — TELEPHONE ENCOUNTER
Patient is status post laparoscopic hand assisted converted to open, low anterior colon resection on 9/10/2024 due to rectal cancer with Dr. Boone.  She was discharged from the hospital on 9/17/2024. Patient is stating that she has 2 holes on her belly that are draining to the point that she has to change the dressing frequently.  At times the drainage will drip down her legs.  She denies any pain.  Patient told me that just before she was initially discharged from the hospital that the nurse pulled the drains out.  However she is concerned that the drainage had never stopped while she was in the hospital, now she is home and she has not been given any instructions.     I have asked that patient to please send photos via [employer] for Dr. Bonoe to review.  I walked the patient's  through the steps on how to send a [employer] message and attach a photo.

## 2024-09-25 NOTE — TELEPHONE ENCOUNTER
[facility] requested order to be faxed to ct cystogram for pt to be scheduled with them

## 2024-09-26 ENCOUNTER — TELEPHONE (OUTPATIENT)
Age: 74
End: 2024-09-26

## 2024-09-26 DIAGNOSIS — C20 RECTAL CANCER (HCC): Primary | ICD-10-CM

## 2024-09-26 NOTE — TELEPHONE ENCOUNTER
I called and spoke with Martha and explained why we need to see her in the office.  She is scheduled for TRANSITION OF CARE MANAGEMENT 10/1/24 with SARAH Eli.

## 2024-09-26 NOTE — TELEPHONE ENCOUNTER
Appears she was able to get in contact with Dr. Boone who is okay with the amount of drainage. Can follow up with our office regarding cystogram results/leon management.

## 2024-09-26 NOTE — TELEPHONE ENCOUNTER
Patient s  called stating patient wounds a leaking clear fluid and he insist on seeing Dr. Mazariegos or come into the office today.  Warm transfer to Triage shannon

## 2024-09-26 NOTE — TELEPHONE ENCOUNTER
Spoke with patient and advised that per Dr. Boone the wounds will close up eventually. Patient states that she is looking for a bag that can be placed over the wound to catch the drainage.

## 2024-09-26 NOTE — TELEPHONE ENCOUNTER
Jackie Odonnell PA-C   to [facility]       9/25/24  1:38 PM  Note     From which site is this drainage occurring? If hasn't reach out to her primary surgeon/colorectal would advise doing so for any additional recommendations as well.         Spouse and patient calling in, right and left drain tubes, left tube draining a lot of clear fluid. He has sent pictures to the surgeon/colorectal team and he is awaiting a call back. Advised to call again    Spouse is looking for a bag to collect the drainage, he states it is open to the air and the drainage is coming out quickly. States that she left the hospital on 9/17/24 and it has been this way since. They were told by [facility] that they should have drainage bags but she does not. They are going through many guaze pads and band aides.     Did inform patient and spouse that we don't typically have these drain bags in our offices. Advised to call again the surgeon/colorectal team. Please advise if any other recommendations.     #[phone]

## 2024-09-26 NOTE — TELEPHONE ENCOUNTER
Patients  calling in in regards to follow up from his call yesterday. He was made aware that office is working with physician to come up with next steps. He is requesting a call back.     CB: [phone]

## 2024-09-26 NOTE — TELEPHONE ENCOUNTER
Spoke to patient- Dr. Boone is okay with continuous drainage from abdominal sites. She is requesting dressings as the drainage can be a significant amount. She is scheduled for a cysto with uro- leon is being managed.

## 2024-09-26 NOTE — TELEPHONE ENCOUNTER
[facility] calling about CT Cystogram;  they stated that patient might schedule with [facility] for tomorrow.  Patient currently has CT Cystogram appt with [facility] on 10/04/24;  They will discuss with patient and call back to cancel if our appointment is no longer needed.

## 2024-09-26 NOTE — TELEPHONE ENCOUNTER
Patients GI provider:  Dr. Boone    Number to return call: [phone]    Reason for call: Pt called in regard to drainage from wound  and recommendations. Pt is also asking about bag that can be put over the wound due to the drainage instead of bandages.    Scheduled procedure/appointment date if applicable: 10/23/2024

## 2024-09-27 NOTE — TELEPHONE ENCOUNTER
[facility] auth team calling in to see if we ordered a CT pelvis. She was made aware that the CT cystogram was ordered. She will check into this as it may have been an error on their part.

## 2024-09-27 NOTE — TELEPHONE ENCOUNTER
Lacey INGRAM Is calling from [facility] is calling today.    She is questioning what CPT code was used to obtain the prior auth to the Cystogram?    Kanu states she's authorized for a CPT 08191.    If patient is going to have imaging done in [facility] the authorization has to reflect that it'll be done with [facility]. NPI # [id number] and make it out to the [address] Address.    Please review.    Call back Lacey INGRAM [phone]

## 2024-09-27 NOTE — TELEPHONE ENCOUNTER
agree- nothing to do her RADHA drains are removed. they're the sites she means are leaking. no way to clse them needs gauze bandage and change prn for saturation. when she has the ct cystogram she can have leon removal in urology office- she mentioned to me in the hospital she lives close to the [place] office. await cystogram images

## 2024-09-27 NOTE — TELEPHONE ENCOUNTER
Mable from [facility] called to notify office patient is going to have cysto done, but they will not remove patients leon.     Patient will need to follow up with our office to manage.

## 2024-09-27 NOTE — TELEPHONE ENCOUNTER
Dr. Boone would like patient to be seen by wound management. Ref placed. LM stating referral was placed and provided number to schedule consult.

## 2024-09-30 ENCOUNTER — NURSE TRIAGE (OUTPATIENT)
Dept: OTHER | Facility: OTHER | Age: 74
End: 2024-09-30

## 2024-09-30 NOTE — TELEPHONE ENCOUNTER
"Reason for Disposition   Leakage of urine around catheter    Answer Assessment - Initial Assessment Questions  1. SYMPTOMS: \"What symptoms are you concerned about?\"      Catheter was leaking Saturday into Sunday. Was seen at [facility] for this yesterday. Has not leaked since last night. Catheter is draining into the bag normally.    2. ONSET:  \"When did the symptoms start?\"      Yesterday    3. FEVER: \"Is there a fever?\" If Yes, ask: \"What is the temperature, how was it measured, and when did it start?\"      NO fever    4. ABDOMINAL PAIN: \"Is there any abdominal pain?\" (e.g., Scale 1-10; or mild, moderate, severe)     Is having some discomfort- says it is 5/10 but thinks it is related to injections she is receiving on stomach.     5. URINE COLOR: \"What color is the urine?\"  \"Is there blood present in the urine?\" (e.g., clear, yellow, cloudy, tea-colored, blood streaks, bright red)      Gold yellow    6. ONSET: \"When was the catheter inserted?\"      Leon catheter inserted 3 weeks ago.     7. OTHER SYMPTOMS: \"Do you have any other symptoms?\" (e.g., back pain, bad urine odor)       No other symptoms currently.    Protocols used: Urinary Catheter Symptoms and Questions-ADULT-      Pt states that a nurse with [facility] tried reaching the Urology office yesterday but did not get a response. Let pt know if her leon catheter is working normally at this time to continue to monitor for now. Made aware that information will be forwarded to the office. Pt concerned that catheter will start to leak again.       "

## 2024-09-30 NOTE — TELEPHONE ENCOUNTER
Urine leaking from leon catheter. Was in ED yesterday at [facility]    CT is scheduled for Thursday but she states she needs to do it today since she continues with leaking. [facility] informed her that for it to be done today she needed to reach out to Urology    [facility] at [place] [phone] is the location she will get the CT done     Please advise.     #[phone]

## 2024-09-30 NOTE — TELEPHONE ENCOUNTER
Patient called in stating this is an emergency. She needs the office to call to approve her CT to be done today.    [facility] [place] [phone] is the location she will get the CT done.

## 2024-09-30 NOTE — TELEPHONE ENCOUNTER
Alpa from PT access center form [facility] called for the CPT code that was used for the prior auth for the CT cystogram.      Informed her that the code CPT 79026 was used per appt note that is scheduled with [facility]

## 2024-09-30 NOTE — TELEPHONE ENCOUNTER
[facility] calling to clarify if pt is to have contrast just thought leon or if pt is to have with/ without iv contrast for CT cystogram.    Please advise.  [phone]

## 2024-10-01 DIAGNOSIS — S37.20XD BLADDER INJURY, SUBSEQUENT ENCOUNTER: Primary | ICD-10-CM

## 2024-10-01 NOTE — TELEPHONE ENCOUNTER
Called and spoke with patient at this time. Advised I am calling with an update: we are waiting for the stat cystogram order to be placed. Once that is placed we will call her back.     I inquired about the catheter leaking and what prompted the ER visit. She reviewed she was having discomfort when sitting, intermittent leaking around catheter and some hematuria. Reviewed this does sound like bladder spasms. Reviewed with this type of surgery and then leon in place bladder spasms and intermittent hematuria is expected. She reports she thought this was an emergency and that's why she went to [facility]. She was also never informed of bladder spasms. I apologized and advised this is very common after her type of surgery and with a leon in place.     She then reports she was read all of the notes this morning and doesn't understand the confusion with the orders and why she could have the cystogram done at [facility] but not the leon removal. I tried to clear this up for her. Reviewed there was confusion with [facility] calling in for auth, and then there was confusion if they would remove the catheter. Reviewed our providers recommended stat cystogram at [facility] with leon removal if negative because we were under the impression [facility] wouldn't remove leon regardless. Patient thought [facility] urology providers recommended [facility] not remove it. Reviewed that is not the case.     She reports she would like this taken care of today. Advised provider pool has the message to place stat cystogram. Then it can be rescheduled as STAT. I cannot confirm if radiology can accommodate today at the [place] location, but we would all do our best to make this happen. She knows we will call once order placed.

## 2024-10-01 NOTE — TELEPHONE ENCOUNTER
"Ester Jovel, RN  Registered Nurse Signed Yesterday       Reason for Disposition   Leakage of urine around catheter    Answer Assessment - Initial Assessment Questions  1. SYMPTOMS: \"What symptoms are you concerned about?\"      Catheter was leaking Saturday into Sunday. Was seen at [facility] for this yesterday. Has not leaked since last night. Catheter is draining into the bag normally.    2. ONSET:  \"When did the symptoms start?\"      Yesterday    3. FEVER: \"Is there a fever?\" If Yes, ask: \"What is the temperature, how was it measured, and when did it start?\"      NO fever    4. ABDOMINAL PAIN: \"Is there any abdominal pain?\" (e.g., Scale 1-10; or mild, moderate, severe)     Is having some discomfort- says it is 5/10 but thinks it is related to injections she is receiving on stomach.     5. URINE COLOR: \"What color is the urine?\"  \"Is there blood present in the urine?\" (e.g., clear, yellow, cloudy, tea-colored, blood streaks, bright red)      Gold yellow    6. ONSET: \"When was the catheter inserted?\"      Leon catheter inserted 3 weeks ago.     7. OTHER SYMPTOMS: \"Do you have any other symptoms?\" (e.g., back pain, bad urine odor)       No other symptoms currently.    Protocols used: Urinary Catheter Symptoms and Questions-ADULT-       Pt states that a nurse with [facility] tried reaching the Urology office yesterday but did not get a response. Let pt know if her leon catheter is working normally at this time to continue to monitor for now. Made aware that information will be forwarded to the office. Pt concerned that catheter will start to leak again.        "

## 2024-10-01 NOTE — TELEPHONE ENCOUNTER
Called pt and informed of stat order placed. Advised pt to call CS and schedule appt. Pt verbalized understanding.

## 2024-10-01 NOTE — TELEPHONE ENCOUNTER
Patient called back to see if stat cystogram was available for today;  I explained that she would get a return call if able to perform.  Reminded patient that current cystogram appt is on Friday.  She denies any symptoms at this time.  Will wait for update.

## 2024-10-01 NOTE — TELEPHONE ENCOUNTER
If patient calls back again today     We would recommend either waiting until the scheduled cystogram at [facility] but if she is insistent on getting imaging done today then per yesterdays convo with in office PA we would prefer putting in a stat order for her to get done at [facility] not [facility]. We cannot view their imaging and cannot provide orders for the leon to be removed at conclusion of the CT

## 2024-10-02 ENCOUNTER — HOSPITAL ENCOUNTER (OUTPATIENT)
Dept: CT IMAGING | Facility: HOSPITAL | Age: 74
Discharge: HOME | End: 2024-10-02
Payer: COMMERCIAL

## 2024-10-02 ENCOUNTER — TELEPHONE (OUTPATIENT)
Dept: UROLOGY | Facility: HOSPITAL | Age: 74
End: 2024-10-02

## 2024-10-02 DIAGNOSIS — S37.20XD BLADDER INJURY, SUBSEQUENT ENCOUNTER: ICD-10-CM

## 2024-10-02 PROCEDURE — 51600 INJECTION FOR BLADDER X-RAY: CPT

## 2024-10-02 PROCEDURE — 72194 CT PELVIS W/O & W/DYE: CPT

## 2024-10-02 RX ORDER — IODIXANOL 320 MG/ML
20 INJECTION, SOLUTION INTRAVASCULAR
Status: COMPLETED | OUTPATIENT
Start: 2024-10-02 | End: 2024-10-02

## 2024-10-02 RX ADMIN — IODIXANOL 20 ML: 320 INJECTION, SOLUTION INTRAVASCULAR at 11:33

## 2024-10-02 NOTE — TELEPHONE ENCOUNTER
BRADEN left for patient.     When patient calls back please relay to her that the cystogram was negative for a leak. The leon can be removed. Can be scheduled for a leon removal. If she is able to get to an [place] office (where she lives) by 3 then the nurses there should be able to see her to remove the leon

## 2024-10-02 NOTE — TELEPHONE ENCOUNTER
When the new order was placed for the CT it does not appear that the AP added on that the leon could be removed at the conclusion of the imaging if negative.     Discussed with Dr. Mazariegos and he personally called and left a VM with patient telling her that the CT is negative for a leak.

## 2024-10-02 NOTE — TELEPHONE ENCOUNTER
Rose[facility] to ask for a returned call with an update regarding the patient's authorization.    I informed called the patient is having Cystogram with [facility] but please review to confirm.    Call back [phone]

## 2024-10-03 NOTE — TELEPHONE ENCOUNTER
Per cystogram order comments:  Comments    If negative for leak, removal catheter     Likely catheter removed. It is still in LDAs. Will monitor for return call as nursing and Dr Mazariegos called patient.

## 2024-10-08 ENCOUNTER — NURSE TRIAGE (OUTPATIENT)
Age: 74
End: 2024-10-08

## 2024-10-08 NOTE — TELEPHONE ENCOUNTER
Returned patient call.  She is having rectal spasms and rectal pressure along with cramping.  This has been happening for the past week.  Her stools are semi formed and she is not having complete evacuation.  She has not been eating much due to lack of appetite.  We discussed increasing her fiber intake.  I did send her a copy of the fiber chart for her to review.  She can not get into the bathtub to do warm tub soaks and does not have a sitz bath.  I did suggest she try a heating pad on a very low setting to help to relieve the rectal spasms she feels that she is having.  We also discussed not stilling on the toilet for more than 10-15 minutes, if she is not having a bowel movement she should get up and try again later, rather than sit there for an hour.  Patient does not have any rectal bleeding and her stools are not formed.  Patient satinder have a post op OV on 10/23 I did offer a sooner appointment but she would prefer to wait until 10/23.

## 2024-10-08 NOTE — LETTER
Martha Sandoval  [address] [place] [phone]      Martha Sandoval  10/8/2024      Recommended Total Fiber Intake**   AGE MEN WOMAN   19-50 38 grams/day 25 grams/day   Over 50 30 grams/day 21 grams/day     Fiber Sources in Common Foods  Use this guide to find out if you have enough fiber in you diet.   Food Size of Serving Fiber Grams/Servings Calories/  Serving Food Size of Serving Fiber Grams/Servings Calories/  Serving   Fruits:  (raw unless otherwise noted Vegetables:  (cooked, unless otherwise noted)   Apple (w/peel) 1 medium 3.7 81 Artichoke 1 globe 6.5 60   Apricots 1 cup 3.7 74 Asparagus ½ cup 1.8 25   Banana 1 medium 2.7 105 Beans:      Blackberries 1 cup 7.2 75 Green  (canned)                       ½ cup 1.3 14   Blueberries 1 cup 3.9 81 Kidney ½ cup 5.7 114   Cantaloupe 1 cup 1.3 56 Rodriguez ½ cup 6.1 85   Grapefruit 1 medium 2.8 82 Brewer ½ cup 7.4 118   Grapes 1 cup 1.6 114 White ½ cup 5.5 122   Orange 1 medium 3.1 62 Beets ½ cup 1.6 37   Pear (with peel) 1 medium 4.0 98 Broccoli ½ cup 2.8 26   Pineapple 1 cup 1.9 76 Cabbage, green ½ cup 2.1 16   Plums 1 medium 1.0 36 Cabbage, green (raw) ½ cup 0.8 9   Prunes (dried) 1 cup 11.4 386 Carrots ½ cup 2.6 35   Raspberries 1 cup 8.4 60 Cauliflower ½ cup 2.0 17   Strawberries 1 cup 3.4 45 Cauliflower (raw) ½ cup 1.3 13   Watermelon 1 slice 0.8 51 Celery (raw) ½ cup 1.0 10   GRAIN PRODUCTS AND OTHERS: Corn ½ cup 2.0 66   Bread:    Cucumber (raw) ½ cup 0.4 7   Tajik 1 slice 0.8 68 Eggplant ½ cup 1.2 13   Rye 1 slice 1.6 67 Green Peas ½ cup 4.4 62   White 1 slice 0.6 67 Lettuce, iceberg (raw) ½ cup 0.4 4   Whole      Wheat 1 slice 2.0 70 Onions (raw) ½ cup 1.4 30   Cereal:    Potato (baked with skin) ½ cup 1.5 66   Bran 1 ounce 9.7 70 Spinach ½ cup 2.7 25   Corn Flakes 1 ounce 1.0 110 Tomato ½ cup 1.0 19   Oat Bran 1 ounce 4.3 69 Zucchini ½ cup 1.3 14   Oatmeal 1 ounce 3.0 109 METAMUCIL:   Shredded Wheat 1 ounce 2.8 102 Capsules 6 capsules 3.0 10    Crackers:    Smooth Texture Orange (sugar free) 1 tsp 3.0 20   Erik 1 square 0.1 27 Smooth Texture Orange (with sugar) 1 tbsp 3.0 45   Saltine 1 regular 0.1 13 Wafers 2 wafers 3.0 120   Rice:          Brown ½ cup 1.8 108       White ½ cup 0.3 103       Spaghetti 2 ounces 2.1 225       Almonds (roasted) ½ cup 6.4 351       Peanuts (roasted) ½ cup 6.1 388         ** [place] [employer], The National Academy of Sciences, 2002    Track your fiber intake for five days.  Use the Fiber Source Guide to find out how much fiber is in common food.    If you’re not getting your recommended amount of fiber each day, talk to your doctor about how you can increase the fiber in your diet.   Example Monday Tuesday Wednesday Thursday Friday   Food Oatmeal        Fiber Grams 2.8        Food Blueberries        Fiber Grams 3.9        Food W.W. Bread        Fiber Grams 1.9        Food W.W. Bread        Fiber Grams 1.9        Food Apple        Fiber Grams 3.7        Food Spaghetti        Fiber Grams .14        Food Corn        Fiber Grams 2.0        Food White Bread        Fiber Grams .6        Add numbers in each column to find your daily fiber intake.   Total Daily Fiber Intake 18.2            Too Low - Like most Americans, this example is not enough fiber. Talk to your doctor about how to add fiber to your diet.    Quick Fiber Facts  · Most Americans consume only about half of the recommended fiber they need each day.  · Fiber helps maintain normal bowel function, and helps prevent constipation and its potential complications.  Straining and pressure from constipation may lead to diverticular disease and hemorrhoids.  · Stool softeners or stimulant laxatives only offer short-term relief of constipation, while dietary changes or fiber therapies help break the cycle of irregularity.  · Diets low in saturated fat and cholesterol that include 7 grams of soluble fiber per day from psyllium husk, as in Metamucil, may reduce the risk of  heart disease by lowering cholesterol.  One adult dose of Metamucil has 2.4 grams of this soluble fiber.  · Increase fiber intake gradually, giving the body time to adjust.

## 2024-10-08 NOTE — TELEPHONE ENCOUNTER
Regarding: RECTAL PAIN  ----- Message from Nitza RYAN sent at 10/8/2024  9:01 AM EDT -----  Patient had surgery done on 9/10 DR MUELLER she is having rectal spasms and would like to speak with the Nurse

## 2024-10-21 NOTE — PROGRESS NOTES
Colon and Rectal Surgery   Martha Sandoval 74 y.o. female MRN [id number]  Encounter: [id number]  10/23/24 3:25 PM            Assessment: Martha Sandoval is a 74 y.o. female who had diverticulitis.      Plan:   Rectal cancer (HCC)  The patient is doing fairly well after her low anterior resection for rectal cancer and repair of a cystotomy encountered during the operation.  Her recovery was somewhat labored but she seems to be doing better now.  Her exam is benign.    She is a stage I patient and requires no chemotherapy.  Her only follow-up requirement is a CEA every 3 to 4 months for 2 years and then every 6 months for 3 years thereafter.  Examinations can be done at those times.    I recommend she return as necessary or in 4 months.      Subjective     HPI    Martha Sandoval is a 74 y.o. female who is here today for a post operative evaluation.     The patient is status post laparoscopic, hand assisted low anterior colon resection, converted to hybrid open, laparoscopic mobilization of the splenic flexure on 9/10/2024.    Surgical pathology:  A. Rectum and sigmoid colon (low anterior resection):  - Moderately differentiated adenocarcinoma (8mm) arising in a tubular adenoma  - Thirty-one (31) lymph nodes negative for carcinoma (0/31)  - Margins negative for carcinoma   - Separate hyperplastic polyp   - See staging synoptic (pT1N0)  ~Comment:  - CD31 and D2-40 highlight small vessels and lymphatics. The invasive carcinoma appears to be a Haggitt level 2 tumor.        RESULTS OF IMMUNOHISTOCHEMICAL ANALYSIS FOR MISMATCH REPAIR PROTEIN LOSS  INTERPRETATION: No loss of nuclear expression of MMR proteins: low probability of MSI-H   RESULTS:  Antibody            Clone                 Description                     Results  MLH1                 M1                     Mismatch repair protein  Intact nuclear expression  MSH2                [id number]        Mismatch repair protein  Intact nuclear expression  MSH6                 SP93                  Mismatch repair protein  Intact nuclear expression  PMS2                 A16                    Mismatch repair protein  Intact nuclear expression    Patient hospitalized on 9/19/2024 with  hypotension; dizziness.     CTA abdomen and pelvis on 9/19: Impression: Postoperative changes. Hepatic cirrhosis, mild perihepatic fluid, mesenteric edema most prominent in the left mesentery, the possibility of mild enteritis is considered. No evidence of bowel obstruction. Gallstone.     Seen at the emergency room most recently on 9/29 for Conroy catheter complication.     Historical Information   Past Medical History:   Diagnosis Date    Cancer (HCC)     Chest pain     last assessed 9/16/13    Chest pressure     last assessed 12/15/16    Chronic sinusitis     last assessed 5/4/17    Clotting disorder (HCC)     Compression fracture of lumbar vertebra (HCC)     last assessed 6/13/15    GERD (gastroesophageal reflux disease)     Hypertension     Liver disease     Lump of skin     last assessed 1/17/14     Past Surgical History:   Procedure Laterality Date    COLONOSCOPY      CT CYSTOGRAM  9/11/2024    CT CYSTOGRAM  10/2/2024    CYSTOSCOPY N/A 9/10/2024    Procedure: CYSTOSCOPY, COMPLEX CYSTORRHAPHY, B/L URETHRAL CATETERIZATION;  Surgeon: Ismael Mazariegos MD;  Location: [facility];  Service: Urology    HYSTERECTOMY      KS LAPAROSCOPY COLECTOMY PARTIAL W/ANASTOMOSIS N/A 9/10/2024    Procedure: RESECTION COLON LOW ANTERIOR LAPAROSCOPIC HAND ASSISTED CONVERTED TO OPEN;  Surgeon: BRENDA Boone MD;  Location: [facility];  Service: Colorectal    TONSILLECTOMY         Meds/Allergies       Current Outpatient Medications:     acetaminophen (TYLENOL) 325 mg tablet, Take 3 tablets (975 mg total) by mouth every 8 (eight) hours, Disp: , Rfl:     Alum Hydroxide-Mag Carbonate (GAVISCON PO), Take by mouth in the morning, Disp: , Rfl:     aspirin (ECOTRIN LOW STRENGTH) 81 mg EC tablet, Take 81 mg by mouth daily, Disp: ,  Rfl:     cetirizine (ZyrTEC) 10 mg tablet, Take 1 tablet by mouth daily as needed, Disp: , Rfl:     cholecalciferol (VITAMIN D3) 400 units tablet, Take 400 Units by mouth daily, Disp: , Rfl:     cyanocobalamin (VITAMIN B-12) 100 MCG tablet, Take 100 mcg by mouth daily, Disp: , Rfl:     enoxaparin (Lovenox) 40 mg/0.4 mL, Inject 0.4 mL (40 mg total) under the skin 2 (two) times a day for 21 days, Disp: 16.8 mL, Rfl: 0    enoxaparin (LOVENOX) 40 mg/0.4 mL, Inject 0.4 mL (40 mg total) under the skin in the morning for 21 days, Disp: 8.4 mL, Rfl: 0    furosemide (LASIX) 20 mg tablet, Take 2 tablets (40 mg total) by mouth daily, Disp: 180 tablet, Rfl: 1    guaiFENesin (MUCINEX) 600 mg 12 hr tablet, Take 600 mg by mouth every 12 (twelve) hours, Disp: , Rfl:     methocarbamol (ROBAXIN) 500 mg tablet, Take 1 tablet (500 mg total) by mouth every 8 (eight) hours for 7 days, Disp: 21 tablet, Rfl: 0    metoprolol tartrate (LOPRESSOR) 100 mg tablet, Take 1 tablet (100 mg total) by mouth daily, Disp: 90 tablet, Rfl: 1    omeprazole (PriLOSEC) 20 mg delayed release capsule, Take 1 capsule (20 mg total) by mouth daily, Disp: 90 capsule, Rfl: 1    oxybutynin (DITROPAN-XL) 10 MG 24 hr tablet, Take 1 tablet (10 mg total) by mouth daily at bedtime for 14 days, Disp: 7 tablet, Rfl: 1    Pyridoxine HCl (VITAMIN B-6) 100 MG TABS, Take 1 tablet by mouth daily, Disp: , Rfl:     spironolactone (ALDACTONE) 50 mg tablet, Take 2 tablets (100 mg total) by mouth daily (Patient taking differently: Take 100 mg by mouth every evening), Disp: 180 tablet, Rfl: 1    ursodiol (ACTIGALL) 300 mg capsule, Take 1 capsule (300 mg total) by mouth 2 (two) times a day, Disp: 180 capsule, Rfl: 3    valsartan (DIOVAN) 160 mg tablet, Take 1 tablet (160 mg total) by mouth daily, Disp: 90 tablet, Rfl: 1    Vitamin E 200 units TABS, Take 1 tablet by mouth daily, Disp: , Rfl:   Allergies   Allergen Reactions    Azithromycin GI Intolerance    Colcrys [Colchicine]  "Diarrhea    Dicyclomine GI Intolerance and Hives    Erythromycin GI Intolerance    Naproxen Edema    Sulfa Antibiotics Hives    Sulfamethoxazole-Trimethoprim Rash       Social History   Social History     Substance and Sexual Activity   Drug Use Never     Social History     Tobacco Use   Smoking Status Never   Smokeless Tobacco Never         Family History   Problem Relation Age of Onset    Hyperlipidemia Mother     Hypertension Mother     Cancer Father     Liver cancer Father     Stomach cancer Father     No Known Problems Son     No Known Problems Daughter          Review of Systems   Constitutional: Negative.    Gastrointestinal: Negative.        Objective   Current Vitals:  Vitals:    10/23/24 1512   Height: 5' 1\" (1.549 m)         Physical Exam  Constitutional:       Appearance: Normal appearance.   Cardiovascular:      Rate and Rhythm: Normal rate and regular rhythm.   Pulmonary:      Effort: Pulmonary effort is normal.      Breath sounds: Normal breath sounds.   Abdominal:      General: Abdomen is flat.      Palpations: Abdomen is soft. There is no mass.      Tenderness: There is no abdominal tenderness. There is no guarding.      Hernia: No hernia is present.      Comments: Wounds well.  Ecchymosis resolving.   Neurological:      General: No focal deficit present.      Mental Status: She is alert and oriented to person, place, and time.               "

## 2024-10-23 ENCOUNTER — OFFICE VISIT (OUTPATIENT)
Age: 74
End: 2024-10-23

## 2024-10-23 VITALS — BODY MASS INDEX: 41.57 KG/M2 | HEIGHT: 61 IN

## 2024-10-23 DIAGNOSIS — C20 RECTAL CANCER (HCC): Primary | ICD-10-CM

## 2024-10-23 PROCEDURE — 99024 POSTOP FOLLOW-UP VISIT: CPT | Performed by: COLON & RECTAL SURGERY

## 2024-10-23 NOTE — ASSESSMENT & PLAN NOTE
The patient is doing fairly well after her low anterior resection for rectal cancer and repair of a cystotomy encountered during the operation.  Her recovery was somewhat labored but she seems to be doing better now.  Her exam is benign.    She is a stage I patient and requires no chemotherapy.  Her only follow-up requirement is a CEA every 3 to 4 months for 2 years and then every 6 months for 3 years thereafter.  Examinations can be done at those times.    I recommend she return as necessary or in 4 months.

## 2024-11-08 ENCOUNTER — TELEPHONE (OUTPATIENT)
Dept: NEPHROLOGY | Facility: CLINIC | Age: 74
End: 2024-11-08

## 2024-11-08 NOTE — TELEPHONE ENCOUNTER
Called pt and LVM stating she has been scheduled for a follow up appt on 1/2/25 at 4pm with Dr Gunter in the [facility]. Advised pt to call office if she needs to reschedule. Appt card mailed to pt.

## 2024-11-12 ENCOUNTER — TELEPHONE (OUTPATIENT)
Age: 74
End: 2024-11-12

## 2024-11-12 NOTE — TELEPHONE ENCOUNTER
Patient return phone  called she stated that she found another doctor and longer need that appointment.

## 2024-12-05 ENCOUNTER — TELEPHONE (OUTPATIENT)
Age: 74
End: 2024-12-05

## 2024-12-05 NOTE — TELEPHONE ENCOUNTER
Pt calling to rs her 1/22/25 appt w/ Dr. Lou because she read her reviews and now wants another doctor. I offered to schedule w/ Dr. Fair since she saw him for hepatology and I informed her that his is also gastro and she refused. I was looking for another appt when pt hung up.

## 2024-12-10 ENCOUNTER — RA CDI HCC (OUTPATIENT)
Dept: OTHER | Facility: HOSPITAL | Age: 74
End: 2024-12-10

## 2024-12-10 NOTE — PROGRESS NOTES
HCC coding opportunities          Chart Reviewed number of suggestions sent to Provider: 2  I70.0, I13.0     Patients Insurance     Medicare Insurance: [employer] Ad[employer]age

## 2024-12-17 ENCOUNTER — OFFICE VISIT (OUTPATIENT)
Dept: FAMILY MEDICINE CLINIC | Facility: CLINIC | Age: 74
End: 2024-12-17
Payer: COMMERCIAL

## 2024-12-17 VITALS
SYSTOLIC BLOOD PRESSURE: 120 MMHG | OXYGEN SATURATION: 99 % | HEART RATE: 67 BPM | WEIGHT: 205.2 LBS | BODY MASS INDEX: 37.76 KG/M2 | TEMPERATURE: 97.7 F | DIASTOLIC BLOOD PRESSURE: 72 MMHG | HEIGHT: 62 IN

## 2024-12-17 DIAGNOSIS — R35.0 URINARY FREQUENCY: ICD-10-CM

## 2024-12-17 DIAGNOSIS — N18.30 BENIGN HYPERTENSION WITH CKD (CHRONIC KIDNEY DISEASE) STAGE III (HCC): ICD-10-CM

## 2024-12-17 DIAGNOSIS — K74.60 LIVER CIRRHOSIS SECONDARY TO NASH (HCC): ICD-10-CM

## 2024-12-17 DIAGNOSIS — E78.5 HYPERLIPIDEMIA, UNSPECIFIED HYPERLIPIDEMIA TYPE: ICD-10-CM

## 2024-12-17 DIAGNOSIS — K64.9 HEMORRHOIDS, UNSPECIFIED HEMORRHOID TYPE: ICD-10-CM

## 2024-12-17 DIAGNOSIS — C20 RECTAL CANCER (HCC): ICD-10-CM

## 2024-12-17 DIAGNOSIS — I12.9 BENIGN HYPERTENSION WITH CKD (CHRONIC KIDNEY DISEASE) STAGE III (HCC): ICD-10-CM

## 2024-12-17 DIAGNOSIS — R53.83 OTHER FATIGUE: ICD-10-CM

## 2024-12-17 DIAGNOSIS — Z00.00 MEDICARE ANNUAL WELLNESS VISIT, SUBSEQUENT: Primary | ICD-10-CM

## 2024-12-17 DIAGNOSIS — K75.81 LIVER CIRRHOSIS SECONDARY TO NASH (HCC): ICD-10-CM

## 2024-12-17 PROCEDURE — G0439 PPPS, SUBSEQ VISIT: HCPCS | Performed by: FAMILY MEDICINE

## 2024-12-17 PROCEDURE — 99214 OFFICE O/P EST MOD 30 MIN: CPT | Performed by: FAMILY MEDICINE

## 2024-12-17 RX ORDER — HYDROCORTISONE 25 MG/G
CREAM TOPICAL 2 TIMES DAILY
Qty: 28 G | Refills: 1 | Status: SHIPPED | OUTPATIENT
Start: 2024-12-17

## 2024-12-17 NOTE — ASSESSMENT & PLAN NOTE
Surgery in September.CEA every 3 to 4 months for 2 years.  She does have follow-up with Dr. Araiza.

## 2024-12-17 NOTE — ASSESSMENT & PLAN NOTE
Patient had been seeing [facility].  She has not been back there since she was diagnosed with rectal cancer.  She does have an appointment coming up with Dr. Love regarding the issues she is having with her bowel movements since her surgery.  Orders:  •  AFP tumor marker; Future

## 2024-12-17 NOTE — PROGRESS NOTES
Name: Martha Sandoval      : 1950      MRN: [id number]  Encounter Provider: Janny Ramirez DO  Encounter Date: 2024   Encounter department: [facility]    Assessment & Plan  Medicare annual wellness visit, subsequent  Questions reviewed       Rectal cancer (HCC)  Surgery in September.CEA every 3 to 4 months for 2 years.  She does have follow-up with Dr. Araiza.       Hyperlipidemia, unspecified hyperlipidemia type  Blood work in August showed cholesterol 153 and LDL of 84.       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  She has not been back there since she was diagnosed with rectal cancer.  She does have an appointment coming up with Dr. Love regarding the issues she is having with her bowel movements since her surgery.  Orders:  •  AFP tumor marker; Future    Benign hypertension with CKD (chronic kidney disease) stage III (HCC)  Lab Results   Component Value Date    EGFR 68 2024    EGFR 46 (L) 2024    EGFR 80 2024    CREATININE 0.89 2024    CREATININE 1.24 (H) 2024    CREATININE 0.74 2024   Patient is to follow-up with nephrology and have blood work.  Orders:  •  PTH, intact; Future  •  Phosphorus; Future    Hemorrhoids, unspecified hemorrhoid type  Patient is having frequent large stools and hemorrhoids are irritated.  Orders:  •  hydrocortisone (ANUSOL-HC) 2.5 % rectal cream; Apply topically 2 (two) times a day      Depression Screening and Follow-up Plan: Patient was screened for depression during today's encounter. They screened negative with a PHQ-2 score of 0.      We did have a long discussion regarding the course of her treatment for rectal cancer, surgery, hospital stay, postop issues and issues with bowel  movements, etc.    Preventive health issues were discussed with patient, and age appropriate screening tests were ordered as noted in patient's After Visit Summary. Personalized health advice and appropriate referrals for health education or preventive services given if needed, as noted in patient's After Visit Summary.    Follow up in six months.    History of Present Illness     Patient is seen for follow up of chronic medical conditions. She had rectal surgery in September for rectal carcinoma. Is losing strength, Can barely go up stairs.  Can only eat rice and chicken because of frequent loose stools.  Has discussed this with surgery.  She has been referred to gastroenterology.  Not sleeping.       Patient Care Team:  Janny Ramirez DO as PCP - General    Review of Systems   Constitutional:  Positive for fatigue. Negative for chills and fever.   HENT:  Negative for congestion and sore throat.    Respiratory:  Negative for chest tightness.    Cardiovascular:  Negative for chest pain and palpitations.   Gastrointestinal:  Positive for abdominal pain. Negative for constipation, diarrhea and nausea.        As in HPI   Genitourinary:  Negative for difficulty urinating.   Skin: Negative.    Neurological:  Negative for dizziness and headaches.   Psychiatric/Behavioral: Negative.       Medical History Reviewed by provider this encounter:  Tobacco  Allergies  Meds  Problems  Med Hx  Surg Hx  Fam Hx       Annual Wellness Visit Questionnaire   Martha is here for her Subsequent Wellness visit. Last Medicare Wellness visit information reviewed, patient interviewed and updates made to the record today.      Health Risk Assessment:   Patient rates overall health as good. Patient feels that their physical health rating is same. Patient is satisfied with their life. Eyesight was rated as same. Hearing was rated as same. Patient feels that their emotional and mental health rating is same. Patients states they are sometimes  angry. Patient states they are sometimes unusually tired/fatigued. Pain experienced in the last 7 days has been none. Patient states that she has experienced weight loss or gain in last 6 months.     Depression Screening:   PHQ-2 Score: 0      Fall Risk Screening:   In the past year, patient has experienced: no history of falling in past year      Urinary Incontinence Screening:   Patient has leaked urine accidently in the last six months.     Home Safety:  Patient has trouble with stairs inside or outside of their home. Patient has working smoke alarms and has working carbon monoxide detector. Home safety hazards include: none.     Nutrition:   Current diet is Regular.     Medications:   Patient is currently taking over-the-counter supplements. OTC medications include: see medication list. Patient is able to manage medications.     Activities of Daily Living (ADLs)/Instrumental Activities of Daily Living (IADLs):   Walk and transfer into and out of bed and chair?: Yes  Dress and groom yourself?: Yes    Bathe or shower yourself?: Yes    Feed yourself? Yes  Do your laundry/housekeeping?: Yes  Manage your money, pay your bills and track your expenses?: Yes  Make your own meals?: Yes    Do your own shopping?: Yes    Previous Hospitalizations:   Any hospitalizations or ED visits within the last 12 months?: Yes    How many hospitalizations have you had in the last year?: 1-2    Advance Care Planning:   Living will: No    Durable POA for healthcare: No    Advanced directive: No    End of Life Decisions reviewed with patient: No      Cognitive Screening:   Provider or family/friend/caregiver concerned regarding cognition?: No    PREVENTIVE SCREENINGS      Cardiovascular Screening:    General: Screening Not Indicated and History Lipid Disorder      Diabetes Screening:     General: Screening Current      Colorectal Cancer Screening:     General: History Colorectal Cancer      Cervical Cancer Screening:    General: Screening  Not Indicated      Lung Cancer Screening:     General: Screening Not Indicated      Hepatitis C Screening:    General: Screening Current      Preventive Screening Comments: Since still recovering from major surgery, wants to hold off on any screening.    Screening, Brief Intervention, and Referral to Treatment (SBIRT)    Screening  Typical number of drinks in a day: 0  Typical number of drinks in a week: 0  Interpretation: Low risk drinking behavior.    AUDIT-C Screenin) How often did you have a drink containing alcohol in the past year? never  2) How many drinks did you have on a typical day when you were drinking in the past year? 0  3) How often did you have 6 or more drinks on one occasion in the past year? never    AUDIT-C Score: 0  Interpretation: Score 0-2 (female): Negative screen for alcohol misuse    Single Item Drug Screening:  How often have you used an illegal drug (including marijuana) or a prescription medication for non-medical reasons in the past year? never    Single Item Drug Screen Score: 0  Interpretation: Negative screen for possible drug use disorder    Brief Intervention  Alcohol & drug use screenings were reviewed. No concerns regarding substance use disorder identified.     Social Drivers of Health     Financial Resource Strain: Low Risk  (2024)    Received from [facility]    Overall Financial Resource Strain (CARDIA)    • Difficulty of Paying Living Expenses: Not hard at all   Food Insecurity: No Food Insecurity (2024)    Hunger Vital Sign    • Worried About Running Out of Food in the Last Year: Never true    • Ran Out of Food in the Last Year: Never true   Transportation Needs: No Transportation Needs (2024)    PRAPARE - Transportation    • Lack of Transportation (Medical): No    • Lack of Transportation (Non-Medical): No   Housing Stability: Low Risk  (2024)    Housing Stability Vital Sign    • Unable to Pay for Housing in the Last Year: No  "   • Number of Times Moved in the Last Year: 0    • Homeless in the Last Year: No   Recent Concern: Housing Stability - High Risk (12/17/2024)    Housing Stability Vital Sign    • Unable to Pay for Housing in the Last Year: Yes    • Number of Times Moved in the Last Year: 0    • Homeless in the Last Year: No   Utilities: Not At Risk (12/17/2024)    [facility] Utilities    • Threatened with loss of utilities: No     No results found.    Objective   /72 (BP Location: Left arm, Patient Position: Sitting, Cuff Size: Adult)   Pulse 67   Temp 97.7 °F (36.5 °C)   Ht 5' 1.5\" (1.562 m)   Wt 93.1 kg (205 lb 3.2 oz)   SpO2 99%   BMI 38.14 kg/m²     Physical Exam  Vitals and nursing note reviewed.   Constitutional:       General: She is not in acute distress.     Appearance: She is well-developed.   HENT:      Head: Normocephalic.   Eyes:      General: No scleral icterus.  Neck:      Thyroid: No thyromegaly.      Vascular: No carotid bruit.   Cardiovascular:      Rate and Rhythm: Normal rate and regular rhythm.      Heart sounds: Normal heart sounds. No murmur heard.  Pulmonary:      Effort: Pulmonary effort is normal.      Breath sounds: Normal breath sounds.   Abdominal:      Comments: Scar, panniculus   Musculoskeletal:      Right lower leg: No edema.      Left lower leg: No edema.   Lymphadenopathy:      Cervical: No cervical adenopathy.   Skin:     General: Skin is warm and dry.   Neurological:      Mental Status: She is alert and oriented to person, place, and time.   Psychiatric:         Mood and Affect: Mood normal.         "

## 2024-12-17 NOTE — ASSESSMENT & PLAN NOTE
Lab Results   Component Value Date    EGFR 68 09/20/2024    EGFR 46 (L) 09/19/2024    EGFR 80 09/17/2024    CREATININE 0.89 09/20/2024    CREATININE 1.24 (H) 09/19/2024    CREATININE 0.74 09/17/2024   Patient is to follow-up with nephrology and have blood work.  Orders:  •  PTH, intact; Future  •  Phosphorus; Future

## 2024-12-17 NOTE — PATIENT INSTRUCTIONS
Medicare Preventive Visit Patient Instructions  Thank you for completing your Welcome to Medicare Visit or Medicare Annual Wellness Visit today. Your next wellness visit will be due in one year (12/18/2025).  The screening/preventive services that you may require over the next 5-10 years are detailed below. Some tests may not apply to you based off risk factors and/or age. Screening tests ordered at today's visit but not completed yet may show as past due. Also, please note that scanned in results may not display below.  Preventive Screenings:  Service Recommendations Previous Testing/Comments   Colorectal Cancer Screening  * Colonoscopy    * Fecal Occult Blood Test (FOBT)/Fecal Immunochemical Test (FIT)  * Fecal DNA/Cologuard Test  * Flexible Sigmoidoscopy Age: 45-75 years old   Colonoscopy: every 10 years (may be performed more frequently if at higher risk)  OR  FOBT/FIT: every 1 year  OR  Cologuard: every 3 years  OR  Sigmoidoscopy: every 5 years  Screening may be recommended earlier than age 45 if at higher risk for colorectal cancer. Also, an individualized decision between you and your healthcare provider will decide whether screening between the ages of 76-85 would be appropriate. Colonoscopy: 07/26/2024  FOBT/FIT: Not on file  Cologuard: Not on file  Sigmoidoscopy: Not on file    History Colorectal Cancer     Breast Cancer Screening Age: 40+ years old  Frequency: every 1-2 years  Not required if history of left and right mastectomy Mammogram: 05/17/2019        Cervical Cancer Screening Between the ages of 21-29, pap smear recommended once every 3 years.   Between the ages of 30-65, can perform pap smear with HPV co-testing every 5 years.   Recommendations may differ for women with a history of total hysterectomy, cervical cancer, or abnormal pap smears in past. Pap Smear: 05/07/2019    Screening Not Indicated   Hepatitis C Screening Once for adults born between 1945 and 1965  More frequently in patients at  high risk for Hepatitis C Hep C Antibody: 10/30/2018    Screening Current   Diabetes Screening 1-2 times per year if you're at risk for diabetes or have pre-diabetes Fasting glucose: No results in last 5 years (No results in last 5 years)  A1C: 6.3 % (8/30/2024)  Screening Current   Cholesterol Screening Once every 5 years if you don't have a lipid disorder. May order more often based on risk factors. Lipid panel: 08/30/2024    Screening Not Indicated  History Lipid Disorder     Other Preventive Screenings Covered by Medicare:  Abdominal Aortic Aneurysm (AAA) Screening: covered once if your at risk. You're considered to be at risk if you have a family history of AAA.  Lung Cancer Screening: covers low dose CT scan once per year if you meet all of the following conditions: (1) Age 55-77; (2) No signs or symptoms of lung cancer; (3) Current smoker or have quit smoking within the last 15 years; (4) You have a tobacco smoking history of at least 20 pack years (packs per day multiplied by number of years you smoked); (5) You get a written order from a healthcare provider.  Glaucoma Screening: covered annually if you're considered high risk: (1) You have diabetes OR (2) Family history of glaucoma OR (3)  aged 50 and older OR (4)  American aged 65 and older  Osteoporosis Screening: covered every 2 years if you meet one of the following conditions: (1) You're estrogen deficient and at risk for osteoporosis based off medical history and other findings; (2) Have a vertebral abnormality; (3) On glucocorticoid therapy for more than 3 months; (4) Have primary hyperparathyroidism; (5) On osteoporosis medications and need to assess response to drug therapy.   Last bone density test (DXA Scan): Not on file.  HIV Screening: covered annually if you're between the age of 15-65. Also covered annually if you are younger than 15 and older than 65 with risk factors for HIV infection. For pregnant patients, it is  covered up to 3 times per pregnancy.    Immunizations:  Immunization Recommendations   Influenza Vaccine Annual influenza vaccination during flu season is recommended for all persons aged >= 6 months who do not have contraindications   Pneumococcal Vaccine   * Pneumococcal conjugate vaccine = PCV13 (Prevnar 13), PCV15 (Vaxneuvance), PCV20 (Prevnar 20)  * Pneumococcal polysaccharide vaccine = PPSV23 (Pneumovax) Adults 19-65 yo with certain risk factors or if 65+ yo  If never received any pneumonia vaccine: recommend Prevnar 20 (PCV20)  Give PCV20 if previously received 1 dose of PCV13 or PPSV23   Hepatitis B Vaccine 3 dose series if at intermediate or high risk (ex: diabetes, end stage renal disease, liver disease)   Respiratory syncytial virus (RSV) Vaccine - COVERED BY MEDICARE PART D  * RSVPreF3 (Arexvy) CDC recommends that adults 60 years of age and older may receive a single dose of RSV vaccine using shared clinical decision-making (SCDM)   Tetanus (Td) Vaccine - COST NOT COVERED BY MEDICARE PART B Following completion of primary series, a booster dose should be given every 10 years to maintain immunity against tetanus. Td may also be given as tetanus wound prophylaxis.   Tdap Vaccine - COST NOT COVERED BY MEDICARE PART B Recommended at least once for all adults. For pregnant patients, recommended with each pregnancy.   Shingles Vaccine (Shingrix) - COST NOT COVERED BY MEDICARE PART B  2 shot series recommended in those 19 years and older who have or will have weakened immune systems or those 50 years and older     Health Maintenance Due:      Topic Date Due   • Breast Cancer Screening: Mammogram  05/17/2020   • Cervical Cancer Screening  05/07/2021   • Hepatitis C Screening  Completed   • Colorectal Cancer Screening  Discontinued     Immunizations Due:  There are no preventive care reminders to display for this patient.  Advance Directives   What are advance directives?  Advance directives are legal documents  that state your wishes and plans for medical care. These plans are made ahead of time in case you lose your ability to make decisions for yourself. Advance directives can apply to any medical decision, such as the treatments you want, and if you want to donate organs.   What are the types of advance directives?  There are many types of advance directives, and each state has rules about how to use them. You may choose a combination of any of the following:  Living will:  This is a written record of the treatment you want. You can also choose which treatments you do not want, which to limit, and which to stop at a certain time. This includes surgery, medicine, IV fluid, and tube feedings.   Durable power of  for healthcare (DPAHC):  This is a written record that states who you want to make healthcare choices for you when you are unable to make them for yourself. This person, called a proxy, is usually a family member or a friend. You may choose more than 1 proxy.  Do not resuscitate (DNR) order:  A DNR order is used in case your heart stops beating or you stop breathing. It is a request not to have certain forms of treatment, such as CPR. A DNR order may be included in other types of advance directives.  Medical directive:  This covers the care that you want if you are in a coma, near death, or unable to make decisions for yourself. You can list the treatments you want for each condition. Treatment may include pain medicine, surgery, blood transfusions, dialysis, IV or tube feedings, and a ventilator (breathing machine).  Values history:  This document has questions about your views, beliefs, and how you feel and think about life. This information can help others choose the care that you would choose.  Why are advance directives important?  An advance directive helps you control your care. Although spoken wishes may be used, it is better to have your wishes written down. Spoken wishes can be misunderstood, or  not followed. Treatments may be given even if you do not want them. An advance directive may make it easier for your family to make difficult choices about your care.   Urinary Incontinence   Urinary incontinence (UI)  is when you lose control of your bladder. UI develops because your bladder cannot store or empty urine properly. The 3 most common types of UI are stress incontinence, urge incontinence, or both.  Medicines:   May be given to help strengthen your bladder control. Report any side effects of medication to your healthcare provider.  Do pelvic muscle exercises often:  Your pelvic muscles help you stop urinating. Squeeze these muscles tight for 5 seconds, then relax for 5 seconds. Gradually work up to squeezing for 10 seconds. Do 3 sets of 15 repetitions a day, or as directed. This will help strengthen your pelvic muscles and improve bladder control.  Train your bladder:  Go to the bathroom at set times, such as every 2 hours, even if you do not feel the urge to go. You can also try to hold your urine when you feel the urge to go. For example, hold your urine for 5 minutes when you feel the urge to go. As that becomes easier, hold your urine for 10 minutes.   Self-care:   Keep a UI record.  Write down how often you leak urine and how much you leak. Make a note of what you were doing when you leaked urine.  Drink liquids as directed. You may need to limit the amount of liquid you drink to help control your urine leakage. Do not drink any liquid right before you go to bed. Limit or do not have drinks that contain caffeine or alcohol.   Prevent constipation.  Eat a variety of high-fiber foods. Good examples are high-fiber cereals, beans, vegetables, and whole-grain breads. Walking is the best way to trigger your intestines to have a bowel movement.  Exercise regularly and maintain a healthy weight.  Weight loss and exercise will decrease pressure on your bladder and help you control your leakage.   Use a  catheter as directed  to help empty your bladder. A catheter is a tiny, plastic tube that is put into your bladder to drain your urine.   Go to behavior therapy as directed.  Behavior therapy may be used to help you learn to control your urge to urinate.    Weight Management   Why it is important to manage your weight:  Being overweight increases your risk of health conditions such as heart disease, high blood pressure, type 2 diabetes, and certain types of cancer. It can also increase your risk for osteoarthritis, sleep apnea, and other respiratory problems. Aim for a slow, steady weight loss. Even a small amount of weight loss can lower your risk of health problems.  How to lose weight safely:  A safe and healthy way to lose weight is to eat fewer calories and get regular exercise. You can lose up about 1 pound a week by decreasing the number of calories you eat by 500 calories each day.   Healthy meal plan for weight management:  A healthy meal plan includes a variety of foods, contains fewer calories, and helps you stay healthy. A healthy meal plan includes the following:  Eat whole-grain foods more often.  A healthy meal plan should contain fiber. Fiber is the part of grains, fruits, and vegetables that is not broken down by your body. Whole-grain foods are healthy and provide extra fiber in your diet. Some examples of whole-grain foods are whole-wheat breads and pastas, oatmeal, brown rice, and bulgur.  Eat a variety of vegetables every day.  Include dark, leafy greens such as spinach, kale, sb greens, and mustard greens. Eat yellow and orange vegetables such as carrots, sweet potatoes, and winter squash.   Eat a variety of fruits every day.  Choose fresh or canned fruit (canned in its own juice or light syrup) instead of juice. Fruit juice has very little or no fiber.  Eat low-fat dairy foods.  Drink fat-free (skim) milk or 1% milk. Eat fat-free yogurt and low-fat cottage cheese. Try low-fat cheeses such  as mozzarella and other reduced-fat cheeses.  Choose meat and other protein foods that are low in fat.  Choose beans or other legumes such as split peas or lentils. Choose fish, skinless poultry (chicken or turkey), or lean cuts of red meat (beef or pork). Before you cook meat or poultry, cut off any visible fat.   Use less fat and oil.  Try baking foods instead of frying them. Add less fat, such as margarine, sour cream, regular salad dressing and mayonnaise to foods. Eat fewer high-fat foods. Some examples of high-fat foods include french fries, doughnuts, ice cream, and cakes.  Eat fewer sweets.  Limit foods and drinks that are high in sugar. This includes candy, cookies, regular soda, and sweetened drinks.  Exercise:  Exercise at least 30 minutes per day on most days of the week. Some examples of exercise include walking, biking, dancing, and swimming. You can also fit in more physical activity by taking the stairs instead of the elevator or parking farther away from stores. Ask your healthcare provider about the best exercise plan for you.      © Copyright [employer] 2018 Information is for End User's use only and may not be sold, redistributed or otherwise used for commercial purposes. All illustrations and images included in CareNotes® are the copyrighted property of [employer] or [employer]

## 2024-12-20 LAB
AFP-TM SERPL-MCNC: 10.1 NG/ML
ALBUMIN SERPL-MCNC: 3.8 G/DL (ref 3.5–5.7)
ALP SERPL-CCNC: 67 U/L (ref 35–120)
ALT SERPL-CCNC: 31 U/L
ANION GAP SERPL CALCULATED.3IONS-SCNC: 10 MMOL/L (ref 3–11)
ANISOCYTOSIS BLD QL SMEAR: SLIGHT
AST SERPL-CCNC: 41 U/L
BACTERIA URNS QL MICRO: ABNORMAL
BASOPHILS # BLD AUTO: 0 THOU/CMM (ref 0–0.1)
BASOPHILS NFR BLD AUTO: 1 %
BILIRUB SERPL-MCNC: 0.5 MG/DL (ref 0.2–1)
BUN SERPL-MCNC: 19 MG/DL (ref 7–25)
CALCIUM SERPL-MCNC: 9.1 MG/DL (ref 8.5–10.5)
CAOX CRY #/AREA URNS HPF: PRESENT /[HPF]
CHLORIDE SERPL-SCNC: 102 MMOL/L (ref 100–109)
CO2 SERPL-SCNC: 27 MMOL/L (ref 21–31)
CREAT SERPL-MCNC: 0.96 MG/DL (ref 0.4–1.1)
CYTOLOGY CMNT CVX/VAG CYTO-IMP: ABNORMAL
DIFFERENTIAL METHOD BLD: ABNORMAL
EOSINOPHIL # BLD AUTO: 0.1 THOU/CMM (ref 0–0.5)
EOSINOPHIL NFR BLD AUTO: 2 %
ERYTHROCYTE [DISTWIDTH] IN BLOOD BY AUTOMATED COUNT: 20.6 % (ref 12–16)
GFR/BSA.PRED SERPLBLD CYS-BASED-ARV: 62 ML/MIN/{1.73_M2}
GLUCOSE SERPL-MCNC: 115 MG/DL (ref 65–99)
GLUCOSE UR QL STRIP: NEGATIVE MG/DL
HCT VFR BLD AUTO: 25.8 % (ref 35–43)
HGB BLD-MCNC: 8.1 G/DL (ref 11.5–14.5)
HGB UR QL STRIP: NEGATIVE MG/DL
HYALINE CASTS URNS QL MICRO: ABNORMAL /LPF (ref 0–2)
KETONES UR QL STRIP: NEGATIVE MG/DL
LEUKOCYTE ESTERASE UR QL STRIP: 25 /UL
LYMPHOCYTES # BLD AUTO: 0.5 THOU/CMM (ref 1–3)
LYMPHOCYTES NFR BLD AUTO: 16 %
MCH RBC QN AUTO: 21.5 PG (ref 26–34)
MCHC RBC AUTO-ENTMCNC: 31.2 G/DL (ref 32–37)
MCV RBC AUTO: 69 FL (ref 80–100)
MICROCYTES BLD QL SMEAR: SLIGHT
MONOCYTES # BLD AUTO: 0.3 THOU/CMM (ref 0.3–1)
MONOCYTES NFR BLD AUTO: 8 %
MORPHOLOGY BLD-IMP: ABNORMAL
MUCOUS THREADS URNS QL MICRO: ABNORMAL
NEUTROPHILS # BLD AUTO: 2.2 THOU/CMM (ref 1.8–7.8)
NEUTROPHILS NFR BLD AUTO: 73 %
NITRITE UR QL STRIP: NEGATIVE
OVALOCYTES BLD QL SMEAR: SLIGHT
PH UR: 5 [PH] (ref 4.5–8)
PHOSPHATE SERPL-MCNC: 3.5 MG/DL (ref 2.3–4.6)
PLATELET # BLD AUTO: 163 THOU/CMM (ref 140–350)
PMV BLD REES-ECKER: 8.5 FL (ref 7.5–11.3)
POIKILOCYTOSIS BLD QL SMEAR: SLIGHT
POLYCHROMASIA BLD QL SMEAR: SLIGHT
POTASSIUM SERPL-SCNC: 3.8 MMOL/L (ref 3.5–5.2)
PROT 24H UR-MRATE: NEGATIVE MG/DL
PROT SERPL-MCNC: 7.1 G/DL (ref 6.3–8.3)
PTH-INTACT SERPL-MCNC: 44 PG/ML (ref 12–88)
RBC # BLD AUTO: 3.74 MILL/CMM (ref 3.7–4.7)
RBC #/AREA URNS HPF: ABNORMAL /HPF (ref 0–2)
SL AMB POCT URINE COMMENT: ABNORMAL
SODIUM SERPL-SCNC: 139 MMOL/L (ref 135–145)
SP GR UR: 1.02 (ref 1–1.03)
SPHEROCYTES BLD QL SMEAR: SLIGHT
SQUAMOUS #/AREA URNS HPF: >10 /LPF (ref 0–5)
WBC # BLD AUTO: 3.1 THOU/CMM (ref 4–10)
WBC #/AREA URNS HPF: ABNORMAL /HPF (ref 0–5)
WBC TOXIC VACUOLES BLD QL SMEAR: ABNORMAL

## 2024-12-21 LAB — LEGIONELLA SPEC CULT: NORMAL

## 2024-12-31 ENCOUNTER — RESULTS FOLLOW-UP (OUTPATIENT)
Dept: FAMILY MEDICINE CLINIC | Facility: CLINIC | Age: 74
End: 2024-12-31

## 2024-12-31 DIAGNOSIS — D53.9 NUTRITIONAL ANEMIA, UNSPECIFIED: ICD-10-CM

## 2024-12-31 DIAGNOSIS — D64.9 ANEMIA, UNSPECIFIED TYPE: Primary | ICD-10-CM

## 2025-02-03 ENCOUNTER — APPOINTMENT (OUTPATIENT)
Dept: LAB | Facility: MEDICAL CENTER | Age: 75
End: 2025-02-03
Payer: COMMERCIAL

## 2025-02-03 ENCOUNTER — OFFICE VISIT (OUTPATIENT)
Dept: GASTROENTEROLOGY | Facility: MEDICAL CENTER | Age: 75
End: 2025-02-03
Payer: COMMERCIAL

## 2025-02-03 VITALS
WEIGHT: 197.8 LBS | DIASTOLIC BLOOD PRESSURE: 72 MMHG | HEART RATE: 70 BPM | SYSTOLIC BLOOD PRESSURE: 126 MMHG | BODY MASS INDEX: 36.77 KG/M2 | TEMPERATURE: 97.6 F

## 2025-02-03 DIAGNOSIS — R19.4 FREQUENT BOWEL MOVEMENTS: ICD-10-CM

## 2025-02-03 DIAGNOSIS — K75.81 LIVER CIRRHOSIS SECONDARY TO NASH (HCC): ICD-10-CM

## 2025-02-03 DIAGNOSIS — R77.2 ELEVATED AFP: ICD-10-CM

## 2025-02-03 DIAGNOSIS — K74.60 LIVER CIRRHOSIS SECONDARY TO NASH (HCC): ICD-10-CM

## 2025-02-03 DIAGNOSIS — R15.2 RECTAL URGENCY: Primary | ICD-10-CM

## 2025-02-03 DIAGNOSIS — C20 RECTAL CANCER (HCC): ICD-10-CM

## 2025-02-03 PROCEDURE — 36415 COLL VENOUS BLD VENIPUNCTURE: CPT | Performed by: INTERNAL MEDICINE

## 2025-02-03 PROCEDURE — 99214 OFFICE O/P EST MOD 30 MIN: CPT | Performed by: INTERNAL MEDICINE

## 2025-02-03 PROCEDURE — 86364 TISS TRNSGLTMNASE EA IG CLAS: CPT | Performed by: INTERNAL MEDICINE

## 2025-02-03 PROCEDURE — 82784 ASSAY IGA/IGD/IGG/IGM EACH: CPT | Performed by: INTERNAL MEDICINE

## 2025-02-03 NOTE — ASSESSMENT & PLAN NOTE
She has a history of rectal cancer and underwent LAR in September 2024.  She was recommended repeat colonoscopy in 1 year and is scheduled for follow-up with colorectal surgery this month.

## 2025-02-03 NOTE — ASSESSMENT & PLAN NOTE
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  Ascites: She has a history of ascites in the past previously requiring paracentesis and is currently on Lasix 40 mg and spironolactone 100 mg daily.  Continue diuretic regimen in addition to low-sodium diet  Hepatic encephalopathy: No history  HCC screening: Her recent AFP was elevated at 10.  I have ordered up-to-date triphasic CT (patient prefers CT over MRI) for further workup  Next      Orders:    CT abdomen pelvis w contrast; Future

## 2025-02-03 NOTE — PROGRESS NOTES
"Name: Martha Sandoval      : 1950      MRN: [id number]  Encounter Provider: Diana M Jaiyeola, MD  Encounter Date: 2/3/2025   Encounter department: [facility] [place]  :  Assessment & Plan  Rectal urgency  She reports a longstanding symptom of rectal urgency and frequent bowel movements.  She denies diarrhea and states the stools soft and formed.  We discussed that her symptoms may be related to IBS.  I discussed treatment includes antispasmodic, diet elimination.  She has an allergy listed for dicyclomine as \"hives and GI intolerance\".  She does not recall this reaction to dicyclomine in the past.  I recommend trialing Levsin 3 times a day prior to meals and at bedtime as needed for abdominal spasm/functional component to her symptoms.  I recommend transitioning to Metamucil for fiber supplementation.  I have ordered fecal calprotectin and celiac testing to complete her workup.  She may benefit from the low FODMAP diet and/or pelvic floor physical therapy in the future for likely pelvic floor dysfunction contributing to her symptoms.  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  Ascites: She has a history of ascites in the past previously requiring paracentesis and is currently on Lasix 40 mg and spironolactone 100 mg daily.  Continue diuretic regimen in addition to low-sodium " diet  Hepatic encephalopathy: No history  HCC screening: Her recent AFP was elevated at 10.  I have ordered up-to-date triphasic CT (patient prefers CT over MRI) for further workup  Next      Orders:    CT abdomen pelvis w contrast; Future    Elevated AFP    Orders:    CT abdomen pelvis w contrast; Future    Rectal cancer (HCC)  She has a history of rectal cancer and underwent LAR in September 2024.  She was recommended repeat colonoscopy in 1 year and is scheduled for follow-up with colorectal surgery this month.         Follow-up in 2 to 3 months.  History of Present Illness   HPI  Martha Sandoval is a 74 y.o. female who presents for follow-up evaluation.  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    She was last seen in the hepatology office in August 2024.  She had been diagnosed with rectal cancer in the summer 2024 and underwent LAR in September 2024.  At her hepatology office visit she was thought to have cirrhosis secondary to MASH given her otherwise negative workup.    Interval history: She underwent low anterior resection September 2024 with pathology showing moderately differentiated adenocarcinoma arising in a tubular adenoma, negative lymph nodes and margins negative for carcinoma.    She reports prior to her LAR she had frequent bowel movements 7-10 times per day.  More recently she has been having 10-24 bowel movements per day associated with rectal urgency.  She denies diarrhea and reports soft and formed stool.  At times she has a sensation like she has to have a bowel movement will have no evacuation of stool.  She denies rectal bleeding or mucus in her stool.  At times she can have lower abdominal cramping discomfort.  Stress and anxiety can make her symptoms worse.  She was using Benefiber and Imodium with little to no relief    Her past surgical history includes hysterectomy.  She reports 2 vaginal deliveries, none of which were  complicated.    History of gastrointestinal disease    2024 liver enzymes are within normal limits other than AST 41, hemoglobin 8.1, platelets 163  Esophageal varices: She has no prior EGD   Ascites: She has a reported history of ascites in the past and underwent paracentesis in  and is currently maintained on Lasix 40 mg and Aldactone 100 mg.    Hepatic encephalopathy: No history  HCC screenin2024 AFP 10.1, 2024 CT abdomen pelvis with hepatic cirrhosis, possible enteritis      2024 colonoscopy showed malignant rectal mass with pathology showing moderately differentiated adenocarcinoma.  Multiple colon polyps removed were consistent with tubular adenoma.    2024 colonoscopy showed 10 mm descending colon polyp and 30 mm rectal polyp.  Polyp pathology showed tubular adenoma and rectal pathology showed adenocarcinoma.        Review of Systems   Constitutional:  Negative for chills and fever.   HENT:  Negative for ear pain and sore throat.    Eyes:  Negative for pain and visual disturbance.   Respiratory:  Negative for cough and shortness of breath.    Cardiovascular:  Negative for chest pain and palpitations.   Gastrointestinal:  Positive for abdominal pain and rectal pain. Negative for vomiting.   Genitourinary:  Negative for dysuria and hematuria.   Musculoskeletal:  Negative for arthralgias and back pain.   Skin:  Negative for color change and rash.   Neurological:  Negative for seizures and syncope.   All other systems reviewed and are negative.         Objective   /72   Pulse 70   Temp 97.6 °F (36.4 °C)   Wt 89.7 kg (197 lb 12.8 oz)   BMI 36.77 kg/m²      Physical Exam  Vitals and nursing note reviewed.   Constitutional:       General: She is not in acute distress.     Appearance: She is well-developed.   HENT:      Head: Normocephalic and atraumatic.   Eyes:      Conjunctiva/sclera: Conjunctivae normal.   Cardiovascular:      Rate and Rhythm: Normal rate and regular rhythm.       Heart sounds: No murmur heard.  Pulmonary:      Effort: Pulmonary effort is normal. No respiratory distress.      Breath sounds: Normal breath sounds.   Abdominal:      Palpations: Abdomen is soft.      Tenderness: There is no abdominal tenderness.   Musculoskeletal:         General: No swelling.      Cervical back: Neck supple.   Skin:     General: Skin is warm and dry.      Capillary Refill: Capillary refill takes less than 2 seconds.   Neurological:      Mental Status: She is alert.   Psychiatric:         Mood and Affect: Mood normal.

## 2025-02-04 ENCOUNTER — NURSE TRIAGE (OUTPATIENT)
Age: 75
End: 2025-02-04

## 2025-02-04 ENCOUNTER — APPOINTMENT (OUTPATIENT)
Dept: LAB | Facility: MEDICAL CENTER | Age: 75
End: 2025-02-04
Payer: COMMERCIAL

## 2025-02-04 ENCOUNTER — RESULTS FOLLOW-UP (OUTPATIENT)
Dept: GASTROENTEROLOGY | Facility: MEDICAL CENTER | Age: 75
End: 2025-02-04

## 2025-02-04 DIAGNOSIS — N18.30 BENIGN HYPERTENSION WITH CKD (CHRONIC KIDNEY DISEASE) STAGE III (HCC): ICD-10-CM

## 2025-02-04 DIAGNOSIS — N18.31 STAGE 3A CHRONIC KIDNEY DISEASE (HCC): ICD-10-CM

## 2025-02-04 DIAGNOSIS — D64.9 ANEMIA, UNSPECIFIED TYPE: ICD-10-CM

## 2025-02-04 DIAGNOSIS — I12.9 BENIGN HYPERTENSION WITH CKD (CHRONIC KIDNEY DISEASE) STAGE III (HCC): ICD-10-CM

## 2025-02-04 LAB
IGA SERPL-MCNC: 279 MG/DL (ref 66–433)
TTG IGA SER IA-ACNC: 0.8 U/ML (ref ?–10)

## 2025-02-04 PROCEDURE — 83993 ASSAY FOR CALPROTECTIN FECAL: CPT | Performed by: INTERNAL MEDICINE

## 2025-02-04 NOTE — TELEPHONE ENCOUNTER
Pt. Called back , pt. Is asking is DICYCLOverine  the same as Levsin?  Pt. Saying her insurance will not cover Levsin, it's not FDA approved and she must pay $27, the DICYCLOverine  is covered by insurance and FDA approved and she wants to know does the DICYCLOverine fall under the same class as DICYCLOmine , pt. Is concerned if she will have a rash like she did with bentyl , but it's a medication her insurance will cover, please advise     Not 2 similar spelled medications, I am unfamiliar with Dicycloverine but pt. Found online

## 2025-02-04 NOTE — TELEPHONE ENCOUNTER
Spoke with pt, reviewed provider note. She will think about what she would like to do in regard to medication and call tomorrow

## 2025-02-04 NOTE — TELEPHONE ENCOUNTER
Called the patient and left a VM indicating we are reaching out with a return message from Dr. Jaiyeola in regards to her questions from the call earlier with the nurse. Asked the patient to call us at the office at their convenience so we can relay her message. Provided office callback number.

## 2025-02-04 NOTE — TELEPHONE ENCOUNTER
"SPOKE WITH PT, SEEN IN OFFICE YESTERDAY. PT PRESCRIBED HYOSCYAMINE, CONCERNED BECAUSE PHARMACIST TOLD HER THIS MEDICATION IS NOT FDA APPROVED, AND MAY NOT WORK. WANTS TO KNOW IF DICYCLOVERINE IS THE SAME AS DICYCLOMINE? PT IS REQUESTING AN ALTERNATIVE. THANK YOU.       Answer Assessment - Initial Assessment Questions  1. REASON FOR CALL: \"What is the main reason for your call?\" or \"How can I best help you?\"      SPOKE WITH PT, SEEN IN OFFICE YESTERDAY. PT PRESCRIBED HYOSCYAMINE, CONCERNED BECAUSE PHARMACIST TOLD HER THIS MEDICATION IS NOT FDA APPROVED, AND MAY NOT WORK. WANTS TO KNOW IF DICYCLOVERINE IS THE SAME AS DICYCLOMINE? PT IS REQUESTING AN ALTERNATIVE. THANK YOU.    Protocols used: Information Only Call - No Triage-Adult-[place]    "

## 2025-02-05 DIAGNOSIS — R19.4 FREQUENT BOWEL MOVEMENTS: ICD-10-CM

## 2025-02-05 DIAGNOSIS — R19.5 ELEVATED FECAL CALPROTECTIN: Primary | ICD-10-CM

## 2025-02-05 LAB — CALPROTECTIN STL-MCNC: 120 ΜG/G

## 2025-02-05 NOTE — TELEPHONE ENCOUNTER
Called and spoke with the patient. Patient verbalized understanding of the result report and was agreeable to trying the current medication she has and repeating the stool test in 6-8 weeks. Patient expressed further confusion about why the pharmacy charged more due to the medication not being FDA approved but will report how she does. Patient thanked us.

## 2025-02-05 NOTE — TELEPHONE ENCOUNTER
----- Message from Diana Jaiyeola, MD sent at 2/5/2025 10:39 AM EST -----  Please call the patient with the results    Please let her know that her stool inflammation test is borderline elevated.  This test is not specific and sometimes can be related to recent infections, medication or autoimmune disorders.  I recommend that she continue the medication management as we discussed in the office visit and we will repeat her stool testing in 6 to 8 weeks.  If she continues to have symptoms and/or the stool inflammation test remains elevated she may require a sooner colonoscopy for further workup.

## 2025-02-05 NOTE — RESULT ENCOUNTER NOTE
Please call the patient with the results    Please let her know that her stool inflammation test is borderline elevated.  This test is not specific and sometimes can be related to recent infections, medication or autoimmune disorders.  I recommend that she continue the medication management as we discussed in the office visit and we will repeat her stool testing in 6 to 8 weeks.  If she continues to have symptoms and/or the stool inflammation test remains elevated she may require a sooner colonoscopy for further workup.

## 2025-02-18 NOTE — PROGRESS NOTES
"Colon and Rectal Surgery   Martha Sandoval 74 y.o. female MRN [id number]  Encounter: [id number]  02/19/25 3:20 PM            Assessment: Martha Sandoval is a 74 y.o. female who had rectal cancer.      Plan:   Rectal cancer (HCC)  The patient is doing well except for some GI symptoms that are functional in nature.  She has increased flatulence, some left pelvic floor discomfort, and irregular bowel habits.    I recommend she consider pelvic floor physical therapy.  She had already discussed this with Dr. Jaiyeola.  They can plan further if necessary.    I recommend high fiber intake and exercise.  She has not been advancing her exercise but does walk.    She does have a CAT scan ordered by Dr. Jaiyeola for evaluation of the pain.    I will see her again in 4 months.      Subjective     HPI    Martha Sandoval is a 74 y.o. female who presents for surveillance of stage l rectal cancer.     The patient notes rectal cramping and increase flatulence. Having around 6 formed bowel movements per day. The patient notes occasional episodes of fecal incontinence. Taking Levsin.     Last seen in the office on 10/23/2024.     The patient is status post laparoscopic, hand assisted low anterior colon resection, converted to hybrid open, laparoscopic mobilization of the splenic flexure on 9/10/2024.     Last CTA abdomen and pelvis on 9/19/2024.       No results found for: \"CEA\"    Historical Information   Past Medical History:   Diagnosis Date    Cancer (HCC)     Chest pain     last assessed 9/16/13    Chest pressure     last assessed 12/15/16    Chronic sinusitis     last assessed 5/4/17    Clotting disorder (HCC)     Compression fracture of lumbar vertebra (HCC)     last assessed 6/13/15    GERD (gastroesophageal reflux disease)     Hypertension     Liver disease     Lump of skin     last assessed 1/17/14     Past Surgical History:   Procedure Laterality Date    COLONOSCOPY      CT CYSTOGRAM  9/11/2024    CT CYSTOGRAM  10/2/2024    " CYSTOSCOPY N/A 9/10/2024    Procedure: CYSTOSCOPY, COMPLEX CYSTORRHAPHY, B/L URETHRAL CATETERIZATION;  Surgeon: Ismael Mazariegos MD;  Location: [facility];  Service: Urology    HYSTERECTOMY      VT LAPAROSCOPY COLECTOMY PARTIAL W/ANASTOMOSIS N/A 9/10/2024    Procedure: RESECTION COLON LOW ANTERIOR LAPAROSCOPIC HAND ASSISTED CONVERTED TO OPEN;  Surgeon: BRENDA Boone MD;  Location: [facility];  Service: Colorectal    TONSILLECTOMY         Meds/Allergies       Current Outpatient Medications:     Alum Hydroxide-Mag Carbonate (GAVISCON PO), Take by mouth in the morning, Disp: , Rfl:     aspirin (ECOTRIN LOW STRENGTH) 81 mg EC tablet, Take 81 mg by mouth daily, Disp: , Rfl:     cetirizine (ZyrTEC) 10 mg tablet, Take 1 tablet by mouth daily as needed, Disp: , Rfl:     cholecalciferol (VITAMIN D3) 400 units tablet, Take 400 Units by mouth daily, Disp: , Rfl:     cyanocobalamin (VITAMIN B-12) 100 MCG tablet, Take 100 mcg by mouth daily, Disp: , Rfl:     Enalapril Maleate POWD, , Disp: , Rfl:     furosemide (LASIX) 20 mg tablet, Take 2 tablets (40 mg total) by mouth daily, Disp: 180 tablet, Rfl: 1    guaiFENesin (MUCINEX) 600 mg 12 hr tablet, Take 600 mg by mouth every 12 (twelve) hours, Disp: , Rfl:     hydrocortisone (ANUSOL-HC) 2.5 % rectal cream, Apply topically 2 (two) times a day, Disp: 28 g, Rfl: 1    hyoscyamine (LEVSIN/SL) 0.125 mg SL tablet, Take 1 tablet (0.125 mg total) by mouth every 4 (four) hours as needed for cramping, Disp: 120 tablet, Rfl: 0    methocarbamol (ROBAXIN) 500 mg tablet, Take 1 tablet (500 mg total) by mouth every 8 (eight) hours for 7 days, Disp: 21 tablet, Rfl: 0    metoprolol tartrate (LOPRESSOR) 100 mg tablet, Take 1 tablet (100 mg total) by mouth daily, Disp: 90 tablet, Rfl: 1    omeprazole (PriLOSEC) 20 mg delayed release capsule, Take 1 capsule (20 mg total) by mouth daily, Disp: 90 capsule, Rfl: 1    psyllium (METAMUCIL) 58.6 % powder, Take 1 packet by mouth 3 (three) times a day,  "Disp: 90 packet, Rfl: 3    Pyridoxine HCl (VITAMIN B-6) 100 MG TABS, Take 1 tablet by mouth daily, Disp: , Rfl:     spironolactone (ALDACTONE) 50 mg tablet, Take 2 tablets (100 mg total) by mouth daily, Disp: 180 tablet, Rfl: 1    ursodiol (ACTIGALL) 300 mg capsule, Take 1 capsule (300 mg total) by mouth 2 (two) times a day, Disp: 180 capsule, Rfl: 3    valsartan (DIOVAN) 160 mg tablet, Take 1 tablet (160 mg total) by mouth daily, Disp: 90 tablet, Rfl: 1    Vitamin E 200 units TABS, Take 1 tablet by mouth daily, Disp: , Rfl:     acetaminophen (TYLENOL) 325 mg tablet, Take 3 tablets (975 mg total) by mouth every 8 (eight) hours (Patient not taking: Reported on 2/19/2025), Disp: , Rfl:   Allergies   Allergen Reactions    Azithromycin GI Intolerance    Colcrys [Colchicine] Diarrhea    Dicyclomine GI Intolerance, Hives and Other (See Comments)    Erythromycin GI Intolerance    Naproxen Edema    Sulfa Antibiotics Hives    Nitroglycerin Rash    Sulfamethoxazole-Trimethoprim Rash       Social History   Social History     Substance and Sexual Activity   Drug Use Never     Social History     Tobacco Use   Smoking Status Never   Smokeless Tobacco Never         Family History   Problem Relation Age of Onset    Hyperlipidemia Mother     Hypertension Mother     Cancer Father     Liver cancer Father     Stomach cancer Father     No Known Problems Son     No Known Problems Daughter          Review of Systems   Constitutional: Negative.    Gastrointestinal: Negative.        Objective   Current Vitals:  Vitals:    02/19/25 1448   Weight: 90.3 kg (199 lb)   Height: 5' 1.5\" (1.562 m)         Physical Exam  Constitutional:       Appearance: Normal appearance.   Abdominal:      General: Abdomen is flat.      Palpations: Abdomen is soft. There is no mass.      Tenderness: There is no abdominal tenderness. There is no guarding.   Neurological:      General: No focal deficit present.      Mental Status: She is alert and oriented to person, " place, and time.

## 2025-02-19 ENCOUNTER — OFFICE VISIT (OUTPATIENT)
Age: 75
End: 2025-02-19
Payer: COMMERCIAL

## 2025-02-19 VITALS — BODY MASS INDEX: 36.62 KG/M2 | WEIGHT: 199 LBS | HEIGHT: 62 IN

## 2025-02-19 DIAGNOSIS — C20 RECTAL CANCER (HCC): Primary | ICD-10-CM

## 2025-02-19 PROCEDURE — 99213 OFFICE O/P EST LOW 20 MIN: CPT | Performed by: COLON & RECTAL SURGERY

## 2025-02-19 RX ORDER — ENALAPRIL MALEATE 100 %
POWDER (GRAM) MISCELLANEOUS
COMMUNITY

## 2025-02-19 NOTE — ASSESSMENT & PLAN NOTE
The patient is doing well except for some GI symptoms that are functional in nature.  She has increased flatulence, some left pelvic floor discomfort, and irregular bowel habits.    I recommend she consider pelvic floor physical therapy.  She had already discussed this with Dr. Jaiyeola.  They can plan further if necessary.    I recommend high fiber intake and exercise.  She has not been advancing her exercise but does walk.    She does have a CAT scan ordered by Dr. Jaiyeola for evaluation of the pain.    I will see her again in 4 months.

## 2025-02-26 ENCOUNTER — NURSE TRIAGE (OUTPATIENT)
Age: 75
End: 2025-02-26

## 2025-02-26 NOTE — TELEPHONE ENCOUNTER
Spoke with pt, reviewed provider recommendations. Sent Lowfodmap diet to [facility]. Pt will try beano and if she would like to continue Levsin, she will call for a refill.

## 2025-02-26 NOTE — TELEPHONE ENCOUNTER
We tend to use levsin and bentyl as our preferred medications that can be prescribed for those symptoms. Otherwise, we recommend IBGard. Ibgard tends to range about $20 and usually has coupons if she asks the pharmacist. If this is too costly, then we would recommend OTC beano which is cheaper ( can be around $5 at [facility] and can always ask for coupons, as well).  If she uses beano, would recommend she takes this with meals. Thank you

## 2025-02-26 NOTE — TELEPHONE ENCOUNTER
Spoke with pt, she does not feel an otc medication will help and it is costly. She is asking for additional recommendations/medication

## 2025-02-26 NOTE — TELEPHONE ENCOUNTER
"FOLLOW UP: 5/7/25   REASON FOR CONVERSATION: Hyoscyamine not helping symptoms and no longer covered by plan  SYMPTOMS: Abd cramping x one week, that radiates to lower back.  Denies any other symptoms.  Bowel urgency.  OTHER: Pt states Hyoscyamine is no longer covered by insurance and it hasn't been helping her symptoms recently.   She is allergic to Bentyl(rash/hives).  Would like to know if there is an alternative to those drugs.   DISPOSITION: Discuss with provider and call back.  Please advise.     Reason for Disposition   MILD pain (e.g., does not interfere with normal activities) and pain comes and goes (cramps) lasts > 48 hours  (Exception: This same abdominal pain is a chronic symptom recurrent or ongoing AND present > 4 weeks.)    Answer Assessment - Initial Assessment Questions  1. LOCATION: \"Where does it hurt?\"       General abd pain  2. RADIATION: \"Does the pain shoot anywhere else?\" (e.g., chest, back)      Lower back   3. ONSET: \"When did the pain begin?\" (e.g., minutes, hours or days ago)       One week ago  4. SUDDEN: \"Gradual or sudden onset?\"      Gradual   5. PATTERN \"Does the pain come and go, or is it constant?\"        6. SEVERITY: \"How bad is the pain?\"  (e.g., Scale 1-10; mild, moderate, or severe)      \"Crampy\"  7. RECURRENT SYMPTOM: \"Have you ever had this type of stomach pain before?\" If Yes, ask: \"When was the last time?\" and \"What happened that time?\"       Yes   8. CAUSE: \"What do you think is causing the stomach pain?\"      Unsure   9. RELIEVING/AGGRAVATING FACTORS: \"What makes it better or worse?\" (e.g., antacids, bending or twisting motion, bowel movement)      Nothing   10. OTHER SYMPTOMS: \"Do you have any other symptoms?\" (e.g., back pain, diarrhea, fever, urination pain, vomiting)        Urgency to pass stools    Protocols used: Abdominal Pain - Female-Adult-[place]    "

## 2025-02-27 DIAGNOSIS — R15.2 RECTAL URGENCY: ICD-10-CM

## 2025-02-27 NOTE — TELEPHONE ENCOUNTER
Reason for call:   [x] Refill   [] Prior Auth  [] Other:     Office:   [] PCP/Provider -   [x] Specialty/Provider - Diana M Jaiyeola     Medication: hyoscyamine (LEVSIN/SL) 0.125 mg SL tablet     Dose/Frequency: Take 1 tablet (0.125 mg total) by mouth every 4 (four) hours as needed for cramping     Quantity: 120    Pharmacy: [facility] - [place]     Does the patient have enough for 3 days?   [x] Yes   [] No - Send as HP to POD

## 2025-02-28 ENCOUNTER — VBI (OUTPATIENT)
Dept: ADMINISTRATIVE | Facility: OTHER | Age: 75
End: 2025-02-28

## 2025-02-28 RX ORDER — HYOSCYAMINE SULFATE 0.12 MG/1
0.12 TABLET SUBLINGUAL EVERY 4 HOURS PRN
Qty: 120 TABLET | Refills: 0 | Status: SHIPPED | OUTPATIENT
Start: 2025-02-28

## 2025-02-28 NOTE — TELEPHONE ENCOUNTER
02/28/25 9:52 AM     Chart reviewed for Mammogram was/were not submitted to the patient's insurance.     Sherry Gunn MA   [facility]

## 2025-03-05 ENCOUNTER — TELEPHONE (OUTPATIENT)
Dept: FAMILY MEDICINE CLINIC | Facility: CLINIC | Age: 75
End: 2025-03-05

## 2025-03-05 ENCOUNTER — TELEPHONE (OUTPATIENT)
Age: 75
End: 2025-03-05

## 2025-03-05 DIAGNOSIS — K75.81 LIVER CIRRHOSIS SECONDARY TO NASH (HCC): Primary | ICD-10-CM

## 2025-03-05 DIAGNOSIS — K74.60 LIVER CIRRHOSIS SECONDARY TO NASH (HCC): Primary | ICD-10-CM

## 2025-03-05 DIAGNOSIS — R77.2 ELEVATED AFP: ICD-10-CM

## 2025-03-05 NOTE — TELEPHONE ENCOUNTER
Patient called in regards to needing a CT scan and they informed her that she would need blood work done prior to CT scan. Patient would like provider to place labs to get done for CT scan, patient would like a call back once orders have been placed.

## 2025-03-05 NOTE — TELEPHONE ENCOUNTER
Called pt in regards to CMP lab being ordered today. Pt requesting AFP tumor maker, PTH, CBC, B12, CBC, and Iron labs be ordered by Dr. Ramirez and not another provider. Pt was aware that these labs were already ordered in her chart, but wants then all ordered by only Dr. Ramirez. Pt stated that she needs the labs completed before her CAT scan that's scheduled sometime around 3/20/25. Please advise. Pt uses [facility] and would like to  physical copies of labs when ordered. Call back [phone] .

## 2025-03-06 DIAGNOSIS — D64.9 ANEMIA, UNSPECIFIED TYPE: ICD-10-CM

## 2025-03-06 DIAGNOSIS — I12.9 BENIGN HYPERTENSION WITH CKD (CHRONIC KIDNEY DISEASE) STAGE III (HCC): ICD-10-CM

## 2025-03-06 DIAGNOSIS — C20 RECTAL CANCER (HCC): ICD-10-CM

## 2025-03-06 DIAGNOSIS — K75.81 LIVER CIRRHOSIS SECONDARY TO NASH (HCC): Primary | ICD-10-CM

## 2025-03-06 DIAGNOSIS — R77.2 ELEVATED AFP: ICD-10-CM

## 2025-03-06 DIAGNOSIS — N18.30 BENIGN HYPERTENSION WITH CKD (CHRONIC KIDNEY DISEASE) STAGE III (HCC): ICD-10-CM

## 2025-03-06 DIAGNOSIS — D53.9 NUTRITIONAL ANEMIA, UNSPECIFIED: ICD-10-CM

## 2025-03-06 DIAGNOSIS — K74.60 LIVER CIRRHOSIS SECONDARY TO NASH (HCC): Primary | ICD-10-CM

## 2025-03-06 NOTE — TELEPHONE ENCOUNTER
Pt advised of your message. She stated she usually gets the AFP done every 4 month so that's why she asked to have the AFP done again. Please advise.

## 2025-03-07 DIAGNOSIS — R77.2 ELEVATED AFP: ICD-10-CM

## 2025-03-07 DIAGNOSIS — K74.60 LIVER CIRRHOSIS SECONDARY TO NASH (HCC): Primary | ICD-10-CM

## 2025-03-07 DIAGNOSIS — K75.81 LIVER CIRRHOSIS SECONDARY TO NASH (HCC): Primary | ICD-10-CM

## 2025-03-12 LAB
ALBUMIN SERPL-MCNC: 4.2 G/DL (ref 3.5–5.7)
ALBUMIN/CREAT UR: NORMAL
ALP SERPL-CCNC: 70 U/L (ref 35–120)
ALT SERPL-CCNC: 27 U/L
ANION GAP SERPL CALCULATED.3IONS-SCNC: 7 MMOL/L (ref 3–11)
AST SERPL-CCNC: 33 U/L
BASOPHILS # BLD AUTO: 0 THOU/CMM (ref 0–0.1)
BASOPHILS NFR BLD AUTO: 1 %
BILIRUB SERPL-MCNC: 0.6 MG/DL (ref 0.2–1)
BUN SERPL-MCNC: 23 MG/DL (ref 7–25)
CALCIUM SERPL-MCNC: 9.2 MG/DL (ref 8.5–10.5)
CEA SERPL-MCNC: 1.2 NG/ML
CHLORIDE SERPL-SCNC: 100 MMOL/L (ref 100–109)
CO2 SERPL-SCNC: 32 MMOL/L (ref 21–31)
CREAT SERPL-MCNC: 1.1 MG/DL (ref 0.4–1.1)
CREAT UR-MCNC: 98.3 MG/DL (ref 50–200)
CYTOLOGY CMNT CVX/VAG CYTO-IMP: ABNORMAL
DIFFERENTIAL METHOD BLD: ABNORMAL
EOSINOPHIL # BLD AUTO: 0.1 THOU/CMM (ref 0–0.5)
EOSINOPHIL NFR BLD AUTO: 2 %
ERYTHROCYTE [DISTWIDTH] IN BLOOD BY AUTOMATED COUNT: 20.3 % (ref 12–16)
FERRITIN SERPL-MCNC: 6 NG/ML (ref 11–306.8)
GFR/BSA.PRED SERPLBLD CYS-BASED-ARV: 53 ML/MIN/{1.73_M2}
GLUCOSE SERPL-MCNC: 117 MG/DL (ref 65–99)
HCT VFR BLD AUTO: 30.7 % (ref 35–43)
HGB BLD-MCNC: 10 G/DL (ref 11.5–14.5)
IRON SATN MFR SERPL: 7 % (ref 20–50)
IRON SERPL-MCNC: 36 UG/DL (ref 50–212)
LYMPHOCYTES # BLD AUTO: 0.6 THOU/CMM (ref 1–3)
LYMPHOCYTES NFR BLD AUTO: 15 %
MCH RBC QN AUTO: 23.3 PG (ref 26–34)
MCHC RBC AUTO-ENTMCNC: 32.6 G/DL (ref 32–37)
MCV RBC AUTO: 72 FL (ref 80–100)
MICROALBUMIN UR-MCNC: <0.7 MG/DL
MONOCYTES # BLD AUTO: 0.3 THOU/CMM (ref 0.3–1)
MONOCYTES NFR BLD AUTO: 7 %
NEUTROPHILS # BLD AUTO: 3.1 THOU/CMM (ref 1.8–7.8)
NEUTROPHILS NFR BLD AUTO: 75 %
PHOSPHATE SERPL-MCNC: 4.1 MG/DL (ref 2.3–4.6)
PLATELET # BLD AUTO: 163 THOU/CMM (ref 140–350)
PMV BLD REES-ECKER: 8 FL (ref 7.5–11.3)
POTASSIUM SERPL-SCNC: 3.9 MMOL/L (ref 3.5–5.2)
PROT SERPL-MCNC: 7.8 G/DL (ref 6.3–8.3)
PTH-INTACT SERPL-MCNC: 41.6 PG/ML (ref 12–88)
RBC # BLD AUTO: 4.3 MILL/CMM (ref 3.7–4.7)
SODIUM SERPL-SCNC: 139 MMOL/L (ref 135–145)
TIBC SERPL-MCNC: 512 UG/DL (ref 260–430)
TRANSFERRIN SERPL-MCNC: 366 MG/DL (ref 203–362)
VIT B12 SERPL-MCNC: 555 PG/ML (ref 180–914)
WBC # BLD AUTO: 4.2 THOU/CMM (ref 4–10)

## 2025-03-13 ENCOUNTER — RESULTS FOLLOW-UP (OUTPATIENT)
Dept: OTHER | Facility: HOSPITAL | Age: 75
End: 2025-03-13

## 2025-03-13 NOTE — LETTER
March 13, 2025     Martha Sandoval  [address] [place] [id number]      Dear Ms. Sandoval:    Below are the results from your recent blood work: CEA    Resulted Orders   CEA   Result Value Ref Range    CEA 1.2 <3.0 ng/mL      Comment:      Testing performed using [employer]. Results obtained from   different manufacturers and methods cannot be used interchangeably.       The test results shown above are benign.     If you have any questions or concerns, please don't hesitate to call.         Sincerely,        JUANY Boone MD

## 2025-03-17 ENCOUNTER — TELEPHONE (OUTPATIENT)
Dept: GASTROENTEROLOGY | Facility: AMBULARY SURGERY CENTER | Age: 75
End: 2025-03-17

## 2025-03-17 NOTE — TELEPHONE ENCOUNTER
She is scheduled for a Cat [place] abd / pelvis on 3/24/25 at [facility]. They require the prior auth by Friday 3/21/25 at 3pm    TAX ID # [id number]  NPI# [id number]    [place] [address]  [address] [facility]  [place]    Callback # for Melissa is [phone]

## 2025-03-24 NOTE — TELEPHONE ENCOUNTER
Pt has her cat scan done today and the prior auth needs to be changed. It was done for a cat scan w/ contrast but the order was changed to a cat scan w & w/o contrast. Once the auth has been updated, please call Melissa to inform her at [phone]

## 2025-03-25 ENCOUNTER — RESULTS FOLLOW-UP (OUTPATIENT)
Dept: GASTROENTEROLOGY | Facility: MEDICAL CENTER | Age: 75
End: 2025-03-25

## 2025-03-27 DIAGNOSIS — K75.81 LIVER CIRRHOSIS SECONDARY TO NASH (HCC): Primary | ICD-10-CM

## 2025-03-27 DIAGNOSIS — R77.2 ELEVATED AFP: ICD-10-CM

## 2025-03-27 DIAGNOSIS — K74.60 LIVER CIRRHOSIS SECONDARY TO NASH (HCC): Primary | ICD-10-CM

## 2025-03-27 NOTE — TELEPHONE ENCOUNTER
Pt had a cat scan abd / pelvis w & w/o contract on 3/24/25 at [facility]. The original order was cat scan abd / pelvis with contrast. Please have the provider (Dr Jaiyeola)  submit a new order so the prior auth can be obtained. Thank you.

## 2025-04-06 DIAGNOSIS — I10 BENIGN ESSENTIAL HYPERTENSION: ICD-10-CM

## 2025-04-06 DIAGNOSIS — R15.2 RECTAL URGENCY: ICD-10-CM

## 2025-04-06 DIAGNOSIS — I10 ESSENTIAL HYPERTENSION: ICD-10-CM

## 2025-04-06 DIAGNOSIS — K21.00 REFLUX ESOPHAGITIS: ICD-10-CM

## 2025-04-06 DIAGNOSIS — K74.60 LIVER CIRRHOSIS SECONDARY TO NASH (HCC): ICD-10-CM

## 2025-04-06 DIAGNOSIS — K80.20 MULTIPLE GALLSTONES: ICD-10-CM

## 2025-04-06 DIAGNOSIS — K75.81 LIVER CIRRHOSIS SECONDARY TO NASH (HCC): ICD-10-CM

## 2025-04-07 RX ORDER — HYOSCYAMINE SULFATE 0.12 MG/1
0.12 TABLET SUBLINGUAL EVERY 4 HOURS PRN
Qty: 120 TABLET | Refills: 0 | Status: SHIPPED | OUTPATIENT
Start: 2025-04-07 | End: 2025-04-08 | Stop reason: SDUPTHER

## 2025-04-08 DIAGNOSIS — R15.2 RECTAL URGENCY: ICD-10-CM

## 2025-04-08 RX ORDER — HYOSCYAMINE SULFATE 0.12 MG/1
0.12 TABLET SUBLINGUAL EVERY 4 HOURS PRN
Qty: 120 TABLET | Refills: 0 | OUTPATIENT
Start: 2025-04-08

## 2025-04-08 RX ORDER — OMEPRAZOLE 20 MG/1
20 CAPSULE, DELAYED RELEASE ORAL DAILY
Qty: 90 CAPSULE | Refills: 1 | Status: SHIPPED | OUTPATIENT
Start: 2025-04-08

## 2025-04-08 RX ORDER — URSODIOL 300 MG/1
300 CAPSULE ORAL 2 TIMES DAILY
Qty: 180 CAPSULE | Refills: 1 | Status: SHIPPED | OUTPATIENT
Start: 2025-04-08

## 2025-04-08 RX ORDER — METOPROLOL TARTRATE 100 MG/1
100 TABLET ORAL DAILY
Qty: 90 TABLET | Refills: 1 | Status: SHIPPED | OUTPATIENT
Start: 2025-04-08 | End: 2025-10-05

## 2025-04-08 RX ORDER — FUROSEMIDE 20 MG/1
40 TABLET ORAL DAILY
Qty: 180 TABLET | Refills: 1 | Status: SHIPPED | OUTPATIENT
Start: 2025-04-08

## 2025-04-08 RX ORDER — SPIRONOLACTONE 50 MG/1
100 TABLET, FILM COATED ORAL DAILY
Qty: 180 TABLET | Refills: 1 | Status: SHIPPED | OUTPATIENT
Start: 2025-04-08

## 2025-04-08 RX ORDER — HYOSCYAMINE SULFATE 0.12 MG/1
0.12 TABLET SUBLINGUAL EVERY 4 HOURS PRN
Qty: 120 TABLET | Refills: 1 | Status: SHIPPED | OUTPATIENT
Start: 2025-04-08

## 2025-04-08 RX ORDER — VALSARTAN 160 MG/1
160 TABLET ORAL DAILY
Qty: 90 TABLET | Refills: 1 | Status: SHIPPED | OUTPATIENT
Start: 2025-04-08

## 2025-04-08 NOTE — TELEPHONE ENCOUNTER
NOT A DUPLICATE pt needs it sent to a new pharmacy    Reason for call:   [x] Refill   [] Prior Auth  [] Other:     Office:   [] PCP/Provider -   [x] Specialty/Provider - Gastro    Medication: hyoscyamine (LEVSIN/SL) 0.125 mg SL tablet Take 1 tablet (0.125 mg total) by mouth every 4 (four) hours as needed for cramping       Pharmacy: [id number] - [place] - [address]       Local Pharmacy   Does the patient have enough for 3 days?   [x] Yes   [] No - Send as HP to POD    Mail Away Pharmacy   Does the patient have enough for 10 days?   [] Yes   [] No - Send as HP to POD

## 2025-05-07 ENCOUNTER — OFFICE VISIT (OUTPATIENT)
Dept: GASTROENTEROLOGY | Facility: MEDICAL CENTER | Age: 75
End: 2025-05-07
Payer: COMMERCIAL

## 2025-05-07 ENCOUNTER — APPOINTMENT (OUTPATIENT)
Dept: LAB | Facility: MEDICAL CENTER | Age: 75
End: 2025-05-07
Payer: COMMERCIAL

## 2025-05-07 VITALS
BODY MASS INDEX: 36.62 KG/M2 | WEIGHT: 199 LBS | HEIGHT: 62 IN | HEART RATE: 91 BPM | DIASTOLIC BLOOD PRESSURE: 82 MMHG | TEMPERATURE: 98.3 F | OXYGEN SATURATION: 98 % | SYSTOLIC BLOOD PRESSURE: 125 MMHG

## 2025-05-07 DIAGNOSIS — C20 RECTAL CANCER (HCC): ICD-10-CM

## 2025-05-07 DIAGNOSIS — K74.60 CIRRHOSIS OF LIVER WITHOUT ASCITES, UNSPECIFIED HEPATIC CIRRHOSIS TYPE (HCC): Primary | ICD-10-CM

## 2025-05-07 DIAGNOSIS — R15.2 RECTAL URGENCY: ICD-10-CM

## 2025-05-07 DIAGNOSIS — K86.2 PANCREATIC CYST: ICD-10-CM

## 2025-05-07 DIAGNOSIS — R77.2 ELEVATED AFP: ICD-10-CM

## 2025-05-07 DIAGNOSIS — R19.7 DIARRHEA, UNSPECIFIED TYPE: ICD-10-CM

## 2025-05-07 LAB
AFP-TM SERPL-MCNC: 7.28 NG/ML (ref 0–9)
ALBUMIN SERPL BCG-MCNC: 4.2 G/DL (ref 3.5–5)
ALP SERPL-CCNC: 75 U/L (ref 34–104)
ALT SERPL W P-5'-P-CCNC: 36 U/L (ref 7–52)
ANION GAP SERPL CALCULATED.3IONS-SCNC: 9 MMOL/L (ref 4–13)
AST SERPL W P-5'-P-CCNC: 43 U/L (ref 13–39)
BASOPHILS # BLD AUTO: 0.06 THOUSANDS/ÂΜL (ref 0–0.1)
BASOPHILS NFR BLD AUTO: 1 % (ref 0–1)
BILIRUB SERPL-MCNC: 0.83 MG/DL (ref 0.2–1)
BUN SERPL-MCNC: 26 MG/DL (ref 5–25)
CALCIUM SERPL-MCNC: 9.7 MG/DL (ref 8.4–10.2)
CHLORIDE SERPL-SCNC: 99 MMOL/L (ref 96–108)
CO2 SERPL-SCNC: 30 MMOL/L (ref 21–32)
CREAT SERPL-MCNC: 1.01 MG/DL (ref 0.6–1.3)
EOSINOPHIL # BLD AUTO: 0.17 THOUSAND/ÂΜL (ref 0–0.61)
EOSINOPHIL NFR BLD AUTO: 3 % (ref 0–6)
ERYTHROCYTE [DISTWIDTH] IN BLOOD BY AUTOMATED COUNT: 18.2 % (ref 11.6–15.1)
GFR SERPL CREATININE-BSD FRML MDRD: 55 ML/MIN/1.73SQ M
GLUCOSE P FAST SERPL-MCNC: 104 MG/DL (ref 65–99)
HCT VFR BLD AUTO: 38.2 % (ref 34.8–46.1)
HGB BLD-MCNC: 11.8 G/DL (ref 11.5–15.4)
IMM GRANULOCYTES # BLD AUTO: 0.02 THOUSAND/UL (ref 0–0.2)
IMM GRANULOCYTES NFR BLD AUTO: 0 % (ref 0–2)
INR PPP: 1.06 (ref 0.85–1.19)
LYMPHOCYTES # BLD AUTO: 0.85 THOUSANDS/ÂΜL (ref 0.6–4.47)
LYMPHOCYTES NFR BLD AUTO: 14 % (ref 14–44)
MCH RBC QN AUTO: 24.8 PG (ref 26.8–34.3)
MCHC RBC AUTO-ENTMCNC: 30.9 G/DL (ref 31.4–37.4)
MCV RBC AUTO: 80 FL (ref 82–98)
MONOCYTES # BLD AUTO: 0.4 THOUSAND/ÂΜL (ref 0.17–1.22)
MONOCYTES NFR BLD AUTO: 6 % (ref 4–12)
NEUTROPHILS # BLD AUTO: 4.74 THOUSANDS/ÂΜL (ref 1.85–7.62)
NEUTS SEG NFR BLD AUTO: 76 % (ref 43–75)
NRBC BLD AUTO-RTO: 0 /100 WBCS
PLATELET # BLD AUTO: 182 THOUSANDS/UL (ref 149–390)
PMV BLD AUTO: 9.8 FL (ref 8.9–12.7)
POTASSIUM SERPL-SCNC: 4.5 MMOL/L (ref 3.5–5.3)
PROT SERPL-MCNC: 8.3 G/DL (ref 6.4–8.4)
PROTHROMBIN TIME: 14.1 SECONDS (ref 12.3–15)
RBC # BLD AUTO: 4.76 MILLION/UL (ref 3.81–5.12)
SODIUM SERPL-SCNC: 138 MMOL/L (ref 135–147)
WBC # BLD AUTO: 6.24 THOUSAND/UL (ref 4.31–10.16)

## 2025-05-07 PROCEDURE — 80053 COMPREHEN METABOLIC PANEL: CPT

## 2025-05-07 PROCEDURE — 85610 PROTHROMBIN TIME: CPT

## 2025-05-07 PROCEDURE — 82105 ALPHA-FETOPROTEIN SERUM: CPT

## 2025-05-07 PROCEDURE — 99214 OFFICE O/P EST MOD 30 MIN: CPT

## 2025-05-07 NOTE — ASSESSMENT & PLAN NOTE
Summary: Patient is a 74 y.o. 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 Current MELD-3.0 (15).  Unable to calculate a MELD for today. MELD (15) is from 9/2024.     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  She has been well compensated over the past few years but did report a prior history of ascites needing paracentesis in 2021 when she was diagnosed. Denies any further decompensations.     Ascites   -None present on exam.   -Continue Lasix 40, Aldactone 100  -Encouraged to adhere to a low-sodium (<2000 mg daily) diet.     Esophageal Varices   -Politely declines endoscopy. Aware of risks for varices and complications.    Hepatic Encephalopathy   -None present.   -Patient aware of signs/sxs's of HE and advised to promptly inform provider if experiencing such.    HCC Screening   -Due for AFP today. Continue q3 mos. Discussed importance of following this as AFP was elevated in December.   -CT triple phase 3/24/2025 for HCC screening was negative.    Nutritional Status  -Encouraged high-protein diet in addition to a high-protein snack qHS to prevent prolonged fasting.     Advised the importance of following up with Hepatology.  We discussed natural history, prognosis, and complications of cirrhosis, including decompensation in the form of ascites, variceal bleeding, and hepatic encephalopathy as well as risk for development of HCC.     Orders:    AFP tumor marker; Future    Protime-INR; Future    CBC and differential; Future    Comprehensive metabolic panel; Future    MRI abdomen w wo contrast and mrcp; Future

## 2025-05-07 NOTE — PROGRESS NOTES
Name: Martha Sandoval      : 1950      MRN: [id number]  Encounter Provider: Gladys Reyna PA-C  Encounter Date: 2025   Encounter department: [facility]  :  Assessment & Plan  Cirrhosis of liver without ascites, unspecified hepatic cirrhosis type (HCC)  Summary: Patient is a 74 y.o. 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 Current MELD-3.0 (15).  Unable to calculate a MELD for today. MELD (15) is from 2024.     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  She has been well compensated over the past few years but did report a prior history of ascites needing paracentesis in  when she was diagnosed. Denies any further decompensations.     Ascites   -None present on exam.   -Continue Lasix 40, Aldactone 100  -Encouraged to adhere to a low-sodium (<2000 mg daily) diet.     Esophageal Varices   -Politely declines endoscopy. Aware of risks for varices and complications.    Hepatic Encephalopathy   -None present.   -Patient aware of signs/sxs's of HE and advised to promptly inform provider if experiencing such.    HCC Screening   -Due for AFP today. Continue q3 mos. Discussed importance of following this as AFP was elevated in December.   -CT triple phase 3/24/2025 for HCC screening was negative.    Nutritional Status  -Encouraged high-protein diet in addition to a high-protein snack qHS to prevent prolonged fasting.     Advised the importance of following up with Hepatology.  We discussed natural history, prognosis, and complications of cirrhosis, including decompensation in the form of ascites, variceal bleeding, and hepatic encephalopathy as well as risk for development of HCC.     Orders:    AFP tumor marker; Future    Protime-INR; Future    CBC and differential; Future    Comprehensive metabolic panel; Future    MRI abdomen w wo contrast and  mrcp; Future    Elevated AFP  As above. Repeat today.  Orders:    AFP tumor marker; Future    MRI abdomen w wo contrast and mrcp; Future    Rectal urgency  No watery diarrhea. Deferred rectal exam today. Need to evaluate for infectious diarrhea. Advised to go today to obtain stool studies and get her lab work to update her MELD labs.    Orders:    Stool Enteric Bacterial Panel by PCR; Future    Clostridioides difficile toxin by PCR with EIA; Future    Giardia/Cryptosporidium EIA; Future    Ova and parasite examination; Future    Pancreatic cyst  Known pancreatic cysts. MRI from 1/2023 revealed: Multiple pancreatic cysts less than 1.5 cm, which appear connected to the pancreatic duct, without complicating features.   Per Dr. Fair, repeat in 2025.   Highly recommend the patient proceed with MRI/MRCP for further evaluation of cysts and this can also be used for hepatoma screening.  Patient politely declined further MRIs and also wishes to defer endoscopic evaluation at this time.      Orders:    MRI abdomen w wo contrast and mrcp; Future    Rectal cancer (HCC)  Follows with [facility]. Colonoscopy due 7/2025.        Note written by Violet Castillo PA-C  Visit conducted with Gladys Reyna PA-C  [facility]     History of Present Illness   HPI  Martha Sandoval is a 74 y.o. 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 The patient is s/p laparoscopic colon resection (9/10/2024) with [facility].     The patient is well known to our gastroenterology group for cirrhosis and ongoing rectal urgency.  She was previously seen by Gastroenterology in February 2025 and it was recommended she follow-up with pelvic floor therapy for her rectal urgency and pelvic floor dysfunction.  She has had a normal workup thus far with a normal fecal Juni Protectin level and normal celiac studies.  She was  prescribed Levsin for the cramping and excessive bowel movements which seems to only minimally help her symptoms.  She can have anywhere between five to ten bowel movements per day.  Her bowels are described as soft, formed; denies melena or hematochezia.  She is not able to identify any dietary triggers.  She reports no watery diarrhea.      Interval History:  -Labs 2/04/2025 reveal a normal fecal calpro and celiac studies.   -Last seen 8/01/2024 with hepatology regarding her cirrhosis.   -Has had an elevated AFP (12/2024) and a follow-up triple phase CT was ordered to r/o HCC (3/2025). CT Nodular liver contour is compatible with cirrhosis. No focal hepatic lesion. Mild splenomegaly and trace ascites can indicate portal hypertension. Cholelithiasis, without evidence of biliary obstruction or cholecystitis.  -Patient following with [facility] Dr. Boone. Last appt 2/19/2025. Due for Colonoscopy with them 7/2025.     MELD 3.0: 15 at 9/13/2024  5:27 AM  MELD-Na: 15 at 9/13/2024  5:27 AM  Calculated from:  Serum Creatinine: 0.8 mg/dL (Using min of 1 mg/dL) at 9/13/2024  5:27 AM  Serum Sodium: 134 mmol/L at 9/13/2024  5:27 AM  Total Bilirubin: 1.25 mg/dL at 9/11/2024  4:15 AM  Serum Albumin: 3.3 g/dL at 9/11/2024  4:15 AM  INR(ratio): 1.53 at 9/11/2024  8:52 AM  Age at listing (hypothetical): 74 years  Sex: Female at 9/13/2024  5:27 AM    History obtained from: patient    Review of Systems   All other systems reviewed and are negative.    Pertinent Medical History       Medical History Reviewed by provider this encounter:     .  Past Medical History   Past Medical History:   Diagnosis Date    Cancer (HCC)     Chest pain     last assessed 9/16/13    Chest pressure     last assessed 12/15/16    Chronic sinusitis     last assessed 5/4/17    Clotting disorder (HCC)     Compression fracture of lumbar vertebra (HCC)     last assessed 6/13/15    GERD (gastroesophageal reflux disease)     Hypertension     Liver disease     Lump of  skin     last assessed 1/17/14     Past Surgical History:   Procedure Laterality Date    COLONOSCOPY      CT CYSTOGRAM  9/11/2024    CT CYSTOGRAM  10/2/2024    CYSTOSCOPY N/A 9/10/2024    Procedure: CYSTOSCOPY, COMPLEX CYSTORRHAPHY, B/L URETHRAL CATETERIZATION;  Surgeon: Ismael Mazariegos MD;  Location: [facility];  Service: Urology    HYSTERECTOMY      MO LAPAROSCOPY COLECTOMY PARTIAL W/ANASTOMOSIS N/A 9/10/2024    Procedure: RESECTION COLON LOW ANTERIOR LAPAROSCOPIC HAND ASSISTED CONVERTED TO OPEN;  Surgeon: BRENDA Boone MD;  Location: [facility];  Service: Colorectal    TONSILLECTOMY       Family History   Problem Relation Age of Onset    Hyperlipidemia Mother     Hypertension Mother     Cancer Father     Liver cancer Father     Stomach cancer Father     No Known Problems Son     No Known Problems Daughter       reports that she has never smoked. She has never used smokeless tobacco. She reports that she does not currently use alcohol. She reports that she does not use drugs.  Current Outpatient Medications   Medication Instructions    acetaminophen (TYLENOL) 975 mg, Oral, Every 8 hours scheduled    Alum Hydroxide-Mag Carbonate (GAVISCON PO) Daily    aspirin (ECOTRIN LOW STRENGTH) 81 mg, Daily    cetirizine (ZyrTEC) 10 mg tablet 1 tablet, Daily PRN    cholecalciferol (VITAMIN D3) 400 Units, Daily    cyanocobalamin (VITAMIN B-12) 100 mcg, Daily    Enalapril Maleate POWD     furosemide (LASIX) 40 mg, Oral, Daily    guaiFENesin (MUCINEX) 600 mg, Every 12 hours scheduled    hydrocortisone (ANUSOL-HC) 2.5 % rectal cream Topical, 2 times daily    hyoscyamine (LEVSIN/SL) 0.125 mg, Oral, Every 4 hours PRN    methocarbamol (ROBAXIN) 500 mg, Oral, Every 8 hours scheduled    metoprolol tartrate (LOPRESSOR) 100 mg, Oral, Daily    omeprazole (PRILOSEC) 20 mg, Oral, Daily    psyllium (METAMUCIL) 58.6 % powder 1 packet, Oral, 3 times daily    Pyridoxine HCl (VITAMIN B-6) 100 MG TABS 1 tablet, Daily    spironolactone  (ALDACTONE) 100 mg, Oral, Daily    ursodiol (ACTIGALL) 300 mg, Oral, 2 times daily    valsartan (DIOVAN) 160 mg, Oral, Daily    Vitamin E 200 units TABS 1 tablet, Daily     Allergies   Allergen Reactions    Azithromycin GI Intolerance    Colcrys [Colchicine] Diarrhea    Dicyclomine GI Intolerance, Hives and Other (See Comments)    Erythromycin GI Intolerance    Naproxen Edema    Sulfa Antibiotics Hives    Nitroglycerin Rash    Sulfamethoxazole-Trimethoprim Rash      Current Outpatient Medications on File Prior to Visit   Medication Sig Dispense Refill    Alum Hydroxide-Mag Carbonate (GAVISCON PO) Take by mouth in the morning      aspirin (ECOTRIN LOW STRENGTH) 81 mg EC tablet Take 81 mg by mouth daily      cetirizine (ZyrTEC) 10 mg tablet Take 1 tablet by mouth daily as needed      cholecalciferol (VITAMIN D3) 400 units tablet Take 400 Units by mouth daily      cyanocobalamin (VITAMIN B-12) 100 MCG tablet Take 100 mcg by mouth daily      Enalapril Maleate POWD       furosemide (LASIX) 20 mg tablet Take 2 tablets (40 mg total) by mouth daily 180 tablet 1    guaiFENesin (MUCINEX) 600 mg 12 hr tablet Take 600 mg by mouth every 12 (twelve) hours      hydrocortisone (ANUSOL-HC) 2.5 % rectal cream Apply topically 2 (two) times a day 28 g 1    hyoscyamine (LEVSIN/SL) 0.125 mg SL tablet Take 1 tablet (0.125 mg total) by mouth every 4 (four) hours as needed for cramping 120 tablet 1    metoprolol tartrate (LOPRESSOR) 100 mg tablet Take 1 tablet (100 mg total) by mouth daily 90 tablet 1    omeprazole (PriLOSEC) 20 mg delayed release capsule Take 1 capsule (20 mg total) by mouth daily 90 capsule 1    psyllium (METAMUCIL) 58.6 % powder Take 1 packet by mouth 3 (three) times a day 90 packet 3    Pyridoxine HCl (VITAMIN B-6) 100 MG TABS Take 1 tablet by mouth daily      spironolactone (ALDACTONE) 50 mg tablet Take 2 tablets (100 mg total) by mouth daily 180 tablet 1    ursodiol (ACTIGALL) 300 mg capsule Take 1 capsule (300 mg  "total) by mouth 2 (two) times a day 180 capsule 1    valsartan (DIOVAN) 160 mg tablet Take 1 tablet (160 mg total) by mouth daily 90 tablet 1    Vitamin E 200 units TABS Take 1 tablet by mouth daily      acetaminophen (TYLENOL) 325 mg tablet Take 3 tablets (975 mg total) by mouth every 8 (eight) hours (Patient not taking: Reported on 2/3/2025)      methocarbamol (ROBAXIN) 500 mg tablet Take 1 tablet (500 mg total) by mouth every 8 (eight) hours for 7 days 21 tablet 0     No current facility-administered medications on file prior to visit.      Social History     Tobacco Use    Smoking status: Never    Smokeless tobacco: Never   Vaping Use    Vaping status: Never Used   Substance and Sexual Activity    Alcohol use: Not Currently    Drug use: Never    Sexual activity: Not Currently     Partners: Male        Objective   /82 (BP Location: Right arm)   Pulse 91   Temp 98.3 °F (36.8 °C)   Ht 5' 1.5\" (1.562 m)   Wt 90.3 kg (199 lb)   SpO2 98%   BMI 36.99 kg/m²      Physical Exam  Constitutional:       Appearance: Normal appearance.   Eyes:      Conjunctiva/sclera: Conjunctivae normal.   Cardiovascular:      Rate and Rhythm: Normal rate.   Pulmonary:      Effort: Pulmonary effort is normal.   Abdominal:      General: Abdomen is flat.      Palpations: Abdomen is soft.      Tenderness: There is no guarding.   Skin:     General: Skin is warm and dry.   Neurological:      Mental Status: She is alert. Mental status is at baseline.   Psychiatric:         Mood and Affect: Mood normal.           "

## 2025-05-08 ENCOUNTER — RESULTS FOLLOW-UP (OUTPATIENT)
Dept: GASTROENTEROLOGY | Facility: MEDICAL CENTER | Age: 75
End: 2025-05-08

## 2025-05-08 ENCOUNTER — APPOINTMENT (OUTPATIENT)
Dept: LAB | Facility: MEDICAL CENTER | Age: 75
End: 2025-05-08
Attending: PHYSICIAN ASSISTANT
Payer: COMMERCIAL

## 2025-05-08 DIAGNOSIS — R15.2 RECTAL URGENCY: Primary | ICD-10-CM

## 2025-05-08 DIAGNOSIS — R19.7 DIARRHEA, UNSPECIFIED TYPE: Primary | ICD-10-CM

## 2025-05-08 DIAGNOSIS — K74.60 CIRRHOSIS OF LIVER WITHOUT ASCITES, UNSPECIFIED HEPATIC CIRRHOSIS TYPE (HCC): ICD-10-CM

## 2025-05-08 DIAGNOSIS — C20 RECTAL CANCER (HCC): ICD-10-CM

## 2025-05-08 PROCEDURE — 87209 SMEAR COMPLEX STAIN: CPT

## 2025-05-08 PROCEDURE — 87206 SMEAR FLUORESCENT/ACID STAI: CPT

## 2025-05-08 PROCEDURE — 87015 SPECIMEN INFECT AGNT CONCNTJ: CPT

## 2025-05-08 PROCEDURE — 87505 NFCT AGENT DETECTION GI: CPT

## 2025-05-08 PROCEDURE — 87177 OVA AND PARASITES SMEARS: CPT

## 2025-05-09 ENCOUNTER — APPOINTMENT (OUTPATIENT)
Dept: LAB | Facility: MEDICAL CENTER | Age: 75
End: 2025-05-09
Attending: INTERNAL MEDICINE
Payer: COMMERCIAL

## 2025-05-09 DIAGNOSIS — I12.9 HYPERTENSIVE NEPHROPATHY: ICD-10-CM

## 2025-05-09 DIAGNOSIS — N18.31 CHRONIC KIDNEY DISEASE (CKD) STAGE G3A/A1, MODERATELY DECREASED GLOMERULAR FILTRATION RATE (GFR) BETWEEN 45-59 ML/MIN/1.73 SQUARE METER AND ALBUMINURIA CREATININE RATIO LESS THAN 30 MG/G (HCC): ICD-10-CM

## 2025-05-09 LAB
C COLI+JEJUNI TUF STL QL NAA+PROBE: NEGATIVE
CREAT UR-MCNC: 103.7 MG/DL
CRYPTOSP STL QL ACID FAST STN: NORMAL
EC STX1+STX2 GENES STL QL NAA+PROBE: NEGATIVE
I BELLI SPEC QL ACID FAST MOD KINY STN: NORMAL
MICROALBUMIN UR-MCNC: <7 MG/L
SALMONELLA SP SPAO STL QL NAA+PROBE: NEGATIVE
SHIGELLA SP+EIEC IPAH STL QL NAA+PROBE: NEGATIVE

## 2025-05-09 PROCEDURE — 87329 GIARDIA AG IA: CPT

## 2025-05-09 PROCEDURE — 87493 C DIFF AMPLIFIED PROBE: CPT

## 2025-05-09 PROCEDURE — 82653 EL-1 FECAL QUANTITATIVE: CPT

## 2025-05-10 LAB
C DIFF TOX A+B STL QL IA: NEGATIVE
C DIFF TOX GENS STL QL NAA+PROBE: POSITIVE
G LAMBLIA AG STL QL IA: NEGATIVE

## 2025-05-12 ENCOUNTER — RESULTS FOLLOW-UP (OUTPATIENT)
Dept: GASTROENTEROLOGY | Facility: MEDICAL CENTER | Age: 75
End: 2025-05-12

## 2025-05-12 LAB
CALPROTECTIN STL-MCNC: 94.5 ÂΜG/G
ELASTASE PANC STL-MCNT: >800 UG/G

## 2025-05-12 NOTE — RESULT ENCOUNTER NOTE
Please call the patient with the results    Please let her know that her stool inflammation test is still borderline elevated.  Please ask her if she is still having diarrhea/urgency to have bowel movements?  If she is still having symptoms then she may require sooner colonoscopy to rule out inflammation that can cause diarrhea symptoms.

## 2025-05-15 ENCOUNTER — PREP FOR PROCEDURE (OUTPATIENT)
Dept: GASTROENTEROLOGY | Facility: MEDICAL CENTER | Age: 75
End: 2025-05-15

## 2025-05-15 DIAGNOSIS — R19.5 ELEVATED FECAL CALPROTECTIN: ICD-10-CM

## 2025-05-15 DIAGNOSIS — R15.2 RECTAL URGENCY: ICD-10-CM

## 2025-05-15 DIAGNOSIS — K74.60 CIRRHOSIS OF LIVER WITHOUT ASCITES, UNSPECIFIED HEPATIC CIRRHOSIS TYPE (HCC): Primary | ICD-10-CM

## 2025-05-15 DIAGNOSIS — C20 RECTAL CANCER (HCC): ICD-10-CM

## 2025-05-15 RX ORDER — SODIUM CHLORIDE, SODIUM LACTATE, POTASSIUM CHLORIDE, CALCIUM CHLORIDE 600; 310; 30; 20 MG/100ML; MG/100ML; MG/100ML; MG/100ML
125 INJECTION, SOLUTION INTRAVENOUS CONTINUOUS
OUTPATIENT
Start: 2025-05-15

## 2025-05-15 RX ORDER — SODIUM PICOSULFATE, MAGNESIUM OXIDE, AND ANHYDROUS CITRIC ACID 12; 3.5; 1 G/175ML; G/175ML; MG/175ML
LIQUID ORAL
Qty: 350 ML | Refills: 0 | Status: CANCELLED | OUTPATIENT
Start: 2025-07-23 | End: 2025-07-23

## 2025-05-15 NOTE — TELEPHONE ENCOUNTER
Procedure: colonoscopy  Date: 7/23/2025  Physician performing: Dr. Jaiyeola  Location of procedure:    Instructions given to patient: YES, went over on phone and will be sending via my chart and mailing out to home address.  Diabetic: NO  Clearances: NA

## 2025-05-16 DIAGNOSIS — C20 RECTAL CANCER (HCC): Primary | ICD-10-CM

## 2025-05-16 DIAGNOSIS — R15.2 RECTAL URGENCY: ICD-10-CM

## 2025-05-16 DIAGNOSIS — R19.7 DIARRHEA, UNSPECIFIED TYPE: ICD-10-CM

## 2025-05-16 RX ORDER — SODIUM PICOSULFATE, MAGNESIUM OXIDE, AND ANHYDROUS CITRIC ACID 12; 3.5; 1 G/175ML; G/175ML; MG/175ML
LIQUID ORAL
Qty: 350 ML | Refills: 0 | Status: SHIPPED | OUTPATIENT
Start: 2025-05-16

## 2025-06-12 ENCOUNTER — RA CDI HCC (OUTPATIENT)
Dept: OTHER | Facility: HOSPITAL | Age: 75
End: 2025-06-12

## 2025-06-12 PROBLEM — I13.0 HYPERTENSIVE HEART DISEASE WITH CONGESTIVE HEART FAILURE AND CHRONIC KIDNEY DISEASE (HCC): Status: ACTIVE | Noted: 2025-06-12

## 2025-06-13 NOTE — PROGRESS NOTES
HCC coding opportunities    I13.0, N18.31  GR     Chart Reviewed number of suggestions sent to Provider: 1     Patients Insurance     Medicare Insurance: [employer] Advantage

## 2025-06-18 ENCOUNTER — OFFICE VISIT (OUTPATIENT)
Dept: FAMILY MEDICINE CLINIC | Facility: CLINIC | Age: 75
End: 2025-06-18
Payer: COMMERCIAL

## 2025-06-18 VITALS
HEIGHT: 62 IN | WEIGHT: 202.4 LBS | HEART RATE: 62 BPM | DIASTOLIC BLOOD PRESSURE: 78 MMHG | OXYGEN SATURATION: 99 % | BODY MASS INDEX: 37.25 KG/M2 | SYSTOLIC BLOOD PRESSURE: 120 MMHG

## 2025-06-18 DIAGNOSIS — K75.81 LIVER CIRRHOSIS SECONDARY TO NASH (HCC): ICD-10-CM

## 2025-06-18 DIAGNOSIS — N18.31 STAGE 3A CHRONIC KIDNEY DISEASE (HCC): ICD-10-CM

## 2025-06-18 DIAGNOSIS — E78.5 HYPERLIPIDEMIA, UNSPECIFIED HYPERLIPIDEMIA TYPE: ICD-10-CM

## 2025-06-18 DIAGNOSIS — K74.60 LIVER CIRRHOSIS SECONDARY TO NASH (HCC): ICD-10-CM

## 2025-06-18 DIAGNOSIS — I10 ESSENTIAL HYPERTENSION: Primary | ICD-10-CM

## 2025-06-18 PROCEDURE — G2211 COMPLEX E/M VISIT ADD ON: HCPCS | Performed by: FAMILY MEDICINE

## 2025-06-18 PROCEDURE — 99214 OFFICE O/P EST MOD 30 MIN: CPT | Performed by: FAMILY MEDICINE

## 2025-06-18 NOTE — ASSESSMENT & PLAN NOTE
Lab Results   Component Value Date    EGFR 55 05/07/2025    EGFR 53 (L) 03/12/2025    EGFR 62 12/20/2024    CREATININE 1.01 05/07/2025    CREATININE 1.10 03/12/2025    CREATININE 0.96 12/20/2024

## 2025-06-18 NOTE — PROGRESS NOTES
"Name: Martha Sandoval      : 1950      MRN: [id number]  Encounter Provider: Janny Ramirez DO  Encounter Date: 2025   Encounter department: [facility]  :  Assessment & Plan  Essential hypertension         Stage 3a chronic kidney disease (HCC)  Lab Results   Component Value Date    EGFR 55 2025    EGFR 53 (L) 2025    EGFR 62 2024    CREATININE 1.01 2025    CREATININE 1.10 2025    CREATININE 0.96 2024            Hyperlipidemia, unspecified hyperlipidemia type         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)               Depression Screening and Follow-up Plan: Patient was screened for depression during today's encounter. They screened negative with a PHQ-2 score of 0.        History of Present Illness   Patient still with frequent BM's.  She is scheduled for colonoscopy.      Review of Systems   Constitutional:  Negative for chills and fever.   HENT:  Negative for congestion and sore throat.    Respiratory:  Negative for chest tightness.    Cardiovascular:  Negative for chest pain and palpitations.   Gastrointestinal:         Frequent BM's   Genitourinary:  Negative for difficulty urinating.   Skin: Negative.    Neurological:  Negative for dizziness and headaches.   Psychiatric/Behavioral: Negative.         Objective   /78 (BP Location: Left arm, Patient Position: Sitting, Cuff Size: Large)   Pulse 62   Ht 5' 1.5\" (1.562 m)   Wt 91.8 kg (202 lb 6.4 oz)   SpO2 99%   BMI 37.62 kg/m²      Physical Exam  Vitals and nursing note reviewed.   Constitutional:       General: She is not in acute distress.  HENT:      Head: Normocephalic.   Neck:      Thyroid: No thyromegaly.     Cardiovascular:      Rate and Rhythm: Normal rate and regular rhythm.      Heart sounds: Normal heart sounds.   Pulmonary:      Effort: Pulmonary effort is normal.      Breath sounds: Normal breath sounds.     Musculoskeletal:      Right lower leg: No edema.      Left lower " leg: No edema.   Lymphadenopathy:      Cervical: No cervical adenopathy.     Skin:     General: Skin is warm and dry.     Neurological:      Mental Status: She is alert and oriented to person, place, and time.     Psychiatric:         Mood and Affect: Mood normal.

## 2025-07-01 DIAGNOSIS — R15.2 RECTAL URGENCY: ICD-10-CM

## 2025-07-01 RX ORDER — HYOSCYAMINE SULFATE 0.12 MG/1
0.12 TABLET SUBLINGUAL EVERY 4 HOURS PRN
Qty: 120 TABLET | Refills: 2 | Status: SHIPPED | OUTPATIENT
Start: 2025-07-01

## 2025-07-15 ENCOUNTER — TELEPHONE (OUTPATIENT)
Age: 75
End: 2025-07-15

## 2025-07-16 NOTE — TELEPHONE ENCOUNTER
Called the patient and went over the provider message. Patient verbalized understanding, had additional urinalysis questions but will go through their PCP for answers. Thank you.

## 2025-07-23 ENCOUNTER — ANESTHESIA (OUTPATIENT)
Dept: GASTROENTEROLOGY | Facility: HOSPITAL | Age: 75
End: 2025-07-23
Payer: COMMERCIAL

## 2025-07-23 ENCOUNTER — ANESTHESIA EVENT (OUTPATIENT)
Dept: GASTROENTEROLOGY | Facility: HOSPITAL | Age: 75
End: 2025-07-23
Payer: COMMERCIAL

## 2025-07-23 ENCOUNTER — HOSPITAL ENCOUNTER (OUTPATIENT)
Dept: GASTROENTEROLOGY | Facility: HOSPITAL | Age: 75
Setting detail: OUTPATIENT SURGERY
Discharge: HOME | End: 2025-07-23
Attending: INTERNAL MEDICINE
Payer: COMMERCIAL

## 2025-07-23 VITALS
DIASTOLIC BLOOD PRESSURE: 50 MMHG | TEMPERATURE: 97.6 F | RESPIRATION RATE: 14 BRPM | SYSTOLIC BLOOD PRESSURE: 105 MMHG | HEART RATE: 55 BPM | OXYGEN SATURATION: 98 %

## 2025-07-23 DIAGNOSIS — R19.5 ELEVATED FECAL CALPROTECTIN: ICD-10-CM

## 2025-07-23 DIAGNOSIS — R15.2 RECTAL URGENCY: ICD-10-CM

## 2025-07-23 DIAGNOSIS — C20 RECTAL CANCER (HCC): ICD-10-CM

## 2025-07-23 PROCEDURE — 45380 COLONOSCOPY AND BIOPSY: CPT | Performed by: INTERNAL MEDICINE

## 2025-07-23 PROCEDURE — 45385 COLONOSCOPY W/LESION REMOVAL: CPT | Performed by: INTERNAL MEDICINE

## 2025-07-23 PROCEDURE — 88305 TISSUE EXAM BY PATHOLOGIST: CPT | Performed by: PATHOLOGY

## 2025-07-23 RX ORDER — SODIUM CHLORIDE, SODIUM LACTATE, POTASSIUM CHLORIDE, CALCIUM CHLORIDE 600; 310; 30; 20 MG/100ML; MG/100ML; MG/100ML; MG/100ML
75 INJECTION, SOLUTION INTRAVENOUS CONTINUOUS
Status: DISCONTINUED | OUTPATIENT
Start: 2025-07-23 | End: 2025-07-27 | Stop reason: HOSPADM

## 2025-07-23 RX ORDER — SODIUM CHLORIDE, SODIUM LACTATE, POTASSIUM CHLORIDE, CALCIUM CHLORIDE 600; 310; 30; 20 MG/100ML; MG/100ML; MG/100ML; MG/100ML
125 INJECTION, SOLUTION INTRAVENOUS CONTINUOUS
Status: DISCONTINUED | OUTPATIENT
Start: 2025-07-23 | End: 2025-07-27 | Stop reason: HOSPADM

## 2025-07-23 RX ORDER — PROPOFOL 10 MG/ML
INJECTION, EMULSION INTRAVENOUS CONTINUOUS PRN
Status: DISCONTINUED | OUTPATIENT
Start: 2025-07-23 | End: 2025-07-23

## 2025-07-23 RX ORDER — LIDOCAINE HYDROCHLORIDE 10 MG/ML
INJECTION, SOLUTION EPIDURAL; INFILTRATION; INTRACAUDAL; PERINEURAL AS NEEDED
Status: DISCONTINUED | OUTPATIENT
Start: 2025-07-23 | End: 2025-07-23

## 2025-07-23 RX ORDER — SODIUM CHLORIDE 9 MG/ML
INJECTION, SOLUTION INTRAVENOUS CONTINUOUS PRN
Status: DISCONTINUED | OUTPATIENT
Start: 2025-07-23 | End: 2025-07-23

## 2025-07-23 RX ORDER — PROPOFOL 10 MG/ML
INJECTION, EMULSION INTRAVENOUS AS NEEDED
Status: DISCONTINUED | OUTPATIENT
Start: 2025-07-23 | End: 2025-07-23

## 2025-07-23 RX ORDER — SODIUM CHLORIDE, SODIUM LACTATE, POTASSIUM CHLORIDE, CALCIUM CHLORIDE 600; 310; 30; 20 MG/100ML; MG/100ML; MG/100ML; MG/100ML
INJECTION, SOLUTION INTRAVENOUS CONTINUOUS PRN
Status: DISCONTINUED | OUTPATIENT
Start: 2025-07-23 | End: 2025-07-23

## 2025-07-23 RX ADMIN — PROPOFOL 100 MCG/KG/MIN: 10 INJECTION, EMULSION INTRAVENOUS at 10:44

## 2025-07-23 RX ADMIN — SODIUM CHLORIDE: 0.9 INJECTION, SOLUTION INTRAVENOUS at 09:38

## 2025-07-23 RX ADMIN — PROPOFOL 150 MG: 10 INJECTION, EMULSION INTRAVENOUS at 10:44

## 2025-07-23 RX ADMIN — SODIUM CHLORIDE, SODIUM LACTATE, POTASSIUM CHLORIDE, AND CALCIUM CHLORIDE 125 ML/HR: .6; .31; .03; .02 INJECTION, SOLUTION INTRAVENOUS at 09:38

## 2025-07-23 RX ADMIN — SODIUM CHLORIDE, SODIUM LACTATE, POTASSIUM CHLORIDE, AND CALCIUM CHLORIDE: .6; .31; .03; .02 INJECTION, SOLUTION INTRAVENOUS at 10:31

## 2025-07-23 RX ADMIN — Medication 40 MG: at 10:48

## 2025-07-23 RX ADMIN — LIDOCAINE HYDROCHLORIDE 50 MG: 10 INJECTION, SOLUTION EPIDURAL; INFILTRATION; INTRACAUDAL; PERINEURAL at 10:44

## 2025-07-23 NOTE — ANESTHESIA POSTPROCEDURE EVALUATION
Post-Op Assessment Note    CV Status:  Stable  Pain Score: 0    Pain management: adequate       Mental Status:  Alert and awake   Hydration Status:  Stable   PONV Controlled:  None   Airway Patency:  Patent     Post Op Vitals Reviewed: Yes    No anethesia notable event occurred.    Staff: CRNA           Last Filed PACU Vitals:  Vitals Value Taken Time   Temp     Pulse 58    /66    Resp 20    SpO2 97

## 2025-07-23 NOTE — ANESTHESIA PREPROCEDURE EVALUATION
Procedure:  COLONOSCOPY    Relevant Problems   CARDIO   (+) Atherosclerotic plaque   (+) Essential hypertension   (+) Hyperlipidemia   (+) Hypertensive heart disease with congestive heart failure and chronic kidney disease (HCC)      ENDO   (+) Hypothyroidism      GI/HEPATIC   (+) 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)   (+) Rectal cancer (HCC)      /RENAL   (+) Hypertensive heart disease with congestive heart failure and chronic kidney disease (HCC)   (+) Stage 3a chronic kidney disease (HCC)      MUSCULOSKELETAL   (+) Gout of right foot      ECHO 9/11/24:      Left Ventricle: Left ventricular cavity size is normal. Wall thickness is normal. There is no concentric hypertrophy. There is concentric remodeling. The left ventricular ejection fraction is 65%. Systolic function is normal. Wall motion is normal. Diastolic function is normal.    Aortic Valve: There is aortic valve sclerosis.     Findings    Left Ventricle Left ventricular cavity size is normal. Wall thickness is normal. There is no concentric hypertrophy. There is concentric remodeling. The left ventricular ejection fraction is 65%. Systolic function is normal. Wall motion is normal. Diastolic function is normal.   Right Ventricle Right ventricular cavity size is normal. Systolic function is normal.   Left Atrium The atrium is normal in size.   Right Atrium The atrium is normal in size.   Aortic Valve The aortic valve is trileaflet. The leaflets are not thickened. The leaflets are mildly calcified. There is trace regurgitation. There is aortic valve sclerosis.   Mitral Valve Mitral valve structure is normal.  There is no evidence of regurgitation. There is no evidence of stenosis.   Tricuspid Valve Tricuspid valve structure is normal. There is no evidence of regurgitation. There is no evidence of stenosis.   Pulmonic Valve Pulmonic valve structure is normal. There is no evidence of regurgitation. There is no evidence of stenosis.   Ascending Aorta The  aortic root is normal in size.   Pericardium There is no pericardial effusion. The pericardium is normal in appearance.     Physical Exam    Airway     Mallampati score: II  TM Distance: >3 FB  Neck ROM: full      Cardiovascular      Dental   No notable dental hx     Pulmonary      Neurological    She appears awake, alert and oriented x3.      Other Findings  post-pubertal.      Anesthesia Plan  ASA Score- 3     Anesthesia Type- IV sedation with anesthesia with ASA Monitors.         Additional Monitors:     Airway Plan: natural airway.           Plan Factors-Exercise tolerance (METS): <4 METS.    Chart reviewed.    Patient summary reviewed.    Patient is not a current smoker.  Patient did not smoke on day of surgery.            Induction- intravenous.    Postoperative Plan- .   Monitoring Plan - Monitoring plan - standard ASA monitoring      Perioperative Resuscitation Plan - Level 1 - Full Code.       Informed Consent- Anesthetic plan and risks discussed with patient.  I personally reviewed this patient with the CRNA. Discussed and agreed on the Anesthesia Plan with the CRNA..      NPO Status:  No vitals data found for the desired time range.

## 2025-07-25 PROCEDURE — 88305 TISSUE EXAM BY PATHOLOGIST: CPT | Performed by: PATHOLOGY

## 2025-08-21 ENCOUNTER — VBI (OUTPATIENT)
Dept: ADMINISTRATIVE | Facility: OTHER | Age: 75
End: 2025-08-21

## (undated) DEVICE — POOLE SUCTION HANDLE: Brand: CARDINAL HEALTH

## (undated) DEVICE — 2, DISPOSABLE SUCTION/IRRIGATOR WITHOUT DISPOSABLE TIP: Brand: STRYKEFLOW

## (undated) DEVICE — INTENDED FOR TISSUE SEPARATION, AND OTHER PROCEDURES THAT REQUIRE A SHARP SURGICAL BLADE TO PUNCTURE OR CUT.: Brand: BARD-PARKER SAFETY BLADES SIZE 10, STERILE

## (undated) DEVICE — CHLORHEXIDINE 4PCT 4 OZ

## (undated) DEVICE — 40595 XL TRENDELENBURG POSITIONING KIT: Brand: 40595 XL TRENDELENBURG POSITIONING KIT

## (undated) DEVICE — 3M™ IOBAN™ 2 ANTIMICROBIAL INCISE DRAPE 6650EZ: Brand: IOBAN™ 2

## (undated) DEVICE — ENSEAL X1 TISSUE SEALER, CURVED JAW, 37 CM SHAFT LENGTH: Brand: ENSEAL

## (undated) DEVICE — GLOVE INDICATOR PI UNDERGLOVE SZ 8 BLUE

## (undated) DEVICE — TOWEL SET X-RAY

## (undated) DEVICE — INSUFLATION TUBING INSUFLOW (LEXION)

## (undated) DEVICE — GUIDEWIRE STRGHT TIP 0.035 IN  SOLO PLUS

## (undated) DEVICE — UNDER BUTTOCKS DRAPE W/FLUID CONTROL POUCH: Brand: CONVERTORS

## (undated) DEVICE — ELECTRODE BLADE MOD E-Z CLEAN 2.5IN 6.4CM -0012M

## (undated) DEVICE — Device: Brand: DEFENDO AIR/WATER/SUCTION AND BIOPSY VALVE

## (undated) DEVICE — CYSTOSCOPY PACK: Brand: CONVERTORS

## (undated) DEVICE — SUT MONOCRYL PLUS 4-0 PS-2 18 IN MCP496G

## (undated) DEVICE — TRAVELKIT CONTAINS FIRST STEP KIT (200ML EP-4 KIT) AND SOILED SCOPE BAG - 1 KIT: Brand: TRAVELKIT CONTAINS FIRST STEP KIT AND SOILED SCOPE BAG

## (undated) DEVICE — PREMIUM DRY TRAY LF: Brand: MEDLINE INDUSTRIES, INC.

## (undated) DEVICE — TROCAR: Brand: KII SLEEVE

## (undated) DEVICE — CO2 AND WATER TUBING/CAP SET FOR OLYMPUS® SCOPES & UCR: Brand: ERBE

## (undated) DEVICE — SUT PDS PLUS 1 CTX 36IN PDP371T

## (undated) DEVICE — CATH FOLEY 22FR 5ML 2WAY LUBRICATH

## (undated) DEVICE — ANTIBACTERIAL UNDYED BRAIDED (POLYGLACTIN 910), SYNTHETIC ABSORBABLE SUTURE: Brand: COATED VICRYL

## (undated) DEVICE — SUT PROLENE 2-0 KS 30 IN 8623H

## (undated) DEVICE — TOWEL SURG XR DETECT GREEN STRL RFD

## (undated) DEVICE — TROCAR: Brand: KII FIOS FIRST ENTRY

## (undated) DEVICE — GLOVE SRG BIOGEL 7.5

## (undated) DEVICE — GLOVE SRG BIOGEL ECLIPSE 7.5

## (undated) DEVICE — VISUALIZATION SYSTEM: Brand: CLEARIFY

## (undated) DEVICE — TRAY FOLEY 16FR URIMETER SILICONE SURESTEP

## (undated) DEVICE — PLUMEPEN PRO 10FT

## (undated) DEVICE — ENDOPATH ECHELON ENDOSCOPIC LINEAR CUTTER RELOADS, WHITE, 60MM: Brand: ECHELON ENDOPATH

## (undated) DEVICE — ECHELON CONTOUR W/ BLUE RELOAD: Brand: ECHELON

## (undated) DEVICE — EXOFIN PRECISION PEN HIGH VISCOSITY TOPICAL SKIN ADHESIVE: Brand: EXOFIN PRECISION PEN, 1G

## (undated) DEVICE — DRAPE SHEET THREE QUARTER

## (undated) DEVICE — ECHELON FLEX 60 ARTICULATING ENDOSCOPIC LINEAR CUTTER (NO CARTRIDGE): Brand: ECHELON FLEX ENDOPATH

## (undated) DEVICE — ACCESS PLATFORM FOR MINIMALLY INVASIVE SURGERY.: Brand: GELPORT® LAPAROSCOPIC  SYSTEM

## (undated) DEVICE — PACK PBDS STERILE LAP LITHOTOMY RF